# Patient Record
Sex: MALE | Race: WHITE | NOT HISPANIC OR LATINO | Employment: FULL TIME | URBAN - METROPOLITAN AREA
[De-identification: names, ages, dates, MRNs, and addresses within clinical notes are randomized per-mention and may not be internally consistent; named-entity substitution may affect disease eponyms.]

---

## 2017-06-16 ENCOUNTER — TRANSFERRED RECORDS (OUTPATIENT)
Dept: HEALTH INFORMATION MANAGEMENT | Facility: CLINIC | Age: 67
End: 2017-06-16

## 2017-06-21 ENCOUNTER — MEDICAL CORRESPONDENCE (OUTPATIENT)
Dept: HEALTH INFORMATION MANAGEMENT | Facility: CLINIC | Age: 67
End: 2017-06-21

## 2017-09-14 ENCOUNTER — TRANSFERRED RECORDS (OUTPATIENT)
Dept: HEALTH INFORMATION MANAGEMENT | Facility: CLINIC | Age: 67
End: 2017-09-14

## 2017-09-17 ASSESSMENT — ENCOUNTER SYMPTOMS
ARTHRALGIAS: 1
HYPERTENSION: 1
NUMBNESS: 1
TINGLING: 1
TREMORS: 1

## 2017-09-18 ENCOUNTER — PRE VISIT (OUTPATIENT)
Dept: NEUROLOGY | Facility: CLINIC | Age: 67
End: 2017-09-18

## 2017-09-18 NOTE — TELEPHONE ENCOUNTER
1.  Date/reason for appt:  9/28/17   DBS Consult - Tremors    2.  Referring provider:  Dr Ramirez, [facility]    3.  Call to patient (Yes / No - short description):  No referred  Received records from [facility], sent to clinic    4.  Previous care at / records requested from:  [facility]

## 2017-09-19 NOTE — TELEPHONE ENCOUNTER
Records received from [facility].   Included  Office notes: 2/27/14, 5/6/14, 9/9/14, 1/30/15, 6/12/15, 10/29/15, 2/12/16, 10/7/16, 2/3/17, 6/8/17, 9/14/17  Radiology reports: CT maxio facial 6/14/13  CT head 3/4/14  MR brain 6/16/17  Other: EMG 7/14/15

## 2017-09-25 ENCOUNTER — TRANSFERRED RECORDS (OUTPATIENT)
Dept: HEALTH INFORMATION MANAGEMENT | Facility: CLINIC | Age: 67
End: 2017-09-25

## 2017-09-28 ENCOUNTER — OFFICE VISIT (OUTPATIENT)
Dept: NEUROLOGY | Facility: CLINIC | Age: 67
End: 2017-09-28

## 2017-09-28 VITALS
WEIGHT: 293 LBS | HEART RATE: 71 BPM | HEIGHT: 74 IN | DIASTOLIC BLOOD PRESSURE: 73 MMHG | BODY MASS INDEX: 37.6 KG/M2 | SYSTOLIC BLOOD PRESSURE: 123 MMHG

## 2017-09-28 DIAGNOSIS — G25.0 ESSENTIAL TREMOR: ICD-10-CM

## 2017-09-28 DIAGNOSIS — R29.818 PARKINSONIAN FEATURES: Primary | ICD-10-CM

## 2017-09-28 RX ORDER — DOXEPIN HYDROCHLORIDE 10 MG/1
10 CAPSULE ORAL AT BEDTIME
COMMUNITY
Start: 2016-12-30 | End: 2022-08-19

## 2017-09-28 RX ORDER — FINASTERIDE 5 MG/1
TABLET, FILM COATED ORAL
COMMUNITY
Start: 2017-09-18 | End: 2019-01-11

## 2017-09-28 RX ORDER — FLUTICASONE PROPIONATE 50 MCG
SPRAY, SUSPENSION (ML) NASAL
COMMUNITY
Start: 2013-09-30 | End: 2018-05-02

## 2017-09-28 RX ORDER — CIPROFLOXACIN 500 MG/1
500 TABLET, FILM COATED ORAL
COMMUNITY
Start: 2017-08-07 | End: 2018-01-22

## 2017-09-28 RX ORDER — TOPIRAMATE 100 MG/1
TABLET, FILM COATED ORAL
COMMUNITY
Start: 2017-09-14 | End: 2019-06-21

## 2017-09-28 RX ORDER — ASPIRIN 81 MG/1
81 TABLET, CHEWABLE ORAL EVERY EVENING
Status: ON HOLD | COMMUNITY
Start: 2011-10-19 | End: 2018-05-30

## 2017-09-28 RX ORDER — ALLOPURINOL 300 MG/1
TABLET ORAL
COMMUNITY
Start: 2017-08-13 | End: 2022-08-19

## 2017-09-28 RX ORDER — GABAPENTIN 300 MG/1
300 CAPSULE ORAL
COMMUNITY
End: 2017-09-28

## 2017-09-28 RX ORDER — CARBIDOPA AND LEVODOPA 25; 100 MG/1; MG/1
TABLET ORAL
Qty: 270 TABLET | Refills: 3 | Status: SHIPPED | OUTPATIENT
Start: 2017-09-28 | End: 2017-11-13

## 2017-09-28 RX ORDER — CICLOPIROX 80 MG/ML
SOLUTION TOPICAL
COMMUNITY
Start: 2017-01-04 | End: 2018-05-15

## 2017-09-28 RX ORDER — LORAZEPAM 0.5 MG
2 TABLET ORAL 2 TIMES DAILY
Status: ON HOLD | COMMUNITY
Start: 2011-12-06 | End: 2018-05-30

## 2017-09-28 RX ORDER — CETIRIZINE HYDROCHLORIDE 5 MG/1
5 TABLET ORAL
COMMUNITY
Start: 2011-10-19 | End: 2018-01-22

## 2017-09-28 RX ORDER — MULTIVIT-MIN/IRON/FOLIC ACID/K 18-600-40
CAPSULE ORAL
COMMUNITY
Start: 2011-12-06 | End: 2018-01-22

## 2017-09-28 RX ORDER — HYDROCODONE BITARTRATE AND ACETAMINOPHEN 10; 325 MG/1; MG/1
1 TABLET ORAL EVERY 8 HOURS PRN
Status: ON HOLD | COMMUNITY
Start: 2016-12-30 | End: 2018-05-30

## 2017-09-28 RX ORDER — AMLODIPINE BESYLATE 5 MG/1
5 TABLET ORAL EVERY MORNING
COMMUNITY
Start: 2016-12-30

## 2017-09-28 RX ORDER — LIRAGLUTIDE 6 MG/ML
INJECTION SUBCUTANEOUS
COMMUNITY
Start: 2017-09-05 | End: 2023-07-14 | Stop reason: ALTCHOICE

## 2017-09-28 RX ORDER — POTASSIUM CHLORIDE 750 MG/1
CAPSULE, EXTENDED RELEASE ORAL
COMMUNITY
Start: 2017-09-09 | End: 2019-12-13

## 2017-09-28 RX ORDER — SIMVASTATIN 40 MG
40 TABLET ORAL AT BEDTIME
COMMUNITY
Start: 2016-12-30

## 2017-09-28 RX ORDER — GLIPIZIDE 10 MG/1
10 TABLET ORAL 2 TIMES DAILY
COMMUNITY
Start: 2017-08-29 | End: 2023-07-14 | Stop reason: ALTCHOICE

## 2017-09-28 RX ORDER — DIPHENOXYLATE HCL/ATROPINE 2.5-.025MG
2 TABLET ORAL 4 TIMES DAILY PRN
COMMUNITY
Start: 2017-08-07

## 2017-09-28 RX ORDER — CLOTRIMAZOLE AND BETAMETHASONE DIPROPIONATE 10; .64 MG/G; MG/G
CREAM TOPICAL
COMMUNITY
Start: 2016-06-24 | End: 2018-09-14

## 2017-09-28 ASSESSMENT — PAIN SCALES - GENERAL: PAINLEVEL: NO PAIN (0)

## 2017-09-28 NOTE — NURSING NOTE
Chief Complaint   Patient presents with     Consult     New Movement disorder- DBS assessment     Deana GEE

## 2017-09-28 NOTE — LETTER
2017       RE: Stefan Odonnell  [address]  [facility] [address]     Dear Colleague,    Thank you for referring your patient, Stefan Odonnell, to the [facility] NEUROLOGY at [facility]. Please see a copy of my visit note below.    REFERRING PROVIDER: Dr. Edmundo Ramirez    PATIENT: Stefan Odonnell    : 1950    YANELIS: 2017    REASON FOR VISIT: Evaluation of tremor.    HPI: Mr. Stefan Odonnell is a 67 year old right - male who came to the [facility] neurology clinic accompanied by his wife, Blanca, for evaluation of deep brain stimulation (DBS) surgery as referred by Dr. Edmundo Ramirez.  Pt & wife live in [place], [place] about 3 hours away from clinic.    He reports tremor started about 5 years ago in right hand.  Tremor has not responded to medication.  He has been tried on Propranolol, primidone, & gabapentin.  Currently, he is on Topiramate 100 mg TID.  He hasn't been on antiparkinsonian medications.  See Hx of symptoms below.    ROS for TREMOR: -   Tremor location:  Right hand.  He occasionally notices leg tremor when he is over-tired.   Able to hold utensils to eat: Hard to manage utensils. Learning to eat with his left hand.   Randal to cut food: His wife cut foods, as he is unable to cut due to tremor.   Exacerbating factors: Tremor gets worse with old weather & when trying to do something.   Relieving factors: Putting pressure on it.  Does alcohol relieve symptoms: Doesn't drink alcohol that much to notice a change.   Change in handwriting: Illegible.  His wife signs his name.   Family Hx of tremor: In his father & son. No official diagnosis.   Resting tremor: Yes.  Tremor is present all the time; at rest & with action.   Bradykinesia: Yes.  Rigidity: He has stiffness in legs.  Gait problem: Walks with caution.  Wife has noticed that strides are shorter.  He doesn't walk as fast as he used to.  Falls: About 3 months ago, he fell out of bed & split his right ear.  He  doesn't know what happened.  He denies having vivid dreams.  His wife reports that he doesn't talk or yell in sleep.    Dystonia: In the last 3 - 4 weeks, he has noticed intermittent cramping in his legs at night.  His toes curl up.  He has cut down salt to see if it helps.  It's unclear if it has helped.   Pain: Bilateral knees from arthritis & feet from neuropathy.  Numbness: Both feet & hands.   Tingling: Both feet & hands.    Pt has completed a [facility] Services Patient Health History, including a 14-system review. This will be scanned into Epic. He states no additional problems other than the ones mentioned above & under medical history.    DATE REVIEW: (MRI, Labs) Notes from Dr. Ramirez & Dr. Last      MEDICATIONS:  Medication Sig     allopurinol (ZYLOPRIM) 300 MG  TAKE 1 TABLET BY MOUTH DAILY     amLODIPine (NORVASC) 5 MG  Take 5 mg by mouth     aspirin 81 MG chewable  Take 81 mg by mouth     cetirizine (ZYRTEC) 5 MG  Take 5 mg by mouth     Cholecalciferol (VITAMIN D) 2000 UNITS       ciclopirox 8 % SOLN      ciprofloxacin (CIPRO) 500 MG  Take 500 mg by mouth     clotrimazole-betamethasone (LOTRISONE) cream as needed.     diphenoxylate-atropine (LOMOTIL) 2.5-0.025 MG per tablet      doxepin (SINEQUAN) 10 MG capsule Take 10 mg by mouth     insulin pen needle (EASY TOUCH PEN NEEDLES) 32G X 5 MM USE WITH INSULIN THREE TIMES DAILY     finasteride (PROSCAR) 5 MG  TAKE 1 TABLET BY MOUTH ONCE DAILY. DO NOT CRUSH.     fluticasone (FLONASE) 50 MCG/ACT spray USE TWO SPRAYS IN EACH NOSTRIL ONCE DAILY. SHAKE WELL BEFORE USE.     glipiZIDE (GLUCOTROL) 10 MG  TAKE 1 TABLET BY MOUTH TWICE DAILY BEFORE MEALS.     HYDROcodone-acetaminophen (NORCO)  MG per tablet      insulin glargine (LANTUS) 100 UNIT/ML injection Inject 27 units in morning and 24 units at night.     Misc Natural Products (TART CHERRY ADVANCED) CAPS      potassium chloride (MICRO-K) 10 MEQ CR capsule TAKE 1 CAPSULE BY MOUTH TWICE DAILY WITH  "MEALS. SWALLOW WHOLE, DO NOT CHEW. CAPSULES MAY BE OPENED AND SPRINKLED ON FOOD.     simvastatin (ZOCOR) 40 MG  Take 40 mg by mouth     topiramate (TOPAMAX) 100 MG  Do not crush.  1 PO TID; 3 Month Supply     liraglutide (VICTOZA PEN) 18 MG/3ML soln INJECT 1.8 MG UNDER THE SKIN ONE TIME A DAY       ALLERGIES: Atorvastatin; Clindamycin; and Gabapentin    PMH:  Anosmia; Basal cell carcinoma, face; Benign head tremor; Bilateral knee pain; Bleeding ulcer (1983); Carpal tunnel syndrome, bilateral; CKD (chronic kidney disease) stage 3, GFR 30-59 ml/min; Diabetes mellitus (H); Essential tremor (2002); Gout; Hyperlipidemia; Hypertension; Insomnia; Neuropathy (H); RENÉE (obstructive sleep apnea); Osteoarthritis of knees, bilateral; Periodic limb movements of sleep; and Restless legs syndrome (RLS).    PSH:  Open Stomach Ulcer Repair; Hemorrhoid surgery; colonoscopy; and tonsillectomy.    FH: Anxiety Disorder in his mother; Arthritis in his mother; Bone Cancer in his brother; DIABETES in his mother; Depression in his mother; HEART DISEASE in his brother, brother, and mother; Obesity in his sister; Prostate Cancer in his father; Tremor in his father and son.    SH: On second marriage.  Has a son from first marriage.   for 27 years.  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  Never smoked.  He rarely drinks alcohol about 2 - 3 x per year.  Doesn't use illicit drugs.       PHYSICAL EXAM:     Vital Signs:  Blood pressure 123/73, pulse 71, height 1.88 m (6' 2\"), weight 132.9 kg (293 lb). Body mass index is 37.62 kg/(m^2).  General: Mr. Odonnell is a pleasant  male who is well-groomed, well-developed and overweight sitting comfortably in the exam room without any distress. Affect is appropriate. Skin: Warm and dry. Head: Normocephalic. Tonsils are without swelling or erythemas. Buccal mucosa is pink & moist.   Lungs: CTA bilaterally without any adventitious sounds.  CV: S1, S2, with a regular heart rate & " rhythm, and no audible murmur. Abdomen: Obese, soft and non-tender.  BS are positive.  Extremities: Peripheral pulses are palpable.    Neurological Exam:   Mental Status: Raphael Cognitive Assessment (MOCA) was completed.     -- Visuospatial/executive:   3/5  -- Naming:   3/3   -- Attention:   3/6   -- Language:   3/3   -- Abstraction:   2/2   -- Delayed Recall: 1/5   -- Orientation:   3/6      Total MOCA score is 21/30 & will be scanned into Epic.    Cranial Nerve Exam: PERRLA. Visual fields are full to confrontation. EOMs are intact. No nystagmus. Facial sensation to light touch is intact. Face is symmetric. Has moderate hypomimia; lips parted some of the time. Gross hearing is intact.  Soft palate & uvula rise midline, the tongue protrudes midline. Voice shows slight loss of expression. Sternocleidomastoid and trapezius muscles are full bilaterally.    Strength: Has 5/5 strength in the upper and lower extremities and proximal & distal muscle groups.    Sensation:  Light touch & pin prick sensations in upper extremities are equal distal vs proximal as well as bilaterally. He had reduced light touch in Lt foot, intact in Rt.  Temperature, vibratory sensation, & proprioception are intact bilaterally.     Cerebellar exam: Finger-to-nose exam shows no dysmetria. Heel-to-shin exam was normal. Romberg test is negative. No pronator drift.    Reflexes: Has symmetrical 2+ reflexes in the biceps & patellar. Babinski is absent bilaterally.    Tremor/Movement Disorders Exam:     Face:  He has hypomimia with lips parted some of the time.  He has reduced eye blinking.   Rest Tremor: Mild in amplitude and persistent in Rt hand.   Action/Postural Tremor: Slight postural tremor in Lt; mild in Rt; mild action tremor bilaterally.   Rigidity: Mild to moderate in RUE; mild in LUE; absent in both LE.    Finger Taps: Mild slowing and reduction in amplitude in Rt; Normal in Lt.   Hand opening & closing: Mild slowing and reduction in  amplitude bilaterally.   Hand pronation & supination:  Mild slowing and reduction in amplitude bilaterally.   Leg Agility: Normal.   Arising from chair - arms folded across chest: Slow; or may need more than one attempt.  Posture: Slightly stooped posture.  Gait: Walks slowly, with short steps. No festination or propulsion. Reduced arm swing bilaterally; Rt > Lt. Tremor present in Rt.   Postural Stability: Normal.  Body Bradykinesia: Mild degree of slowness and poverty of movement.     ASSESSMENT/PLAN:    Mr. Odonnell is a 67 year old right - handed  male with a 5 - year history of right hand tremor. Today's exam shows bilateral postural & action tremor Rt side worse than Lt as well as rest tremor predominantly in Rt hand.  He has parkinsonian features including hypomimia, reduced eye blinking, bradykinesia, increased tone in both upper extremities Rt > Lt, slow gait & short steps & reduced arm swing Rt > Lt.       __  We discussed that possibility of having essential tremor as well as parkinson's diease.  Discussed the symptoms & treatment.  __ We discussed about DBS procedure, risks, & benefits. We informed pt the possibility of 1 - 2 % significant side effect per lead. We discussed the risks of infection, bleeding, stroke, & other potential complications. We discussed appropriate candidates for DBS. We discussed the work-up for the surgery, which includes ON/OFF motor testing, neuropsychological evaluation, and brain MRI. He has done MRI.  We have the report, but not the film, which was requested.  Questions were answered.   __ At this point, we think patient will benefit from DBS surgery. However, it would be reasonable to try Levodopa, which pt & wife agreed.  __  Rx for Sinemet 25/100 mg was given to take 1 tablet in the morning for 1 week; then 1 tablet twice daily for 1 week; then 1 tablet 3 x per day. Take pills 1 hour before meals.  If nauseated, may take it with toast, fruits, or crackers. Take  first dose shortly after you get up then 4 - 5 hours apart.  __  We recommended seeing Dr. Ramirez 2 months after he started Sinemet to evaluate effectiveness.  They were also informed to update us with benefits & side effects.  If he has no benefit & doesn't have side effect, it'd be reasonable to push the dose to 600 mg/day.  __  Will do DBS workup in 3 month, which would be the beginning of 2018 after seeing the effectiveness of Sinemet.    The total time spent with the patient was 60 minutes, and greater than 50% of this time was spent in counseling and coordination of care.  Patient was seen, examined, & discussed with Dr. Horn, who agrees with assessment and plan.    SAMARA Monreal, CNP  [facility]    Agree with assessment and diagnosis of PD, could be a component of ET however the predominant picture is that of PD. Tremor is predominately at rest and with action during movement tremor is most predominant, although still mild, with endpoint and no expression during the middle portions of the movement. Plan will be to go ahead with the DBS workup and in the interim give a trial of Sinemet to see if we can get control of his tremor. It is possible we can get improvement in PD tremor but an ET component will remain. The degree if tremor from PD versus ET is the question and while it appears most consistent with PD I cannot rule out a mild to even moderate ET component.    Again, thank you for allowing me to participate in the care of your patient.      Sincerely,    Pablo Horn MD

## 2017-09-28 NOTE — MR AVS SNAPSHOT
After Visit Summary   9/28/2017    Stefan Odonnell    MRN: [id number]           Patient Information     Date Of Birth          1950        Visit Information        Provider Department      9/28/2017 8:00 AM Pablo Horn MD [facility] Neurology        Today's Diagnoses     Parkinsonian features    -  1    Essential tremor          Care Instructions    September 28, 2017    Dear Mr. Stefan Odonnell,    Thank you for coming today.  During your visit, we have discussed the following:     __  Today's exam shows in addition to essential tremor, you have some parkinsonian features including rest tremor, stiffness mostly in right arm, but also some in left arm, reduced arm swing when walking, generalized slowness.   __  It would be reasonable to try parkinsonian medication called, Sinemet (Carbidopa/Levodopa) 25/100 mg  Take 1 tablet in the morning for 1 week; then 1 tablet twice daily for 1 week; then 1 tablet 3 x per day. Take pills 1 hour before meals.  If nauseated, may take it with toast, fruits, or crackers. Take first dose shortly after you get up then 4 - 5 hours apart.  __  Send [employer] message to update us on effectiveness.  __  We discussed about DBS procedure, risks, & benefits.  There is a possibility of 1 - 2 % significant side effect per lead including infection, bleeding, stroke, & other potential complications.  The work-up for the surgery are motor testing with videotaping, neuropsychological evaluation, and brain MRI.    __  We'll have Madeline Perez RN contact you to schedule the DBS work up in 3 months.   We can change this depending on your response to Sinemet as needed.   __  At this point, we think that you will benefit from DBS surgery.  However, it would be reasonable to try Sinemet first.  __  Return to see your neurologist, Dr. Ramirez, in 2 months after you started Sinemet to evaluate effectiveness of Sinemet.        For questions, you may send us a [employer] message or call  "[phone]    Fax number: [phone]    SAMARA Monreal, CNP  [facility]            Follow-ups after your visit        Who to contact     Please call your clinic at [phone] to:    Ask questions about your health    Make or cancel appointments    Discuss your medicines    Learn about your test results    Speak to your doctor   If you have compliments or concerns about an experience at your clinic, or if you wish to file a complaint, please contact [facility] Patient Relations at [phone] or email us at [email or web address]         Additional Information About Your Visit        [employer] Information     [email or web address] gives you secure access to your electronic health record. If you see a primary care provider, you can also send messages to your care team and make appointments. If you have questions, please call your primary care clinic.  If you do not have a primary care provider, please call [phone] and they will assist you.      [email or web address] is an electronic gateway that provides easy, online access to your medical records. With [email or web address], you can request a clinic appointment, read your test results, renew a prescription or communicate with your care team.     To access your existing account, please contact your [facility] or call [phone] for assistance.        Care EveryWhere ID     This is your Care EveryWhere ID. This could be used by other organizations to access your [facility] medical records  [id number]        Your Vitals Were     Pulse Height BMI (Body Mass Index)             71 1.88 m (6' 2\") 37.62 kg/m2          Blood Pressure from Last 3 Encounters:   09/28/17 123/73    Weight from Last 3 Encounters:   09/28/17 132.9 kg (293 lb)              Today, you had the following     No orders found for display         Today's Medication Changes          These changes are accurate as of: 9/28/17 10:16 AM.  If you have any " questions, ask your nurse or doctor.               Start taking these medicines.        Dose/Directions    carbidopa-levodopa  MG per tablet   Commonly known as:  SINEMET   Used for:  Parkinsonian features   Started by:  Pablo Horn MD        Take 1 tablet in the morning for 1 week; then 1 tablet twice daily for 1 week; then 1 tablet 3 x per day. Take pills 1 hour before meals.  If nauseated, may take it with toast, fruits, or crackers. Take first dose shortly after you get up then 4 - 5 hours apart.   Quantity:  270 tablet   Refills:  3            Where to get your medicines      Some of these will need a paper prescription and others can be bought over the counter.  Ask your nurse if you have questions.     Bring a paper prescription for each of these medications     carbidopa-levodopa  MG per tablet                Primary Care Provider Office Phone # Fax #    Dorian Last [phone]       [facility] [address]  [facility] [address]        Equal Access to Services     [facility] Hospital[facility] AH: Hadii aad ku hadasho Soomaali, waaxda luqadaha, qaybta kaalmada adeegyada, waxay malickin haybryanna dumont . So [facility] [phone].    ATENCIÓN: Si habla español, tiene a newman disposición servicios gratuitos de asistencia lingüística. Llame al [phone].    We comply with applicable federal civil rights laws and [place] laws. We do not discriminate on the basis of race, color, national origin, age, disability sex, sexual orientation or gender identity.            Thank you!     Thank you for choosing [facility] NEUROLOGY  for your care. Our goal is always to provide you with excellent care. Hearing back from our patients is one way we can continue to improve our services. Please take a few minutes to complete the written survey that you may receive in the mail after your visit with us. Thank you!             Your Updated Medication List - Protect others around you:  Learn how to safely use, store and throw away your medicines at www.disposemymeds.org.          This list is accurate as of: 9/28/17 10:16 AM.  Always use your most recent med list.                   Brand Name Dispense Instructions for use Diagnosis    allopurinol 300 MG tablet    ZYLOPRIM     TAKE 1 TABLET BY MOUTH DAILY        amLODIPine 5 MG tablet    NORVASC     Take 5 mg by mouth        aspirin 81 MG chewable tablet      Take 81 mg by mouth        VITA CONTOUR NEXT test strip   Generic drug:  blood glucose monitoring      USE TO TEST BLOOD SUGARS 3 TIMES DAILY        blood glucose monitoring lancets      Test Blood Sugar 3 Times Daily.  [id number]        blood glucose monitoring meter device kit           carbidopa-levodopa  MG per tablet    SINEMET    270 tablet    Take 1 tablet in the morning for 1 week; then 1 tablet twice daily for 1 week; then 1 tablet 3 x per day. Take pills 1 hour before meals.  If nauseated, may take it with toast, fruits, or crackers. Take first dose shortly after you get up then 4 - 5 hours apart.    Parkinsonian features       cetirizine 5 MG tablet    zyrTEC     Take 5 mg by mouth        ciclopirox 8 % Soln           ciprofloxacin 500 MG tablet    CIPRO     Take 500 mg by mouth        clotrimazole-betamethasone cream    LOTRISONE     as needed.        diphenoxylate-atropine 2.5-0.025 MG per tablet    LOMOTIL          doxepin 10 MG capsule    SINEquan     Take 10 mg by mouth        * insulin pen needle 32G X 4 MM      1 each        * EASY TOUCH PEN NEEDLES 32G X 5 MM   Generic drug:  insulin pen needle      USE WITH INSULIN THREE TIMES DAILY        finasteride 5 MG tablet    PROSCAR     TAKE 1 TABLET BY MOUTH ONCE DAILY. DO NOT CRUSH.        fluticasone 50 MCG/ACT spray    FLONASE     USE TWO SPRAYS IN EACH NOSTRIL ONCE DAILY. SHAKE WELL BEFORE USE.        glipiZIDE 10 MG tablet    GLUCOTROL     TAKE 1 TABLET BY MOUTH TWICE DAILY BEFORE MEALS.        HYDROcodone-acetaminophen   MG per tablet    NORCO          insulin glargine 100 UNIT/ML injection    LANTUS     Inject 27 units in morning and 24 units at night.        potassium chloride 10 MEQ CR capsule    MICRO-K     TAKE 1 CAPSULE BY MOUTH TWICE DAILY WITH MEALS. SWALLOW WHOLE, DO NOT CHEW. CAPSULES MAY BE OPENED AND SPRINKLED ON FOOD.        simvastatin 40 MG tablet    ZOCOR     Take 40 mg by mouth        TART CHERRY ADVANCED Caps           topiramate 100 MG tablet    TOPAMAX     Do not crush.  1 PO TID; 3 Month Supply        VICTOZA PEN 18 MG/3ML soln   Generic drug:  liraglutide      INJECT 1.8 MG UNDER THE SKIN ONE TIME A DAY        vitamin D 2000 UNITS Caps           * Notice:  This list has 2 medication(s) that are the same as other medications prescribed for you. Read the directions carefully, and ask your doctor or other care provider to review them with you.

## 2017-09-28 NOTE — PROGRESS NOTES
REFERRING PROVIDER: Dr. Edmundo Ramirez    PATIENT: Stefan Odonnell    : 1950    YANELIS: 2017    REASON FOR VISIT: Evaluation of tremor.    HPI: Mr. Stefan Odonnell is a 67 year old right - male who came to the [facility] neurology clinic accompanied by his wife, Blanca, for evaluation of deep brain stimulation (DBS) surgery as referred by Dr. Edmundo Ramirez.  Pt & wife live in [place], [place] about 3 hours away from clinic.    He reports tremor started about 5 years ago in right hand.  Tremor has not responded to medication.  He has been tried on Propranolol, primidone, & gabapentin.  Currently, he is on Topiramate 100 mg TID.  He hasn't been on antiparkinsonian medications.  See Hx of symptoms below.    ROS for TREMOR: -   Tremor location:  Right hand.  He occasionally notices leg tremor when he is over-tired.   Able to hold utensils to eat: Hard to manage utensils. Learning to eat with his left hand.   Randal to cut food: His wife cut foods, as he is unable to cut due to tremor.   Exacerbating factors: Tremor gets worse with old weather & when trying to do something.   Relieving factors: Putting pressure on it.  Does alcohol relieve symptoms: Doesn't drink alcohol that much to notice a change.   Change in handwriting: Illegible.  His wife signs his name.   Family Hx of tremor: In his father & son. No official diagnosis.   Resting tremor: Yes.  Tremor is present all the time; at rest & with action.   Bradykinesia: Yes.  Rigidity: He has stiffness in legs.  Gait problem: Walks with caution.  Wife has noticed that strides are shorter.  He doesn't walk as fast as he used to.  Falls: About 3 months ago, he fell out of bed & split his right ear.  He doesn't know what happened.  He denies having vivid dreams.  His wife reports that he doesn't talk or yell in sleep.    Dystonia: In the last 3 - 4 weeks, he has noticed intermittent cramping in his legs at night.  His toes curl up.  He has cut down salt to see if it helps.   It's unclear if it has helped.   Pain: Bilateral knees from arthritis & feet from neuropathy.  Numbness: Both feet & hands.   Tingling: Both feet & hands.    Pt has completed a Neuroscience Services Patient Health History, including a 14-system review. This will be scanned into Epic. He states no additional problems other than the ones mentioned above & under medical history.    DATE REVIEW: (MRI, Labs) Notes from Dr. Ramirez & Dr. Last      Answers for HPI/ROS submitted by the patient on 9/17/2017   General Symptoms: No  Skin Symptoms: No  HENT Symptoms: No  EYE SYMPTOMS: No  HEART SYMPTOMS: Yes  LUNG SYMPTOMS: No  INTESTINAL SYMPTOMS: No  URINARY SYMPTOMS: No  REPRODUCTIVE SYMPTOMS: No  SKELETAL SYMPTOMS: Yes  BLOOD SYMPTOMS: No  NERVOUS SYSTEM SYMPTOMS: Yes  MENTAL HEALTH SYMPTOMS: No  High blood pressure: Yes  Joint pain: Yes  Tingling: Yes  Tremor: Yes  Numbness: Yes      MEDICATIONS:  Medication Sig     allopurinol (ZYLOPRIM) 300 MG  TAKE 1 TABLET BY MOUTH DAILY     amLODIPine (NORVASC) 5 MG  Take 5 mg by mouth     aspirin 81 MG chewable  Take 81 mg by mouth     cetirizine (ZYRTEC) 5 MG  Take 5 mg by mouth     Cholecalciferol (VITAMIN D) 2000 UNITS       ciclopirox 8 % SOLN      ciprofloxacin (CIPRO) 500 MG  Take 500 mg by mouth     clotrimazole-betamethasone (LOTRISONE) cream as needed.     diphenoxylate-atropine (LOMOTIL) 2.5-0.025 MG per tablet      doxepin (SINEQUAN) 10 MG capsule Take 10 mg by mouth     insulin pen needle (EASY TOUCH PEN NEEDLES) 32G X 5 MM USE WITH INSULIN THREE TIMES DAILY     finasteride (PROSCAR) 5 MG  TAKE 1 TABLET BY MOUTH ONCE DAILY. DO NOT CRUSH.     fluticasone (FLONASE) 50 MCG/ACT spray USE TWO SPRAYS IN EACH NOSTRIL ONCE DAILY. SHAKE WELL BEFORE USE.     glipiZIDE (GLUCOTROL) 10 MG  TAKE 1 TABLET BY MOUTH TWICE DAILY BEFORE MEALS.     HYDROcodone-acetaminophen (NORCO)  MG per tablet      insulin glargine (LANTUS) 100 UNIT/ML injection Inject 27 units in morning and 24  "units at night.     [facility] (TART CHERRY ADVANCED) CAPS      potassium chloride (MICRO-K) 10 MEQ CR capsule TAKE 1 CAPSULE BY MOUTH TWICE DAILY WITH MEALS. SWALLOW WHOLE, DO NOT CHEW. CAPSULES MAY BE OPENED AND SPRINKLED ON FOOD.     simvastatin (ZOCOR) 40 MG  Take 40 mg by mouth     topiramate (TOPAMAX) 100 MG  Do not crush.  1 PO TID; 3 Month Supply     liraglutide (VICTOZA PEN) 18 MG/3ML soln INJECT 1.8 MG UNDER THE SKIN ONE TIME A DAY       ALLERGIES: Atorvastatin; Clindamycin; and Gabapentin    PMH:  Anosmia; Basal cell carcinoma, face; Benign head tremor; Bilateral knee pain; Bleeding ulcer (1983); Carpal tunnel syndrome, bilateral; CKD (chronic kidney disease) stage 3, GFR 30-59 ml/min; Diabetes mellitus (H); Essential tremor (2002); Gout; Hyperlipidemia; Hypertension; Insomnia; Neuropathy (H); RENÉE (obstructive sleep apnea); Osteoarthritis of knees, bilateral; Periodic limb movements of sleep; and Restless legs syndrome (RLS).    PSH:  Open Stomach Ulcer Repair; Hemorrhoid surgery; colonoscopy; and tonsillectomy.    FH: Anxiety Disorder in his mother; Arthritis in his mother; Bone Cancer in his brother; DIABETES in his mother; Depression in his mother; HEART DISEASE in his brother, brother, and mother; Obesity in his sister; Prostate Cancer in his father; Tremor in his father and son.    SH: On second marriage.  Has a son from first marriage.   for 27 years.  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  Never smoked.  He rarely drinks alcohol about 2 - 3 x per year.  Doesn't use illicit drugs.       PHYSICAL EXAM:     Vital Signs:  Blood pressure 123/73, pulse 71, height 1.88 m (6' 2\"), weight 132.9 kg (293 lb). Body mass index is 37.62 kg/(m^2).  General: Mr. Odonnell is a pleasant  male who is well-groomed, well-developed and overweight sitting comfortably in the exam room without any distress. Affect is appropriate. Skin: Warm and dry. Head: Normocephalic. Tonsils are " without swelling or erythemas. Buccal mucosa is pink & moist.   Lungs: CTA bilaterally without any adventitious sounds.  CV: S1, S2, with a regular heart rate & rhythm, and no audible murmur. Abdomen: Obese, soft and non-tender.  BS are positive.  Extremities: Peripheral pulses are palpable.    Neurological Exam:   Mental Status: Raphael Cognitive Assessment (MOCA) was completed.     -- Visuospatial/executive:   3/5  -- Naming:   3/3   -- Attention:   3/6   -- Language:   3/3   -- Abstraction:   2/2   -- Delayed Recall: 1/5   -- Orientation:   3/6      Total MOCA score is 21/30 & will be scanned into Epic.    Cranial Nerve Exam: PERRLA. Visual fields are full to confrontation. EOMs are intact. No nystagmus. Facial sensation to light touch is intact. Face is symmetric. Has moderate hypomimia; lips parted some of the time. Gross hearing is intact.  Soft palate & uvula rise midline, the tongue protrudes midline. Voice shows slight loss of expression. Sternocleidomastoid and trapezius muscles are full bilaterally.    Strength: Has 5/5 strength in the upper and lower extremities and proximal & distal muscle groups.    Sensation:  Light touch & pin prick sensations in upper extremities are equal distal vs proximal as well as bilaterally. He had reduced light touch in Lt foot, intact in Rt.  Temperature, vibratory sensation, & proprioception are intact bilaterally.     Cerebellar exam: Finger-to-nose exam shows no dysmetria. Heel-to-shin exam was normal. Romberg test is negative. No pronator drift.    Reflexes: Has symmetrical 2+ reflexes in the biceps & patellar. Babinski is absent bilaterally.    Tremor/Movement Disorders Exam:     Face:  He has hypomimia with lips parted some of the time.  He has reduced eye blinking.   Rest Tremor: Mild in amplitude and persistent in Rt hand.   Action/Postural Tremor: Slight postural tremor in Lt; mild in Rt; mild action tremor bilaterally.   Rigidity: Mild to moderate in RUE; mild in  LUE; absent in both LE.    Finger Taps: Mild slowing and reduction in amplitude in Rt; Normal in Lt.   Hand opening & closing: Mild slowing and reduction in amplitude bilaterally.   Hand pronation & supination:  Mild slowing and reduction in amplitude bilaterally.   Leg Agility: Normal.   Arising from chair - arms folded across chest: Slow; or may need more than one attempt.  Posture: Slightly stooped posture.  Gait: Walks slowly, with short steps. No festination or propulsion. Reduced arm swing bilaterally; Rt > Lt. Tremor present in Rt.   Postural Stability: Normal.  Body Bradykinesia: Mild degree of slowness and poverty of movement.     ASSESSMENT/PLAN:    Mr. Odonnell is a 67 year old right - handed  male with a 5 - year history of right hand tremor. Today's exam shows bilateral postural & action tremor Rt side worse than Lt as well as rest tremor predominantly in Rt hand.  He has parkinsonian features including hypomimia, reduced eye blinking, bradykinesia, increased tone in both upper extremities Rt > Lt, slow gait & short steps & reduced arm swing Rt > Lt.       __  We discussed that possibility of having essential tremor as well as parkinson's diease.  Discussed the symptoms & treatment.  __ We discussed about DBS procedure, risks, & benefits. We informed pt the possibility of 1 - 2 % significant side effect per lead. We discussed the risks of infection, bleeding, stroke, & other potential complications. We discussed appropriate candidates for DBS. We discussed the work-up for the surgery, which includes ON/OFF motor testing, neuropsychological evaluation, and brain MRI. He has done MRI.  We have the report, but not the film, which was requested.  Questions were answered.   __ At this point, we think patient will benefit from DBS surgery. However, it would be reasonable to try Levodopa, which pt & wife agreed.  __  Rx for Sinemet 25/100 mg was given to take 1 tablet in the morning for 1 week; then 1  tablet twice daily for 1 week; then 1 tablet 3 x per day. Take pills 1 hour before meals.  If nauseated, may take it with toast, fruits, or crackers. Take first dose shortly after you get up then 4 - 5 hours apart.  __  We recommended seeing Dr. Ramirez 2 months after he started Sinemet to evaluate effectiveness.  They were also informed to update us with benefits & side effects.  If he has no benefit & doesn't have side effect, it'd be reasonable to push the dose to 600 mg/day.  __  Will do DBS workup in 3 month, which would be the beginning of 2018 after seeing the effectiveness of Sinemet.    The total time spent with the patient was 60 minutes, and greater than 50% of this time was spent in counseling and coordination of care.  Patient was seen, examined, & discussed with Dr. Horn, who agrees with assessment and plan.    SAMARA Monreal, [facility]    Agree with assessment and diagnosis of PD, could be a component of ET however the predominant picture is that of PD. Tremor is predominately at rest and with action during movement tremor is most predominant, although still mild, with endpoint and no expression during the middle portions of the movement. Plan will be to go ahead with the DBS workup and in the interim give a trial of Sinemet to see if we can get control of his tremor. It is possible we can get improvement in PD tremor but an ET component will remain. The degree if tremor from PD versus ET is the question and while it appears most consistent with PD I cannot rule out a mild to even moderate ET component.    Pablo Horn MD, PhD

## 2017-09-28 NOTE — PATIENT INSTRUCTIONS
September 28, 2017    Dear  Stefan Ngoison,    Thank you for coming today.  During your visit, we have discussed the following:     __  Today's exam shows in addition to essential tremor, you have some parkinsonian features including rest tremor, stiffness mostly in right arm, but also some in left arm, reduced arm swing when walking, generalized slowness.   __  It would be reasonable to try parkinsonian medication called, Sinemet (Carbidopa/Levodopa) 25/100 mg  Take 1 tablet in the morning for 1 week; then 1 tablet twice daily for 1 week; then 1 tablet 3 x per day. Take pills 1 hour before meals.  If nauseated, may take it with toast, fruits, or crackers. Take first dose shortly after you get up then 4 - 5 hours apart.  __  Send [employer] message to update us on effectiveness.  __  We discussed about DBS procedure, risks, & benefits.  There is a possibility of 1 - 2 % significant side effect per lead including infection, bleeding, stroke, & other potential complications.  The work-up for the surgery are motor testing with videotaping, neuropsychological evaluation, and brain MRI.    __  We'll have Madeline Perez RN contact you to schedule the DBS work up in 3 months.   We can change this depending on your response to Sinemet as needed.   __  At this point, we think that you will benefit from DBS surgery.  However, it would be reasonable to try Sinemet first.  __  Return to see your neurologist, Dr. Ramirez, in 2 months after you started Sinemet to evaluate effectiveness of Sinemet.        For questions, you may send us a [employer] message or call [phone]    Fax number: [phone]    SAMARA Monreal, CNP  [facility]

## 2017-10-04 ENCOUNTER — TELEPHONE (OUTPATIENT)
Dept: NEUROSURGERY | Facility: CLINIC | Age: 67
End: 2017-10-04

## 2017-10-04 DIAGNOSIS — G20.A1 PARKINSON'S DISEASE (H): ICD-10-CM

## 2017-10-04 DIAGNOSIS — G25.0 ESSENTIAL TREMOR: Primary | ICD-10-CM

## 2017-10-04 NOTE — TELEPHONE ENCOUNTER
Spoke with pt's spouse Blanca about DBS workup appointments in January. I let her know that I can schedule all 4 appointments over 2 consecutive days on January 22 and 23rd. This will work well for them. I will send a letter with all pertinent scheduling information once everything is set up. No further questions at this time. I gave Blanca my direct phone number should there be any questions or concerns prior to the January appointments.

## 2017-11-13 DIAGNOSIS — R29.818 PARKINSONIAN FEATURES: ICD-10-CM

## 2017-11-13 RX ORDER — CARBIDOPA AND LEVODOPA 25; 100 MG/1; MG/1
TABLET ORAL
Qty: 540 TABLET | Refills: 3 | Status: SHIPPED | OUTPATIENT
Start: 2017-11-13 | End: 2018-01-24

## 2017-12-14 ENCOUNTER — TRANSFERRED RECORDS (OUTPATIENT)
Dept: HEALTH INFORMATION MANAGEMENT | Facility: CLINIC | Age: 67
End: 2017-12-14

## 2018-01-08 ASSESSMENT — MOVEMENT DISORDERS SOCIETY - UNIFIED PARKINSONS DISEASE RATING SCALE (MDS-UPDRS)
HYGIENE: SLIGHT:  I AM SLOW BUT I DO NOT NEED ANY HELP.
EATING_TASKS: MODERATE: I NEED HELP WITH MANY EATING TASKS BUT CAN MANAGE SOME ALONE.
GETTING_OUT_OF_BED_CAR_DEEP_CHAIR: NORMAL: NOT AT ALL (NO PROBLEMS).
CHEWING_AND_SWALLOWING: NORMAL: NO PROBLEMS.
SALIVA_AND_DROOLING: NORMAL: NOT AT ALL (NO PROBLEMS).
SPEECH: NORMAL: NOT AT ALL (NO PROBLEMS).
HANDWRITING: SEVERE: MOST OR ALL WORDS CANNOT BE READ.
TOTAL_SCORE: 17
TURNING_IN_BED: NORMAL: NOT AT ALL (NO PROBLEMS).
HOBBIES_AND_OTHER_ACTIVITIES: MODERATE: I HAVE MAJOR PROBLEMS DOING THESE ACTIVITIES, BUT STILL DO MOST.
TREMOR: SEVERE: SHAKING OR TEMORS CAUSES PROBLEMS WITH MOST OR ALL ACTIVITES.
WALKING_AND_BALANCE: NORMAL: NOT AT ALL (NO PROBLEMS).
DRESSING: MILD: I AM SLOW AND NEED HELP FOR A FEW DRESSING TASKS (BUTTONS, BRACELETS).
FREEZING: NORMAL: NOT AT ALL (NO PROBLEMS).

## 2018-01-17 ASSESSMENT — ENCOUNTER SYMPTOMS
ARTHRALGIAS: 1
JOINT SWELLING: 1
BACK PAIN: 0
MYALGIAS: 1
MUSCLE CRAMPS: 0
NECK PAIN: 0
STIFFNESS: 1
MUSCLE WEAKNESS: 0

## 2018-01-22 ENCOUNTER — OFFICE VISIT (OUTPATIENT)
Dept: NEUROLOGY | Facility: CLINIC | Age: 68
End: 2018-01-22
Payer: COMMERCIAL

## 2018-01-22 ENCOUNTER — OFFICE VISIT (OUTPATIENT)
Dept: NEUROSURGERY | Facility: CLINIC | Age: 68
End: 2018-01-22
Payer: COMMERCIAL

## 2018-01-22 VITALS
HEART RATE: 87 BPM | BODY MASS INDEX: 37.52 KG/M2 | SYSTOLIC BLOOD PRESSURE: 114 MMHG | DIASTOLIC BLOOD PRESSURE: 79 MMHG | WEIGHT: 292.33 LBS | RESPIRATION RATE: 18 BRPM | OXYGEN SATURATION: 92 % | HEIGHT: 74 IN

## 2018-01-22 VITALS
WEIGHT: 292.3 LBS | DIASTOLIC BLOOD PRESSURE: 79 MMHG | OXYGEN SATURATION: 92 % | HEART RATE: 87 BPM | HEIGHT: 74 IN | BODY MASS INDEX: 37.51 KG/M2 | SYSTOLIC BLOOD PRESSURE: 114 MMHG

## 2018-01-22 DIAGNOSIS — R29.818 PARKINSONIAN FEATURES: ICD-10-CM

## 2018-01-22 DIAGNOSIS — G25.0 ESSENTIAL TREMOR: Primary | ICD-10-CM

## 2018-01-22 DIAGNOSIS — G20.A1 PARKINSON'S DISEASE (H): ICD-10-CM

## 2018-01-22 ASSESSMENT — UNIFIED PARKINSONS DISEASE RATING SCALE (UPDRS)
ARISING_CHAIR: NORMAL: ABLE TO ARISE QUICKLY WITHOUT HESITATION.
SPONTANEITY_OF_MOVEMENT: 1: SLIGHT: SLIGHT GLOBAL SLOWNESS AND POVERTY OF SPONTANEOUS MOVEMENTS.
AXIAL_SCORE: 8
SPEECH: SLIGHT: LOSS OF MODULATION, DICTION OR VOLUME, BUT STILL ALL WORDS EASY TO UNDERSTAND.
RIGIDITY_RUE: SLIGHT: RIGIDITY ONLY DETECTED WITH ACTIVATION MANEUVER.
LEG_AGILITY_RIGHT: SLIGHT: ANY OF THE FOLLOWING: A) THE REGULAR RHYTHM IS BROKEN WITH ONE WITH ONE OR TWO INTERRUPTIONS OR HESITATIONS OF THE MOVEMENT B) SLIGHT SLOWING C) THE AMPLITUDE DECREMENTS NEAR THE END OF THE 10 MOVEMENTS.
TOTAL_SCORE: 19
TOETAPPING_RIGHT: NORMAL
LEG_AGILITY_RIGHT: NORMAL
PARKINSONS_MEDS: ON
PRONATION_SUPINATION_LEFT: NORMAL
ARISING_CHAIR: SLIGHT: ARISING IS SLOWER THAN NORMAL, OR MAY NEED MORE THAN ONE ATTEMPT, OR MAY NEED TO MOVE FORWARD IN THE CHAIR TO ARISE.  NO NEED TO USE THE ARMS OF THE CHAIR.
TOTAL_SCORE: 18
AMPLITUDE_LLE: NORMAL: NO TREMOR.
HANDMOVEMENTS_RIGHT: MILD: ANY OF THE FOLLOWING: A) 3 TO 5 INTERRUPTIONS DURING TAPPING B) MILD SLOWING C) THE AMPLITUDE DECREMENTS MIDWAY IN THE 10-MOVEMENT SEQUENCE
TOETAPPING_LEFT: NORMAL
PARKINSONS_MEDS: OFF
RIGIDITY_RUE: SLIGHT: RIGIDITY ONLY DETECTED WITH ACTIVATION MANEUVER.
RIGIDITY_LLE: NORMAL
AMPLITUDE_RUE: NORMAL: NO TREMOR.
FACIAL_EXPRESSION: MILD: IN ADDITION TO DECREASED EYE-BLINK FREQUENCY, MASKED FACIES PRESENT IN THE LOWER FACE AS WELL, NAMELY FEWER MOVEMENTS AROUND THE MOUTH, SUCH AS LESS SPONTANEOUS SMILING, BUT LIPS NOT PARTED.
FINGER_TAPPING_RIGHT: SLIGHT: ANY OF THE FOLLOWING: A) THE REGULAR RHYTHM IS BROKEN WITH ONE WITH ONE OR TWO INTERRUPTIONS OR HESITATIONS OF THE MOVEMENT B) SLIGHT SLOWING C) THE AMPLITUDE DECREMENTS NEAR THE END OF THE 10 MOVEMENTS.
SPONTANEITY_OF_MOVEMENT: 1: SLIGHT: SLIGHT GLOBAL SLOWNESS AND POVERTY OF SPONTANEOUS MOVEMENTS.
AMPLITUDE_LUE: NORMAL: NO TREMOR.
AMPLITUDE_LUE: NORMAL: NO TREMOR.
RIGIDITY_RLE: NORMAL
POSTURE: 1 SLIGHT.  NOT QUITE ERECT BUT COULD BE NORMAL FOR OLDER PERSONS.
AMPLITUDE_LLE: NORMAL: NO TREMOR.
AMPLITUDE_LIP_JAW: NORMAL: NO TREMOR.
FREEZING_GAIT: NORMAL
RIGIDITY_NECK: SLIGHT: RIGIDITY ONLY DETECTED WITH ACTIVATION MANEUVER.
FINGER_TAPPING_LEFT: NORMAL
TOETAPPING_RIGHT: NORMAL
RIGIDITY_LUE: NORMAL
TOETAPPING_LEFT: SLIGHT: ANY OF THE FOLLOWING: A) THE REGULAR RHYTHM IS BROKEN WITH ONE WITH ONE OR TWO INTERRUPTIONS OR HESITATIONS OF THE MOVEMENT B) SLIGHT SLOWING C) THE AMPLITUDE DECREMENTS NEAR THE END OF THE 10 MOVEMENTS.
AMPLITUDE_RUE: NORMAL: NO TREMOR.
POSTURE: 1 SLIGHT.  NOT QUITE ERECT BUT COULD BE NORMAL FOR OLDER PERSONS.
FINGER_TAPPING_LEFT: NORMAL
RIGIDITY_RLE: NORMAL
AMPLITUDE_RLE: NORMAL: NO TREMOR.
TOTAL_SCORE: 8
LEG_AGILITY_LEFT: NORMAL
FREEZING_GAIT: NORMAL
PRONATION_SUPINATION_LEFT: NORMAL
FINGER_TAPPING_RIGHT: SLIGHT: ANY OF THE FOLLOWING: A) THE REGULAR RHYTHM IS BROKEN WITH ONE WITH ONE OR TWO INTERRUPTIONS OR HESITATIONS OF THE MOVEMENT B) SLIGHT SLOWING C) THE AMPLITUDE DECREMENTS NEAR THE END OF THE 10 MOVEMENTS.
SPEECH: SLIGHT: LOSS OF MODULATION, DICTION OR VOLUME, BUT STILL ALL WORDS EASY TO UNDERSTAND.
TOTAL_SCORE_LEFT: 3
PRONATION_SUPINATION_RIGHT: NORMAL
GAIT: SLIGHT: INDEPENDENT WALKING WITH MINOR GAIT IMPAIRMENT.
AMPLITUDE_RLE: NORMAL: NO TREMOR.
HANDMOVEMENTS_LEFT: NORMAL
RIGIDITY_LUE: NORMAL
TOTAL_SCORE: 9
PRONATION_SUPINATION_RIGHT: NORMAL
POSTURAL_STABILITY: NORMAL:  RECOVERS WITH ONE OR TWO STEPS.
RIGIDITY_LLE: NORMAL
LEG_AGILITY_LEFT: NORMAL
HANDMOVEMENTS_RIGHT: MILD: ANY OF THE FOLLOWING: A) 3 TO 5 INTERRUPTIONS DURING TAPPING B) MILD SLOWING C) THE AMPLITUDE DECREMENTS MIDWAY IN THE 10-MOVEMENT SEQUENCE
AXIAL_SCORE: 7
FACIAL_EXPRESSION: MILD: IN ADDITION TO DECREASED EYE-BLINK FREQUENCY, MASKED FACIES PRESENT IN THE LOWER FACE AS WELL, NAMELY FEWER MOVEMENTS AROUND THE MOUTH, SUCH AS LESS SPONTANEOUS SMILING, BUT LIPS NOT PARTED.
HANDMOVEMENTS_LEFT: NORMAL
TOTAL_SCORE_LEFT: 2
RIGIDITY_NECK: SLIGHT: RIGIDITY ONLY DETECTED WITH ACTIVATION MANEUVER.
CONSTANCY_TREMOR_ATREST: NORMAL: NO TREMOR.
CONSTANCY_TREMOR_ATREST: NORMAL: NO TREMOR.
GAIT: SLIGHT: INDEPENDENT WALKING WITH MINOR GAIT IMPAIRMENT.
AMPLITUDE_LIP_JAW: NORMAL: NO TREMOR.
POSTURAL_STABILITY: NORMAL:  RECOVERS WITH ONE OR TWO STEPS.

## 2018-01-22 ASSESSMENT — PAIN SCALES - GENERAL
PAINLEVEL: NO PAIN (0)
PAINLEVEL: NO PAIN (0)

## 2018-01-22 NOTE — MR AVS SNAPSHOT
After Visit Summary   1/22/2018    Stefan Odonnell    MRN: [id number]           Patient Information     Date Of Birth          1950        Visit Information        Provider Department      1/22/2018 10:00 AM Wayne Marshall MD [facility] Neurosurgery        Today's Diagnoses     Essential tremor    -  1    Parkinson's disease (H)           Follow-ups after your visit        Your next 10 appointments already scheduled     Jan 23, 2018  8:00 AM CST   (Arrive by 7:45 AM)   Neuropsych Eval with Edwina Goldstein, PhD [facility] Neuropsychology ([facility])    [address]  3rd Floor  [place] [id number] [phone]            Jan 23, 2018  1:30 PM CST   MR BRAIN W/O & W CONTRAST with [id number]   [facility] ([facility])    [address] [address]   [phone]           Take your medicines as usual, unless your doctor tells you not to. Bring a list of your current medicines to your exam (including vitamins, minerals and over-the-counter drugs).  You will be given intravenous contrast for this exam. To prepare:   The day before your exam, drink extra fluids at least six 8-ounce glasses (unless your doctor tells you to restrict your fluids).   Have a blood test (creatinine test) within 30 days of your exam. Go to your clinic or Diagnostic Imaging Department for this test.  The MRI machine uses a strong magnet. Please wear clothes without metal (snaps, zippers). A sweatsuit works well, or we may give you a hospital gown.  Please remove any body piercings and hair extensions before you arrive. You will also remove watches, jewelry, hairpins, wallets, dentures, partial dental plates and hearing aids. You may wear contact lenses, and you may be able to wear your rings. We have a safe place to keep your personal items, but it is safer to leave them at home.   **IMPORTANT** THE INSTRUCTIONS BELOW ARE  ONLY FOR THOSE PATIENTS WHO HAVE BEEN TOLD THEY WILL RECEIVE SEDATION OR GENERAL ANESTHESIA DURING THEIR MRI PROCEDURE:  IF YOU WILL RECEIVE SEDATION (take medicine to help you relax during your exam):   You must get the medicine from your doctor before you arrive. Bring the medicine to the exam. Do not take it at home.   Arrive one hour early. Bring someone who can take you home after the test. Your medicine will make you sleepy. After the exam, you may not drive, take a bus or take a taxi by yourself.   No eating 8 hours before your exam. You may have clear liquids up until 4 hours before your exam. (Clear liquids include water, clear tea, black coffee and fruit juice without pulp.)  IF YOU WILL RECEIVE ANESTHESIA (be asleep for your exam):   Arrive 1 1/2 hours early. Bring someone who can take you home after the test. You may not drive, take a bus or take a taxi by yourself.   No eating 8 hours before your exam. You may have clear liquids up until 4 hours before your exam. (Clear liquids include water, clear tea, black coffee and fruit juice without pulp.)  Please call the Imaging Department at your exam site with any questions.              Who to contact     Please call your clinic at [phone] to:    Ask questions about your health    Make or cancel appointments    Discuss your medicines    Learn about your test results    Speak to your doctor   If you have compliments or concerns about an experience at your clinic, or if you wish to file a complaint, please contact [facility] Patient Relations at [phone] or email us at [email or web address]         Additional Information About Your Visit        [employer] Information     Online [employer] gives you secure access to your electronic health record. If you see a primary care provider, you can also send messages to your care team and make appointments. If you have questions, please call your primary care clinic.  If you do not have  "a primary care provider, please call [phone] and they will assist you.      [employer] is an electronic gateway that provides easy, online access to your medical records. With [employer], you can request a clinic appointment, read your test results, renew a prescription or communicate with your care team.     To access your existing account, please contact your [facility] or call [phone] for assistance.        Care EveryWhere ID     This is your Care EveryWhere ID. This could be used by other organizations to access your [facility] medical records  [id number]        Your Vitals Were     Pulse Respirations Height Pulse Oximetry BMI (Body Mass Index)       87 18 1.88 m (6' 2\") 92% 37.53 kg/m2        Blood Pressure from Last 3 Encounters:   01/22/18 114/79   01/22/18 114/79   09/28/17 123/73    Weight from Last 3 Encounters:   01/22/18 132.6 kg (292 lb 5.3 oz)   01/22/18 132.6 kg (292 lb 4.8 oz)   09/28/17 132.9 kg (293 lb)              Today, you had the following     No orders found for display       Primary Care Provider Office Phone # Fax #    Dorian Last [phone]       [facility] [address]  [facility] [address]        Equal Access to Services     DAWSON KHAN : Hadii aad ku hadasho Soomaali, waaxda luqadaha, qaybta kaalmada adeegyada, waxay elen haybryanna dumont . So [facility] [phone].    ATENCIÓN: Si habla español, tiene a newman disposición servicios gratuitos de asistencia lingüística. Llame al [phone].    We comply with applicable federal civil rights laws and [place] laws. We do not discriminate on the basis of race, color, national origin, age, disability, sex, sexual orientation, or gender identity.            Thank you!     Thank you for choosing [facility]  for your care. Our goal is always to provide you with excellent care. Hearing back from our patients is one way we can continue to improve our " services. Please take a few minutes to complete the written survey that you may receive in the mail after your visit with us. Thank you!             Your Updated Medication List - Protect others around you: Learn how to safely use, store and throw away your medicines at www.disposemymeds.org.          This list is accurate as of: 1/22/18 11:42 AM.  Always use your most recent med list.                   Brand Name Dispense Instructions for use Diagnosis    allopurinol 300 MG tablet    ZYLOPRIM     TAKE 1 TABLET BY MOUTH DAILY        amLODIPine 5 MG tablet    NORVASC     Take 5 mg by mouth        aspirin 81 MG chewable tablet      Take 81 mg by mouth        VITA CONTOUR NEXT test strip   Generic drug:  blood glucose monitoring      USE TO TEST BLOOD SUGARS 3 TIMES DAILY        blood glucose monitoring lancets      Test Blood Sugar 3 Times Daily.  Dx-E11.8        blood glucose monitoring meter device kit           carbidopa-levodopa  MG per tablet    SINEMET    540 tablet    Take 1.5 tablets 3 x per day for 2 weeks; if no benefit; increase dose to 1.5 tablets 4 x a day.    Parkinsonian features       ciclopirox 8 % Soln           clotrimazole-betamethasone cream    LOTRISONE     as needed.        diphenoxylate-atropine 2.5-0.025 MG per tablet    LOMOTIL          doxepin 10 MG capsule    SINEquan     Take 10 mg by mouth        * insulin pen needle 32G X 4 MM      1 each        * EASY TOUCH PEN NEEDLES 32G X 5 MM   Generic drug:  insulin pen needle      USE WITH INSULIN THREE TIMES DAILY        finasteride 5 MG tablet    PROSCAR     TAKE 1 TABLET BY MOUTH ONCE DAILY. DO NOT CRUSH.        fluticasone 50 MCG/ACT spray    FLONASE     USE TWO SPRAYS IN EACH NOSTRIL ONCE DAILY. SHAKE WELL BEFORE USE.        glipiZIDE 10 MG tablet    GLUCOTROL     TAKE 1 TABLET BY MOUTH TWICE DAILY BEFORE MEALS.        HYDROcodone-acetaminophen  MG per tablet    NORCO          insulin glargine 100 UNIT/ML injection    LANTUS      Inject 27 units in morning and 24 units at night.        potassium chloride 10 MEQ CR capsule    MICRO-K     TAKE 1 CAPSULE BY MOUTH TWICE DAILY WITH MEALS. SWALLOW WHOLE, DO NOT CHEW. CAPSULES MAY BE OPENED AND SPRINKLED ON FOOD.        simvastatin 40 MG tablet    ZOCOR     Take 40 mg by mouth        TART CHERRY ADVANCED Caps           topiramate 100 MG tablet    TOPAMAX     Do not crush.  1 PO TID; 3 Month Supply        VICTOZA PEN 18 MG/3ML soln   Generic drug:  liraglutide      INJECT 1.8 MG UNDER THE SKIN ONE TIME A DAY        ZYRTEC ALLERGY PO      Take 5 mg by mouth daily        * Notice:  This list has 2 medication(s) that are the same as other medications prescribed for you. Read the directions carefully, and ask your doctor or other care provider to review them with you.

## 2018-01-22 NOTE — LETTER
1/22/2018       RE: Stefan Odonnell  [address] [place] [id number]     Dear Colleague,    Thank you for referring your patient, Stefan Odonnell, to the [facility] NEUROSURGERY at [facility]. Please see a copy of my visit note below.    HISTORY AND PHYSICAL EXAM    Chief Complaint   Patient presents with     Consult     Deep Brain Stimulation, right hand tremors. Essential Tremor with Parkinsonian Features       HISTORY OF PRESENT ILLNESS  We saw Mr. Stefan Odonnell in Neurosurgery Clinic today as part of his work-up for Deep Brain Stimulation. He is a right-handed 67 year old man who began having tremors in his right hand 8-9 years ago. His left hand tremor in minimal and does not impact his daily living. The patient does not appreciate any other symptoms, though he was previously noted to have parkinsonian features including hypomimia, reduced eye blinking, bradykinesia, increased tone in upper extremities, slow gait, short steps and reduced arm swing on Dr. Horn's exam. His tremors have not responded well to medications including propranolol, primidone, gabapentin and he is currently on topiramate. He underwent a trial of Sinemet that did not improve his tremor. 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 His reports his diabetes is well-controlled. His A1C from 09/2017 was 6.8.       Past Medical History:   Diagnosis Date     Anosmia      Basal cell carcinoma, face      Benign head tremor      Bilateral knee pain      Bleeding ulcer 1983     Carpal tunnel syndrome, bilateral      CKD (chronic kidney disease) stage 3, GFR 30-59 ml/min      Diabetes mellitus (H)      Essential tremor 2002     Gout      Hyperlipidemia      Hypertension      Insomnia      Neuropathy      RENÉE (obstructive sleep apnea)      Osteoarthritis of knees, bilateral      Periodic limb movements of sleep      Restless legs syndrome (RLS)        Past Surgical History:    Procedure Laterality Date     COLONOSCOPY       HEMORRHOID SURGERY       Open Stomach Ulcer Repair       TONSILLECTOMY         Family History   Problem Relation Age of Onset     HEART DISEASE Mother      DIABETES Mother      Arthritis Mother      Depression Mother      Anxiety Disorder Mother      Prostate Cancer Father      Tremor Father      Obesity Sister      HEART DISEASE Brother      Bone Cancer Brother      HEART DISEASE Brother      Tremor Son        Social History     Social History     Marital status:      Spouse name: Blanca Odonnell     Number of children: 1     Years of education: N/A     Occupational History     [employer]     Social History Main Topics     Smoking status: Never Smoker     Smokeless tobacco: Never Used     Alcohol use No      Comment: 2 - 3 x a year     Drug use: No     Sexual activity: Not on file     Other Topics Concern     Not on file     Social History Narrative    On second marriage.  Has a son from first marriage.   for 27 years.  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  Never smoked.  He rarely drinks alcohol about 2 - 3 x per year.  Doesn't use illicit drugs.           Allergies   Allergen Reactions     Atorvastatin      Clindamycin GI Disturbance     Gabapentin        Current Outpatient Prescriptions   Medication     Cetirizine HCl (ZYRTEC ALLERGY PO)     carbidopa-levodopa (SINEMET)  MG per tablet     allopurinol (ZYLOPRIM) 300 MG tablet     amLODIPine (NORVASC) 5 MG tablet     aspirin 81 MG chewable tablet     blood glucose monitoring (VITA CONTOUR NEXT) test strip     blood glucose monitoring (VITA CONTOUR NEXT MONITOR) meter device kit     ciclopirox 8 % SOLN     clotrimazole-betamethasone (LOTRISONE) cream     diphenoxylate-atropine (LOMOTIL) 2.5-0.025 MG per tablet     doxepin (SINEQUAN) 10 MG capsule     insulin pen needle (EASY TOUCH PEN NEEDLES) 32G X 5 MM     finasteride (PROSCAR) 5 MG tablet      "fluticasone (FLONASE) 50 MCG/ACT spray     glipiZIDE (GLUCOTROL) 10 MG tablet     HYDROcodone-acetaminophen (NORCO)  MG per tablet     insulin glargine (LANTUS) 100 UNIT/ML injection     insulin pen needle 32G X 4 MM     blood glucose monitoring (VITA MICROLET) lancets     Misc Natural Products (TART CHERRY ADVANCED) CAPS     potassium chloride (MICRO-K) 10 MEQ CR capsule     simvastatin (ZOCOR) 40 MG tablet     topiramate (TOPAMAX) 100 MG tablet     liraglutide (VICTOZA PEN) 18 MG/3ML soln     No current facility-administered medications for this visit.          REVIEW OF SYSTEMS:  Answers for HPI/ROS submitted by the patient on 1/17/2018   General Symptoms: No  Skin Symptoms: No  HENT Symptoms: No  EYE SYMPTOMS: No  HEART SYMPTOMS: No  LUNG SYMPTOMS: No  INTESTINAL SYMPTOMS: No  URINARY SYMPTOMS: No  REPRODUCTIVE SYMPTOMS: No  SKELETAL SYMPTOMS: Yes  BLOOD SYMPTOMS: No  NERVOUS SYSTEM SYMPTOMS: No  MENTAL HEALTH SYMPTOMS: No  Back pain: No  Muscle aches: Yes  Neck pain: No  Swollen joints: Yes  Joint pain: Yes  Muscle cramps: No  Muscle weakness: No  Joint stiffness: Yes  Bone fracture: No      PHYSICAL EXAM  /79 (BP Location: Right arm, Patient Position: Sitting, Cuff Size: Adult Large)  Pulse 87  Resp 18  Ht 1.88 m (6' 2\")  Wt 132.6 kg (292 lb 5.3 oz)  SpO2 92%  BMI 37.53 kg/m2    General: Awake, alert, oriented. Well nourished, well developed, no acute distress.  HEENT: Head normocephalic, atraumatic. No carotid bruit. Neck supple. Good range of motion. No palpable thyroid mass.  Heart: Regular rhythm and rate. No murmurs.  Lungs: Clear to auscultation and percussion bilaterally. No ronchi, rales or wheeze.  Abdomen: Soft, non-tender, non-distended. No hepatosplenomegaly.  Extremity: Warm with no clubbing or cyanosis. No lower extremity edema.    Neurological:  Awake, alert and oriented to date, time, place and person. Speech fluent.   Pupils equal, round, reactive to light.  Extraocular " movement intact.  Visual fields are full on confrontation.  Hearing is grossly normal to finger rub.   Facial sensation intact.  Face symmetric.  Tongue midline.  Uvula elevates equally.    Motor: Right hand  4/5 (baseline secondary to carpel tunnel syndrome, per patient), otherwise full strength of the extremities.  Sensation: intact to light touch and pinpoint.  Deep tendon reflexes: Trace throughout. Negative for clonus. Negative for Keating's sign. No dysmetria.      IMAGING  MRI brain with gadolinium, 6/16/2017 from outside facility.  Minimal white matter disease/small vessel disease.  Mild atrophy.  He does have slightly large ventricular size.      ASSESSMENT   67 year old right handed male with a history of postural/action tremor of his bilateral hand, right worse than left.  Essential Tremor and Parkinsonian Features.    Patient presents for workup for DBS candidacy to treat his tremors.  He is scheduled for his neuropsychiatry evaluation tumor.  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    He did not have much questions regarding DBS surgery.  Given that his symptom is mostly on the right side, he will likely be a unilateral left side implant patient.  His target may be Vim but it should be discussed further.    During today's visit, we discussed both phase I and II of DBS surgery.  We discussed that during Phase I, we would place the DBS electrode on the left side under MAC and microelectrode recording.  He would then return one week later for the phase II with placement of the DBS generator/battery over the left chest wall under general anesthesia.  If he is a unilateral candidate or wait and see strategy candidate, then he will undergo another evaluation/discussion prior to proceeding  to the right side implantation.  If  he is a bilateral candidate in a staged fashion, he would return three weeks later for the phase I and II combined for the placement of the DBS electrode on the right side under MAC and microelectrode recording followed by connection to the previously implanted generator/battery.     Risks, benefits, alternative therapies were discussed with the patient, including but not limited to infection and bleeding (intracranial), injury to the brain, stroke, death, hardware failure and possible need for more surgeries.  Surgical procedure was discussed in detail.  I also discussed possible use of KAYLEE for neuronavigation.  All questions were answered, and he expressed understanding.  A full history and physical exam was performed during today's visit.  His case will be discussed at the Movement Disorder Consensus Group Meeting to determine whether he is a good candidate for DBS surgery.    Of note, they would like to avoid the week of March 19th for the surgery as the patient's wife will not be available to help with the patient and his surgeries.    Briefly, following topic was also discussed.    [employer]  MRI compatible.  Non-rechargeable and rechargeable generator/battery available.  4 contact electrode.  Communication method: have the  or patient controller on the skin directly over the generator/battery.    [employer]  Not yet MRI compatible.  Will become MRI compatible with retro-approval when FDA approves the device for MRI use.  Non-rechargeable generator/battery.  9 contact segmented/directional electrode.  Communication method: Wireless/bluetooth.    [employer]  Not MRI compatible.  Working on generator/battery that will be MRI compatible.  If patient gets an implant and needs an MRI in the future, when the device becomes available, patient can have the upgrade.  Rechargeable generator/battery.  8 contact electrode.  Independent current control at each  contacts.  Communication method: Wireless.    I did discuss that the implant technique for these devices are relatively the same which was discussed again.  They all have similar risks associated with the surgery.      PLAN:  1.  We will discuss his case at the Movement Disorder Consensus Group Meeting, and we will contact him regarding his DBS candidacy.     I, Lincoln David, am serving as a scribe to document services personally performed by Wayne Marshall MD, PhD, based upon my observations and the provider's statements to me. All documentation has been reviewed and edited by the aforementioned doctor prior to being entered into the official medical record.    I, Wayne Marshall, attest that above named individual is acting in scribe capacity, has observed my performance of the services and has documented them in accordance with my direction. The documentation recorded by the scribe accurately reflects the service I personally performed and the decisions made by me. The document was also partially recorded by me and the entire document was edited by me as well.      65 minutes were spent face to face with the patient of which more than 50% of the time was spent counseling and discussing the above issues regarding diagnosis, treatment options including the details of the deep brain stimulation surgery, device options and steps for further evaluation.    Again, thank you for allowing me to participate in the care of your patient.      Sincerely,    Wayne Marshall MD

## 2018-01-22 NOTE — PROGRESS NOTES
HISTORY AND PHYSICAL EXAM    Chief Complaint   Patient presents with     Consult     Deep Brain Stimulation, right hand tremors. Essential Tremor with Parkinsonian Features       HISTORY OF PRESENT ILLNESS  We saw Mr. Stefan Odonnell in Neurosurgery Clinic today as part of his work-up for Deep Brain Stimulation. He is a right-handed 67 year old man who began having tremors in his right hand 8-9 years ago. His left hand tremor in minimal and does not impact his daily living. The patient does not appreciate any other symptoms, though he was previously noted to have parkinsonian features including hypomimia, reduced eye blinking, bradykinesia, increased tone in upper extremities, slow gait, short steps and reduced arm swing on Dr. Horn's exam. His tremors have not responded well to medications including propranolol, primidone, gabapentin and he is currently on topiramate. He underwent a trial of Sinemet that did not improve his tremor. 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 His reports his diabetes is well-controlled. His A1C from 09/2017 was 6.8.       Past Medical History:   Diagnosis Date     Anosmia      Basal cell carcinoma, face      Benign head tremor      Bilateral knee pain      Bleeding ulcer 1983     Carpal tunnel syndrome, bilateral      CKD (chronic kidney disease) stage 3, GFR 30-59 ml/min      Diabetes mellitus (H)      Essential tremor 2002     Gout      Hyperlipidemia      Hypertension      Insomnia      Neuropathy      RENÉE (obstructive sleep apnea)      Osteoarthritis of knees, bilateral      Periodic limb movements of sleep      Restless legs syndrome (RLS)        Past Surgical History:   Procedure Laterality Date     COLONOSCOPY       HEMORRHOID SURGERY       Open Stomach Ulcer Repair       TONSILLECTOMY         Family History   Problem Relation Age of Onset     HEART DISEASE Mother      DIABETES Mother      Arthritis Mother      Depression Mother       Anxiety Disorder Mother      Prostate Cancer Father      Tremor Father      Obesity Sister      HEART DISEASE Brother      Bone Cancer Brother      HEART DISEASE Brother      Tremor Son        Social History     Social History     Marital status:      Spouse name: Blanca Odonnell     Number of children: 1     Years of education: N/A     Occupational History     [employer]     Social History Main Topics     Smoking status: Never Smoker     Smokeless tobacco: Never Used     Alcohol use No      Comment: 2 - 3 x a year     Drug use: No     Sexual activity: Not on file     Other Topics Concern     Not on file     Social History Narrative    On second marriage.  Has a son from first marriage.   for 27 years.  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  Never smoked.  He rarely drinks alcohol about 2 - 3 x per year.  Doesn't use illicit drugs.           Allergies   Allergen Reactions     Atorvastatin      Clindamycin GI Disturbance     Gabapentin        Current Outpatient Prescriptions   Medication     Cetirizine HCl (ZYRTEC ALLERGY PO)     carbidopa-levodopa (SINEMET)  MG per tablet     allopurinol (ZYLOPRIM) 300 MG tablet     amLODIPine (NORVASC) 5 MG tablet     aspirin 81 MG chewable tablet     blood glucose monitoring (VITA CONTOUR NEXT) test strip     blood glucose monitoring (VITA CONTOUR NEXT MONITOR) meter device kit     ciclopirox 8 % SOLN     clotrimazole-betamethasone (LOTRISONE) cream     diphenoxylate-atropine (LOMOTIL) 2.5-0.025 MG per tablet     doxepin (SINEQUAN) 10 MG capsule     insulin pen needle (EASY TOUCH PEN NEEDLES) 32G X 5 MM     finasteride (PROSCAR) 5 MG tablet     fluticasone (FLONASE) 50 MCG/ACT spray     glipiZIDE (GLUCOTROL) 10 MG tablet     HYDROcodone-acetaminophen (NORCO)  MG per tablet     insulin glargine (LANTUS) 100 UNIT/ML injection     insulin pen needle 32G X 4 MM     blood glucose monitoring (VITA MICROLET) lancets  "    Misc Natural Products (TART CHERRY ADVANCED) CAPS     potassium chloride (MICRO-K) 10 MEQ CR capsule     simvastatin (ZOCOR) 40 MG tablet     topiramate (TOPAMAX) 100 MG tablet     liraglutide (VICTOZA PEN) 18 MG/3ML soln     No current facility-administered medications for this visit.          REVIEW OF SYSTEMS:  Answers for HPI/ROS submitted by the patient on 1/17/2018   General Symptoms: No  Skin Symptoms: No  HENT Symptoms: No  EYE SYMPTOMS: No  HEART SYMPTOMS: No  LUNG SYMPTOMS: No  INTESTINAL SYMPTOMS: No  URINARY SYMPTOMS: No  REPRODUCTIVE SYMPTOMS: No  SKELETAL SYMPTOMS: Yes  BLOOD SYMPTOMS: No  NERVOUS SYSTEM SYMPTOMS: No  MENTAL HEALTH SYMPTOMS: No  Back pain: No  Muscle aches: Yes  Neck pain: No  Swollen joints: Yes  Joint pain: Yes  Muscle cramps: No  Muscle weakness: No  Joint stiffness: Yes  Bone fracture: No      PHYSICAL EXAM  /79 (BP Location: Right arm, Patient Position: Sitting, Cuff Size: Adult Large)  Pulse 87  Resp 18  Ht 1.88 m (6' 2\")  Wt 132.6 kg (292 lb 5.3 oz)  SpO2 92%  BMI 37.53 kg/m2    General: Awake, alert, oriented. Well nourished, well developed, no acute distress.  HEENT: Head normocephalic, atraumatic. No carotid bruit. Neck supple. Good range of motion. No palpable thyroid mass.  Heart: Regular rhythm and rate. No murmurs.  Lungs: Clear to auscultation and percussion bilaterally. No ronchi, rales or wheeze.  Abdomen: Soft, non-tender, non-distended. No hepatosplenomegaly.  Extremity: Warm with no clubbing or cyanosis. No lower extremity edema.    Neurological:  Awake, alert and oriented to date, time, place and person. Speech fluent.   Pupils equal, round, reactive to light.  Extraocular movement intact.  Visual fields are full on confrontation.  Hearing is grossly normal to finger rub.   Facial sensation intact.  Face symmetric.  Tongue midline.  Uvula elevates equally.    Motor: Right hand  4/5 (baseline secondary to carpel tunnel syndrome, per patient), " otherwise full strength of the extremities.  Sensation: intact to light touch and pinpoint.  Deep tendon reflexes: Trace throughout. Negative for clonus. Negative for Keating's sign. No dysmetria.      IMAGING  MRI brain with gadolinium, 6/16/2017 from outside facility.  Minimal white matter disease/small vessel disease.  Mild atrophy.  He does have slightly large ventricular size.      ASSESSMENT   67 year old right handed male with a history of postural/action tremor of his bilateral hand, right worse than left.  Essential Tremor and Parkinsonian Features.    Patient presents for workup for DBS candidacy to treat his tremors.  He is scheduled for his neuropsychiatry evaluation tumor.  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    He did not have much questions regarding DBS surgery.  Given that his symptom is mostly on the right side, he will likely be a unilateral left side implant patient.  His target may be Vim but it should be discussed further.    During today's visit, we discussed both phase I and II of DBS surgery.  We discussed that during Phase I, we would place the DBS electrode on the left side under MAC and microelectrode recording.  He would then return one week later for the phase II with placement of the DBS generator/battery over the left chest wall under general anesthesia.  If he is a unilateral candidate or wait and see strategy candidate, then he will undergo another evaluation/discussion prior to proceeding to the right side implantation.  If he is a bilateral candidate in a staged fashion, he would return three weeks later for the phase I and II combined for the placement of the DBS electrode on the right side under MAC and microelectrode recording followed by connection to the  previously implanted generator/battery.     Risks, benefits, alternative therapies were discussed with the patient, including but not limited to infection and bleeding (intracranial), injury to the brain, stroke, death, hardware failure and possible need for more surgeries.  Surgical procedure was discussed in detail.  I also discussed possible use of KAYLEE for neuronavigation.  All questions were answered, and he expressed understanding.  A full history and physical exam was performed during today's visit.  His case will be discussed at the Movement Disorder Consensus Group Meeting to determine whether he is a good candidate for DBS surgery.    Of note, they would like to avoid the week of March 19th for the surgery as the patient's wife will not be available to help with the patient and his surgeries.    Briefly, following topic was also discussed.    [employer]  MRI compatible.  Non-rechargeable and rechargeable generator/battery available.  4 contact electrode.  Communication method: have the  or patient controller on the skin directly over the generator/battery.    [employer]  Not yet MRI compatible.  Will become MRI compatible with retro-approval when FDA approves the device for MRI use.  Non-rechargeable generator/battery.  9 contact segmented/directional electrode.  Communication method: Wireless/bluetooth.    [employer]  Not MRI compatible.  Working on generator/battery that will be MRI compatible.  If patient gets an implant and needs an MRI in the future, when the device becomes available, patient can have the upgrade.  Rechargeable generator/battery.  8 contact electrode.  Independent current control at each contacts.  Communication method: Wireless.    I did discuss that the implant technique for these devices are relatively the same which was discussed again.  They all have similar risks associated with the surgery.      PLAN:  1.  We will discuss his case at the Movement Disorder Consensus  Group Meeting, and we will contact him regarding his DBS candidacy.     I, Lincoln Pamellairais, am serving as a scribe to document services personally performed by Wayne Marshall MD, PhD, based upon my observations and the provider's statements to me. All documentation has been reviewed and edited by the aforementioned doctor prior to being entered into the official medical record.    I, Wayne Marshall, attest that above named individual is acting in scribe capacity, has observed my performance of the services and has documented them in accordance with my direction. The documentation recorded by the scribe accurately reflects the service I personally performed and the decisions made by me. The document was also partially recorded by me and the entire document was edited by me as well.      65 minutes were spent face to face with the patient of which more than 50% of the time was spent counseling and discussing the above issues regarding diagnosis, treatment options including the details of the deep brain stimulation surgery, device options and steps for further evaluation.

## 2018-01-22 NOTE — MR AVS SNAPSHOT
After Visit Summary   1/22/2018    Stefan Odonnell    MRN: [id number]           Patient Information     Date Of Birth          1950        Visit Information        Provider Department      1/22/2018 7:20 AM Carlos Field APRN CNP [facility]        Today's Diagnoses     Essential tremor    -  1    Parkinsonian features          Care Instructions    __  You have completed the ON/OFF Motor Assessment.    __  Continue with your DBS workup appointments.  __  Once you're done with your workup, we'll discuss you at the DBS Consensus meeting & will let you know the decision.  __  You may contact us with any question.           Follow-ups after your visit        Who to contact     Please call your clinic at [phone] to:    Ask questions about your health    Make or cancel appointments    Discuss your medicines    Learn about your test results    Speak to your doctor   If you have compliments or concerns about an experience at your clinic, or if you wish to file a complaint, please contact [facility] Patient Relations at [phone] or email us at [email or web address]         Additional Information About Your Visit        MyChart Information     Nexttt gives you secure access to your electronic health record. If you see a primary care provider, you can also send messages to your care team and make appointments. If you have questions, please call your primary care clinic.  If you do not have a primary care provider, please call [phone] and they will assist you.      Fandium is an electronic gateway that provides easy, online access to your medical records. With Fandium, you can request a clinic appointment, read your test results, renew a prescription or communicate with your care team.     To access your existing account, please contact your [facility] or call [phone] for assistance.        Care EveryWhere ID   "   This is your Care EveryWhere ID. This could be used by other organizations to access your [place] medical records  [id number]        Your Vitals Were     Pulse Height Pulse Oximetry BMI (Body Mass Index)          87 1.88 m (6' 2\") 92% 37.53 kg/m2         Blood Pressure from Last 3 Encounters:   01/22/18 114/79   01/22/18 114/79   09/28/17 123/73    Weight from Last 3 Encounters:   01/22/18 132.6 kg (292 lb 5.3 oz)   01/22/18 132.6 kg (292 lb 4.8 oz)   09/28/17 132.9 kg (293 lb)              Today, you had the following     No orders found for display         Today's Medication Changes          These changes are accurate as of 1/22/18 11:59 PM.  If you have any questions, ask your nurse or doctor.               These medicines have changed or have updated prescriptions.        Dose/Directions    carbidopa-levodopa  MG per tablet   Commonly known as:  SINEMET   This may have changed:  additional instructions   Used for:  Parkinsonian features   Changed by:  Carlos Field, SAMARA CNP        Take 2 tablets 3 x per day.   Refills:  0                Primary Care Provider Office Phone # Fax #    Dorian Last [phone]       [facility] [address] [facility] [address]  [facility] [address]        Equal Access to Services     [facility] BILL : Hadii aad ku hadasho Soomaali, waaxda luqadaha, qaybta kaalmada adeegyada, robert plunkett. So [facility] [phone].    ATENCIÓN: Si habla español, tiene a newman disposición servicios gratuitos de asistencia lingüística. Frankie al [phone].    We comply with applicable federal civil rights laws and [place] laws. We do not discriminate on the basis of race, color, national origin, age, disability, sex, sexual orientation, or gender identity.            Thank you!     Thank you for choosing [facility]UROLOG[facility]  for your care. Our goal is always to provide you with excellent care. Hearing back from our patients is one way we can " continue to improve our services. Please take a few minutes to complete the written survey that you may receive in the mail after your visit with us. Thank you!             Your Updated Medication List - Protect others around you: Learn how to safely use, store and throw away your medicines at www.disposemymeds.org.          This list is accurate as of 1/22/18 11:59 PM.  Always use your most recent med list.                   Brand Name Dispense Instructions for use Diagnosis    allopurinol 300 MG tablet    ZYLOPRIM     TAKE 1 TABLET BY MOUTH DAILY        amLODIPine 5 MG tablet    NORVASC     Take 5 mg by mouth        aspirin 81 MG chewable tablet      Take 81 mg by mouth        VITA CONTOUR NEXT test strip   Generic drug:  blood glucose monitoring      USE TO TEST BLOOD SUGARS 3 TIMES DAILY        blood glucose monitoring lancets      Test Blood Sugar 3 Times Daily.  Dx-E11.8        blood glucose monitoring meter device kit           carbidopa-levodopa  MG per tablet    SINEMET     Take 2 tablets 3 x per day.    Parkinsonian features       ciclopirox 8 % Soln           clotrimazole-betamethasone cream    LOTRISONE     as needed.        diphenoxylate-atropine 2.5-0.025 MG per tablet    LOMOTIL          doxepin 10 MG capsule    SINEquan     Take 10 mg by mouth        * insulin pen needle 32G X 4 MM      1 each        * EASY TOUCH PEN NEEDLES 32G X 5 MM   Generic drug:  insulin pen needle      USE WITH INSULIN THREE TIMES DAILY        finasteride 5 MG tablet    PROSCAR     TAKE 1 TABLET BY MOUTH ONCE DAILY. DO NOT CRUSH.        fluticasone 50 MCG/ACT spray    FLONASE     USE TWO SPRAYS IN EACH NOSTRIL ONCE DAILY. SHAKE WELL BEFORE USE.        glipiZIDE 10 MG tablet    GLUCOTROL     TAKE 1 TABLET BY MOUTH TWICE DAILY BEFORE MEALS.        HYDROcodone-acetaminophen  MG per tablet    NORCO          insulin glargine 100 UNIT/ML injection    LANTUS     Inject 27 units in morning and 24 units at night.         potassium chloride 10 MEQ CR capsule    MICRO-K     TAKE 1 CAPSULE BY MOUTH TWICE DAILY WITH MEALS. SWALLOW WHOLE, DO NOT CHEW. CAPSULES MAY BE OPENED AND SPRINKLED ON FOOD.        simvastatin 40 MG tablet    ZOCOR     Take 40 mg by mouth        TART CHERRY ADVANCED Caps           topiramate 100 MG tablet    TOPAMAX     Do not crush.  1 PO TID; 3 Month Supply        VICTOZA PEN 18 MG/3ML soln   Generic drug:  liraglutide      INJECT 1.8 MG UNDER THE SKIN ONE TIME A DAY        ZYRTEC ALLERGY PO      Take 5 mg by mouth daily        * Notice:  This list has 2 medication(s) that are the same as other medications prescribed for you. Read the directions carefully, and ask your doctor or other care provider to review them with you.

## 2018-01-22 NOTE — NURSING NOTE
Chief Complaint   Patient presents with     Consult     P NEW - MOVEMENT DISORDER     Awa Holden MA

## 2018-01-22 NOTE — PATIENT INSTRUCTIONS
__  You have completed the ON/OFF Motor Assessment.    __  Continue with your DBS workup appointments.  __  Once you're done with your workup, we'll discuss you at the DBS Consensus meeting & will let you know the decision.  __  You may contact us with any question.

## 2018-01-23 ENCOUNTER — OFFICE VISIT (OUTPATIENT)
Dept: NEUROPSYCHOLOGY | Facility: CLINIC | Age: 68
End: 2018-01-23
Payer: COMMERCIAL

## 2018-01-23 ENCOUNTER — RADIANT APPOINTMENT (OUTPATIENT)
Dept: MRI IMAGING | Facility: CLINIC | Age: 68
End: 2018-01-23
Attending: PSYCHIATRY & NEUROLOGY
Payer: COMMERCIAL

## 2018-01-23 DIAGNOSIS — R41.3 MEMORY CHANGES: Primary | ICD-10-CM

## 2018-01-23 DIAGNOSIS — G25.0 ESSENTIAL TREMOR: ICD-10-CM

## 2018-01-23 DIAGNOSIS — G20.C PARKINSONISM, UNSPECIFIED PARKINSONISM TYPE (H): ICD-10-CM

## 2018-01-23 DIAGNOSIS — G20.A1 PARKINSON'S DISEASE (H): ICD-10-CM

## 2018-01-23 RX ORDER — GADOBUTROL 604.72 MG/ML
13 INJECTION INTRAVENOUS ONCE
Status: COMPLETED | OUTPATIENT
Start: 2018-01-23 | End: 2018-01-23

## 2018-01-23 RX ADMIN — GADOBUTROL 13 ML: 604.72 INJECTION INTRAVENOUS at 14:31

## 2018-01-23 NOTE — PROGRESS NOTES
The patient was seen for neuropsychological evaluation at the request of Pablo Horn MD for the purpose of diagnostic clarification and treatment planning.  Two hours of face to face testing were provided by this writer.  Please see Dr. Dewey Cazares's report for a full interpretation of the findings.    Irene Lei  Psychometrist

## 2018-01-23 NOTE — MR AVS SNAPSHOT
After Visit Summary   1/23/2018    Stefan Odonnell    MRN: [id number]           Patient Information     Date Of Birth          1950        Visit Information        Provider Department      1/23/2018 8:00 AM Dewey Caazres, PhD  Health Neuropsychology        Today's Diagnoses     Memory changes    -  1    Essential tremor        Parkinsonism, unspecified Parkinsonism type (H)           Follow-ups after your visit        Who to contact     Please call your clinic at [phone] to:    Ask questions about your health    Make or cancel appointments    Discuss your medicines    Learn about your test results    Speak to your doctor   If you have compliments or concerns about an experience at your clinic, or if you wish to file a complaint, please contact [facility] Patient Relations at [phone] or email us at [email or web address]         Additional Information About Your Visit        MyChart Information     Lavantet gives you secure access to your electronic health record. If you see a primary care provider, you can also send messages to your care team and make appointments. If you have questions, please call your primary care clinic.  If you do not have a primary care provider, please call [phone] and they will assist you.      iSnap is an electronic gateway that provides easy, online access to your medical records. With iSnap, you can request a clinic appointment, read your test results, renew a prescription or communicate with your care team.     To access your existing account, please contact your [facility] or call [phone] for assistance.        Care EveryWhere ID     This is your Care EveryWhere ID. This could be used by other organizations to access your [place] medical records  [id number]         Blood Pressure from Last 3 Encounters:   01/22/18 114/79   01/22/18 114/79   09/28/17 123/73    Weight from  Last 3 Encounters:   01/22/18 132.6 kg (292 lb 5.3 oz)   01/22/18 132.6 kg (292 lb 4.8 oz)   09/28/17 132.9 kg (293 lb)              We Performed the Following     [id number] TESTING, PER HR/PSYCHOLOGIST     NEUROPSYCH TESTING BY [facility]        Primary Care Provider Office Phone # Fax #    Dorian Last [phone]       [facility]CR[facility] [address]  [facility] [address]        Equal Access to Services     [facility] : Hadii aad ku hadasho Soomaali, waaxda luqadaha, qaybta kaalmada adeegyada, waxay idiin hayaan adeildefonso dumont . So [facility] [phone].    ATENCIÓN: Si habla español, tiene a newman disposición servicios gratuitos de asistencia lingüística. Frankie al [phone].    We comply with applicable federal civil rights laws and [place] laws. We do not discriminate on the basis of race, color, national origin, age, disability, sex, sexual orientation, or gender identity.            Thank you!     Thank you for choosing [facility] NEUROPSYCHOLOGY  for your care. Our goal is always to provide you with excellent care. Hearing back from our patients is one way we can continue to improve our services. Please take a few minutes to complete the written survey that you may receive in the mail after your visit with us. Thank you!             Your Updated Medication List - Protect others around you: Learn how to safely use, store and throw away your medicines at www.disposemymeds.org.          This list is accurate as of 1/23/18 11:59 PM.  Always use your most recent med list.                   Brand Name Dispense Instructions for use Diagnosis    allopurinol 300 MG tablet    ZYLOPRIM     TAKE 1 TABLET BY MOUTH DAILY        amLODIPine 5 MG tablet    NORVASC     Take 5 mg by mouth        aspirin 81 MG chewable tablet      Take 81 mg by mouth        VITA CONTOUR NEXT test strip   Generic drug:  blood glucose monitoring      USE TO TEST BLOOD SUGARS 3 TIMES DAILY        blood glucose  monitoring lancets      Test Blood Sugar 3 Times Daily.  Dx-E11.8        blood glucose monitoring meter device kit           carbidopa-levodopa  MG per tablet    SINEMET     Take 2 tablets 3 x per day.    Parkinsonian features       ciclopirox 8 % Soln           clotrimazole-betamethasone cream    LOTRISONE     as needed.        diphenoxylate-atropine 2.5-0.025 MG per tablet    LOMOTIL          doxepin 10 MG capsule    SINEquan     Take 10 mg by mouth        * insulin pen needle 32G X 4 MM      1 each        * EASY TOUCH PEN NEEDLES 32G X 5 MM   Generic drug:  insulin pen needle      USE WITH INSULIN THREE TIMES DAILY        finasteride 5 MG tablet    PROSCAR     TAKE 1 TABLET BY MOUTH ONCE DAILY. DO NOT CRUSH.        fluticasone 50 MCG/ACT spray    FLONASE     USE TWO SPRAYS IN EACH NOSTRIL ONCE DAILY. SHAKE WELL BEFORE USE.        glipiZIDE 10 MG tablet    GLUCOTROL     TAKE 1 TABLET BY MOUTH TWICE DAILY BEFORE MEALS.        HYDROcodone-acetaminophen  MG per tablet    NORCO          insulin glargine 100 UNIT/ML injection    LANTUS     Inject 27 units in morning and 24 units at night.        potassium chloride 10 MEQ CR capsule    MICRO-K     TAKE 1 CAPSULE BY MOUTH TWICE DAILY WITH MEALS. SWALLOW WHOLE, DO NOT CHEW. CAPSULES MAY BE OPENED AND SPRINKLED ON FOOD.        simvastatin 40 MG tablet    ZOCOR     Take 40 mg by mouth        TART CHERRY ADVANCED Caps           topiramate 100 MG tablet    TOPAMAX     Do not crush.  1 PO TID; 3 Month Supply        VICTOZA PEN 18 MG/3ML soln   Generic drug:  liraglutide      INJECT 1.8 MG UNDER THE SKIN ONE TIME A DAY        ZYRTEC ALLERGY PO      Take 5 mg by mouth daily        * Notice:  This list has 2 medication(s) that are the same as other medications prescribed for you. Read the directions carefully, and ask your doctor or other care provider to review them with you.

## 2018-01-24 RX ORDER — CARBIDOPA AND LEVODOPA 25; 100 MG/1; MG/1
2 TABLET ORAL 3 TIMES DAILY
COMMUNITY
Start: 2018-01-24 | End: 2019-06-21

## 2018-01-25 NOTE — PROGRESS NOTES
NAME: Stefan Odonnell  MRN: [id number]  : 1950  ABARCA: 2018    Neuropsychology Laboratory  [facility]  [address]  [place]  [address] [phone]    NEUROPSYCHOLOGICAL EVALUATION    RELEVANT HISTORY AND REASON FOR REFERRAL    This is a report of neuropsychological consultation regarding Stefan De La Fuente, a 67-year-old, right-handed allen with 12 years of formal education. He is a candidate for DBS implantation to treat tremor in his hands (R>L). He is referred for neuropsychological assessment of brain function by Dr. Pablo Horn, as part of the standard presurgical workup.     Mr. Odonnell has been followed by neurologists in the [facility] in [place], [place]. Forwarded records describe several years of a movement disorder that was first seen as essential tremor in his hands. He was treated with propranolol, primidone, gabapentin, and topirimate, with no or minimal benefit.     He has subsequently developed other Parkinsonian features. Seeing Dr. Horn in 2017, the physical exam showed bilateral postural and action tremor (R>L), increased upper extremity tone (R>L), hypomimia, reduced eye blink, bradykinesia, and a slow gait with short steps and reduced arm swing (R>L). Dr. Horn initiated a trial of carbidopa-levodopa. Mr. Odonnell reported no benefit and possible worsened tremor during that trial.     Additional medical concerns include type-2 diabetes, neuropathy, idiopathic anosmia, obstructive sleep apnea, periodic limb movements of sleep, stage-3 chronic kidney disease, obesity, and hypertension. His current medication list includes allopuriniol, amlodipine, 81 mg aspirin, cetirizine, vitamin D, doxepin, finasteride, fluticasone, glipizide, insulin, liraglutide, potassium, simvastatin, and topirimate.     At the 2017 visit with Dr. Horn, cognitive screening with the [facility] suggested mild cognitive impairment, (,  losing points for visuoconstructional, attentional, and recent memory items).     He had his On/Off testing the day before this appointment. There was minimal motor improvement (UPDRS Off=19, On=18).     Brain MRI from 06/16/2017 showed minimal white matter disease/small vessel disease and mild parenchymal atrophy.    With me, Mr. Odonnell reports onset of movement abnormalities about 10 to 15 years ago. He was initially told that it was an issue with muscles and nerves in his shoulder and upper arm, akin to repetitive stress syndrome. Symptoms included unintentionally letting go of a hammer while working. His tremor has progressively worsened in the last couple of years. It is apparent in his right hand. He knows that in-clinic physical exams show a tremor in his left hand, but he says he does not notice it himself.    He denies any concerns about cognitive abilities. He has had lifelong issues with needing information written down in order to remember it (e.g., to-do lists, work orders and instructions). He is a bit disorganized and prone to misplacing items, but this is also lifelong and not changing. He denies excessive forgetfulness, getting lost, or repeating himself. He denies any concerns about receptive or expressive language. He denies any problems with making decisions, either simple or complex. His wife concurs with his self-report.    Mr. Odonnell lives at home with his wife. His wife has to cut up food that requires a knife (he can cut softer foods with a fork in his left hand). She does not provide assistance in bathing, dressing, or other aspects of self-care. There have been no changes to the distribution of household responsibilities. He manages his medications independently, although his wife coordinates all of the refills from the pharmacy. He recently had a notable dosing error. He misread his physician s instructions for the dosing of topiramate. He was taking three pills three times per day  instead of one pill three times per day. The issue was caught after about a month, when he attempted to refill his prescription two months early. Notably, he says that he felt better during that month than he has in a long time, both in terms of his tremor and in terms of general functioning.    He reports appropriate control over his diabetes. He has been making efforts to reduce portion sizes and eat less junk food. He has made a concerted effort at losing weight. He weighed around 350 pounds a couple of years ago and is currently around 285.     He quit using CPAP for sleep apnea about 1  years ago, prompted by his dentist s concerns about repeated tooth decay issues. There have been discussions of alternative treatments for sleep apnea (e.g., mandibular advancement devices) but he has not followed up on them. He sleeps in a bed that elevates, and his wife reports little snoring or other observable sleep disruption currently. In interview, he denies any changes to his general levels of energy. He gets about eight hours of sleep, with his longstanding use of a sleeping medication (presumably doxepin). He feels rested in the morning. He takes a nap for about 60 to 90 minutes every day upon getting home from work.    Mr. Odonnell denies any concerns about mood, apathy, disinterest, diminished motivation, or anxiety. He has not had any psychiatric hospitalizations or concerns about suicidality. He says he has never been treated with psychiatric medications. About 30 years ago, he participated in an outpatient group class for anxiety management. 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 He denies any history of panic. He denies any feelings of claustrophobia with recent neuroimaging studies. The prospect of undergoing an awake neurosurgery does not provoke anxiety; in fact, he thinks it will be an interesting experience.    He denies any hallucinatory experiences. There are no  indications of REM sleep behaviors. He denies any concerns about obsessive-compulsive behaviors. He denies any concerns about addictive behaviors or impulse-control problems.    He reports minimal and rare alcohol consumption. He denies any history of alcohol problems. He denies any use of tobacco or illicit drugs.    Regarding historical cognitive and academic functioning, he denies any requirements for special education services. He was held back in either 4th or 5th grade, and he got some extra tutoring help that summer. He generally disliked school. 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 Throughout school, he worked evenings and weekends. He disliked academics and enjoyed athletics and shop classes. 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 His tremor interferes with work. He hopes that DBS will allow him to work for about another three years before retiring.    Psychosocial history includes a total of five marriages. The first four were short. He has been  to his current wife for the last 28 years. He has two children who live nearby are and are involved in his life.    Aside from tremor, neurologic history includes many years of anosmia. 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 He had a fall down a flight of stairs at age 3 or 4, with apparent loss of memory/altered awareness for about one day. He reports that he was back to his normal self the next day, and there were no apparent sequelae. He denies any history of stroke or seizure. There have been some balance and coordination concerns that he relates to orthopedic issues in his knee ( bone on bone ). He denies any unilateral weakness or  numbness. He denies any concerns with migraine or chronic headaches.    Reported family history is notable for tremor in his father and his son. Neither received a formal diagnosis, and their symptoms are less pronounced than the patient s. He says he is not particularly close with his family, so he knows of no other neurologic history.    BEHAVIORAL OBSERVATIONS    Mr. Odonnell was polite and cooperative with the exam. Mood was euthymic. Affective display was notable for hypomimia. Speech was notable for minimal to subtle hypophonia, but he showed a good range of voice volume over the course of the evaluation. Content of speech was fully normal. His posture was slightly stooped or pushed forward. He had a subtly slow gait. He was alert and not somnolent. Attentional control was appropriate. Comprehension of test instructions was normal. He was a bit doubtful of his abilities, and he seemed to presume a greater degree of difficulty than he was actually having. However he did not respond inappropriately to success and challenge in testing. His effort and persistence were good. Graphomotor tests were notably affected by his tremor. Otherwise, the test results are seen as valid estimations of his current cognitive status.    MEASURES ADMINISTERED    The following measures were administered by a trained psychometrist, under my direct supervision:    Jimmy Dementia Rating Scale-2; Wide Range Achievement Test 4: Word Reading; Wechsler Adult Intelligence Scale-IV: Similarities, Comprehension, Matrix Reasoning, Letter-Number Sequencing, Digit Span; Wechsler Memory Scale-Revised: Information and Orientation, Logical Memory; Debra-Pierce Executive Function System: Verbal Fluency; [place] Naming Test; Clock Drawing; Judgment of Line Orientation; Trail Making Test; Oral Trail Making Test; Stroop Test; Wisconsin Card Sorting Test; Gomez Verbal Learning Test, Revised; Brief Visuospatial Memory Test, Revised; Rodney Depression  Inventory-II; Questionnaire for Impulsive-Compulsive Disorders in Parkinson s Disease-Rating Scale; Parkinson's Disease Questionnaire-39; [place] Sleepiness Scale; Starkstein Apathy Scale; REM Sleep Behavior Disorder Screening Questionnaire.     RESULTS AND INTERPRETATION    Orientation: Mr. Odonnell was appropriately oriented to time, place, basic personal information, and basic cultural information.    Global Screening: Overall dementia screening performance was in the low average range for his age. Relative strengths came in the domains of attention/working memory, initiation/perseveration, and constructional abilities (with scoring lenience for his tremor), which were in the average range. Relative weaknesses came in conceptual thinking and anterograde memory formation, which were in the low average range.    Intellectual Abilities: General intellectual abilities were average to low average. Abstract analogical reasoning was average. Common sense/practical reasoning was average. Visual reasoning through pattern identification was low average.    Language & Related Skills: Basic reading and pronunciation skills were low average. Confrontation naming was superior. As noted above, verbal fluency performances ranged from low average to average.    Perceptual & Visuoconstructional Skills: Visuoperceptual discrimination and matching of variably oriented lines was high average. Clock drawing was abnormal, but mostly from fine motor interference. He was able to copy a pre-drawn clock without non-motoric issues.    Mental Speed & Executive Functioning: Speeded visual scanning and graphomotor sequencing (trail making) under simple conditions was borderline. However, trail making under more complex demands for controlling divided attention was fully average (only took four seconds longer). An oral analog to the Trail Making Test was also obtained. His performance on the simple condition (counting aloud) was mildly slowed  but accurate. Likewise, in the more complex condition (interspersing counting and reciting the alphabet) he had one error and performed with mild slowing. Speeded word reading was low average, speeded color naming was low average, and color naming under additional demands for inhibiting prepotent responses was lower average. Speeded verbal fluency performances were low average to middle average. Conceptualization and inductive reasoning under ambiguous conditions was lower average on a card-sorting test.    Attention & Working Memory: As noted above, attention and working memory performances from the dementia screening battery were in the average range. Attention and working memory were average for repeating and rearranging digit strings. He had a low average performance on a related task that involves rearranging sequences of letters and numbers.    Learning & Anterograde Memory: As noted above, recent memory performances from the dementia screening battery were in the low average range. Immediate verbal memory for short stories was average. Delayed story recall was average. Learning a word list over repeated trials was average. Delayed list recall was borderline, while delayed list recognition was low average. Learning a display of simple shapes and figures over repeated trials was low average. Delayed recall of the display was average, and delayed recognition was low average.    Emotional & Behavioral Functioning: Brief screening of depression symptoms indicated minimal concerns (BDI-II = 6). He did not endorse concerns about significant apathy (Starkstein = 2). There were clinically significant indications of excessive daytime sleepiness (Carrier = 15). He endorsed minimal indications of REM sleep behavior disorder (RBDSQ = 1/9). He endorsed minimal to slight concerns with obsessive/intrusive thoughts or impulsive/compulsive behaviors on the QUIP-RS. On the PDQ-39, he only endorsed issues with motor control in  his hands; he did not endorse emotional concerns or major functional limitations.  IMPRESSIONS AND RECOMMENDATIONS  The neuropsychological results are largely normal. Mr. Odonnell demonstrates overall average to low average cognitive abilities, generally commensurate with his estimated baseline. He may be somewhat slowed at times, and he has relative weaknesses in complex attentional/executive control, abstraction, and conceptualization. However, there are no indications that he is suffering from cognitive impairment that would rise to the level of dementia.     Some of his cognitive profile could be attributable to neurodevelopmental factors (e.g., possible ADHD-like syndrome) and normal aging. If there were organic involvement, it would most likely be due to relative weakening of frontal-subfrontal systems, which is expected in Parkinsonian disorders.     There are no indications of substantive mental health concerns requiring concerted clinical attention. I do not suspect depression or anxiety problems. He may want to revisit treatment options for his obstructive sleep apnea.    He provides an appropriate understanding of the DBS procedures and risks.    From a neuropsychological perspective, there are no direct contraindications to continued consideration of DBS to treat his tremor. He does not appear to be at increased risk for adverse cognitive impact from the surgery, compared to the general population.    A comprehensive baseline has been established. Neuropsychological reevaluation can be deferred to an as-needed basis.    Dewey Cazares, PhD,   Clinical Neuropsychologist      Time spent:  Four hours professional time, including interview, testing, records review, data integration, and report writing (CPT 29788); an additional two hours, including testing administered by a psychometrist and interpreted by a neuropsychologist (CPT 69312). ICD-10 diagnosis: R41.3, G25.0, G20

## 2018-01-29 ENCOUNTER — TELEPHONE (OUTPATIENT)
Dept: NEUROLOGY | Facility: CLINIC | Age: 68
End: 2018-01-29

## 2018-01-29 NOTE — PROGRESS NOTES
Date    1/23/18         Name       Stefan Odonnell         Age    67      Sex  Male        Birthdate                  [birth year]                 [facility] #         [id number]        Station           OP                    Preferred Hand  Right   Occupation            [employer]        Education     12                 Language                 English          WAIS-IV    Raw              Age Scaled   Similarities   21     8    Comprehension        25   11   Letter Num. Seq.   13     6   Digit Span   24     9   Matrix Reasoning     9     7     WIDE RANGE ACHIEVEMENT TEST - 4    Standard  %tile               Grade      Score  Rank               Equiv.  Reading    85     16            7.5      WECHSLER MEMORY SCALE - REVISED    Raw Score        MAS         Information & Orientation        13   Logical Memory  Immed.   27    11    Logical Memory  30 min.   20    10      ALONZO VERBAL LEARNING TEST - REVISED  Form 4                                                  Raw                  T                                        Trial 1               5   Trial 2               9   Trial 3  8   Total 1-3     22       46   Learning  4   Delay  4       33   Percent Retained     44       28   True Positives     10   Discrimination Index  9       42   False Positives  1     BRIEF VISUAL MEMORY TEST - REVISED  Form 5                                            Raw                T                                        Trial 1               4   Trial 2               5   Trial 3  7   Total 1-3     16       41   Learning  3     <20   Delay  7       46   Percent Retained   100     >16 (%ile)  True Positives  4   Discrimination Index  4     11-16  (%ile)  False Positives  0     BOSTON NAMING TEST  Score (Correct + Stim cue)     59   MAS    16       # correct Stimulus Cue:       0    # correct Phonemic Cue:     0   D-KEFS VERBAL FLUENCY  Standard/Alternate    Raw              Age Scaled   Letter Fluency   26     7    Category  Fluency        24     6   Switching Fluency    Total Correct   12     9    Switching Acc.   12   11     BDI Score        6             Minimal                 TRAIL MAKING TEST         Seconds         Errors           MAS                  A    73   1      4  .  B    77   1    10  .    ORAL TRAIL MAKING TEST         Seconds         Errors           M(SD)                  A    12   0     10.6(0.8)  .  B    59   1     40.2(5.6) .    STROOP                    Raw   +  Justus =     Total      MAS        Word  78  +  - =     78     7    Color  53  +  - =     53     7    C-W  29  +  - =     29     8      WISCONSIN CARD SORTING TEST - 64  # Categories  2             11-16    %ile  # persev err.        14         T       45      Conc. level resp.       26           T        39     FMS            0             MARTIN JUDGMENT OF LINE ORIENTATION  Form V  Raw     26  MAS   13    DEMENTIA RATING SCALE - 2  Standard    Raw MAS Raw     MAS   Attention  36   11   Concept  32    6   Init/Psv  35     8  Memory  22    7   Construct    6   10  Total     131/144   7     MAS = Sallis Older Adult Normative Study Age/Age & Ed. Adj. Scaled Score

## 2018-01-29 NOTE — TELEPHONE ENCOUNTER
"                                                      Deep Brain Stimulation Surgery Surgical Candidacy Form    Referring Provider: Dr. Edmundo Ramirez or   Dorian Last MD, [facility] ( primary care provider )   Patient Information:  Name: Stefan Odonnell  YOB: 1950  Age: 67 year old  Height: 6'2\"  Weight: 292 lbs. 5.3 oz  BMI:37.61  Blood Pressure: 114/79  Diagnosis:essential tremor and Parkinson's disease   Age of Onset of Symptoms: 59. Year of Onset of Symptoms: 2000.  Age of Diagnosis: ?. Year of Diagnosis: ?.  Handedness: Right.  Side of Onset: Right upper extremity.   Disease Features: Tremor, anosmia   Surgery Goals: Decrease tremor.  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  He also wants to continue working for at least 3 more years at [employer] doing special carpentry.     Medications: As of 2/1/18  Current Outpatient Prescriptions   Medication Sig Dispense Refill     carbidopa-levodopa (SINEMET)  MG per tablet Take 2 tablets 3 x per day.       Cetirizine HCl (ZYRTEC ALLERGY PO) Take 5 mg by mouth daily       allopurinol (ZYLOPRIM) 300 MG tablet TAKE 1 TABLET BY MOUTH DAILY       amLODIPine (NORVASC) 5 MG tablet Take 5 mg by mouth       aspirin 81 MG chewable tablet Take 81 mg by mouth       blood glucose monitoring (VITA CONTOUR NEXT) test strip USE TO TEST BLOOD SUGARS 3 TIMES DAILY       blood glucose monitoring (VITA CONTOUR NEXT MONITOR) meter device kit        ciclopirox 8 % SOLN        clotrimazole-betamethasone (LOTRISONE) cream as needed.       diphenoxylate-atropine (LOMOTIL) 2.5-0.025 MG per tablet        doxepin (SINEQUAN) 10 MG capsule Take 10 mg by mouth       insulin pen needle (EASY TOUCH PEN NEEDLES) 32G X 5 MM USE WITH INSULIN THREE TIMES DAILY       finasteride (PROSCAR) 5 MG tablet TAKE 1 TABLET BY MOUTH ONCE DAILY. DO NOT CRUSH.       fluticasone (FLONASE) 50 MCG/ACT spray USE TWO SPRAYS IN EACH NOSTRIL ONCE DAILY. SHAKE " WELL BEFORE USE.       glipiZIDE (GLUCOTROL) 10 MG tablet TAKE 1 TABLET BY MOUTH TWICE DAILY BEFORE MEALS.       HYDROcodone-acetaminophen (NORCO)  MG per tablet        insulin glargine (LANTUS) 100 UNIT/ML injection Inject 27 units in morning and 24 units at night.       insulin pen needle 32G X 4 MM 1 each       blood glucose monitoring (VITA MICROLET) lancets Test Blood Sugar 3 Times Daily.  Dx-E11.8       Misc Natural Products (TART CHERRY ADVANCED) CAPS        potassium chloride (MICRO-K) 10 MEQ CR capsule TAKE 1 CAPSULE BY MOUTH TWICE DAILY WITH MEALS. SWALLOW WHOLE, DO NOT CHEW. CAPSULES MAY BE OPENED AND SPRINKLED ON FOOD.       simvastatin (ZOCOR) 40 MG tablet Take 40 mg by mouth       topiramate (TOPAMAX) 100 MG tablet Do not crush.  1 PO TID; 3 Month Supply       liraglutide (VICTOZA PEN) 18 MG/3ML soln INJECT 1.8 MG UNDER THE SKIN ONE TIME A DAY        Motor Assessment: 1/22/18  ON: 18  OFF: 19  % Change: 5%    Tremor Scale 1/25/18 = 48   Neuropsychological Evaluation:1/23/18 Dr. Cazares    Cognitive Issues: Overall average to low average cognitive abilities, generally commensurate with his estimated baseline. Some slowing, variable. Relative weaknesses in complex attentional/executive control, abstraction, and conceptualization. No indications that he is suffering from cognitive impairment that would rise to the level of dementia.     Some of his cognitive profile could be attributable to neurodevelopmental factors (e.g., possible ADHD-like syndrome) and normal aging. If there were organic involvement, it would most likely be due to relative weakening of frontal-subfrontal systems, which is expected in Parkinsonian disorders.     He provides an appropriate understanding of the DBS procedures and risks.     From a neuropsychological perspective, there are no direct contraindications to continued consideration of DBS to treat his tremor. He does not appear to be at increased risk for adverse  cognitive impact from the surgery, compared to the general population.    Psychiatric Issues: Many years ago, had group therapy for anxiety. Some tendencies for ruminative thinking, seen as lifelong. No indications of substantive mental health concerns requiring concerted clinical attention. I do not suspect depression or anxiety problems. He may want to revisit treatment options for his obstructive sleep apnea.h    Psychosis: No hallucinations.     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 Findings are worse on the left side.    MRI Date: 1/23/18    Impression:   There is blurring of the substantia nigra on susceptibility weighted  images, which limits evaluation of the nigrosome-1.     No intracranial hemorrhage, mass effect, midline shift or abnormal  extra axial fluid collection. Mild leukoaraiosis. Mild-to-moderate  generalized parenchymal volume loss. Small right middle cranial fossa  arachnoid cyst. Ventricles are not enlarged out of proportion to the  cerebral sulci. No abnormal foci of restricted diffusion or  intracranial enhancement. Patent major intracranial vascular flow  voids. Paranasal sinuses and mastoid air cells are clear. Orbits are  unremarkable. Normal marrow signal.         IMPRESSION:   1. No significant change since 6/16/2017. No findings to explain  patient's symptoms.  2. Mild chronic small vessel ischemic disease and mild/moderate  generalized parenchymal volume loss.  3. Small right middle cranial fossa arachnoid cyst.   PMH: -  Past Medical History:   Diagnosis Date     Anosmia      Basal cell carcinoma, face      Benign head tremor      Bilateral knee pain      Bleeding ulcer 1983     Carpal tunnel syndrome, bilateral      CKD (chronic kidney disease) stage 3, GFR 30-59 ml/min      Diabetes mellitus (H)      Essential tremor 2002     Gout      Hyperlipidemia      Hypertension      Insomnia      Neuropathy      RENÉE (obstructive  "sleep apnea)      Osteoarthritis of knees, bilateral      Periodic limb movements of sleep      Restless legs syndrome (RLS)      Past Surgical History:   Procedure Laterality Date     COLONOSCOPY       HEMORRHOID SURGERY       Open Stomach Ulcer Repair       TONSILLECTOMY       He is holding off on his carpel tunnel surgery and possible knee surgery to get his DEEP BRAIN STIMULATION surgery first (Estella's note 1/22/18)    Soc Hx:  reports that he has never smoked. He has never used smokeless tobacco. He reports that he does not drink alcohol or use illicit drugs.    Family Hx of Movement Disorders: family history includes Anxiety Disorder in his mother; Arthritis in his mother; Bone Cancer in his brother; DIABETES in his mother; Depression in his mother; HEART DISEASE in his brother, brother, and mother; Obesity in his sister; Prostate Cancer in his father; Tremor in his father and son.    Consult Dr. Pablo Horn     Patient discussed at conference on: 2/1/18, 3/15/18     DBS Meeting Notes/Further Work-up Needed: -  2/5/18 Lala Day RN  1. Datscan to aide in clarifying diagnosis to help with choice of target  2. Lala to call  3. Quentin    Called patient to discuss the above information. Spoke to both he and his wife Blanca. Explained how a Datscan may help us to clarify the diagnosis which would impact the choice of target. He is willing to do whatever it takes to move forward. He stated,  \"I have to have 3 more years of work to get the rule of 85 (full skilled nursing) and I'm determined to achieve this.\"    He will have Dr. Last write the order for an A1C and have the result faxed to me.    His goal is to treat his right hand tremor as it is incapacitating.  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    2/5/18:  A1C as of 2/7/18 was 6.9  Datscan was abnormal - quentin after receiving these two pieces of info.  -----------------------------  From the " 3/15/18 consensus meetin. Treating right hand tremor - Tremor diagnosis - LSTN  2. Abbott - infinity 5  3. Wait and see  4. Madeline to call patient to check on timing of need for knee replacement, less than a year - should have knee done first  5. Discuss research  6. Notify referring doctor - Lala truong

## 2018-02-05 ENCOUNTER — TELEPHONE (OUTPATIENT)
Dept: NEUROLOGY | Facility: CLINIC | Age: 68
End: 2018-02-05

## 2018-02-05 DIAGNOSIS — R29.818 PARKINSONIAN FEATURES: Primary | ICD-10-CM

## 2018-02-05 DIAGNOSIS — G25.0 ESSENTIAL TREMOR: ICD-10-CM

## 2018-02-05 NOTE — TELEPHONE ENCOUNTER
"From the 18 DEEP BRAIN STIMULATION consensus meetin. Datscan to aide in clarifying diagnosis to help with choice of target  2. Lala to call  3. Quentin    Called patient to discuss the above information. Spoke to both he and his wife Blanca. Explained how a Datscan may help us to clarify the diagnosis which would impact the choice of target. He is willing to do whatever it takes to move forward. He stated,  \"I have to have 3 more years of work to get the rule of 85 (full FDC) and I'm determined to achieve this.\"    He will have Dr. Last write the order for an A1C and have the result faxed to me.    His goal is to treat his right hand tremor as it is incapacitating.  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  "

## 2018-02-07 ENCOUNTER — TELEPHONE (OUTPATIENT)
Dept: GENERAL RADIOLOGY | Facility: CLINIC | Age: 68
End: 2018-02-07

## 2018-02-09 NOTE — TELEPHONE ENCOUNTER
"A1C result sheet received from primary care provider. Last A1c listed was 8.0 in 2016. Called Dr. Last's office to let them know we need a current A1C. Spoke to \"Anais\". She will send me the result from 2/6/18.  "

## 2018-02-20 ENCOUNTER — TRANSFERRED RECORDS (OUTPATIENT)
Dept: HEALTH INFORMATION MANAGEMENT | Facility: CLINIC | Age: 68
End: 2018-02-20

## 2018-02-23 ENCOUNTER — RADIANT APPOINTMENT (OUTPATIENT)
Dept: NUCLEAR MEDICINE | Facility: CLINIC | Age: 68
End: 2018-02-23
Attending: NURSE PRACTITIONER
Payer: COMMERCIAL

## 2018-02-23 DIAGNOSIS — G25.0 ESSENTIAL TREMOR: ICD-10-CM

## 2018-02-23 DIAGNOSIS — R29.818 PARKINSONIAN FEATURES: ICD-10-CM

## 2018-02-23 PROCEDURE — 78607 NM BRAIN IMAGING TOMOGRAPHIC (SPECT) DATSCAN: CPT | Performed by: RADIOLOGY

## 2018-02-23 PROCEDURE — A9584 IODINE I-123 IOFLUPANE: HCPCS | Performed by: RADIOLOGY

## 2018-02-27 ENCOUNTER — TELEPHONE (OUTPATIENT)
Dept: NEUROLOGY | Facility: CLINIC | Age: 68
End: 2018-02-27

## 2018-02-27 NOTE — TELEPHONE ENCOUNTER
I called Mrs. Odonnell.  Mr. Odonnell was at work.  I informed her that her 's Dopamine scan was abnormal & most likely he has Parkinson's disease like we suspected.  I told her that we'll discuss him at the DBS consensus meeting & call them with the decision.  She appreciated the call.

## 2018-03-01 ENCOUNTER — TELEPHONE (OUTPATIENT)
Dept: NEUROLOGY | Facility: CLINIC | Age: 68
End: 2018-03-01

## 2018-03-01 NOTE — TELEPHONE ENCOUNTER
I received an Office Note (2 pages) from [address] about patient and I will place paperwork in non-labeled bin for scanning

## 2018-03-16 ENCOUNTER — TELEPHONE (OUTPATIENT)
Dept: NEUROLOGY | Facility: CLINIC | Age: 68
End: 2018-03-16

## 2018-03-16 NOTE — TELEPHONE ENCOUNTER
From the 3/15/18 consensus meetin. Treating right hand tremor - Tremor diagnosis - LSTN  2. Abbott - infinity 5  3. Wait and see  4. Madeline to call patient to check on timing of need for knee replacement, less than a year - should have knee done first  5. Discuss research  6. Notify referring doctor - Lala truong

## 2018-03-16 NOTE — TELEPHONE ENCOUNTER
Spoke with pt's spouse Blanca, as pt was at work. I let her know the followin. The consensus group met and pt is a candidate for Left side DBS lead placement for treatment of the right hand tremor. Target is the Left STN.  2. Wait and see approach, meaning we will see how he responds to the first side before considering doing the 2nd side.   3. Will use the Abbott - InfinAmeriprime system (Infinity 5 battery)    Blanca indicates that pt has been getting cortisone shots for his knee and it has been feeling much better lately. I explained that if his doctor thinks the knee needs to be replaced in less than a year, he should have that done first. If his doctor recommends waiting longer than a year, then we can move forward with DBS. I reminded Blanca that the Abbott system is not approved yet for full body MRI, though we do expect it soon. However, if his doctor thinks he will need an MRI of the knee, he should get it before DBS if Abbott is still not MRI approved by the time of the DBS surgery.    Blanca indicates the pt will call me after following up with his doctor about his knee. I will discuss research with him at that time. Blanca has my direct number and I will wait to hear back from pt.

## 2018-03-27 ENCOUNTER — TELEPHONE (OUTPATIENT)
Dept: NEUROSURGERY | Facility: CLINIC | Age: 68
End: 2018-03-27

## 2018-03-27 NOTE — TELEPHONE ENCOUNTER
Spoke with pt's spouse Blanca. Pt saw his knee doctor and there is no need for future MRI and no plan for knee replacement within the next year, as pt is doing well with occasional cortisone injections. I have messaged Dr. Marshall with this information and have requested that he enter the surgery request. Once the request has been entered, the surgery scheduler will f/u with pt/spouse re dates. Pt's spouse will fax a letter from pt's knee doctor reflecting the above information. No further questions/concerns at this time.

## 2018-04-02 DIAGNOSIS — G25.0 ESSENTIAL TREMOR: ICD-10-CM

## 2018-04-02 DIAGNOSIS — G20.A1 PARKINSON'S DISEASE (H): Primary | ICD-10-CM

## 2018-04-02 DIAGNOSIS — R25.1 TREMOR: Primary | ICD-10-CM

## 2018-04-27 ENCOUNTER — DOCUMENTATION ONLY (OUTPATIENT)
Dept: NEUROSURGERY | Facility: CLINIC | Age: 68
End: 2018-04-27

## 2018-04-30 ENCOUNTER — ALLIED HEALTH/NURSE VISIT (OUTPATIENT)
Dept: NEUROLOGY | Facility: CLINIC | Age: 68
End: 2018-04-30

## 2018-04-30 ENCOUNTER — APPOINTMENT (OUTPATIENT)
Dept: LAB | Facility: CLINIC | Age: 68
End: 2018-04-30

## 2018-04-30 VITALS
SYSTOLIC BLOOD PRESSURE: 134 MMHG | DIASTOLIC BLOOD PRESSURE: 79 MMHG | WEIGHT: 296.5 LBS | BODY MASS INDEX: 39.3 KG/M2 | HEIGHT: 73 IN | HEART RATE: 75 BPM | OXYGEN SATURATION: 95 % | TEMPERATURE: 97.9 F | RESPIRATION RATE: 24 BRPM

## 2018-04-30 DIAGNOSIS — G20.A1 PARALYSIS AGITANS (H): Primary | ICD-10-CM

## 2018-04-30 PROBLEM — G20.C PARKINSONISM, UNSPECIFIED PARKINSONISM TYPE (H): Status: ACTIVE | Noted: 2017-10-02

## 2018-04-30 PROBLEM — Z86.0100 HISTORY OF COLON POLYPS: Status: ACTIVE | Noted: 2018-03-12

## 2018-04-30 ASSESSMENT — PAIN SCALES - GENERAL: PAINLEVEL: NO PAIN (0)

## 2018-04-30 NOTE — MR AVS SNAPSHOT
After Visit Summary   4/30/2018    Stefan Odonnell    MRN: [id number]           Patient Information     Date Of Birth          1950        Visit Information        Provider Department      4/30/2018 10:00 AM Nurse, [facility] Neurology        Today's Diagnoses     Paralysis agitans (H)    -  1       Follow-ups after your visit        Your next 10 appointments already scheduled     May 15, 2018 11:00 AM CDT   (Arrive by 10:45 AM)   PAC EVALUATION with  Pac Dale 8   [facility] Preoperative Assessment [place] ([facility])    [address] [place] [address]   [phone]            May 15, 2018 12:00 PM CDT   (Arrive by 11:45 AM)   PAC RN ASSESSMENT with Hyun Pac Rn   [facility] Assessment [place] ([facility])    [address] [place] [address]   [phone]            May 15, 2018 12:30 PM CDT   (Arrive by 12:15 PM)   PAC Anesthesia Consult with Hyun Pac Anesthesiologist   [facility] Assessment [place] ([facility])    [address] [place] [address]   [phone]            May 29, 2018   Procedure with Wayne Marshall MD   [facility], [place], Same Day Surgery (--)    [address] [place] [address]   [phone]            May 29, 2018  8:40 AM CDT   CT HEAD W/O CONTRAST with UUCT1   [facility], [place], [place] ([facility] [place])    [address] [facility] [address] [phone]           Please bring any scans or X-rays taken at other hospitals, if similar tests were done. Also bring a list of your medicines, including vitamins, minerals and over-the-counter drugs. It is safest to leave personal items at home.  Be sure to tell your doctor:   If you have any allergies.   If there s any chance you are pregnant.   If you are breastfeeding.  You do not need to do  "anything special to prepare for this exam.  Please wear loose clothing, such as a sweat suit or jogging clothes. Avoid snaps, zippers and other metal. We may ask you to undress and put on a hospital gown.            Jun 08, 2018   Procedure with Wayne Marshall MD   [facility], [place], Same Day Surgery (--)    [address]  [place] [address] [phone]              Who to contact     Please call your clinic at [phone] to:    Ask questions about your health    Make or cancel appointments    Discuss your medicines    Learn about your test results    Speak to your doctor            Additional Information About Your Visit        [employer] Information     [employer] gives you secure access to your electronic health record. If you see a primary care provider, you can also send messages to your care team and make appointments. If you have questions, please call your primary care clinic.  If you do not have a primary care provider, please call [phone] and they will assist you.      [employer] is an electronic gateway that provides easy, online access to your medical records. With [employer], you can request a clinic appointment, read your test results, renew a prescription or communicate with your care team.     To access your existing account, please contact your [facility] or call [phone] for assistance.        Care EveryWhere ID     This is your Care EveryWhere ID. This could be used by other organizations to access your [place] medical records  [id number]        Your Vitals Were     Pulse Temperature Respirations Height Pulse Oximetry BMI (Body Mass Index)    75 97.9  F (36.6  C) (Oral) 24 1.861 m (6' 1.25\") 95% 38.85 kg/m2       Blood Pressure from Last 3 Encounters:   04/30/18 134/79   01/22/18 114/79   01/22/18 114/79    Weight from Last 3 Encounters:   04/30/18 134.5 kg (296 lb 8 oz)   01/22/18 132.6 kg (292 lb 5.3 oz)   01/22/18 132.6 kg (292 lb 4.8 oz)            "   Today, you had the following     No orders found for display       Primary Care Provider Office Phone # Fax #    Dorian Last [phone]       [facility] [address] [facility] [address]  [facility] [address]        Equal Access to Services     JAYYSHERRILL BILL : Hadii aad ku hadasho Soomaali, waaxda luqadaha, qaybta kaalmada adeegyada, waxay idiin hayaan adeildefonso parker laseemaearl . So [facility] [phone].    ATENCIÓN: Si habla español, tiene a newman disposición servicios gratuitos de asistencia lingüística. Llame al [phone].    We comply with applicable federal civil rights laws and [place] laws. We do not discriminate on the basis of race, color, national origin, age, disability, sex, sexual orientation, or gender identity.            Thank you!     Thank you for choosing [facility] NEUROLOGY  for your care. Our goal is always to provide you with excellent care. Hearing back from our patients is one way we can continue to improve our services. Please take a few minutes to complete the written survey that you may receive in the mail after your visit with us. Thank you!             Your Updated Medication List - Protect others around you: Learn how to safely use, store and throw away your medicines at www.disposemymeds.org.          This list is accurate as of 4/30/18 11:59 PM.  Always use your most recent med list.                   Brand Name Dispense Instructions for use Diagnosis    allopurinol 300 MG tablet    ZYLOPRIM     TAKE 1 TABLET BY MOUTH DAILY        amLODIPine 5 MG tablet    NORVASC     Take 5 mg by mouth        aspirin 81 MG chewable tablet      Take 81 mg by mouth        VITA CONTOUR NEXT test strip   Generic drug:  blood glucose monitoring      USE TO TEST BLOOD SUGARS 3 TIMES DAILY        blood glucose monitoring lancets      Test Blood Sugar 3 Times Daily.  Dx-E11.8        blood glucose monitoring meter device kit           carbidopa-levodopa  MG per tablet    SINEMET     Take 2  tablets 3 x per day.    Parkinsonian features       ciclopirox 8 % Soln           clotrimazole-betamethasone cream    LOTRISONE     as needed.        diphenoxylate-atropine 2.5-0.025 MG per tablet    LOMOTIL          doxepin 10 MG capsule    SINEquan     Take 10 mg by mouth        * insulin pen needle 32G X 4 MM      1 each        * EASY TOUCH PEN NEEDLES 32G X 5 MM   Generic drug:  insulin pen needle      USE WITH INSULIN THREE TIMES DAILY        finasteride 5 MG tablet    PROSCAR     TAKE 1 TABLET BY MOUTH ONCE DAILY. DO NOT CRUSH.        glipiZIDE 10 MG tablet    GLUCOTROL     TAKE 1 TABLET BY MOUTH TWICE DAILY BEFORE MEALS.        HYDROcodone-acetaminophen  MG per tablet    NORCO          insulin glargine 100 UNIT/ML injection    LANTUS     Inject 27 units in morning and 24 units at night.        potassium chloride 10 MEQ CR capsule    MICRO-K     TAKE 1 CAPSULE BY MOUTH TWICE DAILY WITH MEALS. SWALLOW WHOLE, DO NOT CHEW. CAPSULES MAY BE OPENED AND SPRINKLED ON FOOD.        simvastatin 40 MG tablet    ZOCOR     Take 40 mg by mouth        TART CHERRY ADVANCED Caps           topiramate 100 MG tablet    TOPAMAX     Do not crush.  1 PO TID; 3 Month Supply        VICTOZA PEN 18 MG/3ML soln   Generic drug:  liraglutide      INJECT 1.8 MG UNDER THE SKIN ONE TIME A DAY        * Notice:  This list has 2 medication(s) that are the same as other medications prescribed for you. Read the directions carefully, and ask your doctor or other care provider to review them with you.

## 2018-04-30 NOTE — NURSING NOTE
Chief Complaint   Patient presents with     RECHECK     [facility]- NURSE VISIT/ STUDY     Yoshi Beckford, D[place]

## 2018-04-30 NOTE — PROGRESS NOTES
Patient arrived to research nurse appointment following 7T scan at [facility]. Discussed  and Clinical Core studies, and consented for both studies. Copies of signed consents given to patient, study staff notified.     Completed baseline nurse appointment for Clinical Core following consent. PKG watch data collection was delayed due to band size and due to patient needing to wear a wrist brace for carpel tunnel syndrome.     Patient was asked about frequency of falls, and he stated that he has had no falls in the previous 12 months.     Following our visit, [facility].    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 I spoke with Madeline Perez RN and gave patient her card for them to contact her and discuss time off and needed documentation.     I let them know that I will be in touch with them regarding scheduling of monopolar review for 4-6 weeks following surgery.     Gela Reynolds RN, MPH  Research Nurse

## 2018-05-15 ENCOUNTER — APPOINTMENT (OUTPATIENT)
Dept: SURGERY | Facility: CLINIC | Age: 68
End: 2018-05-15
Payer: COMMERCIAL

## 2018-05-15 ENCOUNTER — OFFICE VISIT (OUTPATIENT)
Dept: SURGERY | Facility: CLINIC | Age: 68
End: 2018-05-15
Payer: COMMERCIAL

## 2018-05-15 ENCOUNTER — ALLIED HEALTH/NURSE VISIT (OUTPATIENT)
Dept: SURGERY | Facility: CLINIC | Age: 68
End: 2018-05-15
Payer: COMMERCIAL

## 2018-05-15 ENCOUNTER — ANESTHESIA EVENT (OUTPATIENT)
Dept: SURGERY | Facility: CLINIC | Age: 68
End: 2018-05-15
Payer: COMMERCIAL

## 2018-05-15 VITALS
WEIGHT: 297.5 LBS | OXYGEN SATURATION: 96 % | HEIGHT: 74 IN | DIASTOLIC BLOOD PRESSURE: 78 MMHG | HEART RATE: 75 BPM | BODY MASS INDEX: 38.18 KG/M2 | SYSTOLIC BLOOD PRESSURE: 133 MMHG | TEMPERATURE: 98 F | RESPIRATION RATE: 18 BRPM

## 2018-05-15 DIAGNOSIS — G20.A1 PARKINSON DISEASE (H): ICD-10-CM

## 2018-05-15 DIAGNOSIS — Z01.818 PREOP EXAMINATION: ICD-10-CM

## 2018-05-15 DIAGNOSIS — Z01.818 PREOP EXAMINATION: Primary | ICD-10-CM

## 2018-05-15 LAB
ANION GAP SERPL CALCULATED.3IONS-SCNC: 6 MMOL/L (ref 3–14)
BUN SERPL-MCNC: 13 MG/DL (ref 7–30)
CALCIUM SERPL-MCNC: 9 MG/DL (ref 8.5–10.1)
CHLORIDE SERPL-SCNC: 112 MMOL/L (ref 94–109)
CO2 SERPL-SCNC: 22 MMOL/L (ref 20–32)
CREAT SERPL-MCNC: 1.48 MG/DL (ref 0.66–1.25)
ERYTHROCYTE [DISTWIDTH] IN BLOOD BY AUTOMATED COUNT: 13.5 % (ref 10–15)
GFR SERPL CREATININE-BSD FRML MDRD: 47 ML/MIN/1.7M2
GLUCOSE SERPL-MCNC: 109 MG/DL (ref 70–99)
HBA1C MFR BLD: 7.3 % (ref 0–5.6)
HCT VFR BLD AUTO: 41 % (ref 40–53)
HGB BLD-MCNC: 13.8 G/DL (ref 13.3–17.7)
INR PPP: 1.04 (ref 0.86–1.14)
MCH RBC QN AUTO: 30.8 PG (ref 26.5–33)
MCHC RBC AUTO-ENTMCNC: 33.7 G/DL (ref 31.5–36.5)
MCV RBC AUTO: 92 FL (ref 78–100)
PLATELET # BLD AUTO: 191 10E9/L (ref 150–450)
POTASSIUM SERPL-SCNC: 4 MMOL/L (ref 3.4–5.3)
RBC # BLD AUTO: 4.48 10E12/L (ref 4.4–5.9)
SODIUM SERPL-SCNC: 141 MMOL/L (ref 133–144)
WBC # BLD AUTO: 7.2 10E9/L (ref 4–11)

## 2018-05-15 NOTE — MR AVS SNAPSHOT
After Visit Summary   5/15/2018    Stefan Odonnell    MRN: [id number]           Patient Information     Date Of Birth          1950        Visit Information        Provider Department      5/15/2018 10:30 AM Pharmacist, Hyun Saab [facility] Assessment [place]        Today's Diagnoses     Preop examination    -  1       Follow-ups after your visit        Your next 10 appointments already scheduled     May 15, 2018 11:00 AM CDT   (Arrive by 10:45 AM)   PAC EVALUATION with Hyun Pac Dale 8   [facility] Preoperative [facility] [place] ([facility])    [address] [place] [id number]   [phone]            May 15, 2018 12:00 PM CDT   (Arrive by 11:45 AM)   PAC RN ASSESSMENT with Hyun Pac Rn   [facility] ([facility])    [address] [place] [id number]   [phone]            May 15, 2018 12:30 PM CDT   (Arrive by 12:15 PM)   PAC Anesthesia Consult with Hyun Pac Anesthesiologist   [facility] ([facility])    [address] [place] [address]   [phone]            May 29, 2018   Procedure with Wayne Marshall MD   [facility], [place], Same Day Surgery (--)    [address] [place] [id number]   [phone]            May 29, 2018  8:40 AM CDT   CT HEAD W/O CONTRAST with UUCT1   [facility], [place], [place] ([facility] [place])    [address] [place] [id number] [phone]           Please bring any scans or X-rays taken at other hospitals, if similar tests were done. Also bring a list of your medicines, including vitamins, minerals and over-the-counter drugs. It is safest to leave personal items at home.  Be sure to tell your doctor:   If you have any allergies.   If there s any chance you are pregnant.   If you are breastfeeding.  You do not  need to do anything special to prepare for this exam.  Please wear loose clothing, such as a sweat suit or jogging clothes. Avoid snaps, zippers and other metal. We may ask you to undress and put on a hospital gown.            Jun 08, 2018   Procedure with Wayne Marshall MD   [facility], [place], Same Day Surgery (--)    [address]  [facility] [place] [address] [phone]              Who to contact     Please call your clinic at [phone] to:    Ask questions about your health    Make or cancel appointments    Discuss your medicines    Learn about your test results    Speak to your doctor            Additional Information About Your Visit        [employer] Information     [employer] gives you secure access to your electronic health record. If you see a primary care provider, you can also send messages to your care team and make appointments. If you have questions, please call your primary care clinic.  If you do not have a primary care provider, please call [phone] and they will assist you.      [employer] is an electronic gateway that provides easy, online access to your medical records. With [employer], you can request a clinic appointment, read your test results, renew a prescription or communicate with your care team.     To access your existing account, please contact your [facility] or call [phone] for assistance.        Care EveryWhere ID     This is your Care EveryWhere ID. This could be used by other organizations to access your [place] medical records  [id number]         Blood Pressure from Last 3 Encounters:   04/30/18 134/79   01/22/18 114/79   01/22/18 114/79    Weight from Last 3 Encounters:   04/30/18 134.5 kg (296 lb 8 oz)   01/22/18 132.6 kg (292 lb 5.3 oz)   01/22/18 132.6 kg (292 lb 4.8 oz)              Today, you had the following     No orders found for display       Primary Care Provider Office Phone # Fax #    Dorian Last [phone]        [facility] [address]  [facility] [address]        Equal Access to Services     JAYYSHERRILL BILL : Hadii aad ku hadketano Souriahali, waaxda luqadaha, qaybta kaalmada adecelestineda, robert malickin hayaaearl hubbardildefonso parker tim plunkett. So [facility] [phone].    ATENCIÓN: Si habla español, tiene a newman disposición servicios gratuitos de asistencia lingüística. Llame al [phone].    We comply with applicable federal civil rights laws and [place] laws. We do not discriminate on the basis of race, color, national origin, age, disability, sex, sexual orientation, or gender identity.            Thank you!     Thank you for choosing [facility] PREOPERATIVE ASS[facility]  for your care. Our goal is always to provide you with excellent care. Hearing back from our patients is one way we can continue to improve our services. Please take a few minutes to complete the written survey that you may receive in the mail after your visit with us. Thank you!             Your Updated Medication List - Protect others around you: Learn how to safely use, store and throw away your medicines at www.disposemymeds.org.          This list is accurate as of 5/15/18 10:51 AM.  Always use your most recent med list.                   Brand Name Dispense Instructions for use Diagnosis    allopurinol 300 MG tablet    ZYLOPRIM     TAKE 1 TABLET BY MOUTH DAILY IN THE EVENING        amLODIPine 5 MG tablet    NORVASC     Take 5 mg by mouth every morning        aspirin 81 MG chewable tablet      Take 81 mg by mouth every evening        VITA CONTOUR NEXT test strip   Generic drug:  blood glucose monitoring      USE TO TEST BLOOD SUGARS 3 TIMES DAILY        blood glucose monitoring lancets      Test Blood Sugar 3 Times Daily.  Dx-E11.8        blood glucose monitoring meter device kit           carbidopa-levodopa  MG per tablet    SINEMET     Take 2 tablets by mouth 3 times daily Takes at 4 AM, 11 AM, 5 PM    Parkinsonian features        clotrimazole-betamethasone cream    LOTRISONE     Apply topically as needed for rash on back of leg        diphenoxylate-atropine 2.5-0.025 MG per tablet    LOMOTIL     Take 2 tablets by mouth 4 times daily as needed        doxepin 10 MG capsule    SINEquan     Take 10 mg by mouth At Bedtime        * insulin pen needle 32G X 4 MM      1 each        * EASY TOUCH PEN NEEDLES 32G X 5 MM   Generic drug:  insulin pen needle      USE WITH INSULIN THREE TIMES DAILY        finasteride 5 MG tablet    PROSCAR     TAKE 1 TABLET BY MOUTH ONCE DAILY AT BEDTIME. DO NOT CRUSH.        glipiZIDE 10 MG tablet    GLUCOTROL     TAKE 5mg (1/2 tablet) BY MOUTH TWICE DAILY BEFORE MEALS.        HYDROcodone-acetaminophen  MG per tablet    NORCO     Take 1 tablet by mouth every 8 hours as needed        insulin glargine 100 UNIT/ML injection    LANTUS     Inject 27 units in morning and 24 units at night.        potassium chloride 10 MEQ CR capsule    MICRO-K     TAKE 1 CAPSULE BY MOUTH TWICE DAILY WITH MEALS. SWALLOW WHOLE, DO NOT CHEW. CAPSULES MAY BE OPENED AND SPRINKLED ON FOOD.        simvastatin 40 MG tablet    ZOCOR     Take 40 mg by mouth At Bedtime        TART CHERRY ADVANCED Caps      Take 2 capsules by mouth 2 times daily        topiramate 100 MG tablet    TOPAMAX     Take 1 tablet by mouth three times a day. Do not crush        VICTOZA PEN 18 MG/3ML soln   Generic drug:  liraglutide      INJECT 1.8 MG UNDER THE SKIN ONE TIME A DAY        * Notice:  This list has 2 medication(s) that are the same as other medications prescribed for you. Read the directions carefully, and ask your doctor or other care provider to review them with you.

## 2018-05-15 NOTE — H&P
Pre-Operative H & P     CC:  Preoperative exam to assess for increased cardiopulmonary risk while undergoing surgery and anesthesia.    Date of Encounter: 5/15/2018  Primary Care Physician:  Dorian Last  Associated diagnosis:  Parkinson's disease    HPI  Stefan Odonnell is a 67 year old male who presents for pre-operative H & P in preparation for a Stealth Assisted Left Deep Brain Stimulator Placement, Phase I, Placement Of Left Deep Brain Stimulator Electrode, Target Left Subthalamic Nucleus With Microelectrode Recording on 5/29/2018 and a Deep Brain Stimulator Placement, Phase II, Placement Of Deep Brain Stimulator Generator/Battery Over The Left Chest Wall on 6/8/2018 by Dr. Marshall at [facility].     Stefan Odonnell is a 67 year old male with hypertension, hyperlipidemia, sleep apnea, obesity, gout, peripheral neuropathy, restless leg syndrome, carpal tunnel, OA, CKD, insomnia and history of BCC that has tremors (R>L) related to parkinson's disease.  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  He has attempted multiple oral medications for management of the tremors, but hasn't had significant improvement with the medications.  He has consulted with Dr. Marshall and has elected to proceed with DBS as noted above.       History is obtained from the patient and his spouse.     Past Medical History  Past Medical History:   Diagnosis Date     Anosmia      Bleeding ulcer 1983     Carpal tunnel syndrome, bilateral      CKD (chronic kidney disease) stage 3, GFR 30-59 ml/min      Diabetes mellitus (H)      Gout      Hx of skin cancer, basal cell 2013    removed from face     Hyperlipidemia      Hypertension      Insomnia      Neuropathy      Obese      RENÉE (obstructive sleep apnea)      Osteoarthritis of knees, bilateral      Periodic limb movements of sleep      Restless legs syndrome (RLS)        Past Surgical History  Past  Surgical History:   Procedure Laterality Date     COLONOSCOPY       HEMORRHOID SURGERY       Open Stomach Ulcer Repair       TONSILLECTOMY         Hx of Blood transfusions/reactions: yes, no reactions    Hx of abnormal bleeding or anti-platelet use: aspirin      Steroid use in the last year: none    Personal or FH with difficulty with Anesthesia:  none    Prior to Admission Medications  Current Outpatient Prescriptions   Medication Sig Dispense Refill     allopurinol (ZYLOPRIM) 300 MG tablet TAKE 1 TABLET BY MOUTH DAILY IN THE EVENING       amLODIPine (NORVASC) 5 MG tablet Take 5 mg by mouth every morning        aspirin 81 MG chewable tablet Take 81 mg by mouth every evening        blood glucose monitoring ([employer] CONTOUR NEXT MONITOR) meter device kit        blood glucose monitoring ([employer] CONTOUR NEXT) test strip USE TO TEST BLOOD SUGARS 3 TIMES DAILY       blood glucose monitoring (VITA MICROLET) lancets Test Blood Sugar 3 Times Daily.  Dx-E11.8       carbidopa-levodopa (SINEMET)  MG per tablet Take 2 tablets by mouth 3 times daily Takes at 4 AM, 11 AM, 5 PM       clotrimazole-betamethasone (LOTRISONE) cream Apply topically as needed for rash on back of leg       diphenoxylate-atropine (LOMOTIL) 2.5-0.025 MG per tablet Take 2 tablets by mouth 4 times daily as needed        doxepin (SINEQUAN) 10 MG capsule Take 10 mg by mouth At Bedtime        finasteride (PROSCAR) 5 MG tablet TAKE 1 TABLET BY MOUTH ONCE DAILY AT BEDTIME. DO NOT CRUSH.       glipiZIDE (GLUCOTROL) 10 MG tablet TAKE 5mg (1/2 tablet) BY MOUTH TWICE DAILY BEFORE MEALS.       HYDROcodone-acetaminophen (NORCO)  MG per tablet Take 1 tablet by mouth every 8 hours as needed        insulin glargine (LANTUS) 100 UNIT/ML injection Inject 27 units in morning and 24 units at night.       insulin pen needle (EASY TOUCH PEN NEEDLES) 32G X 5 MM USE WITH INSULIN THREE TIMES DAILY       insulin pen needle 32G X 4 MM 1 each       liraglutide (VICTOZA PEN)  18 MG/3ML soln INJECT 1.8 MG UNDER THE SKIN ONE TIME A DAY       Misc Natural Products (TART CHERRY ADVANCED) CAPS Take 2 capsules by mouth 2 times daily        potassium chloride (MICRO-K) 10 MEQ CR capsule TAKE 1 CAPSULE BY MOUTH TWICE DAILY WITH MEALS. SWALLOW WHOLE, DO NOT CHEW. CAPSULES MAY BE OPENED AND SPRINKLED ON FOOD.       simvastatin (ZOCOR) 40 MG tablet Take 40 mg by mouth At Bedtime        topiramate (TOPAMAX) 100 MG tablet Take 1 tablet by mouth three times a day. Do not crush         Allergies  Allergies   Allergen Reactions     Atorvastatin Muscle Pain (Myalgia)     Clindamycin GI Disturbance     Gabapentin Swelling       Social History  Social History     Social History     Marital status:      Spouse name: Blanca Odonnell     Number of children: 1     Years of education: N/A     Occupational History     [employer]     Social History Main Topics     Smoking status: Never Smoker     Smokeless tobacco: Never Used     Alcohol use No      Comment: 2 - 3 x a year     Drug use: No     Sexual activity: Yes     Partners: Female     Other Topics Concern     Parent/Sibling W/ Cabg, Mi Or Angioplasty Before 65f 55m? No     Social History Narrative    On second marriage.  Has a son from first marriage.   for 27 years.  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  Never smoked.  He rarely drinks alcohol about 2 - 3 x per year.  Doesn't use illicit drugs.        Family History  Family History   Problem Relation Age of Onset     HEART DISEASE Mother      DIABETES Mother      Arthritis Mother      Depression Mother      Anxiety Disorder Mother      Dementia Mother      Hypertension Mother      Prostate Cancer Father      Tremor Father      Obesity Sister      HEART DISEASE Brother      Bone Cancer Brother      HEART DISEASE Brother      Tremor Son      HEART DISEASE Maternal Grandmother      HEART DISEASE Maternal Grandfather      HEART DISEASE Paternal Grandmother  "     HEART DISEASE Paternal Grandfather      Unknown/Adopted Sister              ROS/MED HX  The complete review of systems is negative other than noted in the HPI or here.     ENT/Pulmonary:     (+)sleep apnea, RENÉE risk factors hypertension, obese, doesn't use CPAP , . .    Neurologic:     (+)neuropathy - bilateral feet., Parkinson's disease features: walks slower than previous, tremors (R>L), mild head tremor, slower reaction time,     Cardiovascular:     (+) Dyslipidemia, hypertension----. : . . . :. . No previous cardiac testing       METS/Exercise Tolerance:  4 - Raking leaves, gardening   Hematologic:     (+) History of Transfusion no previous transfusion reaction -      Musculoskeletal:   (+) arthritis, , , other musculoskeletal- gout      GI/Hepatic:  - neg GI/hepatic ROS       Renal/Genitourinary:     (+) chronic renal disease, type: CRI, Pt does not require dialysis, Pt has no history of transplant,       Endo:     (+) type II DM Last HgA1c: 7.3 date: 5/15/2018 Using insulin - not using insulin pump Normal glucose range:  not previously admitted for DM/DKA Diabetic complications: nephropathy neuropathy, Obesity, .      Psychiatric:     (+) psychiatric history other (comment) (insomnia)      Infectious Disease:  - neg infectious disease ROS       Malignancy:   (+) Malignancy History of Skin  Skin CA Remission status post Surgery,         Other:    (+) H/O Chronic Pain,H/O chronic opiod use ,                    Temp: 98  F (36.7  C) Temp src: Oral BP: 133/78 Pulse: 75   Resp: 18 SpO2: 96 %         297 lbs 8 oz  6' 2\"   Body mass index is 38.2 kg/(m^2).       Physical Exam  Constitutional: Awake, alert, cooperative, no apparent distress, and appears stated age. obese  Eyes: Pupils equal, round and reactive to light, extra ocular muscles intact, sclera clear, conjunctiva normal.  HENT: Normocephalic, oral pharynx with moist mucus membranes, good dentition with 6 implants. No goiter appreciated. "   Respiratory: Clear to auscultation bilaterally, no crackles or wheezing.  Cardiovascular: Regular rate and rhythm, normal S1 and S2, and no murmur noted.  Carotids +2, no bruits. No edema. Palpable pulses to radial  DP and PT arteries.   GI: Normal bowel sounds, soft, non-distended, non-tender, no masses palpated, no hepatosplenomegaly. Mild ventral hernia visible.  Surgical scars: upper abdomen  Lymph/Hematologic: No cervical lymphadenopathy and no supraclavicular lymphadenopathy.  Genitourinary:  deferred  Skin: Warm and dry.  No rashes at anticipated surgical site.   Musculoskeletal: Full ROM of neck. There is no redness, warmth, or swelling of the exposed joints. Gross motor strength is normal.    Neurologic: Awake, alert, oriented to name, place and time. Cranial nerves II-XII are grossly intact. Gait is normal.   Neuropsychiatric: Calm, cooperative. Normal affect.     Labs: (personally reviewed)  Component      Latest Ref Rng & Units 5/15/2018   Sodium      133 - 144 mmol/L 141   Potassium      3.4 - 5.3 mmol/L 4.0   Chloride      94 - 109 mmol/L 112 (H)   Carbon Dioxide      20 - 32 mmol/L 22   Anion Gap      3 - 14 mmol/L 6   Glucose      70 - 99 mg/dL 109 (H)   Urea Nitrogen      7 - 30 mg/dL 13   Creatinine      0.66 - 1.25 mg/dL 1.48 (H)   GFR Estimate      >60 mL/min/1.7m2 47 (L)   GFR Estimate If Black      >60 mL/min/1.7m2 57 (L)   Calcium      8.5 - 10.1 mg/dL 9.0   WBC      4.0 - 11.0 10e9/L 7.2   RBC Count      4.4 - 5.9 10e12/L 4.48   Hemoglobin      13.3 - 17.7 g/dL 13.8   Hematocrit      40.0 - 53.0 % 41.0   MCV      78 - 100 fl 92   MCH      26.5 - 33.0 pg 30.8   MCHC      31.5 - 36.5 g/dL 33.7   RDW      10.0 - 15.0 % 13.5   Platelet Count      150 - 450 10e9/L 191   Hemoglobin A1C      0 - 5.6 % 7.3 (H)   INR      0.86 - 1.14 1.04         Outside records reviewed from: Care Everywhere    ASSESSMENT and PLAN  Stefan Odonnell is a 67 year old male scheduled for Stealth Assisted Left Deep Brain  Stimulator Placement, Phase I, Placement Of Left Deep Brain Stimulator Electrode, Target Left Subthalamic Nucleus With Microelectrode Recording on 5/29/2018 and a Deep Brain Stimulator Placement, Phase II, Placement Of Deep Brain Stimulator Generator/Battery Over The Left Chest Wall on 6/8/2018 by Dr. Marshall in treatment of Parkinson's disease symptoms.  PAC referral for risk assessment and optimization for anesthesia with comorbid conditions of: hypertension, hyperlipidemia, sleep apnea, obesity, gout, peripheral neuropathy, restless leg syndrome, carpal tunnel, OA, CKD, insomnia and history of BCC.      Pre-operative considerations:  1.  Cardiac:  Functional status- METS 4.  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  He reports that he can walk more than 3 blocks with no complication also.  He denies any exertional dyspnea with those activities. Hypertension is managed with norvasc.  Intermediate risk surgery with 0.4% risk of major adverse cardiac event.   2.  Pulm:  Airway feasible.  He was previously diagnosed with sleep apnea and used to use CPAP, but he lost about 70lbs and his wife reports he no longer snores at night like he used to.  He stopped using his CPAP after the weight loss, but hasn't had a repeat sleep study to confirm that the sleep apnea has resolved.  He is still obese with a BMI >30.   3.  GI:  Risk of PONV score = 1.  If > 2, anti-emetic intervention recommended.  4. Neurologic:  Tremors on right are worse than tremors on left per patient report.  He denies lower extremity rigidity or freezing and/or any history of falling.   He takes a rare tablet of norco for diabetic peripheral neuropathy pain.  Instructed to hold topamax and sinemet for phase 1 surgery, but to take both on phase 2 surgery day.    5. Endo:  Diabetes is managed with glipizide, lantus and victoza.  Hold glipizide and victoza DOS.  Take  only 80% of lantus DOS.    6. Renal:  +CKD - creatinine stable today at 1.48.  7. Heme:  Instructed to hold aspirin 7 days prior to surgery.      VTE risk: 1.8%    Patient is optimized and is acceptable candidate for the proposed procedure.  No further diagnostic evaluation is needed.     Patient discussed with Dr. Myers.              Sandra Karimi DNP, RN, APRN  Preoperative Assessment Center  [facility]  Clinic and Surgery Center  Phone: [phone]  Fax: [phone]

## 2018-05-15 NOTE — ANESTHESIA PREPROCEDURE EVALUATION
Anesthesia Evaluation     . Pt has had prior anesthetic. Type: General           ROS/MED HX    ENT/Pulmonary:     (+)sleep apnea, RENÉE risk factors hypertension, obese, doesn't use CPAP , . .    Neurologic:     (+)neuropathy - bilateral feet., Parkinson's disease features: walks slower than previous, tremors (R>L), mild head tremor, slower reaction time,     Cardiovascular:     (+) Dyslipidemia, hypertension----. : . . . :. . No previous cardiac testing       METS/Exercise Tolerance:  4 - Raking leaves, gardening   Hematologic:     (+) History of Transfusion no previous transfusion reaction -      Musculoskeletal:   (+) arthritis, , , other musculoskeletal- gout      GI/Hepatic:  - neg GI/hepatic ROS       Renal/Genitourinary:     (+) chronic renal disease, type: CRI, Pt does not require dialysis, Pt has no history of transplant,       Endo:     (+) type II DM Last HgA1c: 7.3 date: 5/15/2018 Using insulin - not using insulin pump Normal glucose range:  not previously admitted for DM/DKA Diabetic complications: nephropathy neuropathy, Obesity, .      Psychiatric:     (+) psychiatric history other (comment) (insomnia)      Infectious Disease:  - neg infectious disease ROS       Malignancy:   (+) Malignancy History of Skin  Skin CA Remission status post Surgery,         Other:    (+) H/O Chronic Pain,H/O chronic opiod use ,                    Physical Exam  Normal systems: cardiovascular and pulmonary    Airway   Mallampati: II  TM distance: >3 FB  Neck ROM: full    Dental   (+) implants  Comment: 6 implants    Cardiovascular   Rhythm and rate: regular and normal      Pulmonary    breath sounds clear to auscultation               PAC Discussion and Assessment    ASA Classification: 3  Case is suitable for: [place]  Anesthetic techniques and relevant risks discussed: MAC with GA as backup and GA  Invasive monitoring and risk discussed:   Types:   Possibility and Risk of blood transfusion discussed:   NPO  instructions given:   Additional anesthetic preparation and risks discussed:   Needs early admission to pre-op area:   Other:     PAC Resident/NP Anesthesia Assessment:  Stefan Odonnell is a 67 year old male scheduled for Stealth Assisted Left Deep Brain Stimulator Placement, Phase I, Placement Of Left Deep Brain Stimulator Electrode, Target Left Subthalamic Nucleus With Microelectrode Recording on 5/29/2018 and a Deep Brain Stimulator Placement, Phase II, Placement Of Deep Brain Stimulator Generator/Battery Over The Left Chest Wall on 6/8/2018 by Dr. Marshall in treatment of Parkinson's disease symptoms.  PAC referral for risk assessment and optimization for anesthesia with comorbid conditions of: hypertension, hyperlipidemia, sleep apnea, obesity, gout, peripheral neuropathy, restless leg syndrome, carpal tunnel, OA, CKD, insomnia and history of BCC.      Pre-operative considerations:  1.  Cardiac:  Functional status- METS 4.  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  He reports that he can walk more than 3 blocks with no complication also.  He denies any exertional dyspnea with those activities. Hypertension is managed with norvasc.  Intermediate risk surgery with 0.4% risk of major adverse cardiac event.   2.  Pulm:  Airway feasible.  He was previously diagnosed with sleep apnea and used to use CPAP, but he lost about 70lbs and his wife reports he no longer snores at night like he used to.  He stopped using his CPAP after the weight loss, but hasn't had a repeat sleep study to confirm that the sleep apnea has resolved.  He is still obese with a BMI >30.   3.  GI:  Risk of PONV score = 1.  If > 2, anti-emetic intervention recommended.  4. Neurologic:  Tremors on right are worse than tremors on left per patient report.  He denies lower extremity rigidity or freezing and/or any history of falling.   He takes a rare tablet of norco  for diabetic peripheral neuropathy pain.  Instructed to hold topamax and sinemet for phase 1 surgery, but to take both on phase 2 surgery day.    5. Endo:  Diabetes is managed with glipizide, lantus and victoza.  Hold glipizide and victoza DOS.  Take only 80% of lantus DOS.    6. Renal:  +CKD - creatinine stable today at 1.48.  7. Heme:  Instructed to hold aspirin 7 days prior to surgery.      VTE risk: 1.8%    Patient is optimized and is acceptable candidate for the proposed procedure.  No further diagnostic evaluation is needed.     Patient discussed with Dr. Myers.      **For further details of assessment, testing, and physical exam please see H and P completed on same date.          Sandra Karimi DNP, RN, APRN      Reviewed and Signed by PAC Mid-Level Provider/Resident  Mid-Level Provider/Resident: Sandra Karimi DNP, RN, APRN  Date: 5/15/2018  Time: 1200    Attending Anesthesiologist Anesthesia Assessment:  67 year old for DBS on the left in management of Parkinson's disease. Patient has no significant cardiac or pulmonary disease, had RENÉE in the past, but has lost 70 lbs, and no longer using CPAP.     Patient/case discussed with SHIREEN. No need to see patient. Patient is appropriate for the planned procedure without further work-up or medical management.      Reviewed and Signed by PAC Anesthesiologist  Anesthesiologist: rom  Date: 5/15/2018  Time:   Pass/Fail: Pass  Disposition:     PAC Pharmacist Assessment:        Pharmacist:   Date:   Time:      Anesthesia Plan      History & Physical Review  History and physical reviewed and following examination; no interval change.    ASA Status:  3 .        Plan for MAC (with GA backup) with Intravenous induction. Maintenance will be TIVA.  Reason for MAC:  Deep or markedly invasive procedure (G8) and Procedure to face, neck, head or breast  PONV prophylaxis:  Ondansetron       Postoperative Care  Postoperative pain management:  IV analgesics.       Consents  Anesthetic plan, risks, benefits and alternatives discussed with:  Patient (Including possibility of intraoperative awareness or recall.)..        History and physical reviewed; no interval change.     Patient seen, examined, reviewed. See above for details from PAC. Note updated.      Fransisco Ennis  Staff Anesthesiologist                          .

## 2018-05-15 NOTE — PATIENT INSTRUCTIONS
Preparing for Your Surgery      Name:  Stefan Odonnell   MRN:  [id number]   :  1950   Today's Date:  5/15/2018     Arriving for surgery:  Surgery date:  18  Arrival time:  5:30AM  Please come to:       [facility] [address]  [address] [place]  [address]    -   parking is available in front of the hospital from 5:15 am to 8:00 pm    -  Stop at the Information Desk in the lobby    -   Inform the information person that you are here for surgery. An escort to 3c will be provided. If you would not like an escort, please proceed to 3C on the 3rd floor. [phone]     What can I eat or drink?  -  You may have solid food or milk products until 8 hours prior to your surgery. Midnight  -  You may have water, apple juice or 7up/Sprite until 2 hours prior to your surgery. 5:30AM    Which medicines can I take?  -  Do NOT take these medications in the morning, the day of surgery:  Stop vitamins and supplements one week prior to surgery.  Please do not take aspirin for 7 days prior to surgery.  Please do not take carbidopa-levodopa, glipizide(glucotrol, Tart Cherry capsules, topamax, Victoza and potassium the morning of surgery.     -  Please take these medications the day of surgery:  Please take amlodipine(norvasc) and  finsteride the morning of surgery.  Please take Lantus insulin- 22 units the morning of surgery.     How do I prepare myself?  -  Take two showers: one the night before surgery; and one the morning of surgery.         Use Scrubcare or Hibiclens to wash from neck down.  You may use your own shampoo and conditioner. No other hair products.   -  Do NOT use lotion, powder, deodorant, or antiperspirant the day of your surgery.  -  Do NOT wear any jewelry.  - Do not bring your own medications to the hospital, except for inhalers and eye drops.  -  Bring your ID and insurance card.    Questions or Concerns:  If you have questions or concerns regarding the  day of surgery, please call the Preoperative Assessment Center (PAC), Monday-Friday 7AM-7PM:  [phone].  After surgery please call your surgeons office.           AFTER YOUR SURGERY  Breathing exercises   Breathing exercises help you recover faster. Take deep breaths and let the air out slowly. This will:     Help you wake up after surgery.    Help prevent complications like pneumonia.  Preventing complications will help you go home sooner.   We may give you a breathing device (incentive spirometer) to encourage you to breathe deeply.   Nausea and vomiting   You may feel sick to your stomach after surgery; if so, let your nurse know.    Pain control:  After surgery, you may have pain. Our goal is to help you manage your pain. Pain medicine will help you feel comfortable enough to do activities that will help you heal.  These activities may include breathing exercises, walking and physical therapy.   To help your health care team treat your pain we will ask: 1) If you have pain  2) where it is located 3) describe your pain in your words  Methods of pain control include medications given by mouth, vein or by nerve block for some surgeries.  We may give you a pain control pump that will:  1) Deliver the medicine through a tube placed in your vein  2) Control the amount of medicine you receive  3) Allow you to push a button to deliver a dose of pain medicine  Sequential Compression Device (SCD) or Pneumo Boots:  You may need to wear SCD S on your legs or feet. These are wraps connected to a machine that pumps in air and releases it. The repeated pumping helps prevent blood clots from forming.

## 2018-05-15 NOTE — PROGRESS NOTES
Preoperative Assessment Center medication history for May 15, 2018 is complete.    See Epic admission navigator for prior to admission medications.   Operating room staff will still need to confirm medications and last dose information on day of surgery.     Medication history interview sources:  patient, patient's home medication list from [facility]     Changes made to [facility] medication list (reason)  Added: none  Deleted: ciclopirox - not using  Changed: sig updated on every medication on the list except: lantus insulin, victoza, KCl    Additional medication history information (including reliability of information, actions taken by pharmacist):  -- No recent (within 30 days) course of antibiotics  -- No recent (within 30 days) course of steroids  -- No recent (within 30 days) chronic daily medications stopped   -- Patient declines being on any other prescription or over-the-counter medications    Prior to Admission medications    Medication Sig Last Dose Taking? Auth Provider   allopurinol (ZYLOPRIM) 300 MG tablet TAKE 1 TABLET BY MOUTH DAILY IN THE EVENING Taking Yes Reported, Patient   amLODIPine (NORVASC) 5 MG tablet Take 5 mg by mouth every morning  Taking Yes Reported, Patient   aspirin 81 MG chewable tablet Take 81 mg by mouth every evening  Taking Yes Reported, Patient   carbidopa-levodopa (SINEMET)  MG per tablet Take 2 tablets by mouth 3 times daily Takes at 4 AM, 11 AM, 5 PM Taking Yes Carlos Field APRN CNP   diphenoxylate-atropine (LOMOTIL) 2.5-0.025 MG per tablet Take 2 tablets by mouth 4 times daily as needed  Taking Yes Reported, Patient   doxepin (SINEQUAN) 10 MG capsule Take 10 mg by mouth At Bedtime  Taking Yes Reported, Patient   finasteride (PROSCAR) 5 MG tablet TAKE 1 TABLET BY MOUTH ONCE DAILY AT BEDTIME. DO NOT CRUSH. Taking Yes Reported, Patient   glipiZIDE (GLUCOTROL) 10 MG tablet TAKE 5mg (1/2 tablet) BY MOUTH TWICE DAILY BEFORE MEALS. Taking Yes Reported, Patient    HYDROcodone-acetaminophen (NORCO)  MG per tablet Take 1 tablet by mouth every 8 hours as needed  Taking Yes Reported, Patient   insulin glargine (LANTUS) 100 UNIT/ML injection Inject 27 units in morning and 24 units at night. Taking Yes Reported, Patient   liraglutide (VICTOZA PEN) 18 MG/3ML soln INJECT 1.8 MG UNDER THE SKIN ONE TIME A DAY Taking Yes Reported, Patient   Misc Natural Products (TART CHERRY ADVANCED) CAPS Take 2 capsules by mouth 2 times daily  Taking Yes Reported, Patient   potassium chloride (MICRO-K) 10 MEQ CR capsule TAKE 1 CAPSULE BY MOUTH TWICE DAILY WITH MEALS. SWALLOW WHOLE, DO NOT CHEW. CAPSULES MAY BE OPENED AND SPRINKLED ON FOOD. Taking Yes Reported, Patient   simvastatin (ZOCOR) 40 MG tablet Take 40 mg by mouth At Bedtime  Taking Yes Reported, Patient   topiramate (TOPAMAX) 100 MG tablet Take 1 tablet by mouth three times a day. Do not crush Taking Yes Reported, Patient   blood glucose monitoring (VITA CONTOUR NEXT MONITOR) meter device kit    Reported, Patient   blood glucose monitoring (VITA CONTOUR NEXT) test strip USE TO TEST BLOOD SUGARS 3 TIMES DAILY   Reported, Patient   blood glucose monitoring (VITA MICROLET) lancets Test Blood Sugar 3 Times Daily.  Dx-E11.8   Reported, Patient   clotrimazole-betamethasone (LOTRISONE) cream Apply topically as needed for rash on back of leg Not Taking  Reported, Patient   insulin pen needle (EASY TOUCH PEN NEEDLES) 32G X 5 MM USE WITH INSULIN THREE TIMES DAILY   Reported, Patient   insulin pen needle 32G X 4 MM 1 each   Reported, Patient          Medication history completed by: Desmond Hollins [facility]

## 2018-05-15 NOTE — MR AVS SNAPSHOT
After Visit Summary   5/15/2018    Stefan Odonnell    MRN: [id number]           Patient Information     Date Of Birth          1950        Visit Information        Provider Department      5/15/2018 12:00 PM Rn, [facility] Pre[facility]        Care Instructions    Preparing for Your Surgery      Name:  Stefan Odonnell   MRN:  [id number]   :  1950   Today's Date:  5/15/2018     Arriving for surgery:  Surgery date:  18  Arrival time:  5:30AM  Please come to:       [facility] [address]  [address] [place]  [address]    -   parking is available in front of the hospital from 5:15 am to 8:00 pm    -  Stop at the Information Desk in the lobby    -   Inform the information person that you are here for surgery. An escort to 3c will be provided. If you would not like an escort, please proceed to 3C on the 3rd floor. [phone]     What can I eat or drink?  -  You may have solid food or milk products until 8 hours prior to your surgery. Midnight  -  You may have water, apple juice or 7up/Sprite until 2 hours prior to your surgery. 5:30AM    Which medicines can I take?  -  Do NOT take these medications in the morning, the day of surgery:  Stop vitamins and supplements one week prior to surgery.  Please do not take aspirin for 7 days prior to surgery.  Please do not take carbidopa-levodopa, glipizide(glucotrol, Tart Cherry capsules, topamax, Victoza and potassium the morning of surgery.     -  Please take these medications the day of surgery:  Please take amlodipine(norvasc) and  finsteride the morning of surgery.  Please take Lantus insulin- 22 units the morning of surgery.     How do I prepare myself?  -  Take two showers: one the night before surgery; and one the morning of surgery.         Use Scrubcare or Hibiclens to wash from neck down.  You may use your own shampoo and conditioner. No other hair products.   -  Do NOT  use lotion, powder, deodorant, or antiperspirant the day of your surgery.  -  Do NOT wear any jewelry.  - Do not bring your own medications to the hospital, except for inhalers and eye drops.  -  Bring your ID and insurance card.    Questions or Concerns:  If you have questions or concerns regarding the day of surgery, please call the Preoperative Assessment Center (PAC), Monday-Friday 7AM-7PM:  [phone].  After surgery please call your surgeons office.           AFTER YOUR SURGERY  Breathing exercises   Breathing exercises help you recover faster. Take deep breaths and let the air out slowly. This will:     Help you wake up after surgery.    Help prevent complications like pneumonia.  Preventing complications will help you go home sooner.   We may give you a breathing device (incentive spirometer) to encourage you to breathe deeply.   Nausea and vomiting   You may feel sick to your stomach after surgery; if so, let your nurse know.    Pain control:  After surgery, you may have pain. Our goal is to help you manage your pain. Pain medicine will help you feel comfortable enough to do activities that will help you heal.  These activities may include breathing exercises, walking and physical therapy.   To help your health care team treat your pain we will ask: 1) If you have pain  2) where it is located 3) describe your pain in your words  Methods of pain control include medications given by mouth, vein or by nerve block for some surgeries.  We may give you a pain control pump that will:  1) Deliver the medicine through a tube placed in your vein  2) Control the amount of medicine you receive  3) Allow you to push a button to deliver a dose of pain medicine  Sequential Compression Device (SCD) or Pneumo Boots:  You may need to wear SCD S on your legs or feet. These are wraps connected to a machine that pumps in air and releases it. The repeated pumping helps prevent blood clots from forming.           Follow-ups  after your visit        Your next 10 appointments already scheduled     May 15, 2018 12:00 PM CDT   (Arrive by 11:45 AM)   PAC RN ASSESSMENT with Hyun Pac Rn   [facility] Preoperative Assessment [place] ([facility])    [address] [place] [id number]   [phone]            May 15, 2018 12:30 PM CDT   (Arrive by 12:15 PM)   PAC Anesthesia Consult with Hyun Pac Anesthesiologist   [facility] Preoperative Assessment [place] ([facility])    [address] [place] [id number]   [phone]            May 29, 2018   Procedure with Wayne Marshall MD   [facility], Same Day Surgery (--)    [address] [facility] [id number] [phone]            May 29, 2018  8:40 AM CDT   CT HEAD W/O CONTRAST with UUCT1   [facility], CT ([facility], [facility])    [address] [facility] [address] [phone]           Please bring any scans or X-rays taken at other hospitals, if similar tests were done. Also bring a list of your medicines, including vitamins, minerals and over-the-counter drugs. It is safest to leave personal items at home.  Be sure to tell your doctor:   If you have any allergies.   If there s any chance you are pregnant.   If you are breastfeeding.  You do not need to do anything special to prepare for this exam.  Please wear loose clothing, such as a sweat suit or jogging clothes. Avoid snaps, zippers and other metal. We may ask you to undress and put on a hospital gown.            Jun 08, 2018   Procedure with Wayne Marshall MD   [facility], Same Day Surgery (--)    [address] [address] [phone]              Future tests that were ordered for you today     Open Future Orders        Priority Expected Expires Ordered    CBC with platelets Routine 5/15/2018 6/14/2018 5/15/2018    Basic metabolic panel Routine 5/15/2018 6/14/2018  5/15/2018    Hemoglobin A1c Routine 5/15/2018 6/14/2018 5/15/2018    INR Routine 5/15/2018 6/14/2018 5/15/2018            Who to contact     Please call your clinic at [phone] to:    Ask questions about your health    Make or cancel appointments    Discuss your medicines    Learn about your test results    Speak to your doctor            Additional Information About Your Visit        [employer] Information     TabbedOut gives you secure access to your electronic health record. If you see a primary care provider, you can also send messages to your care team and make appointments. If you have questions, please call your primary care clinic.  If you do not have a primary care provider, please call [phone] and they will assist you.      TabbedOut is an electronic gateway that provides easy, online access to your medical records. With TabbedOut, you can request a clinic appointment, read your test results, renew a prescription or communicate with your care team.     To access your existing account, please contact your [facility] or call [phone] for assistance.        Care EveryWhere ID     This is your Care EveryWhere ID. This could be used by other organizations to access your [place] medical records  [id number]         Blood Pressure from Last 3 Encounters:   05/15/18 133/78   04/30/18 134/79   01/22/18 114/79    Weight from Last 3 Encounters:   05/15/18 134.9 kg (297 lb 8 oz)   04/30/18 134.5 kg (296 lb 8 oz)   01/22/18 132.6 kg (292 lb 5.3 oz)              Today, you had the following     No orders found for display       Primary Care Provider Office Phone # Fax #    Dorian BUTTS Berhane [phone]       [facility] [address]  [facility] [address]        Equal Access to Services     DAWSON KHAN : Sunny Elias, waaquilino aguayo, qalucilleta kaalmalili schmidt, robert plunkett. So [facility] [phone].    ATENCIÓN:  Si habla celestino, tiene a newman disposición servicios gratuitos de asistencia lingüística. Frankie parks [phone].    We comply with applicable federal civil rights laws and [place] laws. We do not discriminate on the basis of race, color, national origin, age, disability, sex, sexual orientation, or gender identity.            Thank you!     Thank you for choosing [facility]OPERAT[facility]  for your care. Our goal is always to provide you with excellent care. Hearing back from our patients is one way we can continue to improve our services. Please take a few minutes to complete the written survey that you may receive in the mail after your visit with us. Thank you!             Your Updated Medication List - Protect others around you: Learn how to safely use, store and throw away your medicines at www.disposemymeds.org.          This list is accurate as of 5/15/18 11:30 AM.  Always use your most recent med list.                   Brand Name Dispense Instructions for use Diagnosis    allopurinol 300 MG tablet    ZYLOPRIM     TAKE 1 TABLET BY MOUTH DAILY IN THE EVENING        amLODIPine 5 MG tablet    NORVASC     Take 5 mg by mouth every morning        aspirin 81 MG chewable tablet      Take 81 mg by mouth every evening        VITA CONTOUR NEXT test strip   Generic drug:  blood glucose monitoring      USE TO TEST BLOOD SUGARS 3 TIMES DAILY        blood glucose monitoring lancets      Test Blood Sugar 3 Times Daily.  Dx-E11.8        blood glucose monitoring meter device kit           carbidopa-levodopa  MG per tablet    SINEMET     Take 2 tablets by mouth 3 times daily Takes at 4 AM, 11 AM, 5 PM    Parkinsonian features       clotrimazole-betamethasone cream    LOTRISONE     Apply topically as needed for rash on back of leg        diphenoxylate-atropine 2.5-0.025 MG per tablet    LOMOTIL     Take 2 tablets by mouth 4 times daily as needed        doxepin 10 MG capsule    SINEquan     Take 10 mg by  mouth At Bedtime        * insulin pen needle 32G X 4 MM      1 each        * EASY TOUCH PEN NEEDLES 32G X 5 MM   Generic drug:  insulin pen needle      USE WITH INSULIN THREE TIMES DAILY        finasteride 5 MG tablet    PROSCAR     TAKE 1 TABLET BY MOUTH ONCE DAILY AT BEDTIME. DO NOT CRUSH.        glipiZIDE 10 MG tablet    GLUCOTROL     TAKE 5mg (1/2 tablet) BY MOUTH TWICE DAILY BEFORE MEALS.        HYDROcodone-acetaminophen  MG per tablet    NORCO     Take 1 tablet by mouth every 8 hours as needed        insulin glargine 100 UNIT/ML injection    LANTUS     Inject 27 units in morning and 24 units at night.        potassium chloride 10 MEQ CR capsule    MICRO-K     TAKE 1 CAPSULE BY MOUTH TWICE DAILY WITH MEALS. SWALLOW WHOLE, DO NOT CHEW. CAPSULES MAY BE OPENED AND SPRINKLED ON FOOD.        simvastatin 40 MG tablet    ZOCOR     Take 40 mg by mouth At Bedtime        TART CHERRY ADVANCED Caps      Take 2 capsules by mouth 2 times daily        topiramate 100 MG tablet    TOPAMAX     Take 1 tablet by mouth three times a day. Do not crush        VICTOZA PEN 18 MG/3ML soln   Generic drug:  liraglutide      INJECT 1.8 MG UNDER THE SKIN ONE TIME A DAY        * Notice:  This list has 2 medication(s) that are the same as other medications prescribed for you. Read the directions carefully, and ask your doctor or other care provider to review them with you.

## 2018-05-17 NOTE — PROGRESS NOTES
Liban Aviles,    Your test results are attached.  All of your labs are good for surgery.  Your hemoglobin A1c for your diabetes is 7.3 which indicates fairly good control.  Your creatinine (kidney function number) is stable at 1.48.        Sandra Karimi DNP, RN, ANP-C

## 2018-05-18 ENCOUNTER — TELEPHONE (OUTPATIENT)
Dept: NEUROLOGY | Facility: CLINIC | Age: 68
End: 2018-05-18

## 2018-05-18 DIAGNOSIS — G20.C PARKINSONISM (H): ICD-10-CM

## 2018-05-18 DIAGNOSIS — G25.0 ESSENTIAL TREMOR: Primary | ICD-10-CM

## 2018-05-18 NOTE — TELEPHONE ENCOUNTER
Left voice mail letting patient know that his initial programming for Deep Brain Stimulation is on July 20th with Dr. Sloan at 10:20 am, his CT is scheduled that same day for 1:40. He can go down to imaging early and they may be able to get him in earlier than 1:40. Also sent MyChart message.      Asked patient to call or [employer]Firehart message to confirm appointment.

## 2018-05-22 ENCOUNTER — TRANSFERRED RECORDS (OUTPATIENT)
Dept: HEALTH INFORMATION MANAGEMENT | Facility: CLINIC | Age: 68
End: 2018-05-22

## 2018-05-25 ENCOUNTER — CARE COORDINATION (OUTPATIENT)
Dept: NEUROSURGERY | Facility: CLINIC | Age: 68
End: 2018-05-25

## 2018-05-25 NOTE — PROGRESS NOTES
Left  for pt. Called to see if pt has any questions or concerns for his DBS surgery on 5/29/18, as we will be out of the office on 5/28/18. Reminded pt to follow the written preop instructions he was given at his PAC appointment with regard to nothing to eat after midnight; medications he should/should not take the morning of surgery; antibacterial scrub. Left my direct number and pt is welcome to call with any questions/concerns.

## 2018-05-29 ENCOUNTER — APPOINTMENT (OUTPATIENT)
Dept: GENERAL RADIOLOGY | Facility: CLINIC | Age: 68
End: 2018-05-29
Attending: NEUROLOGICAL SURGERY
Payer: COMMERCIAL

## 2018-05-29 ENCOUNTER — ANESTHESIA (OUTPATIENT)
Dept: SURGERY | Facility: CLINIC | Age: 68
End: 2018-05-29
Payer: COMMERCIAL

## 2018-05-29 ENCOUNTER — HOSPITAL ENCOUNTER (INPATIENT)
Facility: CLINIC | Age: 68
LOS: 1 days | Discharge: HOME | End: 2018-05-30
Attending: NEUROLOGICAL SURGERY | Admitting: NEUROLOGICAL SURGERY
Payer: COMMERCIAL

## 2018-05-29 ENCOUNTER — APPOINTMENT (OUTPATIENT)
Dept: CT IMAGING | Facility: CLINIC | Age: 68
End: 2018-05-29
Attending: STUDENT IN AN ORGANIZED HEALTH CARE EDUCATION/TRAINING PROGRAM
Payer: COMMERCIAL

## 2018-05-29 ENCOUNTER — HOSPITAL ENCOUNTER (INPATIENT)
Dept: CT IMAGING | Facility: CLINIC | Age: 68
End: 2018-05-29
Attending: NEUROLOGICAL SURGERY | Admitting: NEUROLOGICAL SURGERY
Payer: COMMERCIAL

## 2018-05-29 ENCOUNTER — APPOINTMENT (OUTPATIENT)
Dept: GENERAL RADIOLOGY | Facility: CLINIC | Age: 68
End: 2018-05-29
Attending: STUDENT IN AN ORGANIZED HEALTH CARE EDUCATION/TRAINING PROGRAM
Payer: COMMERCIAL

## 2018-05-29 DIAGNOSIS — R25.1 TREMOR: ICD-10-CM

## 2018-05-29 DIAGNOSIS — G25.0 ESSENTIAL TREMOR: Primary | ICD-10-CM

## 2018-05-29 PROBLEM — Z96.89 S/P DEEP BRAIN STIMULATOR PLACEMENT: Status: ACTIVE | Noted: 2018-05-29

## 2018-05-29 LAB
GLUCOSE BLDC GLUCOMTR-MCNC: 108 MG/DL (ref 70–99)
GLUCOSE BLDC GLUCOMTR-MCNC: 126 MG/DL (ref 70–99)
GLUCOSE BLDC GLUCOMTR-MCNC: 138 MG/DL (ref 70–99)
GLUCOSE BLDC GLUCOMTR-MCNC: 260 MG/DL (ref 70–99)
GLUCOSE SERPL-MCNC: 148 MG/DL (ref 70–99)
INTERPRETATION ECG - MUSE: NORMAL
POTASSIUM SERPL-SCNC: 3.8 MMOL/L (ref 3.4–5.3)

## 2018-05-29 PROCEDURE — 70450 CT HEAD/BRAIN W/O DYE: CPT | Mod: 77

## 2018-05-29 PROCEDURE — C1778 LEAD, NEUROSTIMULATOR: HCPCS | Performed by: NEUROLOGICAL SURGERY

## 2018-05-29 PROCEDURE — 25000128 H RX IP 250 OP 636: Performed by: NURSE ANESTHETIST, CERTIFIED REGISTERED

## 2018-05-29 PROCEDURE — 36000074 ZZH SURGERY LEVEL 6 1ST 30 MIN - UMMC: Performed by: NEUROLOGICAL SURGERY

## 2018-05-29 PROCEDURE — 25000125 ZZHC RX 250: Performed by: NURSE ANESTHETIST, CERTIFIED REGISTERED

## 2018-05-29 PROCEDURE — 37000008 ZZH ANESTHESIA TECHNICAL FEE, 1ST 30 MIN: Performed by: NEUROLOGICAL SURGERY

## 2018-05-29 PROCEDURE — 00H03MZ INSERTION OF NEUROSTIMULATOR LEAD INTO BRAIN, PERCUTANEOUS APPROACH: ICD-10-PCS | Performed by: NEUROLOGICAL SURGERY

## 2018-05-29 PROCEDURE — 25000128 H RX IP 250 OP 636: Performed by: STUDENT IN AN ORGANIZED HEALTH CARE EDUCATION/TRAINING PROGRAM

## 2018-05-29 PROCEDURE — 87081 CULTURE SCREEN ONLY: CPT | Performed by: NEUROLOGICAL SURGERY

## 2018-05-29 PROCEDURE — 40000170 ZZH STATISTIC PRE-PROCEDURE ASSESSMENT II: Performed by: NEUROLOGICAL SURGERY

## 2018-05-29 PROCEDURE — 25000132 ZZH RX MED GY IP 250 OP 250 PS 637: Performed by: STUDENT IN AN ORGANIZED HEALTH CARE EDUCATION/TRAINING PROGRAM

## 2018-05-29 PROCEDURE — 36000076 ZZH SURGERY LEVEL 6 EA 15 ADDTL MIN - UMMC: Performed by: NEUROLOGICAL SURGERY

## 2018-05-29 PROCEDURE — 40000277 XR SURGERY CARM FLUORO LESS THAN 5 MIN W STILLS: Mod: TC

## 2018-05-29 PROCEDURE — 25000128 H RX IP 250 OP 636: Performed by: NEUROLOGICAL SURGERY

## 2018-05-29 PROCEDURE — 27210794 ZZH OR GENERAL SUPPLY STERILE: Performed by: NEUROLOGICAL SURGERY

## 2018-05-29 PROCEDURE — 82947 ASSAY GLUCOSE BLOOD QUANT: CPT | Performed by: ANESTHESIOLOGY

## 2018-05-29 PROCEDURE — 27210995 ZZH RX 272: Performed by: NEUROLOGICAL SURGERY

## 2018-05-29 PROCEDURE — 25000131 ZZH RX MED GY IP 250 OP 636 PS 637: Mod: GY | Performed by: STUDENT IN AN ORGANIZED HEALTH CARE EDUCATION/TRAINING PROGRAM

## 2018-05-29 PROCEDURE — 25000128 H RX IP 250 OP 636: Performed by: ANESTHESIOLOGY

## 2018-05-29 PROCEDURE — 71000014 ZZH RECOVERY PHASE 1 LEVEL 2 FIRST HR: Performed by: NEUROLOGICAL SURGERY

## 2018-05-29 PROCEDURE — 70250 X-RAY EXAM OF SKULL: CPT

## 2018-05-29 PROCEDURE — 8E09XBG COMPUTER ASSISTED PROCEDURE OF HEAD AND NECK REGION, WITH COMPUTERIZED TOMOGRAPHY: ICD-10-PCS | Performed by: NEUROLOGICAL SURGERY

## 2018-05-29 PROCEDURE — 37000009 ZZH ANESTHESIA TECHNICAL FEE, EACH ADDTL 15 MIN: Performed by: NEUROLOGICAL SURGERY

## 2018-05-29 PROCEDURE — 00000146 ZZHCL STATISTIC GLUCOSE BY METER IP

## 2018-05-29 PROCEDURE — A9270 NON-COVERED ITEM OR SERVICE: HCPCS | Mod: GY | Performed by: NEUROLOGICAL SURGERY

## 2018-05-29 PROCEDURE — 20000004 ZZH R&B ICU UMMC

## 2018-05-29 PROCEDURE — 93010 ELECTROCARDIOGRAM REPORT: CPT | Performed by: INTERNAL MEDICINE

## 2018-05-29 PROCEDURE — 36415 COLL VENOUS BLD VENIPUNCTURE: CPT | Performed by: ANESTHESIOLOGY

## 2018-05-29 PROCEDURE — A9270 NON-COVERED ITEM OR SERVICE: HCPCS | Mod: GY | Performed by: STUDENT IN AN ORGANIZED HEALTH CARE EDUCATION/TRAINING PROGRAM

## 2018-05-29 PROCEDURE — 40000065 ZZH STATISTIC EKG NON-CHARGEABLE

## 2018-05-29 PROCEDURE — 27810169 ZZH OR IMPLANT GENERAL: Performed by: NEUROLOGICAL SURGERY

## 2018-05-29 PROCEDURE — 25000132 ZZH RX MED GY IP 250 OP 250 PS 637: Performed by: NEUROLOGICAL SURGERY

## 2018-05-29 PROCEDURE — 8E09XBH COMPUTER ASSISTED PROCEDURE OF HEAD AND NECK REGION, WITH MAGNETIC RESONANCE IMAGING: ICD-10-PCS | Performed by: NEUROLOGICAL SURGERY

## 2018-05-29 PROCEDURE — 71000015 ZZH RECOVERY PHASE 1 LEVEL 2 EA ADDTL HR: Performed by: NEUROLOGICAL SURGERY

## 2018-05-29 PROCEDURE — 70450 CT HEAD/BRAIN W/O DYE: CPT

## 2018-05-29 PROCEDURE — 84132 ASSAY OF SERUM POTASSIUM: CPT | Performed by: ANESTHESIOLOGY

## 2018-05-29 PROCEDURE — 25000125 ZZHC RX 250: Performed by: NEUROLOGICAL SURGERY

## 2018-05-29 DEVICE — KIT DBS LEAD DBS 8CH 40CM 1-3,3-1 SPACE 0.5 BLACK 6172ANS: Type: IMPLANTABLE DEVICE | Site: BRAIN | Status: FUNCTIONAL

## 2018-05-29 DEVICE — IMP BUR HOLE COVER GUARDIAN 6010ANS: Type: IMPLANTABLE DEVICE | Site: BRAIN | Status: FUNCTIONAL

## 2018-05-29 RX ORDER — DOXEPIN HYDROCHLORIDE 10 MG/1
10 CAPSULE ORAL AT BEDTIME
Status: DISCONTINUED | OUTPATIENT
Start: 2018-05-29 | End: 2018-05-30 | Stop reason: HOSPADM

## 2018-05-29 RX ORDER — SODIUM CHLORIDE, SODIUM LACTATE, POTASSIUM CHLORIDE, CALCIUM CHLORIDE 600; 310; 30; 20 MG/100ML; MG/100ML; MG/100ML; MG/100ML
INJECTION, SOLUTION INTRAVENOUS CONTINUOUS
Status: DISCONTINUED | OUTPATIENT
Start: 2018-05-29 | End: 2018-05-29 | Stop reason: HOSPADM

## 2018-05-29 RX ORDER — LIRAGLUTIDE 6 MG/ML
1.8 INJECTION SUBCUTANEOUS DAILY
Status: DISCONTINUED | OUTPATIENT
Start: 2018-05-30 | End: 2018-05-30 | Stop reason: HOSPADM

## 2018-05-29 RX ORDER — HYDRALAZINE HYDROCHLORIDE 20 MG/ML
10-20 INJECTION INTRAMUSCULAR; INTRAVENOUS EVERY 30 MIN PRN
Status: DISCONTINUED | OUTPATIENT
Start: 2018-05-29 | End: 2018-05-30 | Stop reason: HOSPADM

## 2018-05-29 RX ORDER — ALLOPURINOL 300 MG/1
300 TABLET ORAL EVERY EVENING
Status: DISCONTINUED | OUTPATIENT
Start: 2018-05-29 | End: 2018-05-30 | Stop reason: HOSPADM

## 2018-05-29 RX ORDER — PROPOFOL 10 MG/ML
INJECTION, EMULSION INTRAVENOUS PRN
Status: DISCONTINUED | OUTPATIENT
Start: 2018-05-29 | End: 2018-05-29

## 2018-05-29 RX ORDER — OXYCODONE HYDROCHLORIDE 5 MG/1
5-10 TABLET ORAL EVERY 4 HOURS PRN
Status: DISCONTINUED | OUTPATIENT
Start: 2018-05-29 | End: 2018-05-30 | Stop reason: HOSPADM

## 2018-05-29 RX ORDER — SODIUM CHLORIDE, SODIUM LACTATE, POTASSIUM CHLORIDE, CALCIUM CHLORIDE 600; 310; 30; 20 MG/100ML; MG/100ML; MG/100ML; MG/100ML
INJECTION, SOLUTION INTRAVENOUS CONTINUOUS PRN
Status: DISCONTINUED | OUTPATIENT
Start: 2018-05-29 | End: 2018-05-29

## 2018-05-29 RX ORDER — LIDOCAINE HYDROCHLORIDE AND EPINEPHRINE 10; 10 MG/ML; UG/ML
INJECTION, SOLUTION INFILTRATION; PERINEURAL PRN
Status: DISCONTINUED | OUTPATIENT
Start: 2018-05-29 | End: 2018-05-29 | Stop reason: HOSPADM

## 2018-05-29 RX ORDER — CEFAZOLIN SODIUM 2 G/100ML
2 INJECTION, SOLUTION INTRAVENOUS EVERY 8 HOURS
Status: DISCONTINUED | OUTPATIENT
Start: 2018-05-29 | End: 2018-05-30 | Stop reason: HOSPADM

## 2018-05-29 RX ORDER — AMOXICILLIN 250 MG
1 CAPSULE ORAL 2 TIMES DAILY
Status: DISCONTINUED | OUTPATIENT
Start: 2018-05-29 | End: 2018-05-30 | Stop reason: HOSPADM

## 2018-05-29 RX ORDER — AMOXICILLIN 250 MG
2 CAPSULE ORAL 2 TIMES DAILY
Status: DISCONTINUED | OUTPATIENT
Start: 2018-05-29 | End: 2018-05-30 | Stop reason: HOSPADM

## 2018-05-29 RX ORDER — NALOXONE HYDROCHLORIDE 0.4 MG/ML
.1-.4 INJECTION, SOLUTION INTRAMUSCULAR; INTRAVENOUS; SUBCUTANEOUS
Status: DISCONTINUED | OUTPATIENT
Start: 2018-05-29 | End: 2018-05-30 | Stop reason: HOSPADM

## 2018-05-29 RX ORDER — HYDROMORPHONE HYDROCHLORIDE 1 MG/ML
.3-.5 INJECTION, SOLUTION INTRAMUSCULAR; INTRAVENOUS; SUBCUTANEOUS EVERY 5 MIN PRN
Status: DISCONTINUED | OUTPATIENT
Start: 2018-05-29 | End: 2018-05-29 | Stop reason: HOSPADM

## 2018-05-29 RX ORDER — AMLODIPINE BESYLATE 5 MG/1
5 TABLET ORAL EVERY MORNING
Status: DISCONTINUED | OUTPATIENT
Start: 2018-05-30 | End: 2018-05-30 | Stop reason: HOSPADM

## 2018-05-29 RX ORDER — BACITRACIN 500 [USP'U]/G
OINTMENT OPHTHALMIC PRN
Status: DISCONTINUED | OUTPATIENT
Start: 2018-05-29 | End: 2018-05-29 | Stop reason: HOSPADM

## 2018-05-29 RX ORDER — PROPOFOL 10 MG/ML
INJECTION, EMULSION INTRAVENOUS CONTINUOUS PRN
Status: DISCONTINUED | OUTPATIENT
Start: 2018-05-29 | End: 2018-05-29

## 2018-05-29 RX ORDER — LABETALOL HYDROCHLORIDE 5 MG/ML
10-40 INJECTION, SOLUTION INTRAVENOUS EVERY 10 MIN PRN
Status: DISCONTINUED | OUTPATIENT
Start: 2018-05-29 | End: 2018-05-30 | Stop reason: HOSPADM

## 2018-05-29 RX ORDER — CARBIDOPA AND LEVODOPA 25; 100 MG/1; MG/1
2 TABLET ORAL ONCE
Status: COMPLETED | OUTPATIENT
Start: 2018-05-29 | End: 2018-05-29

## 2018-05-29 RX ORDER — FINASTERIDE 5 MG/1
5 TABLET, FILM COATED ORAL EVERY EVENING
Status: DISCONTINUED | OUTPATIENT
Start: 2018-05-30 | End: 2018-05-30 | Stop reason: HOSPADM

## 2018-05-29 RX ORDER — ONDANSETRON 4 MG/1
4 TABLET, ORALLY DISINTEGRATING ORAL EVERY 6 HOURS PRN
Status: DISCONTINUED | OUTPATIENT
Start: 2018-05-29 | End: 2018-05-30 | Stop reason: HOSPADM

## 2018-05-29 RX ORDER — GLIPIZIDE 5 MG/1
5 TABLET ORAL
Status: DISCONTINUED | OUTPATIENT
Start: 2018-05-30 | End: 2018-05-30 | Stop reason: HOSPADM

## 2018-05-29 RX ORDER — DEXTROSE MONOHYDRATE 25 G/50ML
25-50 INJECTION, SOLUTION INTRAVENOUS
Status: DISCONTINUED | OUTPATIENT
Start: 2018-05-29 | End: 2018-05-30 | Stop reason: HOSPADM

## 2018-05-29 RX ORDER — ACETAMINOPHEN 325 MG/1
975 TABLET ORAL EVERY 8 HOURS
Status: DISCONTINUED | OUTPATIENT
Start: 2018-05-29 | End: 2018-05-30 | Stop reason: HOSPADM

## 2018-05-29 RX ORDER — NICOTINE POLACRILEX 4 MG
15-30 LOZENGE BUCCAL
Status: DISCONTINUED | OUTPATIENT
Start: 2018-05-29 | End: 2018-05-30 | Stop reason: HOSPADM

## 2018-05-29 RX ORDER — CARBIDOPA AND LEVODOPA 25; 100 MG/1; MG/1
2 TABLET ORAL 3 TIMES DAILY
Status: DISCONTINUED | OUTPATIENT
Start: 2018-05-29 | End: 2018-05-30 | Stop reason: HOSPADM

## 2018-05-29 RX ORDER — TOPIRAMATE 100 MG/1
100 TABLET, FILM COATED ORAL 3 TIMES DAILY
Status: DISCONTINUED | OUTPATIENT
Start: 2018-05-29 | End: 2018-05-30 | Stop reason: HOSPADM

## 2018-05-29 RX ORDER — CEFAZOLIN SODIUM 1 G/50ML
3 SOLUTION INTRAVENOUS
Status: DISCONTINUED | OUTPATIENT
Start: 2018-05-29 | End: 2018-05-29

## 2018-05-29 RX ORDER — LIDOCAINE 40 MG/G
CREAM TOPICAL
Status: DISCONTINUED | OUTPATIENT
Start: 2018-05-29 | End: 2018-05-30 | Stop reason: HOSPADM

## 2018-05-29 RX ORDER — ONDANSETRON 2 MG/ML
INJECTION INTRAMUSCULAR; INTRAVENOUS PRN
Status: DISCONTINUED | OUTPATIENT
Start: 2018-05-29 | End: 2018-05-29

## 2018-05-29 RX ORDER — FENTANYL CITRATE 50 UG/ML
25-50 INJECTION, SOLUTION INTRAMUSCULAR; INTRAVENOUS
Status: DISCONTINUED | OUTPATIENT
Start: 2018-05-29 | End: 2018-05-29 | Stop reason: HOSPADM

## 2018-05-29 RX ORDER — CEFAZOLIN SODIUM 1 G/50ML
3 SOLUTION INTRAVENOUS
Status: COMPLETED | OUTPATIENT
Start: 2018-05-29 | End: 2018-05-29

## 2018-05-29 RX ORDER — ONDANSETRON 4 MG/1
4 TABLET, ORALLY DISINTEGRATING ORAL EVERY 30 MIN PRN
Status: DISCONTINUED | OUTPATIENT
Start: 2018-05-29 | End: 2018-05-29 | Stop reason: HOSPADM

## 2018-05-29 RX ORDER — ONDANSETRON 2 MG/ML
4 INJECTION INTRAMUSCULAR; INTRAVENOUS EVERY 30 MIN PRN
Status: DISCONTINUED | OUTPATIENT
Start: 2018-05-29 | End: 2018-05-29 | Stop reason: HOSPADM

## 2018-05-29 RX ORDER — POLYETHYLENE GLYCOL 3350 17 G/17G
17 POWDER, FOR SOLUTION ORAL DAILY
Status: DISCONTINUED | OUTPATIENT
Start: 2018-05-29 | End: 2018-05-30 | Stop reason: HOSPADM

## 2018-05-29 RX ORDER — LIDOCAINE 40 MG/G
CREAM TOPICAL
Status: DISCONTINUED | OUTPATIENT
Start: 2018-05-29 | End: 2018-05-29 | Stop reason: HOSPADM

## 2018-05-29 RX ORDER — ONDANSETRON 2 MG/ML
4 INJECTION INTRAMUSCULAR; INTRAVENOUS EVERY 6 HOURS PRN
Status: DISCONTINUED | OUTPATIENT
Start: 2018-05-29 | End: 2018-05-30 | Stop reason: HOSPADM

## 2018-05-29 RX ORDER — SIMVASTATIN 20 MG
40 TABLET ORAL AT BEDTIME
Status: DISCONTINUED | OUTPATIENT
Start: 2018-05-29 | End: 2018-05-30 | Stop reason: HOSPADM

## 2018-05-29 RX ORDER — CEFAZOLIN SODIUM 1 G/3ML
1 INJECTION, POWDER, FOR SOLUTION INTRAMUSCULAR; INTRAVENOUS SEE ADMIN INSTRUCTIONS
Status: DISCONTINUED | OUTPATIENT
Start: 2018-05-29 | End: 2018-05-29

## 2018-05-29 RX ORDER — CEFAZOLIN SODIUM 1 G/3ML
1 INJECTION, POWDER, FOR SOLUTION INTRAMUSCULAR; INTRAVENOUS SEE ADMIN INSTRUCTIONS
Status: DISCONTINUED | OUTPATIENT
Start: 2018-05-29 | End: 2018-05-29 | Stop reason: HOSPADM

## 2018-05-29 RX ORDER — LIDOCAINE HYDROCHLORIDE 20 MG/ML
INJECTION, SOLUTION INFILTRATION; PERINEURAL PRN
Status: DISCONTINUED | OUTPATIENT
Start: 2018-05-29 | End: 2018-05-29

## 2018-05-29 RX ORDER — ACETAMINOPHEN 325 MG/1
650 TABLET ORAL EVERY 4 HOURS PRN
Status: DISCONTINUED | OUTPATIENT
Start: 2018-06-01 | End: 2018-05-30 | Stop reason: HOSPADM

## 2018-05-29 RX ADMIN — FENTANYL CITRATE 50 MCG: 50 INJECTION, SOLUTION INTRAMUSCULAR; INTRAVENOUS at 15:43

## 2018-05-29 RX ADMIN — PROPOFOL 100 MCG/KG/MIN: 10 INJECTION, EMULSION INTRAVENOUS at 13:25

## 2018-05-29 RX ADMIN — PROPOFOL 30 MG: 10 INJECTION, EMULSION INTRAVENOUS at 08:09

## 2018-05-29 RX ADMIN — FENTANYL CITRATE 25 MCG: 50 INJECTION, SOLUTION INTRAMUSCULAR; INTRAVENOUS at 15:22

## 2018-05-29 RX ADMIN — SODIUM CHLORIDE, POTASSIUM CHLORIDE, SODIUM LACTATE AND CALCIUM CHLORIDE: 600; 310; 30; 20 INJECTION, SOLUTION INTRAVENOUS at 07:40

## 2018-05-29 RX ADMIN — FENTANYL CITRATE 25 MCG: 50 INJECTION, SOLUTION INTRAMUSCULAR; INTRAVENOUS at 15:11

## 2018-05-29 RX ADMIN — PROPOFOL 30 MG: 10 INJECTION, EMULSION INTRAVENOUS at 08:11

## 2018-05-29 RX ADMIN — HYDROMORPHONE HYDROCHLORIDE 0.3 MG: 1 INJECTION, SOLUTION INTRAMUSCULAR; INTRAVENOUS; SUBCUTANEOUS at 16:40

## 2018-05-29 RX ADMIN — DOXEPIN HYDROCHLORIDE 10 MG: 10 CAPSULE ORAL at 21:23

## 2018-05-29 RX ADMIN — INSULIN GLARGINE 24 UNITS: 100 INJECTION, SOLUTION SUBCUTANEOUS at 21:21

## 2018-05-29 RX ADMIN — SIMVASTATIN 40 MG: 20 TABLET, FILM COATED ORAL at 21:21

## 2018-05-29 RX ADMIN — LIDOCAINE HYDROCHLORIDE 60 MG: 20 INJECTION, SOLUTION INFILTRATION; PERINEURAL at 08:00

## 2018-05-29 RX ADMIN — CARBIDOPA AND LEVODOPA 2 TABLET: 25; 100 TABLET ORAL at 13:25

## 2018-05-29 RX ADMIN — TOPIRAMATE 100 MG: 100 TABLET, FILM COATED ORAL at 20:16

## 2018-05-29 RX ADMIN — PROPOFOL 40 MG: 10 INJECTION, EMULSION INTRAVENOUS at 08:06

## 2018-05-29 RX ADMIN — CARBIDOPA AND LEVODOPA 2 TABLET: 25; 100 TABLET ORAL at 20:15

## 2018-05-29 RX ADMIN — LIDOCAINE HYDROCHLORIDE 40 MG: 20 INJECTION, SOLUTION INFILTRATION; PERINEURAL at 13:27

## 2018-05-29 RX ADMIN — CEFAZOLIN 1 G: 1 INJECTION, POWDER, FOR SOLUTION INTRAMUSCULAR; INTRAVENOUS at 11:20

## 2018-05-29 RX ADMIN — CEFAZOLIN 1 G: 1 INJECTION, POWDER, FOR SOLUTION INTRAMUSCULAR; INTRAVENOUS at 14:13

## 2018-05-29 RX ADMIN — HYDROMORPHONE HYDROCHLORIDE 0.3 MG: 1 INJECTION, SOLUTION INTRAMUSCULAR; INTRAVENOUS; SUBCUTANEOUS at 15:57

## 2018-05-29 RX ADMIN — CEFAZOLIN SODIUM 2 G: 2 INJECTION, SOLUTION INTRAVENOUS at 21:22

## 2018-05-29 RX ADMIN — Medication 10 MG: at 17:58

## 2018-05-29 RX ADMIN — Medication 3 G: at 09:22

## 2018-05-29 RX ADMIN — ALLOPURINOL 300 MG: 300 TABLET ORAL at 20:16

## 2018-05-29 RX ADMIN — HYDRALAZINE HYDROCHLORIDE 10 MG: 20 INJECTION INTRAMUSCULAR; INTRAVENOUS at 16:02

## 2018-05-29 RX ADMIN — DEXMEDETOMIDINE HYDROCHLORIDE 0.7 MCG/KG/HR: 100 INJECTION, SOLUTION INTRAVENOUS at 08:45

## 2018-05-29 RX ADMIN — ACETAMINOPHEN 975 MG: 325 TABLET, FILM COATED ORAL at 17:59

## 2018-05-29 RX ADMIN — SENNOSIDES AND DOCUSATE SODIUM 1 TABLET: 8.6; 5 TABLET ORAL at 20:16

## 2018-05-29 RX ADMIN — ONDANSETRON 4 MG: 2 INJECTION INTRAMUSCULAR; INTRAVENOUS at 08:00

## 2018-05-29 ASSESSMENT — VISUAL ACUITY
OU: GLASSES;NORMAL ACUITY

## 2018-05-29 ASSESSMENT — PAIN DESCRIPTION - DESCRIPTORS
DESCRIPTORS: ACHING;SORE
DESCRIPTORS: ACHING;SORE

## 2018-05-29 NOTE — ANESTHESIA POSTPROCEDURE EVALUATION
Patient: Stefan Odonnell    Procedure(s):  Stealth Assisted Left Deep Brain Stimulator Placement, Phase I, Placement Of Left Deep Brain Stimulator Electrode, Target Left Subthalamic Nucleus With Microelectrode Recording - Wound Class: I-Clean    Diagnosis:Parkinson's Disease  Diagnosis Additional Information: No value filed.    Anesthesia Type:  MAC    Note:  Anesthesia Post Evaluation    Patient location during evaluation: PACU  Patient participation: Able to fully participate in evaluation  Level of consciousness: awake and alert and awake  Pain management: adequate  Airway patency: patent  Cardiovascular status: acceptable  Respiratory status: acceptable  Hydration status: acceptable  PONV: none     Anesthetic complications: None    Comments: Doing well.        Last vitals:  Vitals:    05/29/18 0540   BP: 138/87   Resp: 17   Temp: 37.1  C (98.8  F)   SpO2: 98%         Electronically Signed By: Fransisco Ennis MD  May 29, 2018  3:00 PM

## 2018-05-29 NOTE — ANESTHESIA CARE TRANSFER NOTE
Patient: Stefan Odonnell    Procedure(s):  Stealth Assisted Left Deep Brain Stimulator Placement, Phase I, Placement Of Left Deep Brain Stimulator Electrode, Target Left Subthalamic Nucleus With Microelectrode Recording - Wound Class: I-Clean    Diagnosis: Parkinson's Disease  Diagnosis Additional Information: No value filed.    Anesthesia Type:   No value filed.     Note:  Airway :Room Air  Patient transferred to:PACU  Comments: Alert, VSS, patent airway, report to RN.Handoff Report: Identifed the Patient, Identified the Reponsible Provider, Reviewed the pertinent medical history, Discussed the surgical course, Reviewed Intra-OP anesthesia mangement and issues during anesthesia, Set expectations for post-procedure period and Allowed opportunity for questions and acknowledgement of understanding      Vitals: (Last set prior to Anesthesia Care Transfer)    CRNA VITALS  5/29/2018 1356 - 5/29/2018 1442      5/29/2018             Pulse: 71    SpO2: 93 %                Electronically Signed By: SAMARA Alexander CRNA  May 29, 2018  2:42 PM

## 2018-05-29 NOTE — OR NURSING
Pt arrived to PACU.  PACU Rn assumed care of pt @ 1440.   Pt arrived able to state name and deny nausea at present time.  Lungs equal and clear bi lat; tolerating nasal cannula.  Head dressing x 2 with dressing c/d/i - incision to left anterior head approximated with sutures - minimal drainage noted.  Neuro checks as per flowsheet - correlates to preop baseline with same exceptions - slight left eye droop & right corner of mouth droop - left eye lag/ poor accommodation when looking to right.    Pt arrived alert and oriented to PACU -had moderate sedation in OR for case.  Additional assessment as per flowsheet.

## 2018-05-29 NOTE — IP AVS SNAPSHOT
MRN:[id number]                      After Visit Summary   2018    Stefan Odonnell    MRN: [id number]           Thank you!     Thank you for choosing [facility] for your care. Our goal is always to provide you with excellent care. Hearing back from our patients is one way we can continue to improve our services. Please take a few minutes to complete the written survey that you may receive in the mail after you visit with us. Thank you!        Patient Information     Date Of Birth          1950        Designated Caregiver       Most Recent Value    Caregiver    Will someone help with your care after discharge? yes    Name of designated caregiver spouse lisa    Phone number of caregiver [phone]    Caregiver address per facesheet      About your hospital stay     You were admitted on:  May 29, 2018 You last received care in the:  [address] [facility]    You were discharged on:  May 30, 2018        Reason for your hospital stay       You were hospitalized for treatment of a Right-Handed Tremor. You underwent placement of a Left Deep Brain Stimulator by Dr. Wayne Marshall on 2018.                  Who to Call     For medical emergencies, please call [phone].  For non-urgent questions about your medical care, please call your primary care provider or clinic, [phone]  For questions related to your surgery, please call your surgery clinic        Attending Provider     Provider Wayne Liang MD Neurosurgery       Primary Care Provider Office Phone # Fax #    Dorian Last [phone]       When to contact your care team       Call if you have any of the followin. Temperature greater than 101.5 F.   2. Any redness, swelling or discharge from the wound.   3. Any new weakness, numbness or altered mental status.  4. Worsening pain that is not improving with the pain medications you were prescribed.     Call [phone] or after 5:00 pm or on  weekends call [phone] and ask for the neurosurgery resident on call. Thank You.                  After Care Instructions     Activity       Your activity upon discharge:   - You may gradually increase your activity as tolerated beginning with short and frequent walks.   - Please avoid any strenuous activity including lifting > 10 lbs for the next 2 weeks.   - Please DO NOT drive while using Opioid Analgesics (Oxycodone)  - Please DO NOT submerge your incision beneath water in a bath tub, hot tub, or pool for 4-6 weeks.            Diet       Follow this diet upon discharge: Orders Placed This Encounter      Regular Diet Adult            Discharge Instructions       Please remain OFF your Aspirin and vitamins/supplements until AFTER Phase II of your DBS Placement.            Wound care and dressings       Instructions to care for your wound at home:  - You may remove your surgical dressings on post-operative day #1 (5/30/2018)  - Monitor your incision for redness, swelling, drainage and warmth.  - You may shower on post-operative day #1 (5/30/2018). It is ok to get your incision wet. Pat dry with a clean towel.  - Maintain your incision open to air. Do not apply any creams, lotions, gels, oils, or ointments to your incision.                  Follow-up Appointments     Follow Up and recommended labs and tests       You are scheduled for Phase II of your Deep Brain Stimulator procedure on Friday June 8, 2018.                  Your next 10 appointments already scheduled     Jun 08, 2018   Procedure with Wayne Marshall MD   [facility], [place], Same Day Surgery (--)    [address] [address]   [phone]            Jul 20, 2018 10:20 AM CDT   (Arrive by 10:05 AM)   Return Movement Disorder with Stanford Sloan MD   [facility] ([facility])    [address]  [address]rd [facility] [address] [phone]            Jul 20, 2018  1:40 PM CDT   CT HEAD W/O  "CONTRAST with UCCT2   [facility] ([facility])    [address]  1st Floor  [facility] [address] [phone]           Please bring any scans or X-rays taken at other hospitals, if similar tests were done. Also bring a list of your medicines, including vitamins, minerals and over-the-counter drugs. It is safest to leave personal items at home.  Be sure to tell your doctor:   If you have any allergies.   If there s any chance you are pregnant.   If you are breastfeeding.  You do not need to do anything special to prepare for this exam.  Please wear loose clothing, such as a sweat suit or jogging clothes. Avoid snaps, zippers and other metal. We may ask you to undress and put on a hospital gown.              Pending Results     Date and Time Order Name Status Description    5/29/2018 1749 Methicillin resistant staph aureus cult In process             Statement of Approval     Ordered          05/30/18 0724  I have reviewed and agree with all the recommendations and orders detailed in this document.  EFFECTIVE NOW     Approved and electronically signed by:  Patricia Garza APRN CNP             Admission Information     Date & Time Provider Department Dept. Phone    5/29/2018 Wayne Marshall MD [address] [facility] [place] [phone]      Your Vitals Were     Blood Pressure Temperature Respirations Height Weight Pulse Oximetry    115/81 98.5  F (36.9  C) (Oral) 14 1.88 m (6' 2\") 135.2 kg (298 lb 1 oz) 94%    BMI (Body Mass Index)                   38.27 kg/m2           MyChart Information     [facility] gives you secure access to your electronic health record. If you see a primary care provider, you can also send messages to your care team and make appointments. If you have questions, please call your primary care clinic.  If you do not have a primary care provider, please call [phone] and they will assist you.        Care EveryWhere ID     This is " your Care EveryWhere ID. This could be used by other organizations to access your [facility] medical records  [id number]        Equal Access to Services     DAWSON KHAN : Hadii mike Elias, wajaleesalili damiancarlos aha, maria teresamarkus kanicki steviechristiano, waxnaif elen jeromeearl hubbardildefonso parker tim dimitrios. So [facility] [phone].    ATENCIÓN: Si habla español, tiene a newman disposición servicios gratuitos de asistencia lingüística. Llame al [phone].    We comply with applicable federal civil rights laws and [place] laws. We do not discriminate on the basis of race, color, national origin, age, disability, sex, sexual orientation, or gender identity.               Review of your medicines      START taking        Dose / Directions    oxyCODONE IR 5 MG tablet   Commonly known as:  ROXICODONE   Used for:  Essential tremor        Dose:  5-10 mg   Take 1-2 tablets (5-10 mg) by mouth every 6 hours as needed for other (pain control or improvement in physical function. Hold dose for analgesic side effects.)   Quantity:  30 tablet   Refills:  0       senna-docusate 8.6-50 MG per tablet   Commonly known as:  SENOKOT-S;PERICOLACE   Used for:  Essential tremor        Dose:  2 tablet   Take 2 tablets by mouth 2 times daily   Quantity:  100 tablet   Refills:  0         CONTINUE these medicines which have NOT CHANGED        Dose / Directions    allopurinol 300 MG tablet   Commonly known as:  ZYLOPRIM        TAKE 1 TABLET BY MOUTH DAILY IN THE EVENING   Refills:  0       amLODIPine 5 MG tablet   Commonly known as:  NORVASC        Dose:  5 mg   Take 5 mg by mouth every morning   Refills:  0       VITA CONTOUR NEXT test strip   Generic drug:  blood glucose monitoring        USE TO TEST BLOOD SUGARS 3 TIMES DAILY   Refills:  0       blood glucose monitoring lancets        Test Blood Sugar 3 Times Daily.  Dx-E11.8   Refills:  0       blood glucose monitoring meter device kit        Refills:  0       carbidopa-levodopa  MG per tablet   Commonly  known as:  SINEMET   Used for:  Parkinsonian features        Dose:  2 tablet   Take 2 tablets by mouth 3 times daily Takes at 4 AM, 11 AM, 5 PM   Refills:  0       clotrimazole-betamethasone cream   Commonly known as:  LOTRISONE        Apply topically as needed for rash on back of leg   Refills:  0       diphenoxylate-atropine 2.5-0.025 MG per tablet   Commonly known as:  LOMOTIL        Dose:  2 tablet   Take 2 tablets by mouth 4 times daily as needed   Refills:  0       doxepin 10 MG capsule   Commonly known as:  SINEquan        Dose:  10 mg   Take 10 mg by mouth At Bedtime   Refills:  0       * insulin pen needle 32G X 4 MM        Dose:  1 each   1 each   Refills:  0       * EASY TOUCH PEN NEEDLES 32G X 5 MM   Generic drug:  insulin pen needle        USE WITH INSULIN THREE TIMES DAILY   Refills:  0       finasteride 5 MG tablet   Commonly known as:  PROSCAR        TAKE 1 TABLET BY MOUTH ONCE DAILY AT BEDTIME. DO NOT CRUSH.   Refills:  0       glipiZIDE 10 MG tablet   Commonly known as:  GLUCOTROL        TAKE 5mg (1/2 tablet) BY MOUTH TWICE DAILY BEFORE MEALS.   Refills:  0       insulin glargine 100 UNIT/ML injection   Commonly known as:  LANTUS        Inject 27 units in morning and 24 units at night.   Refills:  0       potassium chloride 10 MEQ CR capsule   Commonly known as:  MICRO-K        TAKE 1 CAPSULE BY MOUTH TWICE DAILY WITH MEALS. SWALLOW WHOLE, DO NOT CHEW. CAPSULES MAY BE OPENED AND SPRINKLED ON FOOD.   Refills:  0       simvastatin 40 MG tablet   Commonly known as:  ZOCOR        Dose:  40 mg   Take 40 mg by mouth At Bedtime   Refills:  0       topiramate 100 MG tablet   Commonly known as:  TOPAMAX        Take 1 tablet by mouth three times a day. Do not crush   Refills:  0       VICTOZA PEN 18 MG/3ML soln   Generic drug:  liraglutide        INJECT 1.8 MG UNDER THE SKIN ONE TIME A DAY   Refills:  0       * Notice:  This list has 2 medication(s) that are the same as other medications prescribed for you.  Read the directions carefully, and ask your doctor or other care provider to review them with you.      STOP taking     aspirin 81 MG chewable tablet           HYDROcodone-acetaminophen  MG per tablet   Commonly known as:  NORCO           TARSOFIA LOAIZA ADVANCED Caps                Where to get your medicines      Some of these will need a paper prescription and others can be bought over the counter. Ask your nurse if you have questions.     Bring a paper prescription for each of these medications     oxyCODONE IR 5 MG tablet    senna-docusate 8.6-50 MG per tablet                Protect others around you: Learn how to safely use, store and throw away your medicines at www.disposemymeds.org.        Information about OPIOIDS     PRESCRIPTION OPIOIDS: WHAT YOU NEED TO KNOW   You have a prescription for an opioid (narcotic) pain medicine. Opioids can cause addiction. If you have a history of chemical dependency of any type, you are at a higher risk of becoming addicted to opioids. Only take this medicine after all other options have been tried. Take it for as short a time and as few doses as possible.     Do not:    Drive. If you drive while taking these medicines, you could be arrested for driving under the influence (DUI).    Operate heavy machinery    Do any other dangerous activities while taking these medicines.     Drink any alcohol while taking these medicines.      Take with any other medicines that contain acetaminophen. Read all labels carefully. Look for the word  acetaminophen  or  Tylenol.  Ask your pharmacist if you have questions or are unsure.    Store your pills in a secure place, locked if possible. We will not replace any lost or stolen medicine. If you don t finish your medicine, please throw away (dispose) as directed by your pharmacist. The [place] Pollution Control Agency has more information about safe disposal: https://www.pca.state.[place]/living-green/managing-unwanted-medications    All  opioids tend to cause constipation. Drink plenty of water and eat foods that have a lot of fiber, such as fruits, vegetables, prune juice, apple juice and high-fiber cereal. Take a laxative (Miralax, milk of magnesia, Colace, Senna) if you don t move your bowels at least every other day.              Medication List: This is a list of all your medications and when to take them. Check marks below indicate your daily home schedule. Keep this list as a reference.      Medications           Morning Afternoon Evening Bedtime As Needed    allopurinol 300 MG tablet   Commonly known as:  ZYLOPRIM   TAKE 1 TABLET BY MOUTH DAILY IN THE EVENING   Last time this was given:  300 mg on 5/29/2018  8:16 PM                                amLODIPine 5 MG tablet   Commonly known as:  NORVASC   Take 5 mg by mouth every morning   Last time this was given:  5 mg on 5/30/2018  8:10 AM   Last time this was given:  0848 am 5/30                                VITA CONTOUR NEXT test strip   USE TO TEST BLOOD SUGARS 3 TIMES DAILY   Generic drug:  blood glucose monitoring                                blood glucose monitoring lancets   Test Blood Sugar 3 Times Daily.  Dx-E11.8                                blood glucose monitoring meter device kit                                carbidopa-levodopa  MG per tablet   Commonly known as:  SINEMET   Take 2 tablets by mouth 3 times daily Takes at 4 AM, 11 AM, 5 PM   Last time this was given:  2 tablets on 5/30/2018  5:03 AM                                clotrimazole-betamethasone cream   Commonly known as:  LOTRISONE   Apply topically as needed for rash on back of leg                                diphenoxylate-atropine 2.5-0.025 MG per tablet   Commonly known as:  LOMOTIL   Take 2 tablets by mouth 4 times daily as needed                                doxepin 10 MG capsule   Commonly known as:  SINEquan   Take 10 mg by mouth At Bedtime   Last time this was given:  10 mg on 5/29/2018  9:23  PM                                * insulin pen needle 32G X 4 MM   1 each                                * EASY TOUCH PEN NEEDLES 32G X 5 MM   USE WITH INSULIN THREE TIMES DAILY   Generic drug:  insulin pen needle                                finasteride 5 MG tablet   Commonly known as:  PROSCAR   TAKE 1 TABLET BY MOUTH ONCE DAILY AT BEDTIME. DO NOT CRUSH.                                glipiZIDE 10 MG tablet   Commonly known as:  GLUCOTROL   TAKE 5mg (1/2 tablet) BY MOUTH TWICE DAILY BEFORE MEALS.   Last time this was given:  5 mg on 5/30/2018  8:11 AM   Last time this was given:  0800 5/30                                insulin glargine 100 UNIT/ML injection   Commonly known as:  LANTUS   Inject 27 units in morning and 24 units at night.   Last time this was given:  27 Units on 5/30/2018  8:12 AM   Last time this was given:  5/30 800am                                oxyCODONE IR 5 MG tablet   Commonly known as:  ROXICODONE   Take 1-2 tablets (5-10 mg) by mouth every 6 hours as needed for other (pain control or improvement in physical function. Hold dose for analgesic side effects.)                                potassium chloride 10 MEQ CR capsule   Commonly known as:  MICRO-K   TAKE 1 CAPSULE BY MOUTH TWICE DAILY WITH MEALS. SWALLOW WHOLE, DO NOT CHEW. CAPSULES MAY BE OPENED AND SPRINKLED ON FOOD.                                senna-docusate 8.6-50 MG per tablet   Commonly known as:  SENOKOT-S;PERICOLACE   Take 2 tablets by mouth 2 times daily   Last time this was given:  1 tablet on 5/30/2018  8:10 AM   Last time this was given:  5/30 0800am                                simvastatin 40 MG tablet   Commonly known as:  ZOCOR   Take 40 mg by mouth At Bedtime   Last time this was given:  40 mg on 5/29/2018  9:21 PM                                topiramate 100 MG tablet   Commonly known as:  TOPAMAX   Take 1 tablet by mouth three times a day. Do not crush   Last time this was given:  100 mg on 5/30/2018  8:13  AM   Last time this was given:  5/30 800am                                VICTOZA PEN 18 MG/3ML soln   INJECT 1.8 MG UNDER THE SKIN ONE TIME A DAY   Last time this was given:  1.8 mg on 5/30/2018  8:12 AM   Generic drug:  liraglutide                                * Notice:  This list has 2 medication(s) that are the same as other medications prescribed for you. Read the directions carefully, and ask your doctor or other care provider to review them with you.

## 2018-05-29 NOTE — OR NURSING
Left arm PIV pt reports in painful to flush  - slight swelling noted at site - this PIV removed and right arm PIV placed.

## 2018-05-29 NOTE — BRIEF OP NOTE
[facility], [place]    Brief Operative Note    Pre-operative diagnosis: Parkinson's Disease  Post-operative diagnosis Parkinson's Disease  Procedure: Procedure(s):  Stealth Assisted Left Deep Brain Stimulator Placement, Phase I, Placement Of Left Deep Brain Stimulator Electrode, Target Left Subthalamic Nucleus With Microelectrode Recording - Wound Class: I-Clean  Surgeon: Surgeon(s) and Role:     * Wayne Marshall MD - Primary     * Camilo Blue MD - Resident - Assisting  Anesthesia: Combined MAC with Local   Estimated blood loss: 5cc  Drains: None  Specimens: * No specimens in log *  Findings:   Final electrode location is posterior Felix Gun position at 1.0 mm below target.  Complications: None.  Implants: Abbott electrode in left STN, Ref 6172, SN [id number]      NEUROSURGERY ATTENDING ATTESTATION: Wayne OTOOLE M.D., Ph.D., Neurosurgery Attending, was present and scrubbed for the entire case and performed the key and critical portions of the case.

## 2018-05-29 NOTE — IP AVS SNAPSHOT
[address] [address]    Phone:  [phone]                                       After Visit Summary   5/29/2018    Stefan Odonnell    MRN: [id number]           After Visit Summary Signature Page     I have received my discharge instructions, and my questions have been answered. I have discussed any challenges I see with this plan with the nurse or doctor.    ..........................................................................................................................................  Patient/Patient Representative Signature      ..........................................................................................................................................  Patient Representative Print Name and Relationship to Patient    ..................................................               ................................................  Date                                            Time    ..........................................................................................................................................  Reviewed by Signature/Title    ...................................................              ..............................................  Date                                                            Time

## 2018-05-29 NOTE — OR NURSING
Patient taken to CT and then transferred to 4A where a set of neuro checks was done with writer and the 4A nurse together

## 2018-05-29 NOTE — OR NURSING
Dr Brooke Gaviria notified that there were not any fluid orders and that patient sleepy and has not started drinking.  She stated they would evaluate once he arrives in the ICU.  Attempted to call 4a with report-No answer at this time

## 2018-05-30 VITALS
WEIGHT: 298.06 LBS | OXYGEN SATURATION: 96 % | BODY MASS INDEX: 38.25 KG/M2 | DIASTOLIC BLOOD PRESSURE: 73 MMHG | TEMPERATURE: 98 F | RESPIRATION RATE: 16 BRPM | HEIGHT: 74 IN | SYSTOLIC BLOOD PRESSURE: 127 MMHG

## 2018-05-30 LAB
ANION GAP SERPL CALCULATED.3IONS-SCNC: 10 MMOL/L (ref 3–14)
BUN SERPL-MCNC: 16 MG/DL (ref 7–30)
CALCIUM SERPL-MCNC: 8.3 MG/DL (ref 8.5–10.1)
CHLORIDE SERPL-SCNC: 110 MMOL/L (ref 94–109)
CO2 SERPL-SCNC: 22 MMOL/L (ref 20–32)
CREAT SERPL-MCNC: 1.38 MG/DL (ref 0.66–1.25)
ERYTHROCYTE [DISTWIDTH] IN BLOOD BY AUTOMATED COUNT: 14 % (ref 10–15)
GFR SERPL CREATININE-BSD FRML MDRD: 51 ML/MIN/1.7M2
GLUCOSE BLDC GLUCOMTR-MCNC: 178 MG/DL (ref 70–99)
GLUCOSE SERPL-MCNC: 186 MG/DL (ref 70–99)
HCT VFR BLD AUTO: 37.6 % (ref 40–53)
HGB BLD-MCNC: 12.7 G/DL (ref 13.3–17.7)
MCH RBC QN AUTO: 30.7 PG (ref 26.5–33)
MCHC RBC AUTO-ENTMCNC: 33.8 G/DL (ref 31.5–36.5)
MCV RBC AUTO: 91 FL (ref 78–100)
PLATELET # BLD AUTO: 164 10E9/L (ref 150–450)
POTASSIUM SERPL-SCNC: 3.5 MMOL/L (ref 3.4–5.3)
RBC # BLD AUTO: 4.14 10E12/L (ref 4.4–5.9)
SODIUM SERPL-SCNC: 141 MMOL/L (ref 133–144)
WBC # BLD AUTO: 8 10E9/L (ref 4–11)

## 2018-05-30 PROCEDURE — 25000128 H RX IP 250 OP 636: Performed by: STUDENT IN AN ORGANIZED HEALTH CARE EDUCATION/TRAINING PROGRAM

## 2018-05-30 PROCEDURE — 85027 COMPLETE CBC AUTOMATED: CPT | Performed by: STUDENT IN AN ORGANIZED HEALTH CARE EDUCATION/TRAINING PROGRAM

## 2018-05-30 PROCEDURE — 25000132 ZZH RX MED GY IP 250 OP 250 PS 637: Mod: GY | Performed by: STUDENT IN AN ORGANIZED HEALTH CARE EDUCATION/TRAINING PROGRAM

## 2018-05-30 PROCEDURE — 00000146 ZZHCL STATISTIC GLUCOSE BY METER IP

## 2018-05-30 PROCEDURE — A9270 NON-COVERED ITEM OR SERVICE: HCPCS | Mod: GY | Performed by: STUDENT IN AN ORGANIZED HEALTH CARE EDUCATION/TRAINING PROGRAM

## 2018-05-30 PROCEDURE — 25000125 ZZHC RX 250: Performed by: STUDENT IN AN ORGANIZED HEALTH CARE EDUCATION/TRAINING PROGRAM

## 2018-05-30 PROCEDURE — 36415 COLL VENOUS BLD VENIPUNCTURE: CPT | Performed by: STUDENT IN AN ORGANIZED HEALTH CARE EDUCATION/TRAINING PROGRAM

## 2018-05-30 PROCEDURE — 80048 BASIC METABOLIC PNL TOTAL CA: CPT | Performed by: STUDENT IN AN ORGANIZED HEALTH CARE EDUCATION/TRAINING PROGRAM

## 2018-05-30 RX ORDER — AMOXICILLIN 250 MG
2 CAPSULE ORAL 2 TIMES DAILY
Qty: 100 TABLET | Refills: 0 | Status: SHIPPED | OUTPATIENT
Start: 2018-05-30 | End: 2018-09-14

## 2018-05-30 RX ORDER — OXYCODONE HYDROCHLORIDE 5 MG/1
5-10 TABLET ORAL EVERY 6 HOURS PRN
Qty: 30 TABLET | Refills: 0 | Status: SHIPPED | OUTPATIENT
Start: 2018-05-30 | End: 2018-09-14

## 2018-05-30 RX ADMIN — SENNOSIDES AND DOCUSATE SODIUM 1 TABLET: 8.6; 5 TABLET ORAL at 08:10

## 2018-05-30 RX ADMIN — CARBIDOPA AND LEVODOPA 2 TABLET: 25; 100 TABLET ORAL at 05:03

## 2018-05-30 RX ADMIN — AMLODIPINE BESYLATE 5 MG: 5 TABLET ORAL at 08:10

## 2018-05-30 RX ADMIN — ACETAMINOPHEN 975 MG: 325 TABLET, FILM COATED ORAL at 01:28

## 2018-05-30 RX ADMIN — TOPIRAMATE 100 MG: 100 TABLET, FILM COATED ORAL at 08:13

## 2018-05-30 RX ADMIN — CEFAZOLIN SODIUM 2 G: 2 INJECTION, SOLUTION INTRAVENOUS at 05:03

## 2018-05-30 RX ADMIN — GLIPIZIDE 5 MG: 5 TABLET ORAL at 08:11

## 2018-05-30 RX ADMIN — LIRAGLUTIDE 1.8 MG: 6 INJECTION SUBCUTANEOUS at 08:12

## 2018-05-30 ASSESSMENT — VISUAL ACUITY
OU: GLASSES;NORMAL ACUITY

## 2018-05-30 NOTE — PROGRESS NOTES
S: neck pain from frame, improved overnight. Ambulating around the unit.      O:  Exam:  General: Awake;  Alert, In No Acute Distress  Pulm: Breathing Comfortably on RA  Mental status: Oriented x 3  Cranial Nerves: Cranial Nerves II-XII Grossly Intact Bilaterally  Strength:      Del Tr Bi WE WF Gr  R 5 5 5 5 5 4*  L 5 5 5 5 5 5     HF KE KF DF PF EHL  R 5 5 5 5 5 5  L 5 5 5 5 5 5    *R handgrip 4/5 baseline secondary to carpal tunnel syndrome     Pronator Drift: Absent  Sensory: Intact to Light Touch  Reflexes: No Hyperreflexia, Keating s or Clonus Present; Toes Down-Going Bilaterally    INCISION: c/d/i, absorbable sutures in place     A: S/p left DBS, phase I, electrode placement. Doing well post-operatively.     P:  Sutures out: absorbable, wound check in 2 weeks  Imaging: post-operative CT head STEALTH and XR skull AP/lateral completed; no concerns  Pain: pain controlled  Anti-epileptics: none  Blood pressure goals: SBP < 140  Diet: Regular   Hematological goals: Platelets > 100k, INR < 1.5, Hemoglobin > 8   Blood glucose control: restarted PTA medications   Antibiotics: ancef x 3 doses post-operatively   DVT prophylaxis: Sequential compression devices  Ulcer prophylaxis: none indicated  DISPO:  Anticipate D/C Home 5/30   Barriers: none      Contact the neurosurgery resident on call with questions.    Dial * * * [phone]: Enter job code [email or web address] when prompted.    Conner Carney MD  Neurosurgery PGY2

## 2018-05-30 NOTE — DISCHARGE SUMMARY
[facility] Discharge Summary and Instructions    Stefan Odonnell MRN# [id number]   Age: 68 year old YOB: 1950     Date of Admission:  5/29/2018  Date of Discharge::  5/30/2018  Admitting Physician:  Wayne Marshall MD  Discharge Physician:  Wayne Marshall MD          Admission Diagnoses:   S/P deep brain stimulator placement [Z96.89]          Discharge Diagnosis:   S/P deep brain stimulator placement [Z96.89]          Procedures:   5/29/2018: Stealth-Assisted Left Deep Brain Stimulator Placement; Phase I           Brief History of Illness:   Mr. Odonnell is a very pleasant 68 year old male patient whose past medical history is significant for Obstructive Sleep Apnea, Hyperlipidemia, Basal Cell Carcinoma, Diabetes Mellitus, Gout, Chronic Kidney Disease (Stage 3), Restless Leg Syndrome and Essential Tremor affecting the Right Upper Extremity.  His essential tremor began about 8-9 years ago and has progressively worsened despite medical management with Propranolol, Primodone, Gabapentin, Topamax, and Sinemet. He was evaluated by Dr. Wayne Marshall in the Neurosurgery Clinic and deemed an appropriate candidate for placement of Deep Brain Stimulator. 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            Hospital Course:   The patient was admitted to the hospital on 5/29/2018 and underwent the above named operation. There were no susan-operative complications. Post-operatively, the patient was transferred to Unit 4A, Neuro-Surgical ICU for routine post-operative cares. He was evaluated with a CT Head and Skull X-Rays for routine surveillance that demonstrated excellent placement of the leads and no intracranial hemorrhage. The patient completed his susan-operative antibiotics for prophylaxis. On post-operative day #1, the patient had met all discharge goals and was deemed medically and neurologically stable for  "discharge home. He will return for Phase II of his DBS placement on 6/8/2018. He has been instructed to remain off Aspirin and his nutritional supplements until after Phase II of his procedure.     Examination:   /81  Temp 98.5  F (36.9  C) (Oral)  Resp 14  Ht 1.88 m (6' 2\")  Wt 135.2 kg (298 lb 1 oz)  SpO2 94%  BMI 38.27 kg/m2     General Appearance: Up Ambulating in Room; In No Apparent Distress  Orientation: Oriented to Person, Place, Time   Cranial Nerves: CN II-XII Grossly Intact  Motor: 5/5 in BUE and BLE w/ exception to 4+/5 with Right Hand Grasp  Pronator Drift: Absent  Incision: Clean, Dry and Intact with Glue and Absorbable Sutures         Discharge Medications:     Current Discharge Medication List      START taking these medications    Details   oxyCODONE IR (ROXICODONE) 5 MG tablet Take 1-2 tablets (5-10 mg) by mouth every 6 hours as needed for other (pain control or improvement in physical function. Hold dose for analgesic side effects.)  Qty: 30 tablet, Refills: 0    Comments: Indication: Moderate to Severe Post-Operative Pain  Associated Diagnoses: Essential tremor      senna-docusate (SENOKOT-S;PERICOLACE) 8.6-50 MG per tablet Take 2 tablets by mouth 2 times daily  Qty: 100 tablet, Refills: 0    Comments: Indication: Prevention of Constipation with Concurrent use of Opioid Analgesics  Associated Diagnoses: Essential tremor         CONTINUE these medications which have NOT CHANGED    Details   allopurinol (ZYLOPRIM) 300 MG tablet TAKE 1 TABLET BY MOUTH DAILY IN THE EVENING      amLODIPine (NORVASC) 5 MG tablet Take 5 mg by mouth every morning       blood glucose monitoring (VITA CONTOUR NEXT MONITOR) meter device kit       blood glucose monitoring (VITA MICROLET) lancets Test Blood Sugar 3 Times Daily.  Dx-E11.8      carbidopa-levodopa (SINEMET)  MG per tablet Take 2 tablets by mouth 3 times daily Takes at 4 AM, 11 AM, 5 PM    Associated Diagnoses: Parkinsonian features    "   clotrimazole-betamethasone (LOTRISONE) cream Apply topically as needed for rash on back of leg      doxepin (SINEQUAN) 10 MG capsule Take 10 mg by mouth At Bedtime       finasteride (PROSCAR) 5 MG tablet TAKE 1 TABLET BY MOUTH ONCE DAILY AT BEDTIME. DO NOT CRUSH.      glipiZIDE (GLUCOTROL) 10 MG tablet TAKE 5mg (1/2 tablet) BY MOUTH TWICE DAILY BEFORE MEALS.      insulin glargine (LANTUS) 100 UNIT/ML injection Inject 27 units in morning and 24 units at night.      !! insulin pen needle (EASY TOUCH PEN NEEDLES) 32G X 5 MM USE WITH INSULIN THREE TIMES DAILY      liraglutide (VICTOZA PEN) 18 MG/3ML soln INJECT 1.8 MG UNDER THE SKIN ONE TIME A DAY      potassium chloride (MICRO-K) 10 MEQ CR capsule TAKE 1 CAPSULE BY MOUTH TWICE DAILY WITH MEALS. SWALLOW WHOLE, DO NOT CHEW. CAPSULES MAY BE OPENED AND SPRINKLED ON FOOD.      simvastatin (ZOCOR) 40 MG tablet Take 40 mg by mouth At Bedtime       topiramate (TOPAMAX) 100 MG tablet Take 1 tablet by mouth three times a day. Do not crush      blood glucose monitoring (VITA CONTOUR NEXT) test strip USE TO TEST BLOOD SUGARS 3 TIMES DAILY      diphenoxylate-atropine (LOMOTIL) 2.5-0.025 MG per tablet Take 2 tablets by mouth 4 times daily as needed       !! insulin pen needle 32G X 4 MM 1 each       !! - Potential duplicate medications found. Please discuss with provider.      STOP taking these medications       aspirin 81 MG chewable tablet Comments:   Reason for Stopping:         HYDROcodone-acetaminophen (NORCO)  MG per tablet Comments:   Reason for Stopping:         Misc Natural Products (TART CHERRY ADVANCED) CAPS Comments:   Reason for Stopping:                       Discharge Instructions and Follow-Up:   Discharge diet: Regular     Discharge activity: You may advance activity as tolerated. No strenuous exercise or heay lifting greater than 10 lbs for 4 weeks or until seen and cleared in clinic.     Discharge follow-up: Please return for Phase II of your Deep Brain  Stimulator Placement on Friday June 8, 2018.      Wound care: Ok to shower,however no scrubbing of the wound and no soaking of the wound, meaning no bathtubs or swimming pools. Pat dry only. Leave wound open to air.       Please call if you have:  1. increased pain, redness, drainage, swelling at your incision  2. fevers > 101.5 F degrees  3. with any questions or concerns.  You may reach the Neurosurgery clinic at [phone] during regular work hours. ER at [phone].    and ask for the Neurosurgery Resident on call at [phone], during off hours or weekends.         Discharge Disposition:   Discharged to home        Patricia Garza, ACNP-BC, CCRN, CNRN  Department of Neurosurgery  Pager: [phone]

## 2018-05-30 NOTE — PLAN OF CARE
Problem: Patient Care Overview  Goal: Plan of Care/Patient Progress Review  Outcome: Improving  Pt remains at baseline neurologically. SR 60's-70's. Rare PVC's. Normotensive. Afebrile. Satting mid 90's on RA. BBS clear. Regular diet tolerating well. Adequate UOP per urinal. Skin intact other than surgical wounds. See flowsheets. Continue plan of care. DC home today.    Problem: Diabetes Comorbidity  Goal: Diabetes  Patient comorbidity will be monitored for signs and symptoms of hyperglycemia or hypoglycemia. Problems will be absent, minimized or managed by discharge/transition of care.   Outcome: No Change  Pt given home dose of lantus HS.

## 2018-05-30 NOTE — PLAN OF CARE
"Problem: Patient Care Overview  Goal: Plan of Care/Patient Progress Review  Outcome: No Change          Nursing Progress Note    Pt arrived to the SICU at 1730 with PACU RN. Bedside neuro check completed.    D/I/A:  Neuro: Pt A&Ox4. PERRL. Bilateral nystagmus, fatigable. L eyelid droop. R mouth droop with smiling. Denies N/T/decreased sensation to face or trunk. N/T to Bilateral hands and feet, baseline neuropathy. Tremors throughout, R-side>L-side. Glasses on. No acute neurological changes.  CV: Sinus rhythm, rare PVCs. SBP<140mmHg maintained, Labetalol given x1. Afebrile  Pulm: LS clear, diminished at bases. On RA.  GI/: BS hypoactive, -Flatus. Denies nausea. Clear liquid diet tolerating well, advanced to Regular diet. BG stable. Barahona removed at 1850, got up to BR to void.    Ambulated to BR with SBA, tolerated well. Spouse and family at bedside.    P: Continue to monitor and assess. Update POC as needed.    /81  Temp 98  F (36.7  C) (Oral)  Resp 20  Ht 1.88 m (6' 2\")  Wt 134.6 kg (296 lb 11.8 oz)  SpO2 96%  BMI 38.1 kg/m2  Anaid Constantino  May 29, 2018  7:44 PM        "

## 2018-05-31 ENCOUNTER — TELEPHONE (OUTPATIENT)
Dept: CARE COORDINATION | Facility: CLINIC | Age: 68
End: 2018-05-31

## 2018-05-31 ENCOUNTER — CARE COORDINATION (OUTPATIENT)
Dept: NEUROSURGERY | Facility: CLINIC | Age: 68
End: 2018-05-31

## 2018-05-31 ENCOUNTER — CARE COORDINATION (OUTPATIENT)
Dept: NEUROLOGY | Facility: CLINIC | Age: 68
End: 2018-05-31

## 2018-05-31 LAB
BACTERIA SPEC CULT: NORMAL
GLUCOSE BLDC GLUCOMTR-MCNC: 106 MG/DL (ref 70–99)
SPECIMEN SOURCE: NORMAL

## 2018-05-31 NOTE — OP NOTE
[facility] Neurophysiology Summary    Name: Stefan Odonnell  : 1950   MRN: [id number]   Surgery Date: 2018  Surgery Performed:  Left STN DBS lead placement  Neurologist: Pablo Horn MD, PhD  Ordering Physician: Wayne Marshall MD, PhD    Diagnosis: Essential Tremor/Parkinson's disease    Target:  Subthalamic Nucleus (STN)    Side: Left    Procedure:   The surgical field was prepared as per the neurosurgeon's note. Microelectrodes were attached to the stereotactic frame and lowered through the brain with a calibrated microdrive. Neuronal activity was recorded along each track. Single units were isolated and somatosensory/motor responses (SSR) were determined.     Functional Physiologic Mapping:  3.0 Hours     Mapping Equipment: [employer]    Microelectrode Mapping    Electrophysiological Mapping: The target was physiologically mapped using 2 passes (5 total tracks)    Pass 1:    Mapping: Three simultaneous tracks; anterior, center and lateral Felix Gun, starting 15 mm above target depth.  Anterior track: Approximately 2.6 mm interpreted as STN w/ SSR during shoulder, elbow and wrist rotation. Center track: Approximately 5.0 mm interpreted as STN. Lateral track: Approximately 1.59 mm interpreted as STN.    Microstimulation:  No microstimulation performed.    Ring electrode stimulation: No ring electrode stimulation performed.      Pass 2:    Mapping: Two simultaneous tracks; posterior and medial Felix Gun, starting 15 mm above target depth. Posterior track: approximately 5.6 mm interpreted as STN with SSR during shoulder and elbow rotation. Medial track: approximately 5.8 mm interpreted as STN.    Microstimulation: No microstimulation performed.    Ring electrode stimulation: No ring electrode stimulation performed.      Lead placement:  Abbott Infinity 0.5 lead was placed in the posterior Felix Gun track at 1.0 mm below target depth. Intention was for the top of  (segmented) contact three to be placed at the dorsal border of STN.    Macrostimulation (DBS lead):    Lead: Abbott Infinity 0.5    1. Stimulation Parameters: 1(-) 3abc(+) 130 Hz, 60  s  Outcome: Reduced rigidity and improved bradykinesia noted at 0.5 mA.  Cramping  reported by the patient at 2.0 mA in his right foot but persisted even when stimulation was turned off. Muscle contractions/translation of the jaw noted at 2.0 mA, consistent with stimulation of the posterior limb of the internal capsule. No paresthesias reported by the patient up to 2.25 mA.    2. Stimulation Parameters: 3abc(-) 1(+) 130 Hz, 60  s  Outcome: Reduced rigidity and improved bradykinesia noted at 0.75 mA. Muscle contractions/translation of the jaw noted at 2.5 mA, consistent with stimulation of the posterior limb of the internal capsule. No paresthesias reported by the patient up to 2.5 mA.     3. Stimulation Parameters: 3ab(-) 2ab(+) 130 Hz, 60  s  Outcome: No muscle contractions noted up to 4.0 mA. No paresthesias reported by the patient up to 4.0 mA.      Comments:   Overall, good map with dense, robust STN cell activity of adequate length in the posterior and medial Felix Gun tracks with palpable SSR's in the upper limb. Pt. reports tremor but no tremor appreciated during the mapping procedure. Perceptible improvement in bradykinesia and rigidity with less than 1.0 mA using contact configuration 1- 3+ and 3- 1+, showing capsule effect with moderate amplitude (2.0 - 2.5 mA). However, when delivering stimulation via segmented contacts 3ab- 2ab+, there was no perceptible capsule effect up to 4.0 mA. As such, it is expected that using segmented contacts, potentially 3ab- 2ab+, to deliver stimulation will provide a sufficient therapeutic window for symptom suppression.    Pablo Horn MD, PhD

## 2018-05-31 NOTE — PROGRESS NOTES
I received a office note in the mail from [facility] dated 5/22/18 and placed a label on it and will place in bin to be scanned into chart

## 2018-05-31 NOTE — PROGRESS NOTES
Neurosurgery Discharge Coordination Note     Attending physician: Dr. Marshall  Surgery performed: DBS lead placement  Date of Discharge: 5/30/2018  Discharge to: Home     Current status: Patient states he  feels pretty good. He has  mild occasional headache. Incision is also mildly tender making it difficult to sleep on the left side, but discomfort is tolerable.   Denies redness, swelling, increased tenderness, drainage, incision opening or elevated temp. Reports Incision CDI without signs of infection.  Denies current bowel or bladder issues.    Discharge instructions and medications reviewed with patient. Also reviewed preop instructions for 6/8/18 procedure. Pt should take his Sinemet as scheduled on the day of surgery. Otherwise, the preop instructions are the same as those he followed for the 5/29 procedure. Pt will purchase a bottle of antibacterial scrub at local pharmacy, as he was only given 1 bottle at his PAC appointment.     Follow up appointments/imaging/tests needed: DBS battery placement on 6/8/18 at 9:00, arrive on  at 7:00.     RN triage/on call number given: [phone]/ [phone]

## 2018-06-07 ENCOUNTER — ANESTHESIA EVENT (OUTPATIENT)
Dept: SURGERY | Facility: CLINIC | Age: 68
End: 2018-06-07
Payer: COMMERCIAL

## 2018-06-08 ENCOUNTER — ANESTHESIA (OUTPATIENT)
Dept: SURGERY | Facility: CLINIC | Age: 68
End: 2018-06-08
Payer: COMMERCIAL

## 2018-06-08 ENCOUNTER — HOSPITAL ENCOUNTER (OUTPATIENT)
Facility: CLINIC | Age: 68
Discharge: HOME | End: 2018-06-08
Attending: NEUROLOGICAL SURGERY | Admitting: NEUROLOGICAL SURGERY
Payer: COMMERCIAL

## 2018-06-08 VITALS
OXYGEN SATURATION: 95 % | SYSTOLIC BLOOD PRESSURE: 134 MMHG | HEART RATE: 73 BPM | RESPIRATION RATE: 18 BRPM | TEMPERATURE: 98 F | DIASTOLIC BLOOD PRESSURE: 89 MMHG | BODY MASS INDEX: 36.92 KG/M2 | WEIGHT: 287.7 LBS | HEIGHT: 74 IN

## 2018-06-08 DIAGNOSIS — Z96.89 S/P DEEP BRAIN STIMULATOR PLACEMENT: Primary | ICD-10-CM

## 2018-06-08 LAB
GLUCOSE BLDC GLUCOMTR-MCNC: 118 MG/DL (ref 70–99)
GLUCOSE BLDC GLUCOMTR-MCNC: 127 MG/DL (ref 70–99)

## 2018-06-08 PROCEDURE — 27211024 ZZHC OR SUPPLY OTHER OPNP: Performed by: NEUROLOGICAL SURGERY

## 2018-06-08 PROCEDURE — 36000059 ZZH SURGERY LEVEL 3 EA 15 ADDTL MIN UMMC: Performed by: NEUROLOGICAL SURGERY

## 2018-06-08 PROCEDURE — 27210794 ZZH OR GENERAL SUPPLY STERILE: Performed by: NEUROLOGICAL SURGERY

## 2018-06-08 PROCEDURE — 25000128 H RX IP 250 OP 636: Performed by: NURSE ANESTHETIST, CERTIFIED REGISTERED

## 2018-06-08 PROCEDURE — 71000014 ZZH RECOVERY PHASE 1 LEVEL 2 FIRST HR: Performed by: NEUROLOGICAL SURGERY

## 2018-06-08 PROCEDURE — 71000015 ZZH RECOVERY PHASE 1 LEVEL 2 EA ADDTL HR: Performed by: NEUROLOGICAL SURGERY

## 2018-06-08 PROCEDURE — 37000009 ZZH ANESTHESIA TECHNICAL FEE, EACH ADDTL 15 MIN: Performed by: NEUROLOGICAL SURGERY

## 2018-06-08 PROCEDURE — 25000566 ZZH SEVOFLURANE, EA 15 MIN: Performed by: NEUROLOGICAL SURGERY

## 2018-06-08 PROCEDURE — 25000128 H RX IP 250 OP 636: Performed by: ANESTHESIOLOGY

## 2018-06-08 PROCEDURE — 82962 GLUCOSE BLOOD TEST: CPT | Mod: 91

## 2018-06-08 PROCEDURE — 25000128 H RX IP 250 OP 636: Performed by: NEUROLOGICAL SURGERY

## 2018-06-08 PROCEDURE — 36000057 ZZH SURGERY LEVEL 3 1ST 30 MIN - UMMC: Performed by: NEUROLOGICAL SURGERY

## 2018-06-08 PROCEDURE — 25000125 ZZHC RX 250: Performed by: NEUROLOGICAL SURGERY

## 2018-06-08 PROCEDURE — 25000125 ZZHC RX 250: Performed by: NURSE ANESTHETIST, CERTIFIED REGISTERED

## 2018-06-08 PROCEDURE — C9399 UNCLASSIFIED DRUGS OR BIOLOG: HCPCS | Performed by: NURSE ANESTHETIST, CERTIFIED REGISTERED

## 2018-06-08 PROCEDURE — C1883 ADAPT/EXT, PACING/NEURO LEAD: HCPCS | Performed by: NEUROLOGICAL SURGERY

## 2018-06-08 PROCEDURE — 37000008 ZZH ANESTHESIA TECHNICAL FEE, 1ST 30 MIN: Performed by: NEUROLOGICAL SURGERY

## 2018-06-08 PROCEDURE — 40000170 ZZH STATISTIC PRE-PROCEDURE ASSESSMENT II: Performed by: NEUROLOGICAL SURGERY

## 2018-06-08 PROCEDURE — 25000132 ZZH RX MED GY IP 250 OP 250 PS 637: Performed by: ANESTHESIOLOGY

## 2018-06-08 PROCEDURE — C1767 GENERATOR, NEURO NON-RECHARG: HCPCS | Performed by: NEUROLOGICAL SURGERY

## 2018-06-08 PROCEDURE — 27210995 ZZH RX 272: Performed by: NEUROLOGICAL SURGERY

## 2018-06-08 PROCEDURE — 71000027 ZZH RECOVERY PHASE 2 EACH 15 MINS: Performed by: NEUROLOGICAL SURGERY

## 2018-06-08 DEVICE — LEAD EXTENSION W/EXTEND TECHNOLOGY 50CM 6371ANS: Type: IMPLANTABLE DEVICE | Status: FUNCTIONAL

## 2018-06-08 DEVICE — GENERATOR DBS SYSTEM INFINITY 5 IPG 6660ANS
Type: IMPLANTABLE DEVICE | Site: CHEST  WALL | Status: NON-FUNCTIONAL
Removed: 2023-05-11

## 2018-06-08 RX ORDER — LIDOCAINE 40 MG/G
CREAM TOPICAL
Status: DISCONTINUED | OUTPATIENT
Start: 2018-06-08 | End: 2018-06-08 | Stop reason: HOSPADM

## 2018-06-08 RX ORDER — HYDROMORPHONE HYDROCHLORIDE 1 MG/ML
.3-.5 INJECTION, SOLUTION INTRAMUSCULAR; INTRAVENOUS; SUBCUTANEOUS EVERY 10 MIN PRN
Status: DISCONTINUED | OUTPATIENT
Start: 2018-06-08 | End: 2018-06-08 | Stop reason: HOSPADM

## 2018-06-08 RX ORDER — FENTANYL CITRATE 50 UG/ML
INJECTION, SOLUTION INTRAMUSCULAR; INTRAVENOUS PRN
Status: DISCONTINUED | OUTPATIENT
Start: 2018-06-08 | End: 2018-06-08

## 2018-06-08 RX ORDER — FENTANYL CITRATE 50 UG/ML
25-50 INJECTION, SOLUTION INTRAMUSCULAR; INTRAVENOUS
Status: DISCONTINUED | OUTPATIENT
Start: 2018-06-08 | End: 2018-06-08 | Stop reason: HOSPADM

## 2018-06-08 RX ORDER — CEFAZOLIN SODIUM 1 G/50ML
3 SOLUTION INTRAVENOUS
Status: COMPLETED | OUTPATIENT
Start: 2018-06-08 | End: 2018-06-08

## 2018-06-08 RX ORDER — SODIUM CHLORIDE, SODIUM LACTATE, POTASSIUM CHLORIDE, CALCIUM CHLORIDE 600; 310; 30; 20 MG/100ML; MG/100ML; MG/100ML; MG/100ML
INJECTION, SOLUTION INTRAVENOUS CONTINUOUS
Status: DISCONTINUED | OUTPATIENT
Start: 2018-06-08 | End: 2018-06-08 | Stop reason: HOSPADM

## 2018-06-08 RX ORDER — LABETALOL HYDROCHLORIDE 5 MG/ML
10 INJECTION, SOLUTION INTRAVENOUS
Status: DISCONTINUED | OUTPATIENT
Start: 2018-06-08 | End: 2018-06-08 | Stop reason: HOSPADM

## 2018-06-08 RX ORDER — HYDRALAZINE HYDROCHLORIDE 20 MG/ML
2.5-5 INJECTION INTRAMUSCULAR; INTRAVENOUS EVERY 10 MIN PRN
Status: DISCONTINUED | OUTPATIENT
Start: 2018-06-08 | End: 2018-06-08 | Stop reason: HOSPADM

## 2018-06-08 RX ORDER — ONDANSETRON 2 MG/ML
4 INJECTION INTRAMUSCULAR; INTRAVENOUS EVERY 30 MIN PRN
Status: DISCONTINUED | OUTPATIENT
Start: 2018-06-08 | End: 2018-06-08 | Stop reason: HOSPADM

## 2018-06-08 RX ORDER — LIDOCAINE HYDROCHLORIDE 20 MG/ML
INJECTION, SOLUTION INFILTRATION; PERINEURAL PRN
Status: DISCONTINUED | OUTPATIENT
Start: 2018-06-08 | End: 2018-06-08

## 2018-06-08 RX ORDER — ACETAMINOPHEN 325 MG/1
975 TABLET ORAL ONCE
Status: COMPLETED | OUTPATIENT
Start: 2018-06-08 | End: 2018-06-08

## 2018-06-08 RX ORDER — ONDANSETRON 2 MG/ML
INJECTION INTRAMUSCULAR; INTRAVENOUS PRN
Status: DISCONTINUED | OUTPATIENT
Start: 2018-06-08 | End: 2018-06-08

## 2018-06-08 RX ORDER — CEPHALEXIN 500 MG/1
500 CAPSULE ORAL 2 TIMES DAILY
Qty: 14 CAPSULE | Refills: 0 | Status: SHIPPED | OUTPATIENT
Start: 2018-06-08 | End: 2018-06-15

## 2018-06-08 RX ORDER — CEFAZOLIN SODIUM 1 G/3ML
1 INJECTION, POWDER, FOR SOLUTION INTRAMUSCULAR; INTRAVENOUS SEE ADMIN INSTRUCTIONS
Status: DISCONTINUED | OUTPATIENT
Start: 2018-06-08 | End: 2018-06-08 | Stop reason: HOSPADM

## 2018-06-08 RX ORDER — NALOXONE HYDROCHLORIDE 0.4 MG/ML
.1-.4 INJECTION, SOLUTION INTRAMUSCULAR; INTRAVENOUS; SUBCUTANEOUS
Status: DISCONTINUED | OUTPATIENT
Start: 2018-06-08 | End: 2018-06-08 | Stop reason: HOSPADM

## 2018-06-08 RX ORDER — ONDANSETRON 4 MG/1
4 TABLET, ORALLY DISINTEGRATING ORAL EVERY 30 MIN PRN
Status: DISCONTINUED | OUTPATIENT
Start: 2018-06-08 | End: 2018-06-08 | Stop reason: HOSPADM

## 2018-06-08 RX ORDER — PROPOFOL 10 MG/ML
INJECTION, EMULSION INTRAVENOUS PRN
Status: DISCONTINUED | OUTPATIENT
Start: 2018-06-08 | End: 2018-06-08

## 2018-06-08 RX ORDER — MEPERIDINE HYDROCHLORIDE 50 MG/ML
12.5 INJECTION INTRAMUSCULAR; INTRAVENOUS; SUBCUTANEOUS
Status: DISCONTINUED | OUTPATIENT
Start: 2018-06-08 | End: 2018-06-08 | Stop reason: HOSPADM

## 2018-06-08 RX ADMIN — PHENYLEPHRINE HYDROCHLORIDE 200 MCG: 10 INJECTION, SOLUTION INTRAMUSCULAR; INTRAVENOUS; SUBCUTANEOUS at 09:37

## 2018-06-08 RX ADMIN — ROCURONIUM BROMIDE 30 MG: 10 INJECTION INTRAVENOUS at 09:31

## 2018-06-08 RX ADMIN — SODIUM CHLORIDE, POTASSIUM CHLORIDE, SODIUM LACTATE AND CALCIUM CHLORIDE: 600; 310; 30; 20 INJECTION, SOLUTION INTRAVENOUS at 09:48

## 2018-06-08 RX ADMIN — FENTANYL CITRATE 50 MCG: 50 INJECTION, SOLUTION INTRAMUSCULAR; INTRAVENOUS at 08:49

## 2018-06-08 RX ADMIN — FENTANYL CITRATE 50 MCG: 50 INJECTION, SOLUTION INTRAMUSCULAR; INTRAVENOUS at 09:15

## 2018-06-08 RX ADMIN — PHENYLEPHRINE HYDROCHLORIDE 200 MCG: 10 INJECTION, SOLUTION INTRAMUSCULAR; INTRAVENOUS; SUBCUTANEOUS at 10:04

## 2018-06-08 RX ADMIN — ROCURONIUM BROMIDE 50 MG: 10 INJECTION INTRAVENOUS at 08:58

## 2018-06-08 RX ADMIN — Medication 3 G: at 09:15

## 2018-06-08 RX ADMIN — PHENYLEPHRINE HYDROCHLORIDE 100 MCG: 10 INJECTION, SOLUTION INTRAMUSCULAR; INTRAVENOUS; SUBCUTANEOUS at 10:16

## 2018-06-08 RX ADMIN — PROPOFOL 150 MG: 10 INJECTION, EMULSION INTRAVENOUS at 08:58

## 2018-06-08 RX ADMIN — PHENYLEPHRINE HYDROCHLORIDE 100 MCG: 10 INJECTION, SOLUTION INTRAMUSCULAR; INTRAVENOUS; SUBCUTANEOUS at 09:24

## 2018-06-08 RX ADMIN — PHENYLEPHRINE HYDROCHLORIDE 100 MCG: 10 INJECTION, SOLUTION INTRAMUSCULAR; INTRAVENOUS; SUBCUTANEOUS at 09:16

## 2018-06-08 RX ADMIN — Medication 0.5 MG: at 11:37

## 2018-06-08 RX ADMIN — SODIUM CHLORIDE, POTASSIUM CHLORIDE, SODIUM LACTATE AND CALCIUM CHLORIDE: 600; 310; 30; 20 INJECTION, SOLUTION INTRAVENOUS at 08:49

## 2018-06-08 RX ADMIN — LIDOCAINE HYDROCHLORIDE 100 MG: 20 INJECTION, SOLUTION INFILTRATION; PERINEURAL at 08:58

## 2018-06-08 RX ADMIN — MIDAZOLAM 2 MG: 1 INJECTION INTRAMUSCULAR; INTRAVENOUS at 08:49

## 2018-06-08 RX ADMIN — ACETAMINOPHEN 975 MG: 325 TABLET, FILM COATED ORAL at 11:50

## 2018-06-08 RX ADMIN — SUGAMMADEX 200 MG: 100 INJECTION, SOLUTION INTRAVENOUS at 10:39

## 2018-06-08 RX ADMIN — FENTANYL CITRATE 25 MCG: 50 INJECTION, SOLUTION INTRAMUSCULAR; INTRAVENOUS at 10:31

## 2018-06-08 RX ADMIN — ONDANSETRON 4 MG: 2 INJECTION INTRAMUSCULAR; INTRAVENOUS at 10:05

## 2018-06-08 ASSESSMENT — VISUAL ACUITY
OU: BASELINE
OU: GLASSES;NORMAL ACUITY
OU: BASELINE

## 2018-06-08 NOTE — H&P
NEUROSURGERY    HISTORY AND PHYSICAL EXAM UPDATE           Chief Complaint   Patient presents with     Consult       Ongoing Deep Brain Stimulation surgery, right hand tremors. Essential Tremor with Parkinsonian Features.  S/p left side deep brain stimulator placement, phase I, placement of left side deep brain stimulator electrode, target left subthalamic nucleus on 5/29/2018.       HISTORY OF PRESENT ILLNESS  Mr. Stefan Odonnell returns for his ongoing deep brain stimulator surgery.  He is s/p left side deep brain stimulator placement, phase I, placement of left side deep brain stimulator electrode, target left subthalamic nucleus on 5/29/2018.  He tolerated the procedure well and there were no complications.  His postoperative CT head was without any complicating features and he was discharged to home on POD#1.  He does complain of being tired and he does have some tenderness in his head from time to time but he denies any fevers, chills, nausea, vomiting, dizziness, weakness, numbness or seizure like activity.  He is now here for his phase II surgery.  Briefly, he is a right-handed 68 year old man who began having tremors in his right hand 8-9 years ago.  His left hand tremor in minimal and does not impact his daily living.  The patient does not appreciate any other symptoms, though he was previously noted to have parkinsonian features including hypomimia, reduced eye blinking, bradykinesia, increased tone in upper extremities, slow gait, short steps and reduced arm swing on Dr. Horn's exam.  His tremors have not responded well to medications including propranolol, primidone, gabapentin and he is currently on topiramate.  He underwent a trial of Sinemet that did not improve his tremor.  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  His reports his diabetes is well-controlled. His A1C from 09/2017 was 6.8.  His case was discussed at the [employer]  meeting and he was considered to be a DBS candidate, left side STN, and wait and see strategy.    Past Medical History:   Diagnosis Date     Anosmia      Bleeding ulcer 1983     Carpal tunnel syndrome, bilateral      CKD (chronic kidney disease) stage 3, GFR 30-59 ml/min      Diabetes mellitus (H)      Gout      Hx of skin cancer, basal cell 2013    removed from face     Hyperlipidemia      Hypertension      Insomnia      Neuropathy      Obese      RENÉE (obstructive sleep apnea)      Osteoarthritis of knees, bilateral      Periodic limb movements of sleep      Restless legs syndrome (RLS)      Past Surgical History:   Procedure Laterality Date     COLONOSCOPY       HEMORRHOID SURGERY       Open Stomach Ulcer Repair       OPTICAL TRACKING SYSTEM INSERTION DEEP BRAIN STIMULATION Left 5/29/2018    Procedure: OPTICAL TRACKING SYSTEM INSERTION DEEP BRAIN STIMULATION;  Stealth Assisted Left Deep Brain Stimulator Placement, Phase I, Placement Of Left Deep Brain Stimulator Electrode, Target Left Subthalamic Nucleus With Microelectrode Recording;  Surgeon: Wayne Marshall MD;  Location: [facility] OR     TONSILLECTOMY       Social History     Social History     Marital status:      Spouse name: Blanca Odonnell     Number of children: 1     Years of education: N/A     Occupational History     [employer]     Social History Main Topics     Smoking status: Never Smoker     Smokeless tobacco: Never Used     Alcohol use No      Comment: 2 - 3 x a year     Drug use: No     Sexual activity: Yes     Partners: Female     Other Topics Concern     Parent/Sibling W/ Cabg, Mi Or Angioplasty Before 65f 55m? No     Social History Narrative    On second marriage.  Has a son from first marriage.   for 27 years.  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  Never smoked.  He rarely drinks alcohol about 2 - 3 x per year.  Doesn't use illicit drugs.      Family History   Problem Relation Age of  Onset     HEART DISEASE Mother      DIABETES Mother      Arthritis Mother      Depression Mother      Anxiety Disorder Mother      Dementia Mother      Hypertension Mother      Prostate Cancer Father      Tremor Father      Obesity Sister      HEART DISEASE Brother      Bone Cancer Brother      HEART DISEASE Brother      Tremor Son      HEART DISEASE Maternal Grandmother      HEART DISEASE Maternal Grandfather      HEART DISEASE Paternal Grandmother      HEART DISEASE Paternal Grandfather      Unknown/Adopted Sister         Allergies   Allergen Reactions     Atorvastatin Muscle Pain (Myalgia)     Clindamycin GI Disturbance     Gabapentin Swelling     Current Facility-Administered Medications   Medication     ceFAZolin (ANCEF) 1 g vial to attach to  ml bag for ADULT or 50 ml bag for PEDS     ceFAZolin (ANCEF) intermittent infusion 3 g (pre-mix)     lactated ringers infusion     lidocaine (LMX4) kit     lidocaine 1 % 1 mL     sodium chloride (PF) 0.9% PF flush 3 mL     sodium chloride (PF) 0.9% PF flush 3 mL       Current Outpatient Prescriptions   Medication     Cetirizine HCl (ZYRTEC ALLERGY PO)     carbidopa-levodopa (SINEMET)  MG per tablet     allopurinol (ZYLOPRIM) 300 MG tablet     amLODIPine (NORVASC) 5 MG tablet     aspirin 81 MG chewable tablet - ON HOLD FOR SURGERY     blood glucose monitoring ([employer] CONTOUR NEXT) test strip     blood glucose monitoring (Agorafy CONTOUR NEXT MONITOR) meter device kit     ciclopirox 8 % SOLN     clotrimazole-betamethasone (LOTRISONE) cream     diphenoxylate-atropine (LOMOTIL) 2.5-0.025 MG per tablet     doxepin (SINEQUAN) 10 MG capsule     insulin pen needle (EASY TOUCH PEN NEEDLES) 32G X 5 MM     finasteride (PROSCAR) 5 MG tablet     fluticasone (FLONASE) 50 MCG/ACT spray     glipiZIDE (GLUCOTROL) 10 MG tablet     HYDROcodone-acetaminophen (NORCO)  MG per tablet     insulin glargine (LANTUS) 100 UNIT/ML injection     insulin pen needle 32G X 4 MM     blood  glucose monitoring (VITA MICROLET) lancets     Misc Natural Products (TART CHERRY ADVANCED) CAPS     potassium chloride (MICRO-K) 10 MEQ CR capsule     simvastatin (ZOCOR) 40 MG tablet     topiramate (TOPAMAX) 100 MG tablet     liraglutide (VICTOZA PEN) 18 MG/3ML soln       REVIEW OF SYSTEMS:  Answers for HPI/ROS submitted by the patient on 1/17/2018 - updated 6/8/2018.  Also per HPI.  General Symptoms: No  Skin Symptoms: No  HENT Symptoms: No  EYE SYMPTOMS: No  HEART SYMPTOMS: No  LUNG SYMPTOMS: No  INTESTINAL SYMPTOMS: No  URINARY SYMPTOMS: No  REPRODUCTIVE SYMPTOMS: No  SKELETAL SYMPTOMS: Yes  BLOOD SYMPTOMS: No  NERVOUS SYSTEM SYMPTOMS: No  MENTAL HEALTH SYMPTOMS: No  Back pain: No  Muscle aches: Yes  Neck pain: No  Swollen joints: Yes  Joint pain: Yes  Muscle cramps: No  Muscle weakness: No  Joint stiffness: Yes  Bone fracture: No      PHYSICAL EXAM  Temp: 97.9  F (36.6  C) Temp src: Oral BP: 118/82 Pulse: 73   Resp: 18 SpO2: 95 % O2 Device: None (Room air)       General: Awake, alert, oriented. Well nourished, well developed, no acute distress.  HEENT: Head normocephalic, atraumatic. No carotid bruit. Neck supple. Good range of motion. No palpable thyroid mass.  Heart: Regular rhythm and rate. No murmurs.  Lungs: Clear to auscultation and percussion bilaterally. No ronchi, rales or wheeze.  Abdomen: Soft, non-tender, non-distended. No hepatosplenomegaly.  Extremity: Warm with no clubbing or cyanosis. No lower extremity edema.  Left frontal incision clean/dry and intact.  No redness.  No drainage.  No fluid collection.    Neurological:  Awake, alert and oriented to date, time, place and person. Speech fluent.   Pupils equal, round, reactive to light.  Extraocular movement intact.  Visual fields are full on confrontation.  Hearing is grossly normal to finger rub.   Facial sensation intact.  Face symmetric.  Tongue midline.  Uvula elevates equally.    Motor: Right hand  4/5 (baseline secondary to carpel  tunnel syndrome, per patient), otherwise full strength of the extremities.  Sensation: intact to light touch and pinpoint.  Deep tendon reflexes: Trace throughout. Negative for clonus. Negative for Keating's sign. No dysmetria.      ASSESSMENT   67 year old right handed male with a history of postural/action tremor of his bilateral hand, right worse than left.  Essential Tremor and Parkinsonian Features.  S/p left side deep brain stimulator placement, phase I, placement of left side deep brain stimulator electrode, target left subthalamic nucleus, with microelectrode recording on 5/29/2018.    He has done well with his phase I surgery and he is here for his phase II surgery.  We discussed phase II of DBS surgery, placement of the DBS generator/battery over the left chest wall under general anesthesia.     Risks, benefits, alternative therapies were discussed with the patient, including but not limited to infection and bleeding (intracranial), injury to the brain, stroke, death, hardware failure and possible need for more surgeries.  All questions were answered, and he expressed understanding.        PLAN:  1.  Deep brain stimulator placement, phase II, placement of deep brain stimulator generator/battery over the left chest wall.

## 2018-06-08 NOTE — DISCHARGE INSTRUCTIONS
[facility]  Same-Day Surgery   Adult Discharge Orders & Instructions     For 24 hours after surgery    1. Get plenty of rest.  A responsible adult must stay with you for at least 24 hours after you leave the hospital.   2. Do not drive or use heavy equipment.  If you have weakness or tingling, don't drive or use heavy equipment until this feeling goes away.  3. Do not drink alcohol.  4. Avoid strenuous or risky activities.  Ask for help when climbing stairs.   5. You may feel lightheaded.  IF so, sit for a few minutes before standing.  Have someone help you get up.   6. If you have nausea (feel sick to your stomach): Drink only clear liquids such as apple juice, ginger ale, broth or 7-Up.  Rest may also help.  Be sure to drink enough fluids.  Move to a regular diet as you feel able.  7. You may have a slight fever. Call the doctor if your fever is over 100 F (37.7 C) (taken under the tongue) or lasts longer than 24 hours.  8. You may have a dry mouth, a sore throat, muscle aches or trouble sleeping.  These should go away after 24 hours.  9. Do not make important or legal decisions.   Call your doctor for any of the followin.  Signs of infection (fever, growing tenderness at the surgery site, a large amount of drainage or bleeding, severe pain, foul-smelling drainage, redness, swelling).    2. It has been over 8 to 10 hours since surgery and you are still not able to urinate (pass water).    3.  Headache for over 24 hours.    To contact a doctor, call Dr Marshall's office at [phone]-- Neurosurgery      [phone] and ask for the resident on call for Neurosurgery (answered 24 hours a day)      Emergency Department:    [facility]: [phone]       (TTY for hearing impaired: [phone])    If you have obstructive sleep apnea     You were given anesthesia medicine during surgery to keep you comfortable and free of pain. After surgery, you may have more apnea  spells because of this medicine and other medicines you were given. The spells may last longer than usual.     At home:        Keep using the continuous positive airway pressure (CPAP) device when you sleep. Unless your health care provider tells you not to, use it when you sleep, day or night. CPAP is a common device used to treat obstructive sleep apnea.

## 2018-06-08 NOTE — ANESTHESIA POSTPROCEDURE EVALUATION
Patient: Stefan Odonnell    Procedure(s):  Deep Brain Stimulator Placement, Phase II, Placement Of Deep Brain Stimulator Generator/Battery Over The Left Chest Wall - Wound Class: I-Clean    Diagnosis:Parkinson's Disease   Diagnosis Additional Information: No value filed.    Anesthesia Type:  General, ETT    Note:  Anesthesia Post Evaluation    Patient location during evaluation: PACU  Patient participation: Able to fully participate in evaluation  Level of consciousness: awake and alert  Pain management: adequate  Airway patency: patent  Cardiovascular status: acceptable  Respiratory status: acceptable  Hydration status: acceptable  PONV: none     Anesthetic complications: None          Last vitals:  Vitals:    06/08/18 1130 06/08/18 1145 06/08/18 1155   BP: 118/71 125/80 120/81   Pulse:      Resp: 18 18 18   Temp: 36.8  C (98.3  F) 37  C (98.6  F) 36.9  C (98.4  F)   SpO2: 93% 92% 96%         Electronically Signed By: Wayne Gray MD  June 8, 2018  12:02 PM

## 2018-06-08 NOTE — ANESTHESIA PREPROCEDURE EVALUATION
Anesthesia Plan      History & Physical Review  History and physical reviewed and following examination; no interval change.    ASA Status:  3 .    NPO Status:  > 6 hours    Plan for General and ETT with Intravenous induction. Maintenance will be Other.    PONV prophylaxis:  Ondansetron (or other 5HT-3)  See preop H&P prior to his DBS placement last week, document dated 5/15/18.  No interval change      Postoperative Care  Postoperative pain management:  IV analgesics.      Consents  Anesthetic plan, risks, benefits and alternatives discussed with:  Patient and Spouse..                          .

## 2018-06-08 NOTE — ANESTHESIA CARE TRANSFER NOTE
Patient: Stefan Odonnell    Procedure(s):  Deep Brain Stimulator Placement, Phase II, Placement Of Deep Brain Stimulator Generator/Battery Over The Left Chest Wall - Wound Class: I-Clean    Diagnosis: Parkinson's Disease   Diagnosis Additional Information: No value filed.    Anesthesia Type:   No value filed.     Note:  Airway :Face Mask  Patient transferred to:PACU  Comments: Anesthesia Care Transfer Note    Patient: Stefan Odonnell    Transferred to: PACU    Patient vital signs: stable    Airway: none    Monitors on, VSS, pt. Stable, Report given to PACU RN.     Jerry Carrasco CRNA  6/8/2018 10:51 AM      Handoff Report: Identifed the Patient, Identified the Reponsible Provider, Reviewed the pertinent medical history, Discussed the surgical course, Reviewed Intra-OP anesthesia mangement and issues during anesthesia, Set expectations for post-procedure period and Allowed opportunity for questions and acknowledgement of understanding      Vitals: (Last set prior to Anesthesia Care Transfer)    CRNA VITALS  6/8/2018 1015 - 6/8/2018 1051      6/8/2018             Resp Rate (observed): (!)  1    EKG: Sinus rhythm                Electronically Signed By: SAMARA Mcnulty CRNA  June 8, 2018  10:51 AM

## 2018-06-08 NOTE — OR NURSING
Pt requesting tylenol for incisional pain. Dr. Gray notified. Order for tylenol received. MDA to complete sign out. Will continue to monitor.

## 2018-06-08 NOTE — IP AVS SNAPSHOT
MRN:[id number]                      After Visit Summary   6/8/2018    Stefan Odonnell    MRN: [id number]           Thank you!     Thank you for choosing [facility] for your care. Our goal is always to provide you with excellent care. Hearing back from our patients is one way we can continue to improve our services. Please take a few minutes to complete the written survey that you may receive in the mail after you visit with us. Thank you!        Patient Information     Date Of Birth          1950        About your hospital stay     You were admitted on:  June 8, 2018 You last received care in the:  Same Day Surgery [facility]    You were discharged on:  June 8, 2018       Who to Call     For medical emergencies, please call [phone].  For non-urgent questions about your medical care, please call your primary care provider or clinic, [phone]  For questions related to your surgery, please call your surgery clinic        Attending Provider     Provider Wayne Liang MD Neurosurgery       Primary Care Provider Office Phone # Fax #    Dorian Last [phone]      After Care Instructions     Discharge Instructions       Ok to restart Aspirin on Tuesday 6/12/18            Wound care       You may shower. After your incision gets wet, pat your incision dry. Do not vigorously rub your incision. - Do not apply any creams, gels, lotions, or ointments to your incision. - Do not submerge your incision in water for 2-3 weeks post-operatively. - Monitor your incision for signs of infection including redness, swelling, drainage or warmth at the surgical site.                  Follow-up Appointments     Follow Up ([facility])       Follow-up in 2 weeks with Dr. Marshall for wound check    Appointments on [place] and/or [facility] (with [facility] or [facility] provider or service). Call [phone] if you haven't heard regarding these appointments within 7 days of discharge.                   Your next 10 appointments already scheduled     Jul 20, 2018 10:20 AM CDT   (Arrive by 10:05 AM)   Return Movement Disorder with Stanford Sloan MD   [facility] Neurology ([facility])    [address]  [address] [place] [id number] [phone]            Jul 20, 2018  1:40 PM CDT   CT HEAD W/O CONTRAST with UCCT2   [facility] Imaging [place] [facility] ([facility])    [address]  [address] [place] [id number] [phone]           Please bring any scans or X-rays taken at other hospitals, if similar tests were done. Also bring a list of your medicines, including vitamins, minerals and over-the-counter drugs. It is safest to leave personal items at home.  Be sure to tell your doctor:   If you have any allergies.   If there s any chance you are pregnant.   If you are breastfeeding.  You do not need to do anything special to prepare for this exam.  Please wear loose clothing, such as a sweat suit or jogging clothes. Avoid snaps, zippers and other metal. We may ask you to undress and put on a hospital gown.              Further instructions from your care team       [facility] [place]  Same-Day Surgery   Adult Discharge Orders & Instructions     For 24 hours after surgery    1. Get plenty of rest.  A responsible adult must stay with you for at least 24 hours after you leave the hospital.   2. Do not drive or use heavy equipment.  If you have weakness or tingling, don't drive or use heavy equipment until this feeling goes away.  3. Do not drink alcohol.  4. Avoid strenuous or risky activities.  Ask for help when climbing stairs.   5. You may feel lightheaded.  IF so, sit for a few minutes before standing.  Have someone help you get up.   6. If you have nausea (feel sick to your stomach): Drink only clear liquids such as apple juice, ginger ale, broth or 7-Up.  Rest may also help.  Be sure to drink  "enough fluids.  Move to a regular diet as you feel able.  7. You may have a slight fever. Call the doctor if your fever is over 100 F (37.7 C) (taken under the tongue) or lasts longer than 24 hours.  8. You may have a dry mouth, a sore throat, muscle aches or trouble sleeping.  These should go away after 24 hours.  9. Do not make important or legal decisions.   Call your doctor for any of the followin.  Signs of infection (fever, growing tenderness at the surgery site, a large amount of drainage or bleeding, severe pain, foul-smelling drainage, redness, swelling).    2. It has been over 8 to 10 hours since surgery and you are still not able to urinate (pass water).    3.  Headache for over 24 hours.    To contact a doctor, call Dr Marshall's office at [phone]-- Neurosurgery      [phone] and ask for the resident on call for Neurosurgery (answered 24 hours a day)      Emergency Department:    [facility]: [phone]       (TTY for hearing impaired: [phone])    If you have obstructive sleep apnea     You were given anesthesia medicine during surgery to keep you comfortable and free of pain. After surgery, you may have more apnea spells because of this medicine and other medicines you were given. The spells may last longer than usual.     At home:        Keep using the continuous positive airway pressure (CPAP) device when you sleep. Unless your health care provider tells you not to, use it when you sleep, day or night. CPAP is a common device used to treat obstructive sleep apnea.            Pending Results     No orders found from 2018 to 2018.            Admission Information     Date & Time Provider Department Dept. Phone    2018 Wayne Marshall MD Same Day Surgery [facility] [place] [phone]      Your Vitals Were     Blood Pressure Pulse Temperature Respirations Height Weight    122/75 73 97.9  F (36.6  C) (Oral) 18 1.88 m (6' 2\") 130.5 kg (287 lb 11.2 oz)    Pulse " Oximetry BMI (Body Mass Index)                99% 36.94 kg/m2          MyChart Information     [facility] gives you secure access to your electronic health record. If you see a primary care provider, you can also send messages to your care team and make appointments. If you have questions, please call your primary care clinic.  If you do not have a primary care provider, please call [phone] and they will assist you.        Care EveryWhere ID     This is your Care EveryWhere ID. This could be used by other organizations to access your [facility] medical records  [id number]        Equal Access to Services     [facility]: Hadtsering Elias, waaquilino aguayo, venu lipscombalsmith schmidt, robert dumont . So [facility] [phone].    ATENCIÓN: Si habla español, tiene a newman disposición servicios gratuitos de asistencia lingüística. Frankie al [phone].    We comply with applicable federal civil rights laws and [place] laws. We do not discriminate on the basis of race, color, national origin, age, disability, sex, sexual orientation, or gender identity.               Review of your medicines      START taking        Dose / Directions    cephALEXin 500 MG capsule   Commonly known as:  KEFLEX   Used for:  S/P deep brain stimulator placement        Dose:  500 mg   Take 1 capsule (500 mg) by mouth 2 times daily for 7 days   Quantity:  14 capsule   Refills:  0         CONTINUE these medicines which have NOT CHANGED        Dose / Directions    allopurinol 300 MG tablet   Commonly known as:  ZYLOPRIM        TAKE 1 TABLET BY MOUTH DAILY IN THE EVENING   Refills:  0       amLODIPine 5 MG tablet   Commonly known as:  NORVASC        Dose:  5 mg   Take 5 mg by mouth every morning   Refills:  0       VITA CONTOUR NEXT test strip   Generic drug:  blood glucose monitoring        USE TO TEST BLOOD SUGARS 3 TIMES DAILY   Refills:  0       blood glucose monitoring lancets        Test Blood Sugar 3  Times Daily.  Dx-E11.8   Refills:  0       blood glucose monitoring meter device kit        Refills:  0       carbidopa-levodopa  MG per tablet   Commonly known as:  SINEMET   Used for:  Parkinsonian features        Dose:  2 tablet   Take 2 tablets by mouth 3 times daily Takes at 4 AM, 11 AM, 5 PM   Refills:  0       clotrimazole-betamethasone cream   Commonly known as:  LOTRISONE        Apply topically as needed for rash on back of leg   Refills:  0       diphenoxylate-atropine 2.5-0.025 MG per tablet   Commonly known as:  LOMOTIL        Dose:  2 tablet   Take 2 tablets by mouth 4 times daily as needed   Refills:  0       doxepin 10 MG capsule   Commonly known as:  SINEquan        Dose:  10 mg   Take 10 mg by mouth At Bedtime   Refills:  0       * insulin pen needle 32G X 4 MM        Dose:  1 each   1 each   Refills:  0       * EASY TOUCH PEN NEEDLES 32G X 5 MM   Generic drug:  insulin pen needle        USE WITH INSULIN THREE TIMES DAILY   Refills:  0       finasteride 5 MG tablet   Commonly known as:  PROSCAR        TAKE 1 TABLET BY MOUTH ONCE DAILY AT BEDTIME. DO NOT CRUSH.   Refills:  0       glipiZIDE 10 MG tablet   Commonly known as:  GLUCOTROL        TAKE 5mg (1/2 tablet) BY MOUTH TWICE DAILY BEFORE MEALS.   Refills:  0       insulin glargine 100 UNIT/ML injection   Commonly known as:  LANTUS        Inject 27 units in morning and 24 units at night.   Refills:  0       oxyCODONE IR 5 MG tablet   Commonly known as:  ROXICODONE   Used for:  Essential tremor        Dose:  5-10 mg   Take 1-2 tablets (5-10 mg) by mouth every 6 hours as needed for other (pain control or improvement in physical function. Hold dose for analgesic side effects.)   Quantity:  30 tablet   Refills:  0       potassium chloride 10 MEQ CR capsule   Commonly known as:  MICRO-K        TAKE 1 CAPSULE BY MOUTH TWICE DAILY WITH MEALS. SWALLOW WHOLE, DO NOT CHEW. CAPSULES MAY BE OPENED AND SPRINKLED ON FOOD.   Refills:  0       senna-docusate  8.6-50 MG per tablet   Commonly known as:  SENOKOT-S;PERICOLACE   Used for:  Essential tremor        Dose:  2 tablet   Take 2 tablets by mouth 2 times daily   Quantity:  100 tablet   Refills:  0       simvastatin 40 MG tablet   Commonly known as:  ZOCOR        Dose:  40 mg   Take 40 mg by mouth At Bedtime   Refills:  0       topiramate 100 MG tablet   Commonly known as:  TOPAMAX        Take 1 tablet by mouth three times a day. Do not crush   Refills:  0       VICTOZA PEN 18 MG/3ML soln   Generic drug:  liraglutide        INJECT 1.8 MG UNDER THE SKIN ONE TIME A DAY   Refills:  0       * Notice:  This list has 2 medication(s) that are the same as other medications prescribed for you. Read the directions carefully, and ask your doctor or other care provider to review them with you.         Where to get your medicines      These medications were sent to [facility] [place], [place] - [address] [address]     Phone:  [phone]     cephALEXin 500 MG capsule                Protect others around you: Learn how to safely use, store and throw away your medicines at www.disposemymeds.org.        ANTIBIOTIC INSTRUCTION     You've Been Prescribed an Antibiotic - Now What?  Your healthcare team thinks that you or your loved one might have an infection. Some infections can be treated with antibiotics, which are powerful, life-saving drugs. Like all medications, antibiotics have side effects and should only be used when necessary. There are some important things you should know about your antibiotic treatment.      Your healthcare team may run tests before you start taking an antibiotic.    Your team may take samples (e.g., from your blood, urine or other areas) to run tests to look for bacteria. These test can be important to determine if you need an antibiotic at all and, if you do, which antibiotic will work best.      Within a few days, your healthcare team might  change or even stop your antibiotic.    Your team may start you on an antibiotic while they are working to find out what is making you sick.    Your team might change your antibiotic because test results show that a different antibiotic would be better to treat your infection.    In some cases, once your team has more information, they learn that you do not need an antibiotic at all. They may find out that you don't have an infection, or that the antibiotic you're taking won't work against your infection. For example, an infection caused by a virus can't be treated with antibiotics. Staying on an antibiotic when you don't need it is more likely to be harmful than helpful.      You may experience side effects from your antibiotic.    Like all medications, antibiotics have side effects. Some of these can be serious.    Let you healthcare team know if you have any known allergies when you are admitted to the hospital.    One significant side effect of nearly all antibiotics is the risk of severe and sometimes deadly diarrhea caused by Clostridium difficile (C. Difficile). This occurs when a person takes antibiotics because some good germs are destroyed. Antibiotic use allows C. diificile to take over, putting patients at high risk for this serious infection.    As a patient or caregiver, it is important to understand your or your loved one's antibiotic treatment. It is especially important for caregivers to speak up when patients can't speak for themselves. Here are some important questions to ask your healthcare team.    What infection is this antibiotic treating and how do you know I have that infection?    What side effects might occur from this antibiotic?    How long will I need to take this antibiotic?    Is it safe to take this antibiotic with other medications or supplements (e.g., vitamins) that I am taking?     Are there any special directions I need to know about taking this antibiotic? For example, should I  take it with food?    How will I be monitored to know whether my infection is responding to the antibiotic?    What tests may help to make sure the right antibiotic is prescribed for me?      Information provided by:  www.cdc.gov/getsmart  U.S. Department of Health and Human Services  Centers for disease Control and Prevention  National Center for Emerging and Zoonotic Infectious Diseases  Division of Healthcare Quality Promotion             Medication List: This is a list of all your medications and when to take them. Check marks below indicate your daily home schedule. Keep this list as a reference.      Medications           Morning Afternoon Evening Bedtime As Needed    allopurinol 300 MG tablet   Commonly known as:  ZYLOPRIM   TAKE 1 TABLET BY MOUTH DAILY IN THE EVENING                                amLODIPine 5 MG tablet   Commonly known as:  NORVASC   Take 5 mg by mouth every morning                                VITA CONTOUR NEXT test strip   USE TO TEST BLOOD SUGARS 3 TIMES DAILY   Generic drug:  blood glucose monitoring                                blood glucose monitoring lancets   Test Blood Sugar 3 Times Daily.  Dx-E11.8                                blood glucose monitoring meter device kit                                carbidopa-levodopa  MG per tablet   Commonly known as:  SINEMET   Take 2 tablets by mouth 3 times daily Takes at 4 AM, 11 AM, 5 PM                                cephALEXin 500 MG capsule   Commonly known as:  KEFLEX   Take 1 capsule (500 mg) by mouth 2 times daily for 7 days                                clotrimazole-betamethasone cream   Commonly known as:  LOTRISONE   Apply topically as needed for rash on back of leg                                diphenoxylate-atropine 2.5-0.025 MG per tablet   Commonly known as:  LOMOTIL   Take 2 tablets by mouth 4 times daily as needed                                doxepin 10 MG capsule   Commonly known as:  SINEquan   Take 10 mg by  mouth At Bedtime                                * insulin pen needle 32G X 4 MM   1 each                                * EASY TOUCH PEN NEEDLES 32G X 5 MM   USE WITH INSULIN THREE TIMES DAILY   Generic drug:  insulin pen needle                                finasteride 5 MG tablet   Commonly known as:  PROSCAR   TAKE 1 TABLET BY MOUTH ONCE DAILY AT BEDTIME. DO NOT CRUSH.                                glipiZIDE 10 MG tablet   Commonly known as:  GLUCOTROL   TAKE 5mg (1/2 tablet) BY MOUTH TWICE DAILY BEFORE MEALS.                                insulin glargine 100 UNIT/ML injection   Commonly known as:  LANTUS   Inject 27 units in morning and 24 units at night.                                oxyCODONE IR 5 MG tablet   Commonly known as:  ROXICODONE   Take 1-2 tablets (5-10 mg) by mouth every 6 hours as needed for other (pain control or improvement in physical function. Hold dose for analgesic side effects.)                                potassium chloride 10 MEQ CR capsule   Commonly known as:  MICRO-K   TAKE 1 CAPSULE BY MOUTH TWICE DAILY WITH MEALS. SWALLOW WHOLE, DO NOT CHEW. CAPSULES MAY BE OPENED AND SPRINKLED ON FOOD.                                senna-docusate 8.6-50 MG per tablet   Commonly known as:  SENOKOT-S;PERICOLACE   Take 2 tablets by mouth 2 times daily                                simvastatin 40 MG tablet   Commonly known as:  ZOCOR   Take 40 mg by mouth At Bedtime                                topiramate 100 MG tablet   Commonly known as:  TOPAMAX   Take 1 tablet by mouth three times a day. Do not crush                                VICTOZA PEN 18 MG/3ML soln   INJECT 1.8 MG UNDER THE SKIN ONE TIME A DAY   Generic drug:  liraglutide                                * Notice:  This list has 2 medication(s) that are the same as other medications prescribed for you. Read the directions carefully, and ask your doctor or other care provider to review them with you.

## 2018-06-08 NOTE — IP AVS SNAPSHOT
Same Day Surgery [address] [address]    Phone:  [phone]                                       After Visit Summary   6/8/2018    Stefan Odonnell    MRN: [id number]           After Visit Summary Signature Page     I have received my discharge instructions, and my questions have been answered. I have discussed any challenges I see with this plan with the nurse or doctor.    ..........................................................................................................................................  Patient/Patient Representative Signature      ..........................................................................................................................................  Patient Representative Print Name and Relationship to Patient    ..................................................               ................................................  Date                                            Time    ..........................................................................................................................................  Reviewed by Signature/Title    ...................................................              ..............................................  Date                                                            Time

## 2018-06-08 NOTE — OP NOTE
PATIENT NAME: STEFAN CABRERA  YOB: 1950  MRN:   [id number]  ACCOUNT:  [id number]      DATE OF SURGERY:  06/08/2018    PREOPERATIVE DIAGNOSIS:   1.  Right hand essential tremor with Parkinsonian features.  2.  Essential tremor.  3.  Parkinsonism.  4.  S/p left side deep brain stimulator placement, phase I, placement of left side deep brain stimulator electrode, target left subthalamic nucleus, with microelectrode recording, on 5/29/2018.    POSTOPERATIVE DIAGNOSIS:  1.  Right hand essential tremor with Parkinsonian features.  2.  Essential tremor.  3.  Parkinsonism.  4.  S/p left side deep brain stimulator placement, phase I, placement of left side deep brain stimulator electrode, target left subthalamic nucleus, with microelectrode recording, on 5/29/2018.    PROCEDURES PERFORMED:  1.  Deep brain stimulator placement, phase II, placement of new deep brain stimulator generator/battery, model Infinity 5, over the left chest wall.  2.  Placement of one extension wire and connection of the left side deep brain stimulator electrode, one electrode array, to the new generator/battery.  3.  Electrical interrogation and analysis of the deep brain stimulator system.    ATTENDING SURGEON:  Wayne Marshall MD, PhD     RESIDENT SURGEON:  Camilo Blue MD     ANESTHESIA:  General endotracheal.     ESTIMATED BLOOD LOSS:  1 mL.    COMPLICATIONS:  No complications were encountered.     IMPLANTS:  Abbott Infinity 5 DBS generator/battery, Ref# [id number], S/N [id number].  Abbott DBS extension lead, Ref# [id number], S/N [id number].     FINDINGS:  Normal impedance testing of the new device.  No problems were found.    INDICATIONS FOR PROCEDURE:  Mr. Stefan Cabrera returns for his ongoing deep brain stimulator surgery.  He is s/p left side deep brain stimulator placement, phase I, placement of left side deep brain stimulator electrode, target left subthalamic nucleus on 5/29/2018.  He tolerated the procedure well and there were no  complications.  His postoperative CT head was without any complicating features and he was discharged to home on POD#1.  He does complain of being tired and he does have some tenderness in his head from time to time but he denies any fevers, chills, nausea, vomiting, dizziness, weakness, numbness or seizure like activity.  He is now here for his phase II surgery.  Briefly, he is a right-handed 68 year old man who began having tremors in his right hand 8-9 years ago.  His left hand tremor in minimal and does not impact his daily living.  The patient does not appreciate any other symptoms, though he was previously noted to have parkinsonian features including hypomimia, reduced eye blinking, bradykinesia, increased tone in upper extremities, slow gait, short steps and reduced arm swing on Dr. Horn's exam.  His tremors have not responded well to medications including propranolol, primidone, gabapentin and he is currently on topiramate.  He underwent a trial of Sinemet that did not improve his tremor.  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  His reports his diabetes is well-controlled.  His A1C from 09/2017 was 6.8.  His case was discussed at the Movement Disorder [employer] meeting and he was considered to be a DBS candidate, left side STN, and wait and see strategy.  We discussed phase II of DBS surgery, placement of the DBS generator/battery over the left chest wall under general anesthesia.  Risks, benefits, alternative therapies were discussed with the patient, including but not limited to infection and bleeding (intracranial), injury to the brain, stroke, death, hardware failure and possible need for more surgeries.  All questions were answered, and he expressed understanding.      DESCRIPTION OF PROCEDURE:  The patient was taken to the operating theater and positioned supine on the operating room table.  All pressure points were appropriately padded.  With  placement of the endotracheal tube, general endotracheal anesthesia was induced.  His head was turned to the right side, thus exposing the left parietal area of the head.  The previously implanted connector portion of the stimulating electrode from the left side could be palpated on the left side of the head.  A small area of hair was removed over this palpable connector using a surgical hair clipper.  Then the left side of the head, neck, and chest area was prepped and draped in normal sterile fashion.  Local anesthetic was injected in the areas of intended incision at the left scalp and left chest wall as well as along the path of the intended tunneling.  A total of 18 mL of 1% lidocaine with epinephrine mixed with 0.25% Marcaine plain in a 50:50 combination was used.  A timeout was performed confirming the patient's identity, procedure to be performed, site and side of surgery and administration of preoperative prophylactic antibiotics.      Attention was turned to the head.  A #10 blade scalpel was used to make a 2 cm skin incision at the distal end of the connector that was palpated in his scalp.  Hemostasis was achieved with bipolar cautery.  The incision was carried down to the pericranium.  The mosquito was used to hold the protective cover, and we gently pulled out the connector.  This was found to be fully intact.  At that point, a pocket was made using a Gela clamp down toward behind the ear into the nuchal line.  This was in preparation for tunneling of the extension wire.      At that point, attention was turned to the left chest wall area.  A #10 blade scalpel was used to make a 6 cm incision on the left chest wall.  A #10 blade scalpel was used to finish dissection down to the proper plane less than 1 cm below the skin.  We found a nice dissecting plane superficial to the pectoralis muscle.  A combination of blunt dissection as well as the monopolar cautery with a mosquito was used to establish a  subcutaneous pocket about 3 fingerbreadths wide and deep enough to encompass the full length of the fingers.  Hemostasis was achieved with monopolar cautery.  The pocket was copiously irrigated with antibiotic-impregnated saline and sponge was placed in the pocket.      At this point, a tunneler was brought into the field.  We tunnel a passage from the head incision to the chest wall incision.  Care was taken to stay superficial and the path of the tunneler was confirmed to be over the clavicle.  The tunneler was removed, leaving behind a plastic sheath.  One extension wire was passed from the head incision to the chest incision.  Subsequently, the plastic sheath was pulled out from the chest incision, leaving behind the tunneled extension wire.     We then proceeded to make the connection between the left intracranial electrode and the extension wire.  The protective covering was removed from the connector portion of the stimulating electrode.  The contacts were inspected to be clean and without damage.  Then, the stimulating electrode was inserted into the extension wire connection.  The connection was secured with screwdriver.  Then, the extension wire was gently pulled from the chest incision and the excess wire length towards the head was also buried superiorly until the connector was within the incision.  Then, the connector was buried slightly superiorly in the incision so it was not directly under the incision.    At this time, a new Infinity 5 generator/battery was brought onto the field.  The extension wire was then cleaned at its contacts and inserted into the generator/battery portal.  The extension wire for the left sided implant was placed into the front connector portal.  A plug was placed in the back connector portal.  These were secured with a screwdriver.  The previously placed sponge within the pocket was removed and hemostasis was confirmed.  Then, the new generator/battery with the excess wire  being placed posterior to the generator/battery was placed within the left chest wall pocket that was created previously.  The entire apparatus fit into the pocket comfortably.  The generator/battery was anchored to the pocket using two 2-0 Ethibond sutures.    The system was tested electrically.  All the impedances of the left stimulating electrode was within normal.  No problems were found with the system.    With the satisfactory placement of the new system, we began closing the wound.  The left chest incision was closed in the following manner.  The deep pocket was reapproximated using 3-0 Vicryl sutures in an inverted interrupted fashion.  The subcutaneous fat layer was reapproximated using 3-0 Vicryl sutures in an inverted interrupted fashion.  The dermal layer was reapproximated using 3-0 Vicryl sutures in an inverted interrupted fashion.  The skin was reapproximated using 4-0 Monocryl suture in a subcuticular fashion.  The left parietal head incision was irrigated with antibiotic solution and dried.  Meticulous hemostasis was obtained.  It was then closed in the following manner.  The galea was reapproximated over the implanted hardware using 3-0 Vicryl sutures in an inverted interrupted fashion.  This provided a protective layer.  The skin was then reapproximated using 4-0 Monocryl suture in a running fashion.  Both wounds were cleaned and dried.  ChoraPrep was used to clean the incisions again.  Then, Dermabond was applied.    At the end of the case, all counts including needle, sponge and instrument counts were correct x 2.  There were no complications.  Patient was extubated and taken to the recovery room in a stable condition.    Dr. Marshall was present and scrubbed throughout the entirety of the case.       WAYNE MARSHALL MD     As dictated by MORGAN HOFF MD, MS       NEUROSURGERY ATTENDING ATTESTATION: IWayne M.D., Ph.D., Neurosurgery Attending, was present and scrubbed for the entire  case and performed the key and critical portions of the case.

## 2018-06-08 NOTE — BRIEF OP NOTE
[facility], [place]    Brief Operative Note    Pre-operative diagnosis: Parkinson's Disease   Post-operative diagnosis Parkinson's Disease  Procedure: Procedure(s):  Deep Brain Stimulator Placement, Phase II, Placement Of Deep Brain Stimulator Generator/Battery Over The Left Chest Wall - Wound Class: I-Clean  Surgeon: Surgeon(s) and Role:     * Wayne Marshall MD - Primary     * Camilo Blue MD - Resident - Assisting  Anesthesia: General   Estimated blood loss: 1 cc  Drains: None  Specimens: * No specimens in log *  Findings:   DBS battery placed, good impedence.  Complications: None.  Implants: Abbott DBS generator/battery, Infinity 5, Ref# [id number], S/N [id number].  Abbott extension wire, Ref# [id number], S/N [id number].

## 2018-06-13 ENCOUNTER — CARE COORDINATION (OUTPATIENT)
Dept: NEUROSURGERY | Facility: CLINIC | Age: 68
End: 2018-06-13

## 2018-06-13 NOTE — PROGRESS NOTES
"Neurosurgery Discharge Coordination Note     Attending physician: Dr. Marshall  Surgery performed: DBS battery placement  Date of Discharge: 6/8/2018  Discharge to: Home     Current status: Patient states he  feels \"great.\" he denies pain and endorses mild tenderness at the chest incision site and the small site behind the ear, but is tolerating it well without pain medication. Denies redness, swelling, increased tenderness, drainage, incision opening or elevated temp. Reports Incision CDI without signs of infection.  Denies current bowel or bladder issues.    Discharge instructions and medications reviewed with patient.  Follow up appointments/imaging/tests needed: 2 week post op with PA-C or NP. Messaged  to f/u with pt about scheduling an appointment.     RN triage/on call number given: [phone]/ [phone]      "

## 2018-06-25 ENCOUNTER — OFFICE VISIT (OUTPATIENT)
Dept: NEUROSURGERY | Facility: CLINIC | Age: 68
End: 2018-06-25
Payer: COMMERCIAL

## 2018-06-25 VITALS
DIASTOLIC BLOOD PRESSURE: 79 MMHG | OXYGEN SATURATION: 96 % | SYSTOLIC BLOOD PRESSURE: 124 MMHG | HEIGHT: 74 IN | TEMPERATURE: 98.2 F | RESPIRATION RATE: 28 BRPM | WEIGHT: 294.2 LBS | BODY MASS INDEX: 37.76 KG/M2 | HEART RATE: 79 BPM

## 2018-06-25 DIAGNOSIS — G25.0 ESSENTIAL TREMOR: ICD-10-CM

## 2018-06-25 DIAGNOSIS — G20.A1 PARKINSON'S DISEASE (H): Primary | ICD-10-CM

## 2018-06-25 DIAGNOSIS — R25.1 TREMOR: ICD-10-CM

## 2018-06-25 DIAGNOSIS — Z96.89 STATUS POST DEEP BRAIN STIMULATOR PLACEMENT: ICD-10-CM

## 2018-06-25 RX ORDER — ASPIRIN 81 MG/1
81 TABLET, CHEWABLE ORAL DAILY
Status: ON HOLD | COMMUNITY
Start: 2011-10-19 | End: 2021-11-24

## 2018-06-25 ASSESSMENT — PAIN SCALES - GENERAL: PAINLEVEL: NO PAIN (0)

## 2018-06-25 NOTE — MR AVS SNAPSHOT
After Visit Summary   6/25/2018    Stefan Odonnell    MRN: [id number]           Patient Information     Date Of Birth          1950        Visit Information        Provider Department      6/25/2018 2:30 PM Wayne Marshall MD [facility] Neurosurgery        Today's Diagnoses     Parkinson's disease (H)    -  1    Tremor        Essential tremor        Status post deep brain stimulator placement           Follow-ups after your visit        Your next 10 appointments already scheduled     Jul 20, 2018 10:20 AM CDT   (Arrive by 10:05 AM)   Return Movement Disorder with Stanford Sloan MD   [facility] Neurology ([facility] [place])    [address] [place] [address] [id number] [phone]            Jul 20, 2018  1:40 PM CDT   CT HEAD W/O CONTRAST with UCCT2   [facility] CT ([facility])    [address] [place] [address] [id number] [phone]           Please bring any scans or X-rays taken at other hospitals, if similar tests were done. Also bring a list of your medicines, including vitamins, minerals and over-the-counter drugs. It is safest to leave personal items at home.  Be sure to tell your doctor:   If you have any allergies.   If there s any chance you are pregnant.   If you are breastfeeding.  You do not need to do anything special to prepare for this exam.  Please wear loose clothing, such as a sweat suit or jogging clothes. Avoid snaps, zippers and other metal. We may ask you to undress and put on a hospital gown.              Who to contact     Please call your clinic at [phone] to:    Ask questions about your health    Make or cancel appointments    Discuss your medicines    Learn about your test results    Speak to your doctor            Additional Information About Your Visit        MyChart Information     [employer] gives you secure access to your electronic health record. If you see a  "primary care provider, you can also send messages to your care team and make appointments. If you have questions, please call your primary care clinic.  If you do not have a primary care provider, please call [phone] and they will assist you.      [employer] is an electronic gateway that provides easy, online access to your medical records. With [employer], you can request a clinic appointment, read your test results, renew a prescription or communicate with your care team.     To access your existing account, please contact your [facility] or call [phone] for assistance.        Care EveryWhere ID     This is your Care EveryWhere ID. This could be used by other organizations to access your [facility] medical records  [id number]        Your Vitals Were     Pulse Temperature Respirations Height Pulse Oximetry BMI (Body Mass Index)    79 98.2  F (36.8  C) (Oral) 28 1.88 m (6' 2\") 96% 37.77 kg/m2       Blood Pressure from Last 3 Encounters:   06/25/18 124/79   06/08/18 134/89   05/30/18 127/73    Weight from Last 3 Encounters:   06/25/18 133.4 kg (294 lb 3.2 oz)   06/08/18 130.5 kg (287 lb 11.2 oz)   05/30/18 135.2 kg (298 lb 1 oz)              Today, you had the following     No orders found for display       Primary Care Provider Office Phone # Fax #    Dorian Last [phone]       [facility] [address]  [facility] [address]        Equal Access to Services     DAWSON KHAN : Hadii aad ku hadasho Soomaali, waaxda luqadaha, qaybta kaalmada adeegyada, robert dumont . So [facility] [phone].    ATENCIÓN: Si habla español, tiene a newman disposición servicios gratuitos de asistencia lingüística. Llame al [phone].    We comply with applicable federal civil rights laws and [place] laws. We do not discriminate on the basis of race, color, national origin, age, disability, sex, sexual orientation, or gender identity.          "   Thank you!     Thank you for choosing [facility] NEUROSURGERY  for your care. Our goal is always to provide you with excellent care. Hearing back from our patients is one way we can continue to improve our services. Please take a few minutes to complete the written survey that you may receive in the mail after your visit with us. Thank you!             Your Updated Medication List - Protect others around you: Learn how to safely use, store and throw away your medicines at www.disposemymeds.org.          This list is accurate as of 6/25/18 11:59 PM.  Always use your most recent med list.                   Brand Name Dispense Instructions for use Diagnosis    allopurinol 300 MG tablet    ZYLOPRIM     TAKE 1 TABLET BY MOUTH DAILY IN THE EVENING        amLODIPine 5 MG tablet    NORVASC     Take 5 mg by mouth every morning        aspirin 81 MG chewable tablet      Take 81 mg by mouth daily        VITA CONTOUR NEXT test strip   Generic drug:  blood glucose monitoring      USE TO TEST BLOOD SUGARS 3 TIMES DAILY        blood glucose monitoring lancets      Test Blood Sugar 3 Times Daily.  Dx-E11.8        blood glucose monitoring meter device kit           carbidopa-levodopa  MG per tablet    SINEMET     Take 2 tablets by mouth 3 times daily Takes at 4 AM, 11 AM, 5 PM    Parkinsonian features       clotrimazole-betamethasone cream    LOTRISONE     Apply topically as needed for rash on back of leg        diphenoxylate-atropine 2.5-0.025 MG per tablet    LOMOTIL     Take 2 tablets by mouth 4 times daily as needed        doxepin 10 MG capsule    SINEquan     Take 10 mg by mouth At Bedtime        * insulin pen needle 32G X 4 MM      1 each        * EASY TOUCH PEN NEEDLES 32G X 5 MM   Generic drug:  insulin pen needle      USE WITH INSULIN THREE TIMES DAILY        finasteride 5 MG tablet    PROSCAR     TAKE 1 TABLET BY MOUTH ONCE DAILY AT BEDTIME. DO NOT CRUSH.        glipiZIDE 10 MG tablet    GLUCOTROL     TAKE 5mg (1/2  tablet) BY MOUTH TWICE DAILY BEFORE MEALS.        insulin glargine 100 UNIT/ML injection    LANTUS     Inject 27 units in morning and 24 units at night.        oxyCODONE IR 5 MG tablet    ROXICODONE    30 tablet    Take 1-2 tablets (5-10 mg) by mouth every 6 hours as needed for other (pain control or improvement in physical function. Hold dose for analgesic side effects.)    Essential tremor       potassium chloride 10 MEQ CR capsule    MICRO-K     TAKE 1 CAPSULE BY MOUTH TWICE DAILY WITH MEALS. SWALLOW WHOLE, DO NOT CHEW. CAPSULES MAY BE OPENED AND SPRINKLED ON FOOD.        senna-docusate 8.6-50 MG per tablet    SENOKOT-S;PERICOLACE    100 tablet    Take 2 tablets by mouth 2 times daily    Essential tremor       simvastatin 40 MG tablet    ZOCOR     Take 40 mg by mouth At Bedtime        topiramate 100 MG tablet    TOPAMAX     Take 1 tablet by mouth three times a day. Do not crush        VICTOZA PEN 18 MG/3ML soln   Generic drug:  liraglutide      INJECT 1.8 MG UNDER THE SKIN ONE TIME A DAY        * Notice:  This list has 2 medication(s) that are the same as other medications prescribed for you. Read the directions carefully, and ask your doctor or other care provider to review them with you.

## 2018-06-25 NOTE — PROGRESS NOTES
HISTORY AND PHYSICAL EXAM    Chief Complaint   Patient presents with     RECHECK     S/p left STN DBS 05/2018       HISTORY OF PRESENT ILLNESS  We saw Mr. Stefan Odonnell today in [facility]surgery [facility] for his first post-operative visit.  He is a 68 year old man with a right hand tremor that was treated with left STN DBS lead implantation on 05/29/2018 followed by implantation and connection to an Infinity 5 generator/battery on 06/08/2018. He tolerated both operations and he is recovering well.  His initial programming is scheduled for July 20th.  He reports no issues with the incisions and he denies any fevers, chills, nausea, vomiting, dizziness, weakness, numbness or seizure like activity.      Past Medical History:   Diagnosis Date     Anosmia      Bleeding ulcer 1983     Carpal tunnel syndrome, bilateral      CKD (chronic kidney disease) stage 3, GFR 30-59 ml/min      Diabetes mellitus (H)      Gout      Hx of skin cancer, basal cell 2013    removed from face     Hyperlipidemia      Hypertension      Insomnia      Neuropathy      Obese      RENÉE (obstructive sleep apnea)      Osteoarthritis of knees, bilateral      Periodic limb movements of sleep      Restless legs syndrome (RLS)        Past Surgical History:   Procedure Laterality Date     ABDOMEN SURGERY  6/1983    ulcers     COLONOSCOPY       HEMORRHOID SURGERY       IMPLANT DEEP BRAIN STIMULATION GENERATOR / BATTERY Left 6/8/2018    Procedure: IMPLANT DEEP BRAIN STIMULATION GENERATOR / BATTERY;  Deep Brain Stimulator Placement, Phase II, Placement Of Deep Brain Stimulator Generator/Battery Over The Left Chest Wall;  Surgeon: Wayne Marshall MD;  Location: [facility] OR     Open Stomach Ulcer Repair       OPTICAL TRACKING SYSTEM INSERTION DEEP BRAIN STIMULATION Left 5/29/2018    Procedure: OPTICAL TRACKING SYSTEM INSERTION DEEP BRAIN STIMULATION;  Stealth Assisted Left Deep Brain Stimulator Placement, Phase I, Placement Of Left Deep Brain Stimulator Electrode,  Target Left Subthalamic Nucleus With Microelectrode Recording;  Surgeon: Wayne Marshall MD;  Location: [facility] OR     TONSILLECTOMY         Family History   Problem Relation Age of Onset     HEART DISEASE Mother      Diabetes Mother      Arthritis Mother      Depression Mother      Anxiety Disorder Mother      Dementia Mother      Hypertension Mother      LUNG DISEASE Mother      Prostate Cancer Father      Tremor Father      Cancer Father      Prostate     Obesity Sister      HEART DISEASE Brother      Bone Cancer Brother      HEART DISEASE Brother      Tremor Son      HEART DISEASE Maternal Grandmother      HEART DISEASE Maternal Grandfather      HEART DISEASE Paternal Grandmother      HEART DISEASE Paternal Grandfather      Unknown/Adopted Sister        Social History     Social History     Marital status:      Spouse name: Blanca Odonnell     Number of children: 1     Years of education: N/A     Occupational History     [employer]     Social History Main Topics     Smoking status: Never Smoker     Smokeless tobacco: Never Used     Alcohol use No      Comment: 2 - 3 x a year     Drug use: No     Sexual activity: Yes     Partners: Female     Other Topics Concern     Parent/Sibling W/ Cabg, Mi Or Angioplasty Before 65f 55m? No     Social History Narrative    On second marriage.  Has a son from first marriage.   for 27 years.  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  Never smoked.  He rarely drinks alcohol about 2 - 3 x per year.  Doesn't use illicit drugs.           Allergies   Allergen Reactions     Atorvastatin Muscle Pain (Myalgia)     Clindamycin GI Disturbance     Gabapentin Swelling       Current Outpatient Prescriptions   Medication     allopurinol (ZYLOPRIM) 300 MG tablet     amLODIPine (NORVASC) 5 MG tablet     aspirin 81 MG chewable tablet     blood glucose monitoring (VITA CONTOUR NEXT MONITOR) meter device kit     blood glucose monitoring (VITA  "CONTOUR NEXT) test strip     blood glucose monitoring (VITA MICROLET) lancets     carbidopa-levodopa (SINEMET)  MG per tablet     clotrimazole-betamethasone (LOTRISONE) cream     diphenoxylate-atropine (LOMOTIL) 2.5-0.025 MG per tablet     doxepin (SINEQUAN) 10 MG capsule     finasteride (PROSCAR) 5 MG tablet     glipiZIDE (GLUCOTROL) 10 MG tablet     insulin glargine (LANTUS) 100 UNIT/ML injection     insulin pen needle (EASY TOUCH PEN NEEDLES) 32G X 5 MM     insulin pen needle 32G X 4 MM     liraglutide (VICTOZA PEN) 18 MG/3ML soln     oxyCODONE IR (ROXICODONE) 5 MG tablet     potassium chloride (MICRO-K) 10 MEQ CR capsule     senna-docusate (SENOKOT-S;PERICOLACE) 8.6-50 MG per tablet     simvastatin (ZOCOR) 40 MG tablet     topiramate (TOPAMAX) 100 MG tablet     No current facility-administered medications for this visit.        REVIEW OF SYSTEMS:  Answers for HPI/ROS submitted by the patient on 1/17/2018 - updated 6/25/2018.  Also per HPI.  General Symptoms: No  Skin Symptoms: No  HENT Symptoms: No  EYE SYMPTOMS: No  HEART SYMPTOMS: No  LUNG SYMPTOMS: No  INTESTINAL SYMPTOMS: No  URINARY SYMPTOMS: No  REPRODUCTIVE SYMPTOMS: No  SKELETAL SYMPTOMS: Yes  BLOOD SYMPTOMS: No  NERVOUS SYSTEM SYMPTOMS: No  MENTAL HEALTH SYMPTOMS: No  Back pain: No  Muscle aches: Yes  Neck pain: No  Swollen joints: Yes  Joint pain: Yes  Muscle cramps: No  Muscle weakness: No  Joint stiffness: Yes  Bone fracture: No      PHYSICAL EXAM  /79  Pulse 79  Temp 98.2  F (36.8  C) (Oral)  Resp 28  Ht 1.88 m (6' 2\")  Wt 133.4 kg (294 lb 3.2 oz)  SpO2 96%  BMI 37.77 kg/m2    General: Awake, alert, oriented. Well nourished, well developed, he is not in any acute distress.  Incisions: Left frontal incision, left parietal incision and left chest wall incision is well-healed. No exposed hardware. Dermabond and residual sutures were removed without difficulty.     Neurological:  Awake, alert and oriented to date, time, place and " person. Speech fluent.   Pupils equal, round, reactive to light.  Extraocular movement intact.  Facial sensation intact.  Face symmetric.    Motor: moves all extremities well.  Sensation: grossly intact.      ASSESSMENT  68 year old right handed male with a history of postural/action tremor of his bilateral hand, right worse than left.  Essential Tremor and Parkinsonian Features.  S/p left STN DBS with connection to Infinity 5 generator/battery.    Patient has done well with phase I and II surgeries.  He has recovered well and he has no complaints.  At this point, we have no activity or lifting restrictions.  However, his device will not be turned on until 07/20.  Thus, it will be best that he does not return to work until he obtains good tremor control from his system.  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      PLAN  1.  No future appointments needed at this time - we will see him back as needed.     I, Lincoln David, am serving as a scribe to document services personally performed by Wayne Marshall MD, PhD, based upon my observations and the provider's statements to me. All documentation has been reviewed and edited by the aforementioned doctor prior to being entered into the official medical record.    I, Wayne Marshall, attest that above named individual is acting in scribe capacity, has observed my performance of the services and has documented them in accordance with my direction. The documentation recorded by the scribe accurately reflects the service I personally performed and the decisions made by me. The document was also partially recorded by me and the entire document was edited by me as well.

## 2018-06-25 NOTE — NURSING NOTE
Chief Complaint   Patient presents with     RECHECK     [facility]- 2 WEEKS POST-OP F/U     Yoshi Beckford, CMA

## 2018-06-25 NOTE — LETTER
6/25/2018       RE: Stefan Odonnell  [address] [place] [id number]     Dear Colleague,    Thank you for referring your patient, Stefan Odonnell, to the [facility] NEUROSURGERY at [facility]. Please see a copy of my visit note below.    HISTORY AND PHYSICAL EXAM    Chief Complaint   Patient presents with     RECHECK     S/p left STN DBS 05/2018       HISTORY OF PRESENT ILLNESS  We saw Mr. Stefan Odonnell today in Neurosurgery Clinic for his first post-operative visit.  He is a 68 year old man with a right hand tremor that was treated with left STN DBS lead implantation on 05/29/2018 followed by implantation and connection to an Infinity 5 generator/battery on 06/08/2018. He tolerated both operations and he is recovering well.  His initial programming is scheduled for July 20th.  He reports no issues with the incisions and he denies any fevers, chills, nausea, vomiting, dizziness, weakness, numbness or seizure like activity.      Past Medical History:   Diagnosis Date     Anosmia      Bleeding ulcer 1983     Carpal tunnel syndrome, bilateral      CKD (chronic kidney disease) stage 3, GFR 30-59 ml/min      Diabetes mellitus (H)      Gout      Hx of skin cancer, basal cell 2013    removed from face     Hyperlipidemia      Hypertension      Insomnia      Neuropathy      Obese      RENÉE (obstructive sleep apnea)      Osteoarthritis of knees, bilateral      Periodic limb movements of sleep      Restless legs syndrome (RLS)        Past Surgical History:   Procedure Laterality Date     ABDOMEN SURGERY  6/1983    ulcers     COLONOSCOPY       HEMORRHOID SURGERY       IMPLANT DEEP BRAIN STIMULATION GENERATOR / BATTERY Left 6/8/2018    Procedure: IMPLANT DEEP BRAIN STIMULATION GENERATOR / BATTERY;  Deep Brain Stimulator Placement, Phase II, Placement Of Deep Brain Stimulator Generator/Battery Over The Left Chest Wall;  Surgeon: Wayne Marsahll MD;  Location: [facility] OR     Open Stomach Ulcer  Repair       OPTICAL TRACKING SYSTEM INSERTION DEEP BRAIN STIMULATION Left 5/29/2018    Procedure: OPTICAL TRACKING SYSTEM INSERTION DEEP BRAIN STIMULATION;  Stealth Assisted Left Deep Brain Stimulator Placement, Phase I, Placement Of Left Deep Brain Stimulator Electrode, Target Left Subthalamic Nucleus With Microelectrode Recording;  Surgeon: Wayne Marshall MD;  Location: [facility] OR     TONSILLECTOMY         Family History   Problem Relation Age of Onset     HEART DISEASE Mother      Diabetes Mother      Arthritis Mother      Depression Mother      Anxiety Disorder Mother      Dementia Mother      Hypertension Mother      LUNG DISEASE Mother      Prostate Cancer Father      Tremor Father      Cancer Father      Prostate     Obesity Sister      HEART DISEASE Brother      Bone Cancer Brother      HEART DISEASE Brother      Tremor Son      HEART DISEASE Maternal Grandmother      HEART DISEASE Maternal Grandfather      HEART DISEASE Paternal Grandmother      HEART DISEASE Paternal Grandfather      Unknown/Adopted Sister        Social History     Social History     Marital status:      Spouse name: Blanca Odonnell     Number of children: 1     Years of education: N/A     Occupational History     [employer]     Social History Main Topics     Smoking status: Never Smoker     Smokeless tobacco: Never Used     Alcohol use No      Comment: 2 - 3 x a year     Drug use: No     Sexual activity: Yes     Partners: Female     Other Topics Concern     Parent/Sibling W/ Cabg, Mi Or Angioplasty Before 65f 55m? No     Social History Narrative    On second marriage.  Has a son from first marriage.   for 27 years.  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  Never smoked.  He rarely drinks alcohol about 2 - 3 x per year.  Doesn't use illicit drugs.           Allergies   Allergen Reactions     Atorvastatin Muscle Pain (Myalgia)     Clindamycin GI Disturbance     Gabapentin Swelling  "      Current Outpatient Prescriptions   Medication     allopurinol (ZYLOPRIM) 300 MG tablet     amLODIPine (NORVASC) 5 MG tablet     aspirin 81 MG chewable tablet     blood glucose monitoring (VITA CONTOUR NEXT MONITOR) meter device kit     blood glucose monitoring (VITA CONTOUR NEXT) test strip     blood glucose monitoring (VITA MICROLET) lancets     carbidopa-levodopa (SINEMET)  MG per tablet     clotrimazole-betamethasone (LOTRISONE) cream     diphenoxylate-atropine (LOMOTIL) 2.5-0.025 MG per tablet     doxepin (SINEQUAN) 10 MG capsule     finasteride (PROSCAR) 5 MG tablet     glipiZIDE (GLUCOTROL) 10 MG tablet     insulin glargine (LANTUS) 100 UNIT/ML injection     insulin pen needle (EASY TOUCH PEN NEEDLES) 32G X 5 MM     insulin pen needle 32G X 4 MM     liraglutide (VICTOZA PEN) 18 MG/3ML soln     oxyCODONE IR (ROXICODONE) 5 MG tablet     potassium chloride (MICRO-K) 10 MEQ CR capsule     senna-docusate (SENOKOT-S;PERICOLACE) 8.6-50 MG per tablet     simvastatin (ZOCOR) 40 MG tablet     topiramate (TOPAMAX) 100 MG tablet     No current facility-administered medications for this visit.      REVIEW OF SYSTEMS:  Answers for HPI/ROS submitted by the patient on 1/17/2018 - updated 6/25/2018.  Also per HPI.    PHYSICAL EXAM  /79  Pulse 79  Temp 98.2  F (36.8  C) (Oral)  Resp 28  Ht 1.88 m (6' 2\")  Wt 133.4 kg (294 lb 3.2 oz)  SpO2 96%  BMI 37.77 kg/m2    General: Awake, alert, oriented. Well nourished, well developed, he is not in any acute distress.  Incisions: Left frontal incision, left parietal incision and left chest wall incision is well-healed. No exposed hardware. Dermabond and residual sutures were removed without difficulty.     Neurological:  Awake, alert and oriented to date, time, place and person. Speech fluent.   Pupils equal, round, reactive to light.  Extraocular movement intact.  Facial sensation intact.  Face symmetric.    Motor: moves all extremities well.  Sensation: " grossly intact.      ASSESSMENT  68 year old right handed male with a history of postural/action tremor of his bilateral hand, right worse than left.  Essential Tremor and Parkinsonian Features.  S/p left STN DBS with connection to Infinity 5 generator/battery.    Patient has done well with phase I and II surgeries.  He has recovered well and he has no complaints.  At this point, we have no activity or lifting restrictions.  However, his device will not be turned on until 07/20.  Thus, it will be best that he does not return to work until he obtains good tremor control from his system.  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      PLAN  1.  No future appointments needed at this time - we will see him back as needed.     I, Lincoln David, am serving as a scribe to document services personally performed by Wayne Marshall MD, PhD, based upon my observations and the provider's statements to me. All documentation has been reviewed and edited by the aforementioned doctor prior to being entered into the official medical record.    I, Wayne Marshall, attest that above named individual is acting in scribe capacity, has observed my performance of the services and has documented them in accordance with my direction. The documentation recorded by the scribe accurately reflects the service I personally performed and the decisions made by me. The document was also partially recorded by me and the entire document was edited by me as well.     Again, thank you for allowing me to participate in the care of your patient.      Sincerely,    Wayne Marshall MD

## 2018-07-06 ENCOUNTER — MYC MEDICAL ADVICE (OUTPATIENT)
Dept: NEUROLOGY | Facility: CLINIC | Age: 68
End: 2018-07-06

## 2018-07-06 ASSESSMENT — MOVEMENT DISORDERS SOCIETY - UNIFIED PARKINSONS DISEASE RATING SCALE (MDS-UPDRS)
SALIVA_AND_DROOLING: NORMAL: NOT AT ALL (NO PROBLEMS).
WALKING_AND_BALANCE: NORMAL: NOT AT ALL (NO PROBLEMS).
EATING_TASKS: NORMAL: NOT AT ALL (NO PROBLEMS).
HYGIENE: NORMAL: NOT AT ALL (NO PROBLEMS).
TREMOR: MILD: SHAKING OR TREMOR CAUSES PROBLEMS WITH ONLY A FEW ACTIVITES.
HANDWRITING: MODERATE: MANY WORDS ARE UNCLEAR AND DIFFICULT TO READ.
GETTING_OUT_OF_BED_CAR_DEEP_CHAIR: NORMAL: NOT AT ALL (NO PROBLEMS).
FREEZING: NORMAL: NOT AT ALL (NO PROBLEMS).
HOBBIES_AND_OTHER_ACTIVITIES: NORMAL:  NOT AT ALL (NO PROBLEMS).
CHEWING_AND_SWALLOWING: NORMAL: NO PROBLEMS.
TOTAL_SCORE: 5
SPEECH: NORMAL: NOT AT ALL (NO PROBLEMS).
TURNING_IN_BED: NORMAL: NOT AT ALL (NO PROBLEMS).
DRESSING: NORMAL: NOT AT ALL (NO PROBLEMS).

## 2018-07-20 ENCOUNTER — OFFICE VISIT (OUTPATIENT)
Dept: NEUROLOGY | Facility: CLINIC | Age: 68
End: 2018-07-20
Payer: COMMERCIAL

## 2018-07-20 ENCOUNTER — RADIANT APPOINTMENT (OUTPATIENT)
Dept: CT IMAGING | Facility: CLINIC | Age: 68
End: 2018-07-20
Payer: COMMERCIAL

## 2018-07-20 VITALS
RESPIRATION RATE: 20 BRPM | SYSTOLIC BLOOD PRESSURE: 125 MMHG | BODY MASS INDEX: 37.82 KG/M2 | DIASTOLIC BLOOD PRESSURE: 81 MMHG | WEIGHT: 294.7 LBS | OXYGEN SATURATION: 95 % | HEIGHT: 74 IN | HEART RATE: 80 BPM

## 2018-07-20 DIAGNOSIS — G25.0 ESSENTIAL TREMOR: ICD-10-CM

## 2018-07-20 DIAGNOSIS — G20.C PARKINSONISM (H): ICD-10-CM

## 2018-07-20 DIAGNOSIS — G20.A1 PARKINSON DISEASE (H): Primary | ICD-10-CM

## 2018-07-20 ASSESSMENT — UNIFIED PARKINSONS DISEASE RATING SCALE (UPDRS)
TOETAPPING_LEFT: NORMAL
TOETAPPING_LEFT: SLIGHT: ANY OF THE FOLLOWING: A) THE REGULAR RHYTHM IS BROKEN WITH ONE WITH ONE OR TWO INTERRUPTIONS OR HESITATIONS OF THE MOVEMENT B) SLIGHT SLOWING C) THE AMPLITUDE DECREMENTS NEAR THE END OF THE 10 MOVEMENTS.
SPEECH: SLIGHT: LOSS OF MODULATION, DICTION OR VOLUME, BUT STILL ALL WORDS EASY TO UNDERSTAND.
POSTURAL_STABILITY: SLIGHT: 3-5 STEPS, BUT RECOVERS UNAIDED.
AXIAL_SCORE: 10
SPEECH: SLIGHT: LOSS OF MODULATION, DICTION OR VOLUME, BUT STILL ALL WORDS EASY TO UNDERSTAND.
RIGIDITY_LUE: SLIGHT: RIGIDITY ONLY DETECTED WITH ACTIVATION MANEUVER.
SPONTANEITY_OF_MOVEMENT: 1: SLIGHT: SLIGHT GLOBAL SLOWNESS AND POVERTY OF SPONTANEOUS MOVEMENTS.
FACIAL_EXPRESSION: MODERATE: MASKED FACIES WITH LIPS PARTED SOME OF THE TIME WHEN THE MOUTH IS AT REST.
RIGIDITY_LLE: NORMAL
TOTAL_SCORE: 11
FREEZING_GAIT: NORMAL
FACIAL_EXPRESSION: MILD: IN ADDITION TO DECREASED EYE-BLINK FREQUENCY, MASKED FACIES PRESENT IN THE LOWER FACE AS WELL, NAMELY FEWER MOVEMENTS AROUND THE MOUTH, SUCH AS LESS SPONTANEOUS SMILING, BUT LIPS NOT PARTED.
TOTAL_SCORE: 5
TOTAL_SCORE: 25
FINGER_TAPPING_LEFT: NORMAL
RIGIDITY_LLE: NORMAL
AMPLITUDE_LUE: NORMAL: NO TREMOR.
POSTURE: 1 SLIGHT.  NOT QUITE ERECT BUT COULD BE NORMAL FOR OLDER PERSONS.
PARKINSONS_MEDS: ON
RIGIDITY_LLE: NORMAL
POSTURE: 1 SLIGHT.  NOT QUITE ERECT BUT COULD BE NORMAL FOR OLDER PERSONS.
GAIT: NORMAL
RIGIDITY_NECK: SLIGHT: RIGIDITY ONLY DETECTED WITH ACTIVATION MANEUVER.
RIGIDITY_NECK: SLIGHT: RIGIDITY ONLY DETECTED WITH ACTIVATION MANEUVER.
AXIAL_SCORE: 8
CONSTANCY_TREMOR_ATREST: NORMAL: NO TREMOR.
AMPLITUDE_LIP_JAW: NORMAL: NO TREMOR.
AMPLITUDE_RLE: NORMAL: NO TREMOR.
TOTAL_SCORE: 17
AMPLITUDE_LUE: NORMAL: NO TREMOR.
POSTURAL_STABILITY: SLIGHT: 3-5 STEPS, BUT RECOVERS UNAIDED.
HANDMOVEMENTS_LEFT: NORMAL
LEG_AGILITY_LEFT: NORMAL
HANDMOVEMENTS_RIGHT: SLIGHT: ANY OF THE FOLLOWING: A) THE REGULAR RHYTHM IS BROKEN WITH ONE WITH ONE OR TWO INTERRUPTIONS OR HESITATIONS OF THE MOVEMENT B) SLIGHT SLOWING C) THE AMPLITUDE DECREMENTS NEAR THE END OF THE 10 MOVEMENTS.
RIGIDITY_RLE: SLIGHT: RIGIDITY ONLY DETECTED WITH ACTIVATION MANEUVER.
PRONATION_SUPINATION_LEFT: NORMAL
AMPLITUDE_LIP_JAW: NORMAL: NO TREMOR.
LEG_AGILITY_RIGHT: NORMAL
CONSTANCY_TREMOR_ATREST: NORMAL: NO TREMOR.
AMPLITUDE_RUE: NORMAL: NO TREMOR.
FREEZING_GAIT: NORMAL
FINGER_TAPPING_RIGHT: SLIGHT: ANY OF THE FOLLOWING: A) THE REGULAR RHYTHM IS BROKEN WITH ONE WITH ONE OR TWO INTERRUPTIONS OR HESITATIONS OF THE MOVEMENT B) SLIGHT SLOWING C) THE AMPLITUDE DECREMENTS NEAR THE END OF THE 10 MOVEMENTS.
RIGIDITY_RLE: SLIGHT: RIGIDITY ONLY DETECTED WITH ACTIVATION MANEUVER.
AMPLITUDE_RLE: NORMAL: NO TREMOR.
RIGIDITY_RUE: SLIGHT: RIGIDITY ONLY DETECTED WITH ACTIVATION MANEUVER.
PRONATION_SUPINATION_LEFT: NORMAL
TOTAL_SCORE: 5
TOTAL_SCORE_LEFT: 4
LEG_AGILITY_LEFT: NORMAL
RIGIDITY_LUE: SLIGHT: RIGIDITY ONLY DETECTED WITH ACTIVATION MANEUVER.
RIGIDITY_RUE: SLIGHT: RIGIDITY ONLY DETECTED WITH ACTIVATION MANEUVER.
RIGIDITY_RUE: NORMAL
AMPLITUDE_RLE: NORMAL: NO TREMOR.
PARKINSONS_MEDS: OFF
TOTAL_SCORE: 17
RIGIDITY_LUE: SLIGHT: RIGIDITY ONLY DETECTED WITH ACTIVATION MANEUVER.
AMPLITUDE_LLE: NORMAL: NO TREMOR.
RIGIDITY_NECK: SLIGHT: RIGIDITY ONLY DETECTED WITH ACTIVATION MANEUVER.
TOTAL_SCORE_LEFT: 4
FINGER_TAPPING_RIGHT: SLIGHT: ANY OF THE FOLLOWING: A) THE REGULAR RHYTHM IS BROKEN WITH ONE WITH ONE OR TWO INTERRUPTIONS OR HESITATIONS OF THE MOVEMENT B) SLIGHT SLOWING C) THE AMPLITUDE DECREMENTS NEAR THE END OF THE 10 MOVEMENTS.
FINGER_TAPPING_LEFT: NORMAL
AMPLITUDE_LUE: NORMAL: NO TREMOR.
AXIAL_SCORE: 8
AMPLITUDE_LLE: NORMAL: NO TREMOR.
PRONATION_SUPINATION_RIGHT: NORMAL
AMPLITUDE_LLE: NORMAL: NO TREMOR.
TOTAL_SCORE_LEFT: 4
TOETAPPING_LEFT: NORMAL
LEG_AGILITY_LEFT: NORMAL
FACIAL_EXPRESSION: MILD: IN ADDITION TO DECREASED EYE-BLINK FREQUENCY, MASKED FACIES PRESENT IN THE LOWER FACE AS WELL, NAMELY FEWER MOVEMENTS AROUND THE MOUTH, SUCH AS LESS SPONTANEOUS SMILING, BUT LIPS NOT PARTED.
AMPLITUDE_RUE: NORMAL: NO TREMOR.
FINGER_TAPPING_LEFT: NORMAL
TOETAPPING_RIGHT: SLIGHT: ANY OF THE FOLLOWING: A) THE REGULAR RHYTHM IS BROKEN WITH ONE WITH ONE OR TWO INTERRUPTIONS OR HESITATIONS OF THE MOVEMENT B) SLIGHT SLOWING C) THE AMPLITUDE DECREMENTS NEAR THE END OF THE 10 MOVEMENTS.
HANDMOVEMENTS_LEFT: SLIGHT: ANY OF THE FOLLOWING: A) THE REGULAR RHYTHM IS BROKEN WITH ONE WITH ONE OR TWO INTERRUPTIONS OR HESITATIONS OF THE MOVEMENT B) SLIGHT SLOWING C) THE AMPLITUDE DECREMENTS NEAR THE END OF THE 10 MOVEMENTS.
AMPLITUDE_RUE: NORMAL: NO TREMOR.
PRONATION_SUPINATION_LEFT: NORMAL
LEG_AGILITY_RIGHT: SLIGHT: ANY OF THE FOLLOWING: A) THE REGULAR RHYTHM IS BROKEN WITH ONE WITH ONE OR TWO INTERRUPTIONS OR HESITATIONS OF THE MOVEMENT B) SLIGHT SLOWING C) THE AMPLITUDE DECREMENTS NEAR THE END OF THE 10 MOVEMENTS.
TOETAPPING_RIGHT: NORMAL
PARKINSONS_MEDS: OFF
HANDMOVEMENTS_RIGHT: SLIGHT: ANY OF THE FOLLOWING: A) THE REGULAR RHYTHM IS BROKEN WITH ONE WITH ONE OR TWO INTERRUPTIONS OR HESITATIONS OF THE MOVEMENT B) SLIGHT SLOWING C) THE AMPLITUDE DECREMENTS NEAR THE END OF THE 10 MOVEMENTS.
ARISING_CHAIR: SLIGHT: ARISING IS SLOWER THAN NORMAL, OR MAY NEED MORE THAN ONE ATTEMPT, OR MAY NEED TO MOVE FORWARD IN THE CHAIR TO ARISE.  NO NEED TO USE THE ARMS OF THE CHAIR.
POSTURAL_STABILITY: SLIGHT: 3-5 STEPS, BUT RECOVERS UNAIDED.
RIGIDITY_RLE: NORMAL
HANDMOVEMENTS_RIGHT: MILD: ANY OF THE FOLLOWING: A) 3 TO 5 INTERRUPTIONS DURING TAPPING B) MILD SLOWING C) THE AMPLITUDE DECREMENTS MIDWAY IN THE 10-MOVEMENT SEQUENCE
LEG_AGILITY_RIGHT: NORMAL
FINGER_TAPPING_RIGHT: SLIGHT: ANY OF THE FOLLOWING: A) THE REGULAR RHYTHM IS BROKEN WITH ONE WITH ONE OR TWO INTERRUPTIONS OR HESITATIONS OF THE MOVEMENT B) SLIGHT SLOWING C) THE AMPLITUDE DECREMENTS NEAR THE END OF THE 10 MOVEMENTS.
CONSTANCY_TREMOR_ATREST: NORMAL: NO TREMOR.
SPEECH: SLIGHT: LOSS OF MODULATION, DICTION OR VOLUME, BUT STILL ALL WORDS EASY TO UNDERSTAND.
SPONTANEITY_OF_MOVEMENT: 1: SLIGHT: SLIGHT GLOBAL SLOWNESS AND POVERTY OF SPONTANEOUS MOVEMENTS.
ARISING_CHAIR: SLIGHT: ARISING IS SLOWER THAN NORMAL, OR MAY NEED MORE THAN ONE ATTEMPT, OR MAY NEED TO MOVE FORWARD IN THE CHAIR TO ARISE.  NO NEED TO USE THE ARMS OF THE CHAIR.
GAIT: SLIGHT: INDEPENDENT WALKING WITH MINOR GAIT IMPAIRMENT.
POSTURE: 1 SLIGHT.  NOT QUITE ERECT BUT COULD BE NORMAL FOR OLDER PERSONS.
PRONATION_SUPINATION_RIGHT: NORMAL
GAIT: NORMAL
AMPLITUDE_LIP_JAW: NORMAL: NO TREMOR.
SPONTANEITY_OF_MOVEMENT: 1: SLIGHT: SLIGHT GLOBAL SLOWNESS AND POVERTY OF SPONTANEOUS MOVEMENTS.
PRONATION_SUPINATION_RIGHT: NORMAL
HANDMOVEMENTS_LEFT: SLIGHT: ANY OF THE FOLLOWING: A) THE REGULAR RHYTHM IS BROKEN WITH ONE WITH ONE OR TWO INTERRUPTIONS OR HESITATIONS OF THE MOVEMENT B) SLIGHT SLOWING C) THE AMPLITUDE DECREMENTS NEAR THE END OF THE 10 MOVEMENTS.
FREEZING_GAIT: NORMAL
TOETAPPING_RIGHT: NORMAL
ARISING_CHAIR: SLIGHT: ARISING IS SLOWER THAN NORMAL, OR MAY NEED MORE THAN ONE ATTEMPT, OR MAY NEED TO MOVE FORWARD IN THE CHAIR TO ARISE.  NO NEED TO USE THE ARMS OF THE CHAIR.

## 2018-07-20 ASSESSMENT — PAIN SCALES - GENERAL: PAINLEVEL: NO PAIN (0)

## 2018-07-20 NOTE — PATIENT INSTRUCTIONS
Today you saw Dr. Sloan and Dr. Mcneill for DBS programming.    We completed the initial programming for your Abbott Infinity device.     We found good tremor control at the current settings.   Currently you are set at 2.0 milliamps. You have a range 0.0 to 3.5 milliamps. Please let us know how you are doing at home. If your tremor is still not controlled, we could discuss increasing the stimulation as long as you are not having side effects.     Continue your medications at current doses.

## 2018-07-20 NOTE — PROGRESS NOTES
"[facility] Neurology  Movement Disorders Clinic    Stefan Odonnell  YOB: 1950  MRN: [id number]    Reason for visit: Initial DBS programming     History of Present Illness:    Stefan Odonnell is a 68 year old man with essential tremor and Parkinsonism s/p left STN DBS (5/29/2018). After surgery he noted improvement in tremor and rigidity for about 3 days. He would like to start back at work as a [employer]en. He is feeling well. No pain at surgical site. Incisions have healed well.     No medication changes. He is taking Sinemet for Parkinsonism. He does not notice a clear \"on\" effect. Tremor does not respond to levodopa. Topiramate is modestly effective. He denied side effects from topiramate.      MEDICATIONS:  All medications reviewed in Epic. Has stopped oxycodone and senna.     MDS Medications  4am  11am  5pm    Carbidopa/levodopa 25/100  2  2  2    Topiramate 100 mg  1  1  1        Review of Systems:  12 point review of systems completed. Pertinent positives and negatives above and in HPI    Physical Exam:    Vitals: /81 (BP Location: Left arm, Patient Position: Sitting, Cuff Size: Adult Large)  Pulse 80  Resp 20  Ht 1.88 m (6' 2\")  Wt 133.7 kg (294 lb 11.2 oz)  SpO2 95%  BMI 37.84 kg/m2  General: Cooperative, no acute distress  HEENT: Normocephalic and nontraumatic. Head incisions well healed. Sclera white, moist mucous membranes.  Cardiac: Regular rate and rhythm.  Respiratory: Nonlabored breathing  Chest: Left chest incision well healed. No erythema or drainage.   Extremities: Distal pulses intact. No UE or LE edema      NEUROLOGIC:  MENTAL STATUS: Fully alert, attentive and oriented.  SPEECH: Slight hypophonia  FACIAL EXPRESSION: Mild hypomimia    CRANIAL NERVES: EOM full. Face symmetric    MOTOR:   INVOLUNTARY MOVEMENTS - No resting tremor. Reemergent postural tremor about 2cm with outstretched hands, 3-5 cm in batwing position on the right, 1 on the left. Kinetic tremor about 1cm " bilaterally with intention component.   AGILITY - Mild bradykinesia on the right and slight on the left hemibody. Improved with stimulation.  TONE - Slight axial and appendicular rigidity - see UPDRS    COORDINATION: No dysmetria with FNF bilaterally  GAIT: Able to rise with hands crossed over chest, slight hesitation. Posture slightly stooped. Stride shortened on the right off stimulation, but normal on stimulation. Arm swing also improved on stimulation. Pull test slightly abnormal (3 steps) - unchanged with stimulation.         UDPRS Part I and II    Part I    -- Over the last week -- 0: Normal -- 1: Slight -- 2: Mild -- 3: Moderate -- 4: Severe  1.1 Cognitive Impairment: 0   1.2 Hallucinations and psychosis: 0   1.3 Depressed mood: 0   1.4 Anxious mood: 0   1.5 Apathy:  0   1.6 Features of DDS:  0   1.7 Sleep problems:  0   1.8 Daytime sleepiness:  0   1.9 Pain and other sensations:  0   1.10 Urinary problems:  0   1.11 Constipation problems:   0   1.12 Lightheadedness on standin   1.13 Fatigue:  1     Total:    1       Part II    -- Over the last week -- 0: Normal -- 1: Slight -- 2: Mild -- 3: Moderate -- 4: Severe   2.1 Speech: 1   2.2 Saliva and droolin   2.3 Chewing and swallowin   2.4 Eating tasks: 2   2.5 Dressin   2.6 Hygiene:  1   2.7 Handwritin   2.8 Doing hobbies and other activities:  2   2.9 Turning in bed:  0   2.10 Tremor:       2.11 Getting out of bed/car/deep chair:   2   2.12 Walking and balance: 0   2.13 Freezin     Total:    14     Total Part I and II: 15    MDS-UPDRS Part III   0: Normal -- 1: Slight -- 2: Mild -- 3: Moderate -- 4: Severe      UPDRS Values 2018   Time: 11:13 AM 12:53 PM 1:52 PM   Medication Off Off On   R Brain DBS: None None None   L Brain DBS: Off On On   Speech 1 1 1   Facial Expression 3 2 2   Rigidity Neck 1 1 1   Rigidity RUE 1 1 0   Rigidity LUE 1 1 1   Rigidity RLE 1 0 1   Rigidity LLE 0 0 0   Finger Taps R 1 1 1    Finger Taps L 0 0 0   Hand Mvt R 2 1 1   Hand Mvt L 0 1 1   Pron-/Supinate R 0 0 0   Pron-/Supinate L 0 0 0   Toe Tap R 1 0 0   Toe Tap L 1 0 0   Leg Agility R 1 0 0   Leg Agility L 0 0 0   Arise From Chair 1 1 1   Gait 1 0 0   Gait Freezing 0 0 0   Postural Stability 1 1 1   Posture 1 1 1   Global Spont Mvt 1 1 1   Postural Tremor RUE 3 1 1   Postural Tremor LUE 1 1 1   Kinetic Tremor RUE 1 1 1   Kinetic Tremor LUE 1 1 1   Rest Tremor RUE 0 0 0   Rest Tremor LUE 0 0 0   Rest Tremor RLE 0 0 0   Rest Tremor LLE 0 0 0   Rest Tremor Lip/Jaw 0 0 0   Rest Tremor Constancy 0 0 0   Total Right 11 5 5   Total Left 4 4 4   Axial Total 10 8 8   Total 25 17 17       PROCEDURE - DBS PROGRAMMING RECORD    Battery status: >3.0v  ON/OFF:OFF  Implanted generator: Abbott Infinity, implanted 6/8/2018  Lead site(s): Left STN, implanted 5/29/2018  Impedance Check: No problems found     Impedances: (ohms)  1 875  2A 1975  3A 1862  2B 1562  4 800  3B 1650  2C 450  3C 437       Program 1 Left Brain             Initial Final   Amplitude (mA)  OFF  2.0 [range 0-3.5]   Pulse width (usec)    60   Freq (Hz)    130   Contacts: C    +   Contacts: 4       Contacts: 3    abc-   Contacts: 2       Contacts: 1              Contacts Amplitude PW Freq Ramp? Effect Side effect    Cpos, 1neg  0.5  60  130  y  Wingbeating tremor 3, reemergent  NN    Cpos, 1neg  1.0  60  130  y  Wingbeating tremor 2, highest amplitude at 15 seconds  NN    Cpos, 1neg  1.5  60  130  y  Wingbeating tremor 1, highest amplitude at 1 minute  NN    Cpos, 1neg  2.0  60  130  y  Wingbeating tremor 1; no reemergent component after >1.5 minute observation. Intention tremor 1.    FT 1   Feels looser.  Tingling left hand, mild, transient.     Cpos, 1neg   2.5  60  130  y  FT 1  NN    Cpos, 1neg   3.0  60  130  y  FT 1. Trace wingbeating tremor.   NN    Cpos, 1neg   3.5  60  130  y  Intention tremor 1. FT 1  NN    Cpos, 1neg   4.0  60  130  y  FT 1, Rigidity 0  NN    Cpos, 1neg   4.5   "60  130  y  FT 1  Feels \"weird,\" foggy    Cpos, 2abcneg  0.5  60  130  y  Wingbeating tremor 1 immediately, reemergent tremor 3 at 25 seconds.  NN    Cpos, 2abcneg  1.0  60  130  y  Wingbeating immediate tremor 1, reemergent 2 at 40 seconds. FT 1 with decrement  NN    Cpos, 2abcneg  1.5  60  130   y  FT 1 with slowing, no decrement. Improved compared to previous. Wingbeating tremor trace immediately. Reemergent at 60 seconds, still 1. Intention tremor 1.   NN    Cpos, 2abcneg  2.0  60  130  y   Intention tremor slightly better. FT improved, slightly slow 1.   NN    Cpos, 2abcneg  2.5  60  130  y  Intention tremor improved, 1 but trace. FT 1.   NN    Cpos, 2abcneg  3.0  60  130  y  Intention tremor trace. FT 1.   NN    Cpos, 2abcneg  3.5  60  130  y  Trace intention tremor, FT slightly slow  NN    Cpos, 2abcneg  4.0  60  130  y  Very trace intention tremor, FT slightly slow  NN    Cpos, 2abcneg  4.5  60  130  y  Very trace intention tremor, FT slightly slower compared to previous current  Foggy feeling    Cpos, 3abcneg  0.5  60  130  y  Intention tremor 1.   NN    Cpos, 3abcneg  1.0  60  130  y  Intention tremor 1, improved. FT 1, improved.   NN    Cpos, 3abcneg  1.5  60  130  y  FT faster just trace slowness. Intention tremor 1.   NN    Cpos, 3abcneg  2.0  60  130  y  FT trace slowness, Intention tremor 1 but better than baseline.   NN    Cpos, 3abcneg  2.5  60  130  y  Intention tremor trace  NN    Cpos, 3abcneg  3.0  60  130  y  FT trace slowness, Intention tremor trace at the chin  NN    Cpos, 3abcneg  3.5  60  130  y  Very trace intention tremor only at the chin. FT trace slowness, improved compared to previous setting. Best tremor control.  NN    Cpos, 3abcneg  4.0  60  130  y  No change in tremor or finger tapping.   Feeling of tenseness on right hemibody. \"Everything wants to tense up the the right side.\"    Cpos, 4neg  0.5  60  130  y  NN  NN    Cpos, 4neg  1.0  60  130  y  Intention tremor 1, improved from " baseline. FT slow with decrement  NN    Cpos, 4neg  1.5  60   130  y  Intention tremor 1 NC  NN    Cpos, 4neg  2.0  60   130  y  FT 1 slow with decrement.    Intention tremor 1 but improved  NN    Cpos, 4neg  2.5  60  130  y  Intention tremor 1  NN    Cpos, 4neg  3.0  60  130  y  Intention tremor 1, but improved - remains at chin. FT 1.   NN    Cpos, 4neg  3.5  60  130  y  No change in intention tremor. FT slightly improved - no decrement but still slow.   NN    Cpos, 4neg  4.0  60  130  y  Slight slowness with FT. Intention tremor trace at chin  NN    Cpos, 4neg  4.5  60  130  y  Slight slowness with FT. Trace intention tremor.  Feels nauseated.            IMPRESSION:  1. Essential tremor - we did observe low frequency tremor immediately on posture, but larger amplitude tremor is reemergent and likely related to PD  2. Parkinson disease, tremor predominant    Stefan Odonnell is a 68 year old man with essential tremor and tremor predominant Parkinson disease s/p left STN DBS with the Abbott Infinity device who presented for initial programming. We established good thresholds and tremor suppression at all contacts - but best at contacts 2 and 3.     PLAN:  - He left today on a monopolar configuration C+3- at 2.0 mA, 60 microseconds, 130 Hz. His range is set 0 to 3.5 mA  - No medication changes today  - Follow up in 3 months    Filomena Mcneill MD  Movement Disorders Fellow    Patient seen and discussed with Dr. Sloan    Patient seen and examined with Dr. Mcneill and I agree with the assessment and plan as outlined.  CT may reflect developing susan-elctrode gliosis;  no evidence of acuity or clinical correllate.  The neurostimulator interrogation and programming was done under my close supervision and I was present and actively participated throughout.  Analysis of neurostimulator complex, DBS, with programming:   95978 x1, [id number] x2    Stanford Sloan PhD, MD

## 2018-07-20 NOTE — MR AVS SNAPSHOT
After Visit Summary   7/20/2018    Stefan Odonnell    MRN: [id number]           Patient Information     Date Of Birth          1950        Visit Information        Provider Department      7/20/2018 10:20 AM Stanford Sloan MD [facility] Neurology        Today's Diagnoses     Parkinson disease (H)    -  1      Care Instructions    Today you saw Dr. Sloan and Dr. Mcneill for DBS programming.    We completed the initial programming for your Abbott Infinity device.     We found good tremor control at the current settings.   Currently you are set at 2.0 milliamps. You have a range 0.0 to 3.5 milliamps. Please let us know how you are doing at home. If your tremor is still not controlled, we could discuss increasing the stimulation as long as you are not having side effects.     Continue your medications at current doses.           Follow-ups after your visit        Your next 10 appointments already scheduled     Sep 14, 2018 10:20 AM CDT   (Arrive by 10:05 AM)   Return Movement Disorder with Stanford Sloan MD   [facility] Neurology ([facility])    [address] [place] [id number] [phone]              Who to contact     Please call your clinic at [phone] to:    Ask questions about your health    Make or cancel appointments    Discuss your medicines    Learn about your test results    Speak to your doctor            Additional Information About Your Visit        [employer] Information     [employer] gives you secure access to your electronic health record. If you see a primary care provider, you can also send messages to your care team and make appointments. If you have questions, please call your primary care clinic.  If you do not have a primary care provider, please call [phone] and they will assist you.      [employer] is an electronic gateway that provides easy, online access to your medical records. With [employer], you can request a clinic  "appointment, read your test results, renew a prescription or communicate with your care team.     To access your existing account, please contact your [facility] or call [phone] for assistance.        Care EveryWhere ID     This is your Care EveryWhere ID. This could be used by other organizations to access your [place] medical records  [id number]        Your Vitals Were     Pulse Respirations Height Pulse Oximetry BMI (Body Mass Index)       80 20 1.88 m (6' 2\") 95% 37.84 kg/m2        Blood Pressure from Last 3 Encounters:   07/20/18 125/81   06/25/18 124/79   06/08/18 134/89    Weight from Last 3 Encounters:   07/20/18 133.7 kg (294 lb 11.2 oz)   06/25/18 133.4 kg (294 lb 3.2 oz)   06/08/18 130.5 kg (287 lb 11.2 oz)              We Performed the Following     ANALYSIS NEUROSTIM, COMPLEX DBS W PROGRAM, EA ADDTL 30 MIN     ANALYSIS NEUROSTIM, COMPLEX DBS W PROGRAM, EA ADDTL 30 MIN     Analysis of Neurostimulator, Complex DBS w/Programming, 1st Hour (09422)     EP REPORT - HIM SCAN        Primary Care Provider Office Phone # Fax #    Dorian Last [phone]        [facility] [address]  [facility] [address]        Equal Access to Services     DAWSON KHAN AH: Hadii aad ku hadasho Soomaali, waaxda luqadaha, qaybta kaalmada adeegyada, waxay elen haybryanna dumont . So [facility] [phone].    ATENCIÓN: Si habla español, tiene a newman disposición servicios gratuitos de asistencia lingüística. Llame al [phone].    We comply with applicable federal civil rights laws and [place] laws. We do not discriminate on the basis of race, color, national origin, age, disability, sex, sexual orientation, or gender identity.            Thank you!     Thank you for choosing [facility]  for your care. Our goal is always to provide you with excellent care. Hearing back from our patients is one way we can continue to improve our services. " Please take a few minutes to complete the written survey that you may receive in the mail after your visit with us. Thank you!             Your Updated Medication List - Protect others around you: Learn how to safely use, store and throw away your medicines at www.disposemymeds.org.          This list is accurate as of 7/20/18 11:59 PM.  Always use your most recent med list.                   Brand Name Dispense Instructions for use Diagnosis    allopurinol 300 MG tablet    ZYLOPRIM     TAKE 1 TABLET BY MOUTH DAILY IN THE EVENING        amLODIPine 5 MG tablet    NORVASC     Take 5 mg by mouth every morning        aspirin 81 MG chewable tablet      Take 81 mg by mouth daily        VITA CONTOUR NEXT test strip   Generic drug:  blood glucose monitoring      USE TO TEST BLOOD SUGARS 3 TIMES DAILY        blood glucose monitoring lancets      Test Blood Sugar 3 Times Daily.  Dx-E11.8        blood glucose monitoring meter device kit           carbidopa-levodopa  MG per tablet    SINEMET     Take 2 tablets by mouth 3 times daily Takes at 4 AM, 11 AM, 5 PM    Parkinsonian features       clotrimazole-betamethasone cream    LOTRISONE     Apply topically as needed for rash on back of leg        diphenoxylate-atropine 2.5-0.025 MG per tablet    LOMOTIL     Take 2 tablets by mouth 4 times daily as needed        doxepin 10 MG capsule    SINEquan     Take 10 mg by mouth At Bedtime        * insulin pen needle 32G X 4 MM      1 each        * EASY TOUCH PEN NEEDLES 32G X 5 MM   Generic drug:  insulin pen needle      USE WITH INSULIN THREE TIMES DAILY        finasteride 5 MG tablet    PROSCAR     TAKE 1 TABLET BY MOUTH ONCE DAILY AT BEDTIME. DO NOT CRUSH.        glipiZIDE 10 MG tablet    GLUCOTROL     TAKE 5mg (1/2 tablet) BY MOUTH TWICE DAILY BEFORE MEALS.        insulin glargine 100 UNIT/ML injection    LANTUS     Inject 27 units in morning and 24 units at night.        oxyCODONE IR 5 MG tablet    ROXICODONE    30 tablet     Take 1-2 tablets (5-10 mg) by mouth every 6 hours as needed for other (pain control or improvement in physical function. Hold dose for analgesic side effects.)    Essential tremor       potassium chloride 10 MEQ CR capsule    MICRO-K     TAKE 1 CAPSULE BY MOUTH TWICE DAILY WITH MEALS. SWALLOW WHOLE, DO NOT CHEW. CAPSULES MAY BE OPENED AND SPRINKLED ON FOOD.        senna-docusate 8.6-50 MG per tablet    SENOKOT-S;PERICOLACE    100 tablet    Take 2 tablets by mouth 2 times daily    Essential tremor       simvastatin 40 MG tablet    ZOCOR     Take 40 mg by mouth At Bedtime        topiramate 100 MG tablet    TOPAMAX     Take 1 tablet by mouth three times a day. Do not crush        VICTOZA PEN 18 MG/3ML soln   Generic drug:  liraglutide      INJECT 1.8 MG UNDER THE SKIN ONE TIME A DAY        * Notice:  This list has 2 medication(s) that are the same as other medications prescribed for you. Read the directions carefully, and ask your doctor or other care provider to review them with you.

## 2018-07-20 NOTE — NURSING NOTE
Chief Complaint   Patient presents with     RECHECK     P RETURN - MOVEMENT DISORDER     Awa Holden MA     S/P knee surgery  left 2017

## 2018-07-20 NOTE — LETTER
Re:  Stefan Odonnell  [address] [place] [id number]      July 20, 2018        To whom it may concern.    Mr. Stefan Odonnell was seen in the neurology clinic today for deep brain stimulation programming for tremor.  With respect to his tremor, he can return to work without activity restrictions.  With respect to his implanted neurostimulator, he should follow 's recommendations on avoiding powerful electromagnetic sources such as MRI scanners, arc welding, and diathermy.      Stanford Sloan PhD, MD

## 2018-07-20 NOTE — LETTER
Date: 7/20/2018      Name: Stefan Odonnell                       YOB: 1950    To whom it may concern,    Mr. Stefan Odonnell was seen in the neurology clinic for deep brain stimulation programming for tremor. From that perspective he may return to work without any activity restrictions.           _________________________  Stanford Sloan MD

## 2018-08-31 ASSESSMENT — MOVEMENT DISORDERS SOCIETY - UNIFIED PARKINSONS DISEASE RATING SCALE (MDS-UPDRS)
TREMOR: NORMAL: NOT AT ALL (NO PROBLEMS).
WALKING_AND_BALANCE: NORMAL: NOT AT ALL (NO PROBLEMS).
HOBBIES_AND_OTHER_ACTIVITIES: NORMAL:  NOT AT ALL (NO PROBLEMS).
HYGIENE: NORMAL: NOT AT ALL (NO PROBLEMS).
CHEWING_AND_SWALLOWING: NORMAL: NO PROBLEMS.
EATING_TASKS: NORMAL: NOT AT ALL (NO PROBLEMS).
SALIVA_AND_DROOLING: NORMAL: NOT AT ALL (NO PROBLEMS).
DRESSING: NORMAL: NOT AT ALL (NO PROBLEMS).
SPEECH: NORMAL: NOT AT ALL (NO PROBLEMS).
TURNING_IN_BED: NORMAL: NOT AT ALL (NO PROBLEMS).
GETTING_OUT_OF_BED_CAR_DEEP_CHAIR: NORMAL: NOT AT ALL (NO PROBLEMS).
TOTAL_SCORE: 0
HANDWRITING: NORMAL: NOT AT ALL (NO PROBLEMS).
FREEZING: NORMAL: NOT AT ALL (NO PROBLEMS).

## 2018-09-14 ENCOUNTER — OFFICE VISIT (OUTPATIENT)
Dept: NEUROLOGY | Facility: CLINIC | Age: 68
End: 2018-09-14
Payer: COMMERCIAL

## 2018-09-14 ENCOUNTER — RADIANT APPOINTMENT (OUTPATIENT)
Dept: CT IMAGING | Facility: CLINIC | Age: 68
End: 2018-09-14
Payer: COMMERCIAL

## 2018-09-14 VITALS
HEART RATE: 84 BPM | SYSTOLIC BLOOD PRESSURE: 142 MMHG | WEIGHT: 289.3 LBS | HEIGHT: 74 IN | BODY MASS INDEX: 37.13 KG/M2 | DIASTOLIC BLOOD PRESSURE: 84 MMHG

## 2018-09-14 DIAGNOSIS — G25.0 ESSENTIAL TREMOR: Primary | ICD-10-CM

## 2018-09-14 DIAGNOSIS — G25.0 ESSENTIAL TREMOR: ICD-10-CM

## 2018-09-14 ASSESSMENT — UNIFIED PARKINSONS DISEASE RATING SCALE (UPDRS)
PRONATION_SUPINATION_RIGHT: NORMAL
FINGER_TAPPING_LEFT: SLIGHT: ANY OF THE FOLLOWING: A) THE REGULAR RHYTHM IS BROKEN WITH ONE WITH ONE OR TWO INTERRUPTIONS OR HESITATIONS OF THE MOVEMENT B) SLIGHT SLOWING C) THE AMPLITUDE DECREMENTS NEAR THE END OF THE 10 MOVEMENTS.
AMPLITUDE_RLE: NORMAL: NO TREMOR.
GAIT: NORMAL
AMPLITUDE_LLE: NORMAL: NO TREMOR.
TOTAL_SCORE: 2
SPEECH: SLIGHT: LOSS OF MODULATION, DICTION OR VOLUME, BUT STILL ALL WORDS EASY TO UNDERSTAND.
AXIAL_SCORE: 8
LEG_AGILITY_RIGHT: NORMAL
SPEECH: SLIGHT: LOSS OF MODULATION, DICTION OR VOLUME, BUT STILL ALL WORDS EASY TO UNDERSTAND.
POSTURE: 1 SLIGHT.  NOT QUITE ERECT BUT COULD BE NORMAL FOR OLDER PERSONS.
CONSTANCY_TREMOR_ATREST: SLIGHT: TREMOR AT REST IS PRESENT  25% OF THE ENTIRE EXAMINATION PERIOD.
CONSTANCY_TREMOR_ATREST: SLIGHT: TREMOR AT REST IS PRESENT  25% OF THE ENTIRE EXAMINATION PERIOD.
FACIAL_EXPRESSION: MILD: IN ADDITION TO DECREASED EYE-BLINK FREQUENCY, MASKED FACIES PRESENT IN THE LOWER FACE AS WELL, NAMELY FEWER MOVEMENTS AROUND THE MOUTH, SUCH AS LESS SPONTANEOUS SMILING, BUT LIPS NOT PARTED.
FINGER_TAPPING_LEFT: SLIGHT: ANY OF THE FOLLOWING: A) THE REGULAR RHYTHM IS BROKEN WITH ONE WITH ONE OR TWO INTERRUPTIONS OR HESITATIONS OF THE MOVEMENT B) SLIGHT SLOWING C) THE AMPLITUDE DECREMENTS NEAR THE END OF THE 10 MOVEMENTS.
RIGIDITY_RLE: NORMAL
RIGIDITY_LLE: NORMAL
AMPLITUDE_LLE: NORMAL: NO TREMOR.
TOTAL_SCORE: 15
AXIAL_SCORE: 8
LEG_AGILITY_RIGHT: NORMAL
TOTAL_SCORE_LEFT: 6
FACIAL_EXPRESSION: MILD: IN ADDITION TO DECREASED EYE-BLINK FREQUENCY, MASKED FACIES PRESENT IN THE LOWER FACE AS WELL, NAMELY FEWER MOVEMENTS AROUND THE MOUTH, SUCH AS LESS SPONTANEOUS SMILING, BUT LIPS NOT PARTED.
PARKINSONS_MEDS: OFF
RIGIDITY_LUE: MILD: RIGIDITY DETECTED WITHOUT THE ACTIVATION MANEUVER.  FULL RANGE OF MOTION IS EASILY ACHIEVED.
TOETAPPING_RIGHT: NORMAL
FINGER_TAPPING_RIGHT: SLIGHT: ANY OF THE FOLLOWING: A) THE REGULAR RHYTHM IS BROKEN WITH ONE WITH ONE OR TWO INTERRUPTIONS OR HESITATIONS OF THE MOVEMENT B) SLIGHT SLOWING C) THE AMPLITUDE DECREMENTS NEAR THE END OF THE 10 MOVEMENTS.
HANDMOVEMENTS_LEFT: SLIGHT: ANY OF THE FOLLOWING: A) THE REGULAR RHYTHM IS BROKEN WITH ONE WITH ONE OR TWO INTERRUPTIONS OR HESITATIONS OF THE MOVEMENT B) SLIGHT SLOWING C) THE AMPLITUDE DECREMENTS NEAR THE END OF THE 10 MOVEMENTS.
FREEZING_GAIT: NORMAL
AMPLITUDE_RUE: NORMAL: NO TREMOR.
RIGIDITY_RLE: NORMAL
HANDMOVEMENTS_RIGHT: SLIGHT: ANY OF THE FOLLOWING: A) THE REGULAR RHYTHM IS BROKEN WITH ONE WITH ONE OR TWO INTERRUPTIONS OR HESITATIONS OF THE MOVEMENT B) SLIGHT SLOWING C) THE AMPLITUDE DECREMENTS NEAR THE END OF THE 10 MOVEMENTS.
TOTAL_SCORE_LEFT: 4
TOETAPPING_LEFT: NORMAL
RIGIDITY_RUE: NORMAL
PRONATION_SUPINATION_LEFT: NORMAL
SPONTANEITY_OF_MOVEMENT: 1: SLIGHT: SLIGHT GLOBAL SLOWNESS AND POVERTY OF SPONTANEOUS MOVEMENTS.
POSTURAL_STABILITY: NORMAL:  RECOVERS WITH ONE OR TWO STEPS.
TOTAL_SCORE_LEFT: 5
RIGIDITY_LLE: NORMAL
AXIAL_SCORE: 7
PRONATION_SUPINATION_RIGHT: NORMAL
RIGIDITY_LLE: NORMAL
TOTAL_SCORE: 20
PARKINSONS_MEDS: ON
ARISING_CHAIR: SLIGHT: ARISING IS SLOWER THAN NORMAL, OR MAY NEED MORE THAN ONE ATTEMPT, OR MAY NEED TO MOVE FORWARD IN THE CHAIR TO ARISE.  NO NEED TO USE THE ARMS OF THE CHAIR.
POSTURAL_STABILITY: NORMAL:  RECOVERS WITH ONE OR TWO STEPS.
RIGIDITY_NECK: MILD: RIGIDITY DETECTED WITHOUT THE ACTIVATION MANEUVER.  FULL RANGE OF MOTION IS EASILY ACHIEVED.
PRONATION_SUPINATION_LEFT: NORMAL
LEG_AGILITY_RIGHT: NORMAL
SPONTANEITY_OF_MOVEMENT: 1: SLIGHT: SLIGHT GLOBAL SLOWNESS AND POVERTY OF SPONTANEOUS MOVEMENTS.
RIGIDITY_LUE: SLIGHT: RIGIDITY ONLY DETECTED WITH ACTIVATION MANEUVER.
FINGER_TAPPING_RIGHT: MILD: ANY OF THE FOLLOWING: A) 3 TO 5 INTERRUPTIONS DURING TAPPING B) MILD SLOWING C) THE AMPLITUDE DECREMENTS MIDWAY IN THE 10-MOVEMENT SEQUENCE
RIGIDITY_NECK: MILD: RIGIDITY DETECTED WITHOUT THE ACTIVATION MANEUVER.  FULL RANGE OF MOTION IS EASILY ACHIEVED.
HANDMOVEMENTS_RIGHT: SLIGHT: ANY OF THE FOLLOWING: A) THE REGULAR RHYTHM IS BROKEN WITH ONE WITH ONE OR TWO INTERRUPTIONS OR HESITATIONS OF THE MOVEMENT B) SLIGHT SLOWING C) THE AMPLITUDE DECREMENTS NEAR THE END OF THE 10 MOVEMENTS.
GAIT: NORMAL
RIGIDITY_NECK: SLIGHT: RIGIDITY ONLY DETECTED WITH ACTIVATION MANEUVER.
AMPLITUDE_LIP_JAW: NORMAL: NO TREMOR.
TOETAPPING_LEFT: NORMAL
FACIAL_EXPRESSION: MILD: IN ADDITION TO DECREASED EYE-BLINK FREQUENCY, MASKED FACIES PRESENT IN THE LOWER FACE AS WELL, NAMELY FEWER MOVEMENTS AROUND THE MOUTH, SUCH AS LESS SPONTANEOUS SMILING, BUT LIPS NOT PARTED.
AMPLITUDE_LIP_JAW: NORMAL: NO TREMOR.
HANDMOVEMENTS_LEFT: SLIGHT: ANY OF THE FOLLOWING: A) THE REGULAR RHYTHM IS BROKEN WITH ONE WITH ONE OR TWO INTERRUPTIONS OR HESITATIONS OF THE MOVEMENT B) SLIGHT SLOWING C) THE AMPLITUDE DECREMENTS NEAR THE END OF THE 10 MOVEMENTS.
FREEZING_GAIT: NORMAL
SPEECH: SLIGHT: LOSS OF MODULATION, DICTION OR VOLUME, BUT STILL ALL WORDS EASY TO UNDERSTAND.
TOTAL_SCORE: 14
AMPLITUDE_RUE: NORMAL: NO TREMOR.
LEG_AGILITY_LEFT: NORMAL
RIGIDITY_LUE: SLIGHT: RIGIDITY ONLY DETECTED WITH ACTIVATION MANEUVER.
CONSTANCY_TREMOR_ATREST: NORMAL: NO TREMOR.
TOETAPPING_RIGHT: NORMAL
AMPLITUDE_RLE: NORMAL: NO TREMOR.
AMPLITUDE_LUE: SLIGHT: < 1 CM IN MAXIMAL AMPLITUDE.
POSTURAL_STABILITY: NORMAL:  RECOVERS WITH ONE OR TWO STEPS.
PARKINSONS_MEDS: OFF
SPONTANEITY_OF_MOVEMENT: 1: SLIGHT: SLIGHT GLOBAL SLOWNESS AND POVERTY OF SPONTANEOUS MOVEMENTS.
PRONATION_SUPINATION_RIGHT: NORMAL
RIGIDITY_RLE: SLIGHT: RIGIDITY ONLY DETECTED WITH ACTIVATION MANEUVER.
FINGER_TAPPING_RIGHT: SLIGHT: ANY OF THE FOLLOWING: A) THE REGULAR RHYTHM IS BROKEN WITH ONE WITH ONE OR TWO INTERRUPTIONS OR HESITATIONS OF THE MOVEMENT B) SLIGHT SLOWING C) THE AMPLITUDE DECREMENTS NEAR THE END OF THE 10 MOVEMENTS.
AMPLITUDE_LIP_JAW: NORMAL: NO TREMOR.
GAIT: NORMAL
RIGIDITY_RUE: NORMAL
LEG_AGILITY_LEFT: NORMAL
AMPLITUDE_LLE: NORMAL: NO TREMOR.
FINGER_TAPPING_LEFT: SLIGHT: ANY OF THE FOLLOWING: A) THE REGULAR RHYTHM IS BROKEN WITH ONE WITH ONE OR TWO INTERRUPTIONS OR HESITATIONS OF THE MOVEMENT B) SLIGHT SLOWING C) THE AMPLITUDE DECREMENTS NEAR THE END OF THE 10 MOVEMENTS.
AMPLITUDE_LUE: NORMAL: NO TREMOR.
TOTAL_SCORE: 2
POSTURE: 1 SLIGHT.  NOT QUITE ERECT BUT COULD BE NORMAL FOR OLDER PERSONS.
RIGIDITY_RUE: NORMAL
FREEZING_GAIT: NORMAL
TOETAPPING_RIGHT: NORMAL
AMPLITUDE_LUE: SLIGHT: < 1 CM IN MAXIMAL AMPLITUDE.
LEG_AGILITY_LEFT: NORMAL
HANDMOVEMENTS_RIGHT: SLIGHT: ANY OF THE FOLLOWING: A) THE REGULAR RHYTHM IS BROKEN WITH ONE WITH ONE OR TWO INTERRUPTIONS OR HESITATIONS OF THE MOVEMENT B) SLIGHT SLOWING C) THE AMPLITUDE DECREMENTS NEAR THE END OF THE 10 MOVEMENTS.
ARISING_CHAIR: SLIGHT: ARISING IS SLOWER THAN NORMAL, OR MAY NEED MORE THAN ONE ATTEMPT, OR MAY NEED TO MOVE FORWARD IN THE CHAIR TO ARISE.  NO NEED TO USE THE ARMS OF THE CHAIR.
AMPLITUDE_RUE: NORMAL: NO TREMOR.
PRONATION_SUPINATION_LEFT: NORMAL
TOETAPPING_LEFT: NORMAL
TOTAL_SCORE: 5
AMPLITUDE_RLE: NORMAL: NO TREMOR.
HANDMOVEMENTS_LEFT: SLIGHT: ANY OF THE FOLLOWING: A) THE REGULAR RHYTHM IS BROKEN WITH ONE WITH ONE OR TWO INTERRUPTIONS OR HESITATIONS OF THE MOVEMENT B) SLIGHT SLOWING C) THE AMPLITUDE DECREMENTS NEAR THE END OF THE 10 MOVEMENTS.
POSTURE: 1 SLIGHT.  NOT QUITE ERECT BUT COULD BE NORMAL FOR OLDER PERSONS.
ARISING_CHAIR: SLIGHT: ARISING IS SLOWER THAN NORMAL, OR MAY NEED MORE THAN ONE ATTEMPT, OR MAY NEED TO MOVE FORWARD IN THE CHAIR TO ARISE.  NO NEED TO USE THE ARMS OF THE CHAIR.

## 2018-09-14 ASSESSMENT — PAIN SCALES - GENERAL: PAINLEVEL: NO PAIN (0)

## 2018-09-14 NOTE — PATIENT INSTRUCTIONS
Your repeat head CT looks improved, which is good.    We did not change your DBS settings today. Let us know if you are having any side effects by MyChart or by a phone call.    We will send a message to Dr. Gandhi, we agree that we can try tapering off the topiramate.    We can also likely decrease the Sinemet in the future, but would wait to start that until you are seen again in follow-up

## 2018-09-14 NOTE — PROGRESS NOTES
[facility] Follow-up    CC: DBS follow-up    HPI: Stefan Odonnell is a 68 year-old male with PD (and possible concomitant ET) s/p left STN DBS who presents in follow-up. He wast last seen for monopolar review and initial programming on 7/20/18.    He reports good effect for his right hand tremor. He eventually turned up the current from 2 to 3.0mA. He reports he is completely satisfied with his tremor now.    He does report that he feels like he is going to fall over when he first stands up, but has not done so. No orthostasis. He did have one fall when a giant spool for wires fell out of the back of his truck and he was walking backward to get away from it.     He also reports some stomach upset since the DBS has been turned off. Decreased appetite. Was recommended to take omeprazole by PCP but he has not done it.    He has had diarrhea since his stomach surgery for hernia in past but it is somewhat worse now.    No dyskinesias. He does not feel like the topiramate or levodopa have any effect.      PD-related Medications                   4 11 5 7   Carbidopa/levodopa 25/100                                 2 2 2     topiramate 100mg                        1 1  1     Last dose of topiramate at 7pm yesterday  Last dose of carbidopa/levodopa at 5pm    Took 2 tablets of Sinemet at 11:06am    Medications:  Current Outpatient Prescriptions   Medication     allopurinol (ZYLOPRIM) 300 MG tablet     amLODIPine (NORVASC) 5 MG tablet     aspirin 81 MG chewable tablet     blood glucose monitoring (VITA CONTOUR NEXT MONITOR) meter device kit     blood glucose monitoring (VITA CONTOUR NEXT) test strip     blood glucose monitoring (VITA MICROLET) lancets     carbidopa-levodopa (SINEMET)  MG per tablet     clotrimazole-betamethasone (LOTRISONE) cream     diphenoxylate-atropine (LOMOTIL) 2.5-0.025 MG per tablet     doxepin (SINEQUAN) 10 MG capsule     finasteride (PROSCAR) 5 MG tablet  "    glipiZIDE (GLUCOTROL) 10 MG tablet     insulin glargine (LANTUS) 100 UNIT/ML injection     insulin pen needle (EASY TOUCH PEN NEEDLES) 32G X 5 MM     insulin pen needle 32G X 4 MM     liraglutide (VICTOZA PEN) 18 MG/3ML soln     oxyCODONE IR (ROXICODONE) 5 MG tablet     potassium chloride (MICRO-K) 10 MEQ CR capsule     senna-docusate (SENOKOT-S;PERICOLACE) 8.6-50 MG per tablet     simvastatin (ZOCOR) 40 MG tablet     topiramate (TOPAMAX) 100 MG tablet     No current facility-administered medications for this visit.      UDPRS Part II  Part II   2.1 Speech: 0: Normal: Not at all (no problems).   2.2 Saliva and droolin: Normal: Not at all (no problems).   2.3 Chewing and swallowin: Normal: No problems.   2.4 Eating tasks: 0: Normal: Not at all (No problems).   2.5 Dressin: Normal: Not at all (no problems).   2.6 Hygiene: 0: Normal: Not at all (no problems).   2.7 Handwritin: Normal: Not at all (no problems).   2.8 Doing hobbies and other activities: 0: Normal: Not at all (no problems).   2.9 Turning in bed:0: Normal: Not at all (no problems).   2.10 Tremor: 0: Normal: Not at all (no problems).   2.11 Getting out of bed: 0: Normal: Not at all (no problems).   2.12 Walking and balance: 1: Slight: I am slightly slow or may drag a leg. I never use a walking aid.   2.13 Freezin: Normal: Not at all (no problems).   Total:   Total Part I: 1    Part IV: 0    Exam:  /84 (BP Location: Right arm, Patient Position: Chair, Cuff Size: Adult Large)  Pulse 84  Ht 1.88 m (6' 2\")  Wt 131.2 kg (289 lb 4.8 oz)  BMI 37.14 kg/m2    Neurological Examination (R/L):  UPDRS Values 2018   Time: 10:30 AM 10:34 AM 11:46 AM   Medication Off Off On   R Brain DBS: None None None   L Brain DBS: On Off On   Speech 1 1 1   Facial Expression 2 2 2   Rigidity Neck 1 2 2   Rigidity RUE 0 0 0   Rigidity LUE 1 2 1   Rigidity RLE 0 1 0   Rigidity LLE 0 0 0   Finger Taps R 1 2 1   Finger Taps L 1 " 1 1   Hand Mvt R 1 1 1   Hand Mvt L 1 1 1   Pron-/Supinate R 0 0 0   Pron-/Supinate L 0 0 0   Toe Tap R 0 0 0   Toe Tap L 0 0 0   Leg Agility R 0 0 0   Leg Agility L 0 0 0   Arise From Chair 1 1 1   Gait 0 0 0   Gait Freezing 0 0 0   Postural Stability 0 0 0   Posture 1 1 1   Global Spont Mvt 1 1 1   Postural Tremor RUE 0 0 0   Postural Tremor LUE 0 0 0   Kinetic Tremor RUE 0 1 0   Kinetic Tremor LUE 1 1 1   Rest Tremor RUE 0 0 0   Rest Tremor LUE 1 1 0   Rest Tremor RLE 0 0 0   Rest Tremor LLE 0 0 0   Rest Tremor Lip/Jaw 0 0 0   Rest Tremor Constancy 1 1 0   Total Right 2 5 2   Total Left 5 6 4   Axial Total 7 8 8   Total 15 20 14       Battery status 2.96V  ON/OFF: ON  Implanted generator: Abbott 6660 (5)  Lead site(s): L STN  Impedance Check: OK  Therapeutic: 775 ohms     Program 1 Left Brain         Right Brain       Initial Final Initial Final   Amplitude (mA)  3.0  same  N/A     Pulse width (usec)  60         Freq (Hz)  130         Contacts: C  +         Contacts: 4           Contacts: 3  abc-         Contacts: 2           Contacts: 1           Range 0 to 3.50mA       Therapy impedances:  Program 1: 775 ohms    Assessment: 68 year-old male with PD (and possible concomitant ET) s/p left STN DBS. Has good tremor control with some subjective imbalance as only side effect noted. Will continue to monitor. A repeat head CT was performed today due to some edema surrounding his DBS lead. This appeared to be improved on our read.     Will not make any DBS changes.    Read Dr. Ramirez's notes. Agree can try tapering off topiramate. Sinemet can likely also be decreased in future, but would hold on doing so until he follows up here again in January. Feel he will likely not be able to fully discontinue the Sinemet.    Plan:   -no DBS changes  -can taper topiramate    Follow-up in January    Kalin Karimi MD  Movement Disorders Fellow    Patient seen and examined with Dr. Karimi and I agree with the assessment and plan as  outlined.  I participated in anc closely supervised the neurostimulator interrogation:  first hour, Analysis of neurostimulator simple/complex without programming 46487 1h.    He increased stimulation amplitude from 2 to 3 mA (upper limit is 3.5) for breakthrough tremor, and with that, has satisfactory symptom control.  We discussed whether to tweak his stimulator settings (he is using contact 3 in omnidirectional mode, so we are not using individual segments, at this point) but there seems, at this point, no significant room for improvement.  Possibly, at some time in the future we'll do that.  Prilosec's been prescribed, for queasiness -- he was skeptical, doesn't think he has GERD, but I suggested he at least try it:  he can always discontinue if it's ineffective.  On exam, ONstim/OFFmed 1+ LUE rigidity, about 1+ bradykinesia in right finger- and toe-tapping and no other Izzy, mild LUE postural & kinetic tremor, decreased R armswing.  Handwriting OFF- vs. ON-stim by Dr. Karimi shows a very dramatic improvement ONstim.   Repeat head CT today shows reduction in susan-electrode lucency (presumptive edema) compared to prior.    Stanford Sloan PhD, MD

## 2018-09-14 NOTE — MR AVS SNAPSHOT
After Visit Summary   9/14/2018    Stefan Odonnell    MRN: [id number]           Patient Information     Date Of Birth          1950        Visit Information        Provider Department      9/14/2018 10:20 AM Stanford Sloan MD [facility]        Today's Diagnoses     Essential tremor    -  1      Care Instructions    Your repeat head CT looks improved, which is good.    We did not change your DBS settings today. Let us know if you are having any side effects by MyChart or by a phone call.    We will send a message to Dr. Gandhi, we agree that we can try tapering off the topiramate.    We can also likely decrease the Sinemet in the future, but would wait to start that until you are seen again in follow-up          Follow-ups after your visit        Your next 10 appointments already scheduled     Jan 11, 2019  8:20 AM CST   (Arrive by 8:05 AM)   Return Movement Disorder with Stanford Sloan MD   [facility] ([facility])    [address] [place] [id number] [phone]              Who to contact     Please call your clinic at [phone] to:    Ask questions about your health    Make or cancel appointments    Discuss your medicines    Learn about your test results    Speak to your doctor            Additional Information About Your Visit        [employer] Information     [employer] gives you secure access to your electronic health record. If you see a primary care provider, you can also send messages to your care team and make appointments. If you have questions, please call your primary care clinic.  If you do not have a primary care provider, please call [phone] and they will assist you.      [employer] is an electronic gateway that provides easy, online access to your medical records. With [employer], you can request a clinic appointment, read your test results, renew a prescription or communicate with your care team.     To access  "your existing account, please contact your [facility] or call [phone] for assistance.        Care EveryWhere ID     This is your Care EveryWhere ID. This could be used by other organizations to access your [place] medical records  [id number]        Your Vitals Were     Pulse Height BMI (Body Mass Index)             84 1.88 m (6' 2\") 37.14 kg/m2          Blood Pressure from Last 3 Encounters:   09/14/18 142/84   07/20/18 125/81   06/25/18 124/79    Weight from Last 3 Encounters:   09/14/18 131.2 kg (289 lb 4.8 oz)   07/20/18 133.7 kg (294 lb 11.2 oz)   06/25/18 133.4 kg (294 lb 3.2 oz)                 Today's Medication Changes          These changes are accurate as of 9/14/18 11:54 AM.  If you have any questions, ask your nurse or doctor.               Stop taking these medicines if you haven't already. Please contact your care team if you have questions.     clotrimazole-betamethasone cream   Commonly known as:  LOTRISONE   Stopped by:  Stanford Sloan MD           oxyCODONE IR 5 MG tablet   Commonly known as:  ROXICODONE   Stopped by:  Stanford Sloan MD           senna-docusate 8.6-50 MG per tablet   Commonly known as:  SENOKOT-S;PERICOLACE   Stopped by:  Stanford Sloan MD                    Primary Care Provider Office Phone # Fax #    Dorianjeromy Last [phone]       [facility] [address] [facility] [address]  [facility] [address]        Equal Access to Services     [facility] AH: Hadii mike monaco hadasho Soomaali, waaxda luqadaha, qaybta kaalmada robert schmidt. So [facility] [phone].    ATENCIÓN: Si habla español, tiene a newman disposición servicios gratuitos de asistencia lingüística. Llame al [phone].    We comply with applicable federal civil rights laws and [place] laws. We do not discriminate on the basis of race, color, national origin, age, disability, sex, sexual orientation, or gender " identity.            Thank you!     Thank you for choosing [facility]  for your care. Our goal is always to provide you with excellent care. Hearing back from our patients is one way we can continue to improve our services. Please take a few minutes to complete the written survey that you may receive in the mail after your visit with us. Thank you!             Your Updated Medication List - Protect others around you: Learn how to safely use, store and throw away your medicines at www.disposemymeds.org.          This list is accurate as of 9/14/18 11:54 AM.  Always use your most recent med list.                   Brand Name Dispense Instructions for use Diagnosis    allopurinol 300 MG tablet    ZYLOPRIM     TAKE 1 TABLET BY MOUTH DAILY IN THE EVENING        amLODIPine 5 MG tablet    NORVASC     Take 5 mg by mouth every morning        aspirin 81 MG chewable tablet      Take 81 mg by mouth daily        VITA CONTOUR NEXT test strip   Generic drug:  blood glucose monitoring      USE TO TEST BLOOD SUGARS 3 TIMES DAILY        blood glucose monitoring lancets      Test Blood Sugar 3 Times Daily.  Dx-E11.8        blood glucose monitoring meter device kit           carbidopa-levodopa  MG per tablet    SINEMET     Take 2 tablets by mouth 3 times daily Takes at 4 AM, 11 AM, 5 PM    Parkinsonian features       diphenoxylate-atropine 2.5-0.025 MG per tablet    LOMOTIL     Take 2 tablets by mouth 4 times daily as needed        doxepin 10 MG capsule    SINEquan     Take 10 mg by mouth At Bedtime        * insulin pen needle 32G X 4 MM      1 each        * EASY TOUCH PEN NEEDLES 32G X 5 MM   Generic drug:  insulin pen needle      USE WITH INSULIN THREE TIMES DAILY        finasteride 5 MG tablet    PROSCAR     TAKE 1 TABLET BY MOUTH ONCE DAILY AT BEDTIME. DO NOT CRUSH.        glipiZIDE 10 MG tablet    GLUCOTROL     TAKE 5mg (1/2 tablet) BY MOUTH TWICE DAILY BEFORE MEALS.        insulin glargine 100 UNIT/ML injection     LANTUS     Inject 27 units in morning and 24 units at night.        potassium chloride 10 MEQ CR capsule    MICRO-K     TAKE 1 CAPSULE BY MOUTH TWICE DAILY WITH MEALS. SWALLOW WHOLE, DO NOT CHEW. CAPSULES MAY BE OPENED AND SPRINKLED ON FOOD.        simvastatin 40 MG tablet    ZOCOR     Take 40 mg by mouth At Bedtime        topiramate 100 MG tablet    TOPAMAX     Take 1 tablet by mouth three times a day. Do not crush        VICTOZA PEN 18 MG/3ML soln   Generic drug:  liraglutide      INJECT 1.8 MG UNDER THE SKIN ONE TIME A DAY        * Notice:  This list has 2 medication(s) that are the same as other medications prescribed for you. Read the directions carefully, and ask your doctor or other care provider to review them with you.

## 2018-12-07 ENCOUNTER — OFFICE VISIT (OUTPATIENT)
Dept: NEUROPSYCHOLOGY | Facility: CLINIC | Age: 68
End: 2018-12-07

## 2018-12-07 DIAGNOSIS — Z00.6 EXAMINATION OF PARTICIPANT IN CLINICAL TRIAL: Primary | ICD-10-CM

## 2018-12-07 NOTE — PROGRESS NOTES
The patient was seen for neuropsychological evaluation at the request of Stanford Sloan for the purposes of diagnostic clarification and treatment planning.  One hour of face-to-face testing was provided by this writer.  Please see Dr. Edwina Goldstein's report for a full interpretation of the findings.

## 2018-12-10 NOTE — PROGRESS NOTES
The patient completed the 6 month neuropsychological evaluation for the [place] [facility]. There was 1 hour and 10 minutes of psychometrist time. OnCore ID: [id number]     Measures administered:    WAIS-IV: Letter Number Sequencing, Digit Span  WMS-R Information & Orientation  D-KEFS Verbal Fluency  Clock Drawing  Trail Making Test  Stroop  Gomez Verbal Learning Test - Revised  MoCA v 7.3    BDI-2  QUIP  ESS  RBDSQ  PDQ-39  Apathy Scale

## 2019-01-06 ASSESSMENT — MOVEMENT DISORDERS SOCIETY - UNIFIED PARKINSONS DISEASE RATING SCALE (MDS-UPDRS)
HOBBIES_AND_OTHER_ACTIVITIES: NORMAL:  NOT AT ALL (NO PROBLEMS).
CHEWING_AND_SWALLOWING: NORMAL: NO PROBLEMS.
WALKING_AND_BALANCE: NORMAL: NOT AT ALL (NO PROBLEMS).
GETTING_OUT_OF_BED_CAR_DEEP_CHAIR: NORMAL: NOT AT ALL (NO PROBLEMS).
HYGIENE: NORMAL: NOT AT ALL (NO PROBLEMS).
TURNING_IN_BED: NORMAL: NOT AT ALL (NO PROBLEMS).
TOTAL_SCORE: 1
DRESSING: NORMAL: NOT AT ALL (NO PROBLEMS).
SPEECH: NORMAL: NOT AT ALL (NO PROBLEMS).
HANDWRITING: NORMAL: NOT AT ALL (NO PROBLEMS).
TREMOR: SLIGHT: SHAKING OR TREMOR OCCURS BUT DOES NOT CAUSE PROBLEMS WITH ANY ACTIVITIES.
FREEZING: NORMAL: NOT AT ALL (NO PROBLEMS).
EATING_TASKS: NORMAL: NOT AT ALL (NO PROBLEMS).
SALIVA_AND_DROOLING: NORMAL: NOT AT ALL (NO PROBLEMS).

## 2019-01-11 ENCOUNTER — OFFICE VISIT (OUTPATIENT)
Dept: NEUROLOGY | Facility: CLINIC | Age: 69
End: 2019-01-11

## 2019-01-11 VITALS
TEMPERATURE: 98.1 F | SYSTOLIC BLOOD PRESSURE: 138 MMHG | HEART RATE: 66 BPM | HEIGHT: 74 IN | OXYGEN SATURATION: 96 % | DIASTOLIC BLOOD PRESSURE: 84 MMHG | BODY MASS INDEX: 37.23 KG/M2 | RESPIRATION RATE: 18 BRPM | WEIGHT: 290.1 LBS

## 2019-01-11 DIAGNOSIS — G20.A1 PARKINSON'S DISEASE (H): Primary | ICD-10-CM

## 2019-01-11 RX ORDER — FINASTERIDE 5 MG/1
5 TABLET, FILM COATED ORAL EVERY MORNING
COMMUNITY

## 2019-01-11 ASSESSMENT — UNIFIED PARKINSONS DISEASE RATING SCALE (UPDRS)
LEG_AGILITY_RIGHT: NORMAL
RIGIDITY_RLE: NORMAL
POSTURAL_STABILITY: NORMAL:  RECOVERS WITH ONE OR TWO STEPS.
POSTURE: 1 SLIGHT.  NOT QUITE ERECT BUT COULD BE NORMAL FOR OLDER PERSONS.
CONSTANCY_TREMOR_ATREST: NORMAL: NO TREMOR.
HANDMOVEMENTS_RIGHT: SLIGHT: ANY OF THE FOLLOWING: A) THE REGULAR RHYTHM IS BROKEN WITH ONE WITH ONE OR TWO INTERRUPTIONS OR HESITATIONS OF THE MOVEMENT B) SLIGHT SLOWING C) THE AMPLITUDE DECREMENTS NEAR THE END OF THE 10 MOVEMENTS.
RIGIDITY_LLE: NORMAL
POSTURE: 1 SLIGHT.  NOT QUITE ERECT BUT COULD BE NORMAL FOR OLDER PERSONS.
FINGER_TAPPING_LEFT: NORMAL
PRONATION_SUPINATION_LEFT: NORMAL
CONSTANCY_TREMOR_ATREST: NORMAL: NO TREMOR.
LEG_AGILITY_LEFT: NORMAL
HANDMOVEMENTS_LEFT: NORMAL
AMPLITUDE_RUE: NORMAL: NO TREMOR.
TOTAL_SCORE: 14
LEG_AGILITY_RIGHT: NORMAL
FACIAL_EXPRESSION: MODERATE: MASKED FACIES WITH LIPS PARTED SOME OF THE TIME WHEN THE MOUTH IS AT REST.
ARISING_CHAIR: SLIGHT: ARISING IS SLOWER THAN NORMAL, OR MAY NEED MORE THAN ONE ATTEMPT, OR MAY NEED TO MOVE FORWARD IN THE CHAIR TO ARISE.  NO NEED TO USE THE ARMS OF THE CHAIR.
RIGIDITY_RUE: NORMAL
AMPLITUDE_LLE: NORMAL: NO TREMOR.
PRONATION_SUPINATION_LEFT: NORMAL
AMPLITUDE_RUE: NORMAL: NO TREMOR.
TOETAPPING_RIGHT: NORMAL
AXIAL_SCORE: 7
AMPLITUDE_LLE: NORMAL: NO TREMOR.
RIGIDITY_RLE: NORMAL
FINGER_TAPPING_LEFT: SLIGHT: ANY OF THE FOLLOWING: A) THE REGULAR RHYTHM IS BROKEN WITH ONE WITH ONE OR TWO INTERRUPTIONS OR HESITATIONS OF THE MOVEMENT B) SLIGHT SLOWING C) THE AMPLITUDE DECREMENTS NEAR THE END OF THE 10 MOVEMENTS.
TOTAL_SCORE_LEFT: 3
LEG_AGILITY_LEFT: NORMAL
GAIT: NORMAL
TOTAL_SCORE: 17
RIGIDITY_RUE: SLIGHT: RIGIDITY ONLY DETECTED WITH ACTIVATION MANEUVER.
AMPLITUDE_LLE: NORMAL: NO TREMOR.
PRONATION_SUPINATION_LEFT: NORMAL
AXIAL_SCORE: 8
PRONATION_SUPINATION_RIGHT: NORMAL
RIGIDITY_LLE: SLIGHT: RIGIDITY ONLY DETECTED WITH ACTIVATION MANEUVER.
PRONATION_SUPINATION_RIGHT: NORMAL
AMPLITUDE_LIP_JAW: NORMAL: NO TREMOR.
SPONTANEITY_OF_MOVEMENT: 1: SLIGHT: SLIGHT GLOBAL SLOWNESS AND POVERTY OF SPONTANEOUS MOVEMENTS.
FACIAL_EXPRESSION: MODERATE: MASKED FACIES WITH LIPS PARTED SOME OF THE TIME WHEN THE MOUTH IS AT REST.
FREEZING_GAIT: NORMAL
FINGER_TAPPING_RIGHT: SLIGHT: ANY OF THE FOLLOWING: A) THE REGULAR RHYTHM IS BROKEN WITH ONE WITH ONE OR TWO INTERRUPTIONS OR HESITATIONS OF THE MOVEMENT B) SLIGHT SLOWING C) THE AMPLITUDE DECREMENTS NEAR THE END OF THE 10 MOVEMENTS.
AMPLITUDE_LIP_JAW: NORMAL: NO TREMOR.
FACIAL_EXPRESSION: MODERATE: MASKED FACIES WITH LIPS PARTED SOME OF THE TIME WHEN THE MOUTH IS AT REST.
TOETAPPING_LEFT: NORMAL
PARKINSONS_MEDS: ON
TOETAPPING_RIGHT: NORMAL
GAIT: NORMAL
HANDMOVEMENTS_LEFT: SLIGHT: ANY OF THE FOLLOWING: A) THE REGULAR RHYTHM IS BROKEN WITH ONE WITH ONE OR TWO INTERRUPTIONS OR HESITATIONS OF THE MOVEMENT B) SLIGHT SLOWING C) THE AMPLITUDE DECREMENTS NEAR THE END OF THE 10 MOVEMENTS.
AMPLITUDE_RLE: NORMAL: NO TREMOR.
SPONTANEITY_OF_MOVEMENT: 1: SLIGHT: SLIGHT GLOBAL SLOWNESS AND POVERTY OF SPONTANEOUS MOVEMENTS.
HANDMOVEMENTS_LEFT: SLIGHT: ANY OF THE FOLLOWING: A) THE REGULAR RHYTHM IS BROKEN WITH ONE WITH ONE OR TWO INTERRUPTIONS OR HESITATIONS OF THE MOVEMENT B) SLIGHT SLOWING C) THE AMPLITUDE DECREMENTS NEAR THE END OF THE 10 MOVEMENTS.
TOTAL_SCORE: 2
POSTURAL_STABILITY: NORMAL:  RECOVERS WITH ONE OR TWO STEPS.
PRONATION_SUPINATION_RIGHT: NORMAL
LEG_AGILITY_RIGHT: NORMAL
ARISING_CHAIR: SLIGHT: ARISING IS SLOWER THAN NORMAL, OR MAY NEED MORE THAN ONE ATTEMPT, OR MAY NEED TO MOVE FORWARD IN THE CHAIR TO ARISE.  NO NEED TO USE THE ARMS OF THE CHAIR.
AXIAL_SCORE: 8
RIGIDITY_RLE: NORMAL
RIGIDITY_LUE: NORMAL
SPONTANEITY_OF_MOVEMENT: 1: SLIGHT: SLIGHT GLOBAL SLOWNESS AND POVERTY OF SPONTANEOUS MOVEMENTS.
AMPLITUDE_RUE: NORMAL: NO TREMOR.
RIGIDITY_LUE: NORMAL
AMPLITUDE_RLE: NORMAL: NO TREMOR.
PARKINSONS_MEDS: OFF
LEG_AGILITY_LEFT: NORMAL
AMPLITUDE_LUE: NORMAL: NO TREMOR.
FREEZING_GAIT: NORMAL
POSTURE: 1 SLIGHT.  NOT QUITE ERECT BUT COULD BE NORMAL FOR OLDER PERSONS.
TOETAPPING_LEFT: NORMAL
RIGIDITY_NECK: SLIGHT: RIGIDITY ONLY DETECTED WITH ACTIVATION MANEUVER.
TOTAL_SCORE_LEFT: 5
FINGER_TAPPING_RIGHT: SLIGHT: ANY OF THE FOLLOWING: A) THE REGULAR RHYTHM IS BROKEN WITH ONE WITH ONE OR TWO INTERRUPTIONS OR HESITATIONS OF THE MOVEMENT B) SLIGHT SLOWING C) THE AMPLITUDE DECREMENTS NEAR THE END OF THE 10 MOVEMENTS.
TOTAL_SCORE: 6
POSTURAL_STABILITY: NORMAL:  RECOVERS WITH ONE OR TWO STEPS.
RIGIDITY_NECK: SLIGHT: RIGIDITY ONLY DETECTED WITH ACTIVATION MANEUVER.
TOETAPPING_RIGHT: SLIGHT: ANY OF THE FOLLOWING: A) THE REGULAR RHYTHM IS BROKEN WITH ONE WITH ONE OR TWO INTERRUPTIONS OR HESITATIONS OF THE MOVEMENT B) SLIGHT SLOWING C) THE AMPLITUDE DECREMENTS NEAR THE END OF THE 10 MOVEMENTS.
FINGER_TAPPING_LEFT: SLIGHT: ANY OF THE FOLLOWING: A) THE REGULAR RHYTHM IS BROKEN WITH ONE WITH ONE OR TWO INTERRUPTIONS OR HESITATIONS OF THE MOVEMENT B) SLIGHT SLOWING C) THE AMPLITUDE DECREMENTS NEAR THE END OF THE 10 MOVEMENTS.
HANDMOVEMENTS_RIGHT: MILD: ANY OF THE FOLLOWING: A) 3 TO 5 INTERRUPTIONS DURING TAPPING B) MILD SLOWING C) THE AMPLITUDE DECREMENTS MIDWAY IN THE 10-MOVEMENT SEQUENCE
RIGIDITY_RUE: NORMAL
RIGIDITY_NECK: SLIGHT: RIGIDITY ONLY DETECTED WITH ACTIVATION MANEUVER.
RIGIDITY_LUE: SLIGHT: RIGIDITY ONLY DETECTED WITH ACTIVATION MANEUVER.
TOTAL_SCORE_LEFT: 3
TOETAPPING_LEFT: NORMAL
TOTAL_SCORE: 2
GAIT: NORMAL
ARISING_CHAIR: NORMAL: ABLE TO ARISE QUICKLY WITHOUT HESITATION.
HANDMOVEMENTS_RIGHT: SLIGHT: ANY OF THE FOLLOWING: A) THE REGULAR RHYTHM IS BROKEN WITH ONE WITH ONE OR TWO INTERRUPTIONS OR HESITATIONS OF THE MOVEMENT B) SLIGHT SLOWING C) THE AMPLITUDE DECREMENTS NEAR THE END OF THE 10 MOVEMENTS.
AMPLITUDE_LUE: NORMAL: NO TREMOR.
SPEECH: SLIGHT: LOSS OF MODULATION, DICTION OR VOLUME, BUT STILL ALL WORDS EASY TO UNDERSTAND.
RIGIDITY_LLE: SLIGHT: RIGIDITY ONLY DETECTED WITH ACTIVATION MANEUVER.
SPEECH: SLIGHT: LOSS OF MODULATION, DICTION OR VOLUME, BUT STILL ALL WORDS EASY TO UNDERSTAND.
FINGER_TAPPING_RIGHT: SLIGHT: ANY OF THE FOLLOWING: A) THE REGULAR RHYTHM IS BROKEN WITH ONE WITH ONE OR TWO INTERRUPTIONS OR HESITATIONS OF THE MOVEMENT B) SLIGHT SLOWING C) THE AMPLITUDE DECREMENTS NEAR THE END OF THE 10 MOVEMENTS.
TOTAL_SCORE: 13
CONSTANCY_TREMOR_ATREST: NORMAL: NO TREMOR.
AMPLITUDE_RLE: NORMAL: NO TREMOR.
PARKINSONS_MEDS: OFF
AMPLITUDE_LUE: NORMAL: NO TREMOR.
AMPLITUDE_LIP_JAW: NORMAL: NO TREMOR.
FREEZING_GAIT: NORMAL
SPEECH: SLIGHT: LOSS OF MODULATION, DICTION OR VOLUME, BUT STILL ALL WORDS EASY TO UNDERSTAND.

## 2019-01-11 ASSESSMENT — PAIN SCALES - GENERAL: PAINLEVEL: NO PAIN (0)

## 2019-01-11 ASSESSMENT — MIFFLIN-ST. JEOR: SCORE: 2150.63

## 2019-01-11 NOTE — PROGRESS NOTES
[facility] Follow-up    CC: DBS follow-up    HPI: Stefan Odonnell is a 68 year-old male with PD (and possible concomitant ET) s/p left STN DBS who presents in follow-up. He wast last seen 9/14/18 and was doing well aside from some mild imbalance. No programming changes were made at that time.     Today he notes, since last being seen, having had bilateral CTS surgery and a cortisone shot in his right knee.     He also has been tapering his topiramate. He thinks he has had slight increase in his LEFT tremor but no right hand tremor noted.    Slightly off-balance when he first gets up but otherwise no issues. Was present prior to surgery, but now is worse but has not worsened since last visit. No falls.    He also notes tingling/discomfort in legs in evening, which resolves with walking.    PD-related Medications                   4 11 5 7   Carbidopa/levodopa 25/100                                 2 2 2     topiramate 100mg                        0.5   0.5     Last dose of carbidopa/levodopa at 11am yesterday  Topiramate: 7pm      Medications:  Current Outpatient Medications   Medication     allopurinol (ZYLOPRIM) 300 MG tablet     amLODIPine (NORVASC) 5 MG tablet     aspirin 81 MG chewable tablet     blood glucose monitoring (VITA CONTOUR NEXT MONITOR) meter device kit     blood glucose monitoring (VITA CONTOUR NEXT) test strip     blood glucose monitoring (VITA MICROLET) lancets     carbidopa-levodopa (SINEMET)  MG per tablet     doxepin (SINEQUAN) 10 MG capsule     finasteride (PROSCAR) 5 MG tablet     glipiZIDE (GLUCOTROL) 10 MG tablet     insulin glargine (LANTUS) 100 UNIT/ML injection     insulin pen needle (EASY TOUCH PEN NEEDLES) 32G X 5 MM     insulin pen needle 32G X 4 MM     liraglutide (VICTOZA PEN) 18 MG/3ML soln     potassium chloride (MICRO-K) 10 MEQ CR capsule     simvastatin (ZOCOR) 40 MG tablet     topiramate (TOPAMAX) 100 MG tablet      diphenoxylate-atropine (LOMOTIL) 2.5-0.025 MG per tablet     No current facility-administered medications for this visit.      UDPRS  Part I   1.1 Cognitive impairment: 0: Normal: No cognitive impairment.  1.2 Hallucinations and psychosis: 0: Normal: No hallucinations or psychotic behaviour.   1.3 Depressed mood: 0: Normal: No depressed mood.   1.4 Anxious mood: 0: Normal: No anxious feelings.   1.5 Apathy: 1: Slight: Apathy appreciated by patient and/or caregiver, but no interference with daily activities and social interactions.   1.6 Features of DDS: 0: Normal: No problems present.   1.7 Sleep problems: 1: Slight: Sleep problems are present but usually do not cause trouble getting a full night of sleep. Goes to sleep at 7pm but wakes at 3am.  1.8 Daytime sleepiness: 2: Mild: Sometimes I fall asleep when alone and relaxing. For example, while reading or watching TV.   1.9 Pain and other sensations: 1: Slight: I have these feelings. However, I can do things and be with other people without difficulty. Tingling/discomfort in legs in evening, resolves with walking  1.10 Urinary problems: 0: Normal: No urine control problems.   1.11 Constipation problems: 0: Normal: No constipation.   1.12 Light headedness on standin: Normal: No dizzy or foggy feelings.   1.13 Fatigue:0: Normal: No fatigue.   Total:     Part II   2.1 Speech: 0: Normal: Not at all (no problems).   2.2 Saliva and droolin: Normal: Not at all (no problems).   2.3 Chewing and swallowin: Normal: No problems.   2.4 Eating tasks: 1: Slight: I am slow, but I do not need any help handling my food and have not had food spills while eating. (Has trigger finger and bilateral CTS surgery recently)  2.5 Dressin: Normal: Not at all (no problems).   2.6 Hygiene: 0: Normal: Not at all (no problems).   2.7 Handwritin: Normal: Not at all (no problems).   2.8 Doing hobbies and other activities: 0: Normal: Not at all (no problems).   2.9 Turning in  "bed:0: Normal: Not at all (no problems).   2.10 Tremor: 1: Slight: Shaking or tremor occurs but does not cause problems with any activities. (Only on left now, just when stressed).  2.11 Getting out of bed: 0: Normal: Not at all (no problems).   2.12 Walking and balance: 1: Slight: I am slightly slow or may drag a leg. I never use a walking aid. Slightly off-balance when he first gets up but otherwise no issues. Was present prior to surgery, but now is worse. No falls.  2.13 Freezin: Normal: Not at all (no problems).   Total: 3/52 (previous 1)  Total Part I and II:     Part IV: 0    Exam:  /84 (BP Location: Left arm, Patient Position: Chair, Cuff Size: Adult Large)   Pulse 66   Resp 18   Ht 1.88 m (6' 2\")   Wt 131.6 kg (290 lb 1.6 oz)   SpO2 96%   BMI 37.25 kg/m    2 Sinemet taken at 9:30am. (1 was chewed).  DBS off at 9:33am  Turned on DBS at 9:40am  UPDRS Values 2019   Time: 8:40 AM 9:35 AM 10:20 AM   Medication Off Off On   R Brain DBS: None None None   L Brain DBS: On Off On   Speech 1 1 1   Facial Expression 3 3 3   Rigidity Neck 1 1 1   Rigidity RUE 0 1 0   Rigidity LUE 0 1 0   Rigidity RLE 0 0 0   Rigidity LLE 1 1 0   Finger Taps R 1 1 1   Finger Taps L 1 0 1   Hand Mvt R 1 2 1   Hand Mvt L 1 0 1   Pron-/Supinate R 0 0 0   Pron-/Supinate L 0 0 0   Toe Tap R 0 1 0   Toe Tap L 0 0 0   Leg Agility R 0 0 0   Leg Agility L 0 0 0   Arise From Chair 0 1 1   Gait 0 0 0   Gait Freezing 0 0 0   Postural Stability 0 0 0   Posture 1 1 1   Global Spont Mvt 1 1 1   Postural Tremor RUE 0 0 0   Postural Tremor LUE 1 0 0   Kinetic Tremor RUE 0 1 0   Kinetic Tremor LUE 1 1 1   Rest Tremor RUE 0 0 0   Rest Tremor LUE 0 0 0   Rest Tremor RLE 0 0 0   Rest Tremor LLE 0 0 0   Rest Tremor Lip/Jaw 0 0 0   Rest Tremor Constancy 0 0 0   Total Right 2 6 2   Total Left 5 3 3   Axial Total 7 8 8   Total 14 17 13         Battery status 2.96V  ON/OFF: ON  Implanted generator: Abbott [id number] " (5)  Lead site(s): L STN  System impedance Check: OK  Therapeutic: 862 ohms     Program 1 Left Brain         Right Brain N/A       Initial Final Initial Final   Amplitude (mA)  3.0 [0-3.5]  same       Pulse width (usec)  60         Freq (Hz)  130         Contacts: C  +         Contacts: 4           Contacts: 3  abc-         Contacts: 2           Contacts: 1           Range 0 to 3.50mA     Assessment: 68 year-old male with PD (and possible concomitant ET) s/p left STN DBS. Right hand tremor much improved. He has had some slight increase in left hand tremor which may be related to topiramate taper. Will follow with further topiramate decrease.     Plan:   -no changes to DBS today  -continue topiramate taper    Kalin Karimi MD  Movement Disorders Fellow    Patient seen and examined with Dr. Karimi and I agree with the assessment and plan as outlined.  Here for a thorough assessment of the current stimulator settings' effects.  Baseline's not perfectly stable: he's had surgery for trigger finger, and for carpal tunnel syndrome.  Neurologist is tapering Topamax for tremor;  with that, he reports slight increase in tremor.  Uncomfortable sensation in legs, in the evening, relieved by walking around (or by going to bed);  not every day, and it doesn't bother him enough that he'd consider taking additional medication for it. In fact, he asks about getting off levodopa;  explained that medication reduction is a possibiltiy, but that we don't generally aim for total discontinuation (effect on nonmotor symnptoms, also a buffer against device failure).  I participated in and closely supervised the neurostimulator interrogation:  , Analysis of neurostimulator simple/complex without programming [id number].    Stanford Sloan PhD, MD

## 2019-01-11 NOTE — Clinical Note
1/11/2019       RE: Stefan Odonnell  [address] [facility] [address]     Dear Colleague,    Thank you for referring your patient, Stefan Odonnell, to the [facility] NEUROLOGY at [facility]. Please see a copy of my visit note below.    [facility] Disorders Clinic Follow-up    CC: DBS follow-up    HPI: Stefan Odonnell is a 68 year-old male with PD (and possible concomitant ET) s/p left STN DBS who presents in follow-up. He wast last seen 9/14/18 and was doing well aside from some mild imbalance. No programming changes were made at that time.     Today he notes, since last being seen, having had bilateral CTS surgery and a cortisone shot in his right knee.     He also has been tapering his topiramate. He thinks he has had slight increase in his LEFT tremor but no right hand tremor noted.    Slightly off-balance when he first gets up but otherwise no issues. Was present prior to surgery, but now is worse but has not worsened since last visit. No falls.    He also notes tingling/discomfort in legs in evening, which resolves with walking.    PD-related Medications                   4 11 5 7   Carbidopa/levodopa 25/100                                 2 2 2     topiramate 100mg                        0.5   0.5     Last dose of carbidopa/levodopa at 11am yesterday  Topiramate: 7pm      Medications:  Current Outpatient Medications   Medication     allopurinol (ZYLOPRIM) 300 MG tablet     amLODIPine (NORVASC) 5 MG tablet     aspirin 81 MG chewable tablet     blood glucose monitoring (VITA CONTOUR NEXT MONITOR) meter device kit     blood glucose monitoring (VITA CONTOUR NEXT) test strip     blood glucose monitoring (VITA MICROLET) lancets     carbidopa-levodopa (SINEMET)  MG per tablet     doxepin (SINEQUAN) 10 MG capsule     finasteride (PROSCAR) 5 MG tablet     glipiZIDE (GLUCOTROL) 10 MG tablet     insulin glargine (LANTUS) 100 UNIT/ML injection     insulin pen  needle (EASY TOUCH PEN NEEDLES) 32G X 5 MM     insulin pen needle 32G X 4 MM     liraglutide (VICTOZA PEN) 18 MG/3ML soln     potassium chloride (MICRO-K) 10 MEQ CR capsule     simvastatin (ZOCOR) 40 MG tablet     topiramate (TOPAMAX) 100 MG tablet     diphenoxylate-atropine (LOMOTIL) 2.5-0.025 MG per tablet     No current facility-administered medications for this visit.      UDPRS  Part I   1.1 Cognitive impairment: 0: Normal: No cognitive impairment.  1.2 Hallucinations and psychosis: 0: Normal: No hallucinations or psychotic behaviour.   1.3 Depressed mood: 0: Normal: No depressed mood.   1.4 Anxious mood: 0: Normal: No anxious feelings.   1.5 Apathy: 1: Slight: Apathy appreciated by patient and/or caregiver, but no interference with daily activities and social interactions.   1.6 Features of DDS: 0: Normal: No problems present.   1.7 Sleep problems: 1: Slight: Sleep problems are present but usually do not cause trouble getting a full night of sleep. Goes to sleep at 7pm but wakes at 3am.  1.8 Daytime sleepiness: 2: Mild: Sometimes I fall asleep when alone and relaxing. For example, while reading or watching TV.   1.9 Pain and other sensations: 1: Slight: I have these feelings. However, I can do things and be with other people without difficulty. Tingling/discomfort in legs in evening, resolves with walking  1.10 Urinary problems: 0: Normal: No urine control problems.   1.11 Constipation problems: 0: Normal: No constipation.   1.12 Light headedness on standin: Normal: No dizzy or foggy feelings.   1.13 Fatigue:0: Normal: No fatigue.   Total:     Part II   2.1 Speech: 0: Normal: Not at all (no problems).   2.2 Saliva and droolin: Normal: Not at all (no problems).   2.3 Chewing and swallowin: Normal: No problems.   2.4 Eating tasks: 1: Slight: I am slow, but I do not need any help handling my food and have not had food spills while eating. (Has trigger finger and bilateral CTS surgery  "recently)  2.5 Dressin: Normal: Not at all (no problems).   2.6 Hygiene: 0: Normal: Not at all (no problems).   2.7 Handwritin: Normal: Not at all (no problems).   2.8 Doing hobbies and other activities: 0: Normal: Not at all (no problems).   2.9 Turning in bed:0: Normal: Not at all (no problems).   2.10 Tremor: 1: Slight: Shaking or tremor occurs but does not cause problems with any activities. (Only on left now, just when stressed).  2.11 Getting out of bed: 0: Normal: Not at all (no problems).   2.12 Walking and balance: 1: Slight: I am slightly slow or may drag a leg. I never use a walking aid. Slightly off-balance when he first gets up but otherwise no issues. Was present prior to surgery, but now is worse. No falls.  2.13 Freezin: Normal: Not at all (no problems).   Total: 3/52 (previous 1)  Total Part I and II:     Part IV: 0    Exam:  /84 (BP Location: Left arm, Patient Position: Chair, Cuff Size: Adult Large)   Pulse 66   Resp 18   Ht 1.88 m (6' 2\")   Wt 131.6 kg (290 lb 1.6 oz)   SpO2 96%   BMI 37.25 kg/m     2 Sinemet taken at 9:30am. (1 was chewed).  DBS off at 9:33am  Turned on DBS at 9:40am  UPDRS Values 2019   Time: 8:40 AM 9:35 AM 10:20 AM   Medication Off Off On   R Brain DBS: None None None   L Brain DBS: On Off On   Speech 1 1 1   Facial Expression 3 3 3   Rigidity Neck 1 1 1   Rigidity RUE 0 1 0   Rigidity LUE 0 1 0   Rigidity RLE 0 0 0   Rigidity LLE 1 1 0   Finger Taps R 1 1 1   Finger Taps L 1 0 1   Hand Mvt R 1 2 1   Hand Mvt L 1 0 1   Pron-/Supinate R 0 0 0   Pron-/Supinate L 0 0 0   Toe Tap R 0 1 0   Toe Tap L 0 0 0   Leg Agility R 0 0 0   Leg Agility L 0 0 0   Arise From Chair 0 1 1   Gait 0 0 0   Gait Freezing 0 0 0   Postural Stability 0 0 0   Posture 1 1 1   Global Spont Mvt 1 1 1   Postural Tremor RUE 0 0 0   Postural Tremor LUE 1 0 0   Kinetic Tremor RUE 0 1 0   Kinetic Tremor LUE 1 1 1   Rest Tremor RUE 0 0 0   Rest Tremor LUE 0 0 " 0   Rest Tremor RLE 0 0 0   Rest Tremor LLE 0 0 0   Rest Tremor Lip/Jaw 0 0 0   Rest Tremor Constancy 0 0 0   Total Right 2 6 2   Total Left 5 3 3   Axial Total 7 8 8   Total 14 17 13         Battery status 2.96V  ON/OFF: ON  Implanted generator: Abbott [id number] (5)  Lead site(s): L STN  System impedance Check: OK  Therapeutic: 862 ohms     Program 1 Left Brain         Right Brain       Initial Final Initial Final   Amplitude (mA)  3.0  same  N/A     Pulse width (usec)  60         Freq (Hz)  130         Contacts: C  +         Contacts: 4           Contacts: 3  abc-         Contacts: 2           Contacts: 1           Range 0 to 3.50mA     Assessment: 68 year-old male with PD (and possible concomitant ET) s/p left STN DBS. Right hand tremor much improved. He has had some slight increase in left hand tremor which may be related to topiramate taper. Will follow with further topiramate decrease.     Plan:   -no changes to DBS today  -continue topiramate taper    Kalin Karimi MD  Movement Disorders Fellow      Again, thank you for allowing me to participate in the care of your patient.      Sincerely,    Stanford Sloan MD

## 2019-01-11 NOTE — PATIENT INSTRUCTIONS
Continue your topiramate taper.    We will see you once this is done.    We did not change your stimulator today.

## 2019-01-11 NOTE — NURSING NOTE
Chief Complaint   Patient presents with     RECHECK     [facility] RETURN MOVEMENT DISORDER        Jessica Woodard, EMT

## 2019-01-20 NOTE — PROGRESS NOTES
"PATIENT: Stefan Odonnell    : 1950    YANELIS: 2018    REASON FOR VISIT:  Pre-DBS ON/OFF motor symptom evaluation.      HPI: Mr. Stefan Odonnell is a 67 year old right-handed male who came to the [email or web address] neurology clinic accompanied by his wife for ON/OFF motor evaluation as part of Deep Brain Stimulation surgery work-up for management of tremor.   He reports his tremor started 8-9 years in his right hand, did not notice tremor in the left hand until he was being examined initially here.      He was initially seen on 2017 and there was a concern for some parkinsonian features. He was started on a trial of Sinemet 25/100, 2 tabs TID. He states the tremor seems worse on this dose, and denies any other benefits.  Although he didn't think he got much benefit on Sinemet, after he stopped taking it, he thinks tremor might had been a little worse.      He has a history of anosmia x15 years, lost while hospitalized with kidney failure/infection?.  Father had tremor.      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      Current antiparkinsonian medication:   Medication Sig     carbidopa-levodopa (SINEMET)  MG per tablet Take 2 tablets 3 x per day for 2 weeks; if no benefit; increase dose to 1.5 tablets 4 x a day.       Last dose of antiparkinsonian & anti-tremor medication was taken:   Sinemet - yesterday at 5 pm  Topamax - yesterday at 5 pm    In clinic, OFF motor exam was completed and patient took 2 tabs of Sinemet 25/10 at 8:10 am.  After 45 minutes, ON motor exam was completed.    Physical Exam:    Vital Signs:  Blood pressure 114/79, pulse 87, height 1.88 m (6' 2\"), weight 132.6 kg (292 lb 4.8 oz), SpO2 92 %.  Body mass index is 37.53 kg/(m^2).    MDS Part II  -- Total Score: 17     -- Over the last week -- 0: Normal -- 1: Slight -- 2: Mild -- 3: " C/o vomiting, diarrhea, lower abdominal pain and I almost fainted 3 times today. LMP currently 1/19/19 Moderate -- 4: Severe     2.1 Speech: 0   2.2 Saliva and droolin   2.3 Chewing and swallowin   2.4 Eating tasks: 3   2.5 Dressin   2.6 Hygiene:  1   2.7 Handwritin   2.8 Doing hobbies and other activities:  3   2.9 Turning in bed:  0   2.10 Tremor:  4   2.11 Getting out of bed/car/deep chair:   0   2.12 Walking and balance: 0   2.13 Freezin     Total:    17         Fahn Carolyn Suggs Tremor Rating Scale scanned to chart     UPDRS Values 2018   Medication Off On   R Brain DBS: None None   L Brain DBS: None None   Speech 1 1   Facial Expression 2 2   Rigidity Neck 1 1   Rigidity RUE 1 1   Rigidity LUE 0 0   Rigidity RLE 0 0   Rigidity LLE 0 0   Finger Taps R 1 1   Finger Taps L 0 0   Hand Mvt R 2 2   Hand Mvt L 0 0   Pron-/Supinate R 0 0   Pron-/Supinate L 0 0   Toe Tap R 0 0   Toe Tap L 1 0   Leg Agility R 1 0   Leg Agility L 0 0   Arise From Chair 0 1   Gait 1 1   Gait Freezing 0 0   Postural Stability 0 0   Posture 1 1   Global Spont Mvt 1 1   Postural Tremor Rue 3 3   Postural Tremor LUE 1 1   Kinetic Tremor RUE 1 1   Kinetic Tremor LUE 1 1   Rest Tremor RUE 0 0   Rest Tremor LUE 0 0   Rest Tremor RLE 0 0   Rest Tremor LLE 0 0   Rest Tremor Lip/Jaw 0 0   Rest Tremor Constancy 0 0   Total Right 9 8   Total Left 3 2   Axial Total 7 8   Total 19 18     MOTOR TEST: UPDRS Flowsheet was completed in Epic. Exam was videotaped.   Total OFF score = 19  Total ON score = 18    Percentage: 18 - 19 = 1 --> 1 ÷ 19 = 0.05 = 5% motor improvement.      ASSESSMENT/PLAN: Tremor: Mr. 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      __  Pt had forgotten risk/benefits of DBS.   I briefly went over DBS risks, & benefits; the possibility of 1 - 3 % serious side effects including infection, bleeding, stroke, cognitive &  "speech impairment, seizures, . . . . & death; the possibility of lead misplacement; appropriate DBS candidates; and DBS programming & the need to return to clinic for reprogramming.  All questions were answered.    __  He reports holding off on his carpel tunnel surgery & knee problems, which he might need surgery.  He was informed that usually we recommend getting these surgeries first, the do the DBS.  Pt opted to do the DBS surgery first.  He was informed about the [employer] vs [place] scientific vs [employer] devices.  \"I would look for you brian's input to choose what's best for me.\"  They were informed that at this point the [employer] & [place] scientific devices don't have the MRI approval yet.  His wife responded, \"I had brain aneurysm & clip long time ago.  I can't have MRI & life goes on.\"  Both are open to hear the DBS Team's recommendation in which lead to use.     __  Pt & wife were informed that we'll contact them after the DBS team meets & discusses his work-up. They understands the plan.    The total time spent with the patient in ON/OFF motor evaluation & discussing about DBS surgery was 120 minutes and greater than 50% of the time was spent in counseling & coordination of care.  Kalin Karimi MD; Movement Disorders Fellow did the assessment & videotaping.     SAMARA Monreal, CNP  [facility]          "

## 2019-03-05 ASSESSMENT — MOVEMENT DISORDERS SOCIETY - UNIFIED PARKINSONS DISEASE RATING SCALE (MDS-UPDRS)
TOTAL_SCORE: 0
HOBBIES_AND_OTHER_ACTIVITIES: NORMAL:  NOT AT ALL (NO PROBLEMS).
HANDWRITING: NORMAL: NOT AT ALL (NO PROBLEMS).
GETTING_OUT_OF_BED_CAR_DEEP_CHAIR: NORMAL: NOT AT ALL (NO PROBLEMS).
TREMOR: NORMAL: NOT AT ALL (NO PROBLEMS).
HYGIENE: NORMAL: NOT AT ALL (NO PROBLEMS).
TURNING_IN_BED: NORMAL: NOT AT ALL (NO PROBLEMS).
WALKING_AND_BALANCE: NORMAL: NOT AT ALL (NO PROBLEMS).
CHEWING_AND_SWALLOWING: NORMAL: NO PROBLEMS.
EATING_TASKS: NORMAL: NOT AT ALL (NO PROBLEMS).
DRESSING: NORMAL: NOT AT ALL (NO PROBLEMS).
SALIVA_AND_DROOLING: NORMAL: NOT AT ALL (NO PROBLEMS).
FREEZING: NORMAL: NOT AT ALL (NO PROBLEMS).
SPEECH: NORMAL: NOT AT ALL (NO PROBLEMS).

## 2019-03-08 ENCOUNTER — OFFICE VISIT (OUTPATIENT)
Dept: NEUROLOGY | Facility: CLINIC | Age: 69
End: 2019-03-08
Payer: COMMERCIAL

## 2019-03-08 VITALS
RESPIRATION RATE: 20 BRPM | BODY MASS INDEX: 38.26 KG/M2 | WEIGHT: 298.1 LBS | DIASTOLIC BLOOD PRESSURE: 85 MMHG | HEART RATE: 64 BPM | HEIGHT: 74 IN | TEMPERATURE: 97.8 F | OXYGEN SATURATION: 95 % | SYSTOLIC BLOOD PRESSURE: 134 MMHG

## 2019-03-08 DIAGNOSIS — G20.A1 PARKINSON'S DISEASE (H): Primary | ICD-10-CM

## 2019-03-08 ASSESSMENT — UNIFIED PARKINSONS DISEASE RATING SCALE (UPDRS)
AMPLITUDE_RUE: NORMAL: NO TREMOR.
LEG_AGILITY_RIGHT: NORMAL
FINGER_TAPPING_LEFT: NORMAL
RIGIDITY_RLE: NORMAL
FINGER_TAPPING_RIGHT: SLIGHT: ANY OF THE FOLLOWING: A) THE REGULAR RHYTHM IS BROKEN WITH ONE WITH ONE OR TWO INTERRUPTIONS OR HESITATIONS OF THE MOVEMENT B) SLIGHT SLOWING C) THE AMPLITUDE DECREMENTS NEAR THE END OF THE 10 MOVEMENTS.
HANDMOVEMENTS_LEFT: SLIGHT: ANY OF THE FOLLOWING: A) THE REGULAR RHYTHM IS BROKEN WITH ONE WITH ONE OR TWO INTERRUPTIONS OR HESITATIONS OF THE MOVEMENT B) SLIGHT SLOWING C) THE AMPLITUDE DECREMENTS NEAR THE END OF THE 10 MOVEMENTS.
CONSTANCY_TREMOR_ATREST: NORMAL: NO TREMOR.
TOETAPPING_LEFT: NORMAL
GAIT: NORMAL
AMPLITUDE_LIP_JAW: NORMAL: NO TREMOR.
RIGIDITY_LUE: MILD: RIGIDITY DETECTED WITHOUT THE ACTIVATION MANEUVER.  FULL RANGE OF MOTION IS EASILY ACHIEVED.
TOTAL_SCORE: 2
FACIAL_EXPRESSION: MILD: IN ADDITION TO DECREASED EYE-BLINK FREQUENCY, MASKED FACIES PRESENT IN THE LOWER FACE AS WELL, NAMELY FEWER MOVEMENTS AROUND THE MOUTH, SUCH AS LESS SPONTANEOUS SMILING, BUT LIPS NOT PARTED.
TOTAL_SCORE: 16
LEG_AGILITY_LEFT: NORMAL
RIGIDITY_RUE: NORMAL
HANDMOVEMENTS_RIGHT: NORMAL
FREEZING_GAIT: NORMAL
TOETAPPING_RIGHT: NORMAL
AMPLITUDE_LLE: NORMAL: NO TREMOR.
POSTURAL_STABILITY: NORMAL:  RECOVERS WITH ONE OR TWO STEPS.
AMPLITUDE_RLE: NORMAL: NO TREMOR.
AXIAL_SCORE: 9
PARKINSONS_MEDS: OFF
PRONATION_SUPINATION_RIGHT: NORMAL
AMPLITUDE_LUE: NORMAL: NO TREMOR.
SPONTANEITY_OF_MOVEMENT: 1: SLIGHT: SLIGHT GLOBAL SLOWNESS AND POVERTY OF SPONTANEOUS MOVEMENTS.
RIGIDITY_NECK: MODERATE: RIGIDITY DETECTED WITHOUT THE ACTIVATION MANEUVER. FULL RANGE OF MOTION IS ACHIEVED WITH EFFORT.
TOTAL_SCORE_LEFT: 5
ARISING_CHAIR: SLIGHT: ARISING IS SLOWER THAN NORMAL, OR MAY NEED MORE THAN ONE ATTEMPT, OR MAY NEED TO MOVE FORWARD IN THE CHAIR TO ARISE.  NO NEED TO USE THE ARMS OF THE CHAIR.
PRONATION_SUPINATION_LEFT: NORMAL
RIGIDITY_LLE: NORMAL
POSTURE: 1 SLIGHT.  NOT QUITE ERECT BUT COULD BE NORMAL FOR OLDER PERSONS.
SPEECH: SLIGHT: LOSS OF MODULATION, DICTION OR VOLUME, BUT STILL ALL WORDS EASY TO UNDERSTAND.

## 2019-03-08 ASSESSMENT — MIFFLIN-ST. JEOR: SCORE: 2186.92

## 2019-03-08 ASSESSMENT — PAIN SCALES - GENERAL: PAINLEVEL: NO PAIN (0)

## 2019-03-08 NOTE — PATIENT INSTRUCTIONS
Plan:  - No changes made to DBS settings this visit  - After being off of topiramate for two weeks you may stop your last dose of CD/LD 25/100 mg 2 tabs at 5pm.   - Follow the below med regimen:   Movement Disorder-related Medications                   4 am 11 am   CD/LD 25/100 mg 2 2   - Continue to exercise daily and safely.   - You will be contacted regarding research that was discussed in clinic. Thank you for your willingness to consider participation.   - You will be contacted to schedule an appointment for your neuropsych exam.     We will see you back on 6/21/2019 at 8:20am. Please do not take CD/LD the morning of this clinic appointment.

## 2019-03-08 NOTE — PROGRESS NOTES
Department of Neurology  Movement Disorders Division     Patient: Stefan Odonnell   MRN: [id number]   : 1950   Date of Visit: 3/8/2019     Reason for visit: PD follow up    History of Present Illness  Mr. Odonnell is a 68 year old R handed male with PMH of PD (and possible concomitant ET) s/p left STN DBS that presents to Neurology Movement clinic for follow up. Patient was last seen on 2019 where he had a slight increase in left hand tremor which was thought to be due the topiramate taper. He was continued on topiramate taper managed by primary Neurologist. No changes were made to DBS at that time.    History obtained from patient accompanied by wife, Blanca. Patient reports that he is currently on Topirimate 100mg 0.5 tab qpm and will be done with this med on . Tremors are improved since last clinic visit. He continues to take CD/LD 25/100mg 2 tabs tid, however, he will often forget his 5pm dose. He doesn't notice that his tremors worsen if he misses his last dose. He denies SEs to CD/LD and doesn't notice wearing off. He forgets to take his 5pm dose of CD/LD at least 4 times a week. He is intererested in decreasing his dose of CD/LD. He is not as active in the winter and is hoping to exercise more when the weather warms. Denies issues with uncomfortable feeling in legs in the evening at this time.    UDPRS Part I and II  Part I   1.1 Cognitive impairment: 0: Normal: No cognitive impairment.  1.2 Hallucinations and psychosis: 0: Normal: No hallucinations or psychotic behaviour.   1.3 Depressed mood: 0: Normal: No depressed mood.   1.4 Anxious mood: 0: Normal: No anxious feelings.   1.5 Apathy: 0: Normal: No apathy.   1.6 Features of DDS: 0: Normal: No problems present.   1.7 Sleep problems: 0: Normal: No problems. . Sleeps 7 hours at night. No dream enactment. Gets up 3 times a night to urinate. He has lost 100lbs in a few years and does not snore anymore.   1.8 Daytime sleepiness: 1: Slight: Daytime  sleepiness occurs but I can resist and I stay awake.   1.9 Pain and other sensations: 0: Normal: No uncomfortable feelings.   1.10 Urinary problems: 0: Normal: No urine control problems.   1.11 Constipation problems: 0: Normal: No constipation. BM twice a day.   1.12 Light headedness on standin: Slight: Dizzy or foggy feelings occur. However, they do not cause me troubles doing things.   1.13 Fatigue:0: Normal: No fatigue.   Total: , previous 5    Part II   2.1 Speech: 0: Normal: Not at all (no problems).   2.2 Saliva and droolin: Normal: Not at all (no problems).   2.3 Chewing and swallowin: Normal: No problems.   2.4 Eating tasks: 0: Normal: Not at all (No problems).   2.5 Dressin: Normal: Not at all (no problems).   2.6 Hygiene: 0: Normal: Not at all (no problems).   2.7 Handwritin: Slight: My writing is slow, clumsy or uneven, but all words are clear. He is able to write legibly now. He has had carpal tunnel syndrome and trigger finger repair.   2.8 Doing hobbies and other activities: 1: Slight: I am a bit slow but do these activities easily.   2.9 Turning in bed:0: Normal: Not at all (no problems).   2.10 Tremor: 1: Slight: Shaking or tremor occurs but does not cause problems with any activities.   When frustrated, the L hand will shake. No shaking in R hand.   2.11 Getting out of bed: 0: Normal: Not at all (no problems).   2.12 Walking and balance: 0: Normal: Not at all (no problems).   2.13 Freezin: Normal: Not at all (no problems).   Total: 3/52, previous 3    Total Part I and II:     Review of Systems:  Other than that mentioned above, the remainder of 12 systems reviewed were negative.    Medications:  Current Outpatient Medications   Medication Sig Dispense Refill     allopurinol (ZYLOPRIM) 300 MG tablet TAKE 1 TABLET BY MOUTH DAILY IN THE EVENING       amLODIPine (NORVASC) 5 MG tablet Take 5 mg by mouth every morning        aspirin 81 MG chewable tablet Take 81 mg by  mouth daily       blood glucose monitoring ([employer] CONTOUR NEXT MONITOR) meter device kit        blood glucose monitoring ([employer] CONTOUR NEXT) test strip USE TO TEST BLOOD SUGARS 3 TIMES DAILY       blood glucose monitoring (VITA MICROLET) lancets Test Blood Sugar 3 Times Daily.  [id number]       carbidopa-levodopa (SINEMET)  MG per tablet Take 2 tablets by mouth 3 times daily Takes at 4 AM, 11 AM, 5 PM       diphenoxylate-atropine (LOMOTIL) 2.5-0.025 MG per tablet Take 2 tablets by mouth 4 times daily as needed        doxepin (SINEQUAN) 10 MG capsule Take 10 mg by mouth At Bedtime        finasteride (PROSCAR) 5 MG tablet Take 5 mg by mouth every morning       glipiZIDE (GLUCOTROL) 10 MG tablet TAKE 5mg (1/2 tablet) BY MOUTH TWICE DAILY BEFORE MEALS.       insulin glargine (LANTUS) 100 UNIT/ML injection Inject 27 units in morning and 24 units at night.       insulin pen needle (EASY TOUCH PEN NEEDLES) 32G X 5 MM USE WITH INSULIN THREE TIMES DAILY       insulin pen needle 32G X 4 MM 1 each       liraglutide (VICTOZA PEN) 18 MG/3ML soln INJECT 1.8 MG UNDER THE SKIN ONE TIME A DAY       potassium chloride (MICRO-K) 10 MEQ CR capsule TAKE 1 CAPSULE BY MOUTH TWICE DAILY WITH MEALS. SWALLOW WHOLE, DO NOT CHEW. CAPSULES MAY BE OPENED AND SPRINKLED ON FOOD.       simvastatin (ZOCOR) 40 MG tablet Take 40 mg by mouth At Bedtime        topiramate (TOPAMAX) 100 MG tablet Take half tablet twice a day          Movement Disorder-related Medications                   4 am 11 am 5 pm 7:30 pm   CD/LD 25/100 mg 2 2 2    Topiramate 100mg    0.5   Last taken at 11 am 3/7/2019     Physical Exam:  The patient's  weight is 135.2 kg (298 lb 1.6 oz).      Physical Exam:  Gen: alert, active, attentive, appropriately groomed   HEENT: normocephalic, eyes open with no discharge, nares patent, oropharynx clear-no lesions  Chest: normal configuration, chest rise equal b/l, non labored breathing   Extremities: no edema/cyanosis in BUE/BLE,  "distal pulses intact  Psych: mood stable     Neurologic Exam:  Mental status: Alert and oriented to person, place, time, and situation. Follows commands. Recent and remote memory intact. Attention span and concentration intact.   Speech: Fluent, intact comprehension and articulation, no vocal tremor with \"Ehhh\" or \"Ahhh\"   CN: visual fields intact in all fields, EOMIB,  no nystagmus, normal saccades, PERRL, facial sensation intact, facial movement symmetric, hearing grossly intact to conversation, tongue protrudes midline   Motor: Moves all extremities equally against gravity without difficulty, normal axial and appendicular tone  Reflexes (R/L): 2/2  in biceps, brachioradialis, triceps, patellars, achilles; no clonus, toes down going  Sensation: intact to light touch throughout   Coordination: grossly intact with FTN with kinetic tremor on R less then 1cm, 0.5-1cm on L  Gait: able to rise unassisted from a seated position though slowed, minimally decreased arm swing on L compared to R and normal length of gait, no en bloc turns, no ataxia    UPDRS III  UPDRS Values 3/8/2019   Time: 2:11 PM   Medication Off   R Brain DBS: None   L Brain DBS: On   Speech 1   Facial Expression 2   Rigidity Neck 3   Rigidity RUE 0   Rigidity LUE 2   Rigidity RLE 0   Rigidity LLE 0   Finger Taps R 1   Finger Taps L 0   Hand Mvt R 0   Hand Mvt L 1   Pron-/Supinate R 0   Pron-/Supinate L 0   Toe Tap R 0   Toe Tap L 0   Leg Agility R 0   Leg Agility L 0   Arise From Chair 1   Gait 0   Gait Freezing 0   Postural Stability 0   Posture 1   Global Spont Mvt 1   Postural Tremor RUE 0   Postural Tremor LUE 1   Kinetic Tremor RUE 1   Kinetic Tremor LUE 1   Rest Tremor RUE 0   Rest Tremor LUE 0   Rest Tremor RLE 0   Rest Tremor LLE 0   Rest Tremor Lip/Jaw 0   Rest Tremor Constancy 0   Total Right 2   Total Left 5   Axial Total 9   Total 16   Previous Off med, on DBS 14  Off med, Off DBS 17  On med, On DBS 13    Battery status: 2.96 " (2.96V)  ON/OFF: ON  Implanted generator: Abbott [id number] (5)  Lead site(s): L STN  System impedance Check: OK, no issues with any leads   Program impedance: 912 ohms     Program 1 Left Brain         Right Brain N/A       Initial Final Initial Final   Amplitude (mA)  3.0 No change        Pulse width (usec)  60         Freq (Hz)  130         Contacts: C  +         Contacts: 4           Contacts: 3  abc-         Contacts: 2           Contacts: 1           Pt range 0 to 3.50mA    Impression:  Mr. Odonnell is a 68 year old R handed male with PMH of PD (and possible concomitant ET) s/p left STN DBS that presents to Neurology Movement clinic for follow up. He reports tremors in hands are improved. He will be completely off of topiramate by this weekend.    Plan:   - No changes made to DBS settings this visit  - After being off of topiramate for two weeks stop your last dose of CD/LD 25/100 mg 2 tabs at 5pm.   - Follow the below med regimen:   Movement Disorder-related Medications                   4 am 11 am   CD/LD 25/100 mg 2 2   - Continue to exercise daily and safely.   - You will be contacted regarding research that was discussed in clinic. Thank you for your willingness to consider participation.   - You will be contacted to schedule an appointment for your neuropsych exam.     RTC: 6/21/2019 at 8:20am. Please do not take CD/LD the morning of this clinic appointment.     Patient seen and discussed with Dr. Sloan.     Kristen Mcbride,   Movement Disorders Fellow    Patient seen and examined with Dr. Mcbride and I agree with the assessment and plan as outlined.    Stanford Sloan PhD, MD

## 2019-03-08 NOTE — NURSING NOTE
Chief Complaint   Patient presents with     RECHECK     [facility] RETURN MOVEMENT DISORDER     Danyelle Fletcher

## 2019-03-12 ENCOUNTER — TRANSFERRED RECORDS (OUTPATIENT)
Dept: HEALTH INFORMATION MANAGEMENT | Facility: CLINIC | Age: 69
End: 2019-03-12

## 2019-03-20 ENCOUNTER — DOCUMENTATION ONLY (OUTPATIENT)
Dept: NEUROLOGY | Facility: CLINIC | Age: 69
End: 2019-03-20

## 2019-03-20 NOTE — PROGRESS NOTES
Received office notes from [facility] 3/20/19, records were sent to be scanned  3/20/19    Michelle Miranda CMA

## 2019-05-16 ENCOUNTER — MYC MEDICAL ADVICE (OUTPATIENT)
Dept: NEUROLOGY | Facility: CLINIC | Age: 69
End: 2019-05-16

## 2019-06-20 ENCOUNTER — OFFICE VISIT (OUTPATIENT)
Dept: NEUROPSYCHOLOGY | Facility: CLINIC | Age: 69
End: 2019-06-20
Payer: COMMERCIAL

## 2019-06-20 DIAGNOSIS — G25.0 ESSENTIAL TREMOR: ICD-10-CM

## 2019-06-20 DIAGNOSIS — G20.A1 PARKINSON'S DISEASE (H): Primary | ICD-10-CM

## 2019-06-20 DIAGNOSIS — F09 MENTAL DISORDER DUE TO GENERAL MEDICAL CONDITION: ICD-10-CM

## 2019-06-20 NOTE — NURSING NOTE
The patient was seen for neuropsychological evaluation at the request of Stanford Sloan for the purposes of diagnostic clarification and treatment planning.  170 minutes of test administration and scoring were provided by this writer, Dionna Roblero.  Please see Dr. Edwina Goldstein's report for a full interpretation of the findings.

## 2019-06-21 ENCOUNTER — OFFICE VISIT (OUTPATIENT)
Dept: NEUROLOGY | Facility: CLINIC | Age: 69
End: 2019-06-21
Payer: COMMERCIAL

## 2019-06-21 VITALS
HEART RATE: 81 BPM | SYSTOLIC BLOOD PRESSURE: 147 MMHG | TEMPERATURE: 98 F | HEIGHT: 73 IN | OXYGEN SATURATION: 95 % | DIASTOLIC BLOOD PRESSURE: 87 MMHG | WEIGHT: 295.8 LBS | BODY MASS INDEX: 39.2 KG/M2 | RESPIRATION RATE: 16 BRPM

## 2019-06-21 DIAGNOSIS — R20.2 PARESTHESIA: ICD-10-CM

## 2019-06-21 DIAGNOSIS — G20.A1 PARKINSON DISEASE (H): Primary | ICD-10-CM

## 2019-06-21 DIAGNOSIS — R29.818 PARKINSONIAN FEATURES: ICD-10-CM

## 2019-06-21 LAB
CREAT SERPL-MCNC: 1.25 MG/DL (ref 0.66–1.25)
GFR SERPL CREATININE-BSD FRML MDRD: 58 ML/MIN/{1.73_M2}

## 2019-06-21 RX ORDER — CARBIDOPA AND LEVODOPA 25; 100 MG/1; MG/1
TABLET ORAL
Qty: 360 TABLET | Refills: 3 | Status: SHIPPED | OUTPATIENT
Start: 2019-06-21 | End: 2024-07-19

## 2019-06-21 ASSESSMENT — UNIFIED PARKINSONS DISEASE RATING SCALE (UPDRS)
RIGIDITY_RUE: MILD: RIGIDITY DETECTED WITHOUT THE ACTIVATION MANEUVER.  FULL RANGE OF MOTION IS EASILY ACHIEVED.
AMPLITUDE_LUE: NORMAL: NO TREMOR.
CONSTANCY_TREMOR_ATREST: NORMAL: NO TREMOR.
FINGER_TAPPING_LEFT: SLIGHT: ANY OF THE FOLLOWING: A) THE REGULAR RHYTHM IS BROKEN WITH ONE WITH ONE OR TWO INTERRUPTIONS OR HESITATIONS OF THE MOVEMENT B) SLIGHT SLOWING C) THE AMPLITUDE DECREMENTS NEAR THE END OF THE 10 MOVEMENTS.
PRONATION_SUPINATION_RIGHT: SLIGHT: ANY OF THE FOLLOWING: A) THE REGULAR RHYTHM IS BROKEN WITH ONE WITH ONE OR TWO INTERRUPTIONS OR HESITATIONS OF THE MOVEMENT B) SLIGHT SLOWING C) THE AMPLITUDE DECREMENTS NEAR THE END OF THE 10 MOVEMENTS.
FINGER_TAPPING_RIGHT: MODERATE: ANY OF THE FOLLOWING:  A) MORE THAN 5 INTERRUPTIONS OR AT LEAST ONE LONGER ARREST (FREEZE) IN ONGOING MOVEMENT  B) MODERATE SLOWING C) THE AMPLITUDE DECREMENTS STARTING AFTER THE FIRST MOVEMENT.
TOETAPPING_RIGHT: NORMAL
PRONATION_SUPINATION_RIGHT: NORMAL
TOTAL_SCORE_LEFT: 11
GAIT: NORMAL
FREEZING_GAIT: NORMAL
AXIAL_SCORE: 7
TOTAL_SCORE: 2
RIGIDITY_LLE: NORMAL
FINGER_TAPPING_RIGHT: SLIGHT: ANY OF THE FOLLOWING: A) THE REGULAR RHYTHM IS BROKEN WITH ONE WITH ONE OR TWO INTERRUPTIONS OR HESITATIONS OF THE MOVEMENT B) SLIGHT SLOWING C) THE AMPLITUDE DECREMENTS NEAR THE END OF THE 10 MOVEMENTS.
FACIAL_EXPRESSION: SLIGHT: MINIMAL MASKED FACIES MANIFESTED ONLY BY DECREASED FREQUENCY OF BLINKING.
HANDMOVEMENTS_RIGHT: MODERATE: ANY OF THE FOLLOWING:  A) MORE THAN 5 INTERRUPTIONS  OR AT LEAST ONE LONGER ARREST (FREEZE) IN ONGOING MOVEMENT  B) MODERATE SLOWING C) THE AMPLITUDE DECREMENTS STARTING AFTER THE FIRST MOVEMENT.
ARISING_CHAIR: NORMAL: ABLE TO ARISE QUICKLY WITHOUT HESITATION.
TOTAL_SCORE: 23
FINGER_TAPPING_LEFT: MODERATE: ANY OF THE FOLLOWING:  A) MORE THAN 5 INTERRUPTIONS  OR AT LEAST ONE LONGER ARREST (FREEZE) IN ONGOING MOVEMENT  B) MODERATE SLOWING C) THE AMPLITUDE DECREMENTS STARTING AFTER THE FIRST MOVEMENT.
POSTURE: 0 NORMAL, NO PROBLEMS
AMPLITUDE_LUE: NORMAL: NO TREMOR.
SPEECH: SLIGHT: LOSS OF MODULATION, DICTION OR VOLUME, BUT STILL ALL WORDS EASY TO UNDERSTAND.
PRONATION_SUPINATION_LEFT: NORMAL
AMPLITUDE_RUE: NORMAL: NO TREMOR.
FREEZING_GAIT: NORMAL
LEG_AGILITY_LEFT: NORMAL
POSTURAL_STABILITY: SLIGHT: 3-5 STEPS, BUT RECOVERS UNAIDED.
SPONTANEITY_OF_MOVEMENT: 1: SLIGHT: SLIGHT GLOBAL SLOWNESS AND POVERTY OF SPONTANEOUS MOVEMENTS.
HANDMOVEMENTS_RIGHT: MODERATE: ANY OF THE FOLLOWING:  A) MORE THAN 5 INTERRUPTIONS  OR AT LEAST ONE LONGER ARREST (FREEZE) IN ONGOING MOVEMENT  B) MODERATE SLOWING C) THE AMPLITUDE DECREMENTS STARTING AFTER THE FIRST MOVEMENT.
POSTURAL_STABILITY: NORMAL:  RECOVERS WITH ONE OR TWO STEPS.
TOETAPPING_LEFT: NORMAL
RIGIDITY_RUE: NORMAL
SPONTANEITY_OF_MOVEMENT: 0: NORMAL.  NO PROBLEMS.
TOETAPPING_RIGHT: NORMAL
ARISING_CHAIR: SLIGHT: ARISING IS SLOWER THAN NORMAL, OR MAY NEED MORE THAN ONE ATTEMPT, OR MAY NEED TO MOVE FORWARD IN THE CHAIR TO ARISE.  NO NEED TO USE THE ARMS OF THE CHAIR.
LEG_AGILITY_LEFT: NORMAL
RIGIDITY_RLE: NORMAL
ARISING_CHAIR: SLIGHT: ARISING IS SLOWER THAN NORMAL, OR MAY NEED MORE THAN ONE ATTEMPT, OR MAY NEED TO MOVE FORWARD IN THE CHAIR TO ARISE.  NO NEED TO USE THE ARMS OF THE CHAIR.
RIGIDITY_LLE: NORMAL
RIGIDITY_RUE: SLIGHT: RIGIDITY ONLY DETECTED WITH ACTIVATION MANEUVER.
TOTAL_SCORE: 9
AMPLITUDE_RLE: NORMAL: NO TREMOR.
AMPLITUDE_RLE: NORMAL: NO TREMOR.
RIGIDITY_NECK: MODERATE: RIGIDITY DETECTED WITHOUT THE ACTIVATION MANEUVER. FULL RANGE OF MOTION IS ACHIEVED WITH EFFORT.
AMPLITUDE_RUE: NORMAL: NO TREMOR.
PARKINSONS_MEDS: OFF
RIGIDITY_RLE: NORMAL
GAIT: NORMAL
LEG_AGILITY_RIGHT: NORMAL
TOTAL_SCORE: 7
TOTAL_SCORE_LEFT: 5
POSTURE: 0 NORMAL, NO PROBLEMS
AMPLITUDE_LIP_JAW: NORMAL: NO TREMOR.
AMPLITUDE_LIP_JAW: NORMAL: NO TREMOR.
POSTURAL_STABILITY: SLIGHT: 3-5 STEPS, BUT RECOVERS UNAIDED.
AXIAL_SCORE: 8
RIGIDITY_LUE: MILD: RIGIDITY DETECTED WITHOUT THE ACTIVATION MANEUVER.  FULL RANGE OF MOTION IS EASILY ACHIEVED.
RIGIDITY_RLE: NORMAL
PRONATION_SUPINATION_RIGHT: NORMAL
RIGIDITY_LUE: MILD: RIGIDITY DETECTED WITHOUT THE ACTIVATION MANEUVER.  FULL RANGE OF MOTION IS EASILY ACHIEVED.
LEG_AGILITY_LEFT: NORMAL
PARKINSONS_MEDS: ON
SPEECH: SLIGHT: LOSS OF MODULATION, DICTION OR VOLUME, BUT STILL ALL WORDS EASY TO UNDERSTAND.
FREEZING_GAIT: NORMAL
TOTAL_SCORE_LEFT: 9
SPEECH: SLIGHT: LOSS OF MODULATION, DICTION OR VOLUME, BUT STILL ALL WORDS EASY TO UNDERSTAND.
RIGIDITY_NECK: MILD: RIGIDITY DETECTED WITHOUT THE ACTIVATION MANEUVER.  FULL RANGE OF MOTION IS EASILY ACHIEVED.
TOETAPPING_RIGHT: NORMAL
LEG_AGILITY_RIGHT: NORMAL
PARKINSONS_MEDS: OFF
AMPLITUDE_RLE: NORMAL: NO TREMOR.
PRONATION_SUPINATION_LEFT: SLIGHT: ANY OF THE FOLLOWING: A) THE REGULAR RHYTHM IS BROKEN WITH ONE WITH ONE OR TWO INTERRUPTIONS OR HESITATIONS OF THE MOVEMENT B) SLIGHT SLOWING C) THE AMPLITUDE DECREMENTS NEAR THE END OF THE 10 MOVEMENTS.
FACIAL_EXPRESSION: SLIGHT: MINIMAL MASKED FACIES MANIFESTED ONLY BY DECREASED FREQUENCY OF BLINKING.
AMPLITUDE_LIP_JAW: NORMAL: NO TREMOR.
SPONTANEITY_OF_MOVEMENT: 1: SLIGHT: SLIGHT GLOBAL SLOWNESS AND POVERTY OF SPONTANEOUS MOVEMENTS.
CONSTANCY_TREMOR_ATREST: NORMAL: NO TREMOR.
RIGIDITY_LLE: NORMAL
HANDMOVEMENTS_LEFT: MILD: ANY OF THE FOLLOWING: A) 3 TO 5 INTERRUPTIONS DURING TAPPING B) MILD SLOWING C) THE AMPLITUDE DECREMENTS MIDWAY IN THE 10-MOVEMENT SEQUENCE
AXIAL_SCORE: 2
RIGIDITY_LUE: MILD: RIGIDITY DETECTED WITHOUT THE ACTIVATION MANEUVER.  FULL RANGE OF MOTION IS EASILY ACHIEVED.
GAIT: NORMAL
FACIAL_EXPRESSION: NORMAL.
HANDMOVEMENTS_LEFT: SLIGHT: ANY OF THE FOLLOWING: A) THE REGULAR RHYTHM IS BROKEN WITH ONE WITH ONE OR TWO INTERRUPTIONS OR HESITATIONS OF THE MOVEMENT B) SLIGHT SLOWING C) THE AMPLITUDE DECREMENTS NEAR THE END OF THE 10 MOVEMENTS.
PRONATION_SUPINATION_LEFT: NORMAL
AMPLITUDE_LLE: NORMAL: NO TREMOR.
AMPLITUDE_LLE: NORMAL: NO TREMOR.
HANDMOVEMENTS_RIGHT: SLIGHT: ANY OF THE FOLLOWING: A) THE REGULAR RHYTHM IS BROKEN WITH ONE WITH ONE OR TWO INTERRUPTIONS OR HESITATIONS OF THE MOVEMENT B) SLIGHT SLOWING C) THE AMPLITUDE DECREMENTS NEAR THE END OF THE 10 MOVEMENTS.
AMPLITUDE_RUE: NORMAL: NO TREMOR.
FINGER_TAPPING_RIGHT: SLIGHT: ANY OF THE FOLLOWING: A) THE REGULAR RHYTHM IS BROKEN WITH ONE WITH ONE OR TWO INTERRUPTIONS OR HESITATIONS OF THE MOVEMENT B) SLIGHT SLOWING C) THE AMPLITUDE DECREMENTS NEAR THE END OF THE 10 MOVEMENTS.
AMPLITUDE_LLE: NORMAL: NO TREMOR.
HANDMOVEMENTS_LEFT: MILD: ANY OF THE FOLLOWING: A) 3 TO 5 INTERRUPTIONS DURING TAPPING B) MILD SLOWING C) THE AMPLITUDE DECREMENTS MIDWAY IN THE 10-MOVEMENT SEQUENCE
TOETAPPING_LEFT: NORMAL
TOTAL_SCORE: 31
TOETAPPING_LEFT: NORMAL
POSTURE: 0 NORMAL, NO PROBLEMS
TOTAL_SCORE: 12
AMPLITUDE_LUE: NORMAL: NO TREMOR.
FINGER_TAPPING_LEFT: NORMAL
CONSTANCY_TREMOR_ATREST: NORMAL: NO TREMOR.
LEG_AGILITY_RIGHT: NORMAL
RIGIDITY_NECK: SLIGHT: RIGIDITY ONLY DETECTED WITH ACTIVATION MANEUVER.

## 2019-06-21 ASSESSMENT — MOVEMENT DISORDERS SOCIETY - UNIFIED PARKINSONS DISEASE RATING SCALE (MDS-UPDRS)
HANDWRITING: NORMAL: NOT AT ALL (NO PROBLEMS).
FREEZING: NORMAL: NOT AT ALL (NO PROBLEMS).
EATING_TASKS: NORMAL: NOT AT ALL (NO PROBLEMS).
TREMOR: NORMAL: NOT AT ALL (NO PROBLEMS).
HOBBIES_AND_OTHER_ACTIVITIES: NORMAL:  NOT AT ALL (NO PROBLEMS).
DRESSING: NORMAL: NOT AT ALL (NO PROBLEMS).
WALKING_AND_BALANCE: NORMAL: NOT AT ALL (NO PROBLEMS).
GETTING_OUT_OF_BED_CAR_DEEP_CHAIR: NORMAL: NOT AT ALL (NO PROBLEMS).
TURNING_IN_BED: NORMAL: NOT AT ALL (NO PROBLEMS).
SALIVA_AND_DROOLING: NORMAL: NOT AT ALL (NO PROBLEMS).
HYGIENE: NORMAL: NOT AT ALL (NO PROBLEMS).
SPEECH: NORMAL: NOT AT ALL (NO PROBLEMS).
CHEWING_AND_SWALLOWING: NORMAL: NO PROBLEMS.
TOTAL_SCORE: 0

## 2019-06-21 ASSESSMENT — PAIN SCALES - GENERAL: PAINLEVEL: NO PAIN (1)

## 2019-06-21 ASSESSMENT — MIFFLIN-ST. JEOR: SCORE: 2161.57

## 2019-06-21 NOTE — PROGRESS NOTES
[facility] Neurology  Movement Disorders Clinic    Stefan Odonnell  YOB: 1950  MRN: [id number]    Diagnosis: Tremulous Parkinson disease  DBS Target(s): Left STN  Date(s) of DBS Lead Placement:   5/29/2019  Date(s) of IPG Placement: 6/8/2019    Reason for visit: DBS programming and follow up for PD    History of Present Illness:    Stefan Odonnell is a 69 year old right handed man with tremulous Parkinson disease and possible superimposed essential tremor s/p left STN DBS who returns for a clinical core 12 month post programming visit. He will complete on/off video taped assessments today.     He was last seen 3/8/2019 at which time there were no changes to DBS settings. He remains on a monopolar omnidirectional setting at contact 3abc- and 3.0 mA, 60 ms and 130 Hz. His maximum is 3.5 mA for patient controller. He was instructed to stop topiramate. He stopped the 5 pm dose of carbidopa/levodopa 25/100, so now he is taking it just twice daily.     Sometimes he feels an tingling along the right face, shoulder and chest occurring perhaps twice weekly and lasting thirty minutes. He found this started at the end of April after he updated his patient . He has not made any other changes. He has left the stimulation settings at 3.0 mA.      He denied dyskinesias. No motor fluctuations or wearing off. No dystonia. Tremor is well controlled. He is pleased with DBS results.     MEDICATIONS:    Outpatient Medications Marked as Taking for the 6/21/19 encounter (Office Visit) with Stanford Sloan MD   Medication Sig     allopurinol (ZYLOPRIM) 300 MG tablet TAKE 1 TABLET BY MOUTH DAILY IN THE EVENING     amLODIPine (NORVASC) 5 MG tablet Take 5 mg by mouth every morning      aspirin 81 MG chewable tablet Take 81 mg by mouth daily     blood glucose monitoring ([employer] CONTOUR NEXT MONITOR) meter device kit      blood glucose monitoring (VITA CONTOUR NEXT) test strip USE TO TEST BLOOD SUGARS 3 TIMES DAILY      "blood glucose monitoring (VITA MICROLET) lancets Test Blood Sugar 3 Times Daily.  Dx-E11.8     carbidopa-levodopa (SINEMET)  MG tablet Take 2 tablets twice daily at 4am and 11am     diphenoxylate-atropine (LOMOTIL) 2.5-0.025 MG per tablet Take 2 tablets by mouth 4 times daily as needed      doxepin (SINEQUAN) 10 MG capsule Take 10 mg by mouth At Bedtime      finasteride (PROSCAR) 5 MG tablet Take 5 mg by mouth every morning     glipiZIDE (GLUCOTROL) 10 MG tablet TAKE 5mg (1/2 tablet) BY MOUTH TWICE DAILY BEFORE MEALS.     insulin glargine (LANTUS) 100 UNIT/ML injection Inject 27 units in morning and 24 units at night.     insulin pen needle (EASY TOUCH PEN NEEDLES) 32G X 5 MM USE WITH INSULIN THREE TIMES DAILY     insulin pen needle 32G X 4 MM 1 each     liraglutide (VICTOZA PEN) 18 MG/3ML soln INJECT 1.8 MG UNDER THE SKIN ONE TIME A DAY     potassium chloride (MICRO-K) 10 MEQ CR capsule TAKE 1 CAPSULE BY MOUTH TWICE DAILY WITH MEALS. SWALLOW WHOLE, DO NOT CHEW. CAPSULES MAY BE OPENED AND SPRINKLED ON FOOD.     simvastatin (ZOCOR) 40 MG tablet Take 40 mg by mouth At Bedtime          PD Medications 4am  11am    Carbidopa/levodopa 25/100 mg   2 2                PAST MEDICAL HISTORY:  Reviewed and updated    On routine eye exam, he was noted to have start of macular degeneration. This will be followed. No treatments for now.   Right knee osteoarthritis has been bothering him. He is worried he may need a knee replacement. He was told arthritis is \"bone on bone\"      Review of Systems:  12 point review of systems completed. Pertinent positives and negatives above and in HPI    Physical Exam:    Vitals: /87 (BP Location: Left arm, Patient Position: Sitting, Cuff Size: Adult Large)   Pulse 81   Temp 98  F (36.7  C) (Oral)   Resp 16   Ht 1.864 m (6' 1.38\")   Wt 134.2 kg (295 lb 12.8 oz)   SpO2 95%   BMI 38.63 kg/m    General: Cooperative, no acute distress  HEENT: Normocephalic and nontraumatic, sclera " white, moist mucous membranes  Cardiac: Regular rate and rhythm  Respiratory: Nonlabored breathing  Extremities: Distal pulses intact. No LE edema    NEUROLOGIC:  MENTAL STATUS: Fully alert, attentive and oriented.  SPEECH: Mild hypophonia  FACIAL EXPRESSION: Reduced blinking.     CRANIAL NERVES: EOM full. Facial movements symmetric. No dysarthria. Tongue protrusion midline.     MOTOR:   See UPDRS below for details    COORDINATION: No dysmetria with FNF bilaterally  GAIT: Mild hesitation rising from a chair, but able to rise independent of arms. Posture upright. Normal base. No freezing. Antalgic gait with limping on the right. He has swelling and osteoarthritis of the right knee. Turns in a single pivot step. Pull test slightly abnormal, took 3 steps.         UDPRS Part I and II    Part I    -- Over the last week -- 0: Normal -- 1: Slight -- 2: Mild -- 3: Moderate -- 4: Severe  1.1 Cognitive Impairment: 0   1.2 Hallucinations and psychosis: 0   1.3 Depressed mood: 0   1.4 Anxious mood: 0   1.5 Apathy:  0   1.6 Features of DDS:  0   1.7 Sleep problems:  0   1.8 Daytime sleepiness:  2   1.9 Pain and other sensations:  0   1.10 Urinary problems:  0   1.11 Constipation problems:   0   1.12 Lightheadedness on standin   1.13 Fatigue:  0     Total:    2 (previous 2)       Part II    -- Over the last week -- 0: Normal -- 1: Slight -- 2: Mild -- 3: Moderate -- 4: Severe   2.1 Speech: 0   2.2 Saliva and droolin   2.3 Chewing and swallowin   2.4 Eating tasks: 0   2.5 Dressin   2.6 Hygiene:  0   2.7 Handwritin   2.8 Doing hobbies and other activities:  0   2.9 Turning in bed:  0   2.10 Tremor:  0   2.11 Getting out of bed/car/deep chair:   0   2.12 Walking and balance: 0   2.13 Freezin     Total:    0 (previous 5)     Total Part I and II:    MDS-UPDRS Part III   0: Normal -- 1: Slight -- 2: Mild -- 3: Moderate -- 4: Severe   Last dose of carbidopa/levodopa 11am yesterday  DBS on for first exam 9:03  am  DBS turned off at 9:14 am  DBS off for second exam 9:48 am  DBS turned back on at 10:09 am  Took carbidopa/levodopa 25/100 mg 2 tablets at 10:11 am, one of the tablets was chewed and one tablet swallowed    UPDRS Values 6/21/2019 6/21/2019 6/21/2019   Time: 9:03 AM 9:48 AM 10:57 AM   Medication Off Off On   R Brain DBS: None None None   L Brain DBS: On Off On   Speech 1 1 1   Facial Expression 1 1 0   Rigidity Neck 2 3 1   Rigidity RUE 1 2 0   Rigidity LUE 2 2 2   Rigidity RLE 0 0 0   Rigidity LLE 0 0 0   Finger Taps R 1 3 1   Finger Taps L 1 3 0   Hand Mvt R 3 3 1   Hand Mvt L 2 2 1   Pron-/Supinate R 0 1 0   Pron-/Supinate L 0 1 0   Toe Tap R 0 0 0   Toe Tap L 0 0 0   Leg Agility R 0 0 0   Leg Agility L 0 0 0   Arise From Chair 1 1 0   Gait 0 0 0   Gait Freezing 0 0 0   Postural Stability 1 1 0   Posture 0 0 0   Global Spont Mvt 1 1 0   Postural Tremor RUE 1 1 0   Postural Tremor LUE 2 1 0   Kinetic Tremor RUE 1 2 0   Kinetic Tremor LUE 2 2 2   Rest Tremor RUE 0 0 0   Rest Tremor LUE 0 0 0   Rest Tremor RLE 0 0 0   Rest Tremor LLE 0 0 0   Rest Tremor Lip/Jaw 0 0 0   Rest Tremor Constancy 0 0 0   Total Right 7 12 2   Total Left 9 11 5   Axial Total 7 8 2   Total 23 31 9       PROCEDURE - DBS PROGRAMMING RECORD    Procedure  DBS Interrogation & Analysis:    Model: Abbott Infinity  Site of implantation: Left STN  Date of implantation: 5/29/2019  Lead type: Abbott Infinity 0.5 mm, REF 6172, S/N [id number]  IPG: Abbott Infinity 5, REF 6660, S/N [id number]  Battery: 2.96 volts    Impedance Check:   No problems found. No open or closed circuits.   Program impedance 912 W      Left Brain             Initial Final   Amplitude (mA) 3.0  No change    Pulse width (usec)  60     Freq (Hz)  130     Contacts: C  +     Contacts: 4      Contacts: 3 abc-      Contacts: 2       Contacts: 1                 IMPRESSION:  Stefan Odonnell is a 69 year old right handed man with Parkinson disease s/p left STN DBS who returned for 12 month  clinical core visit. He is doing very well. His RUE tremor is well controlled.     He reported intermittent tingling radiating from the right face into the right shoulder and chest. This is most likely a phenomenon of the stimulation, but he did report one episode of this tingling with stimulation off today - which could raise concern for alternative etiologies. Out of an abundance of caution, we discussed looking into structural lesions and possible seizures.     PLAN:  - No changes to DBS parameters  - No changes to medications. Continue carbidopa/levodopa 25/100 mg 2 tabs bid  - MRI brain with contrast. Creatinine today  - Would recommend EEG. He would like to do this close to his home and will discuss with his local neurologist.     Patient seen and discussed with Dr. Luther Mcneill MD  Movement Disorders Fellow        Patient seen and examined with Dr. Mcneill and I agree with the assessment and plan as outlined.  Over 30 min for counselling and education:  programming time 1h (for on/off comparison, also, we did the off - to - on transition more rapidly than usual, to see if it reproduced  his right face / shoulder tingling.  It did, but stronger, and involving the whole hemibody.  On the other hand, he did get one episode of the tingling while the stimulation was off.  The spells don't bother him.  TIAs unlikely.  Seizures unlikely, but may be worth an EEG.  We'll repeat brain MRI.  These might be amyloid spells, but his pre-operative brain MRI SWI sequence not suggestive of cerebral amyloid angiopathy.  On the other hand, amyloid spells are attributed to sulcal blood products, which might be sequella of the DBS surgery.    Stanford Sloan PhD, MD.

## 2019-06-21 NOTE — PATIENT INSTRUCTIONS
Thank you for coming to the [facility] for your neurology visit.     We are pleased that you have responded well to DBS. We did not make any adjustments to your DBS settings.     Regarding the face & shoulder tingling, today, you had some of that when the stimulation ws off so we'll do some additional testin)  We ordered an MRI of the brain.    We'll have that done here, because of the neurostimulator. You can complete this at your convenience.      2)  Please talk with your neurologist, Dr. Gandhi regarding EEG

## 2019-07-05 NOTE — PROGRESS NOTES
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 Information was obtained via interview with the patient and his wife, and review of his medical records. Mr. Odonnell has a history of Parkinson s disease and possible concomitant essential tremor. He is s/p left STN DBS lead implantation on 5/29/2018. He has had good benefit from surgery including improvement in hand tremor. He was referred for neuropsychological evaluation by Stanford Sloan M.D., as part of the one year follow-up, to assist in determining whether there have been any changes in cognitive functioning as they pertain to treatment planning.    Mr. Odonnell first underwent a neuropsychological evaluation under the direction of my colleague, Dewey Cazares, PhD, on 1/23/2018. Please refer to the report from that date for full details regarding his history and the findings of the evaluation. Briefly, results indicated findings that were largely normal. He demonstrated overall average to low average cognitive abilities, generally commensurate with his estimated baseline. He was thought to be somewhat slowed at times and had relative weaknesses in complex attentional/executive control, abstraction, and conceptualization. Some of his cognitive profile was thought perhaps attributable to neurodevelopmental factors such as possible ADHD-like syndrome, and normal aging.    CLINICAL INTERVIEW FINDINGS    Upon interview, Mr. Odonnell and his wife indicated that he has had fantastic benefit from surgery on the left side of his brain, affecting the right side of his body. He is now able to eat with his right hand, which he had been unable to do before.  He and his wife have not noticed any changes in cognition, including memory, word finding, comprehension, attention or concentration, decision-making, organization. He lives with his wife, who has always managed their finances. He manages his own medications, apparently without difficulty. He is unable to tell if his symptoms are worse if he is late with the Parkinson s medications. They have been able to decrease the number of doses, as he is now down to taking them twice a day. He does not ever take extra doses to assist with his symptoms. He drives without difficulty, but has had two speeding tickets in the last year for driving 80 mph in a 70 mph zone. He handles his personal cares independently.    Mr. Odonnell denied any history of psychiatric illness. He described his mood as fine and he has not felt depressed, sad, hopeless, or guilty. He does experience some anxiety. His wife noted that he is conscious of his age, as he is the oldest employee and he gets worried about his job. He does not have specific fears and denied physiological concomitants of anxiety. His wife noted that he was easily irritated for about six months after his surgery but that this seems to have resolved. He sleeps 7 to 9 hours a night, from 7:00 p.m. to 2:00 or 4:00 a.m. He does not act out his dreams, although he was restless in his sleep before surgery. He does nap at times. His appetite has been good and his weight is fluctuating. His energy level, interest level, and motivation are good. He is active but has not been working out. He denied visual or auditory hallucinations. He denied suicidal ideation or any history of attempts to commit suicide.    Mr. Odonnell stated that he drinks one beer every six months. He denied illicit drug use or tobacco use. He does not lovett and he denied other compulsive behaviors. 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 Work has been easier  since the tremor has been better controlled.    Since his last evaluation, Mr. Odonnell has not had any major illnesses or injuries. His balance has been fairly good, although he noted that he has no cartilage in his right knee which affects his balance somewhat. He denied unilateral weakness or numbness. He is not been bothered by headaches. He has aches and pains which he attributes to normal aging.    PAST MEDICAL HISTORY: Medical records indicate a history of colon polyps, parkinsonism, obstructive sleep apnea, osteoarthritis of the knees, hypertension, gout, type II diabetes mellitus, chronic kidney disease stage III, basal cell carcinoma, essential tremor.    CURRENT MEDICATIONS:  Include allopurinol, amlodipine, aspirin 81 mg, carbidopa levodopa (Sinemet), diphenoxylate-atropine, doxepin, finasteride, glipizide, insulin glargine, liraglutide, potassium chloride, simvastatin.    FAMILY MEDICAL HISTORY:  Significant for tremor in his father and son. He is reportedly not particularly close with his family so he knows of no other neurologic history.    BEHAVIORAL OBSERVATIONS    During the evaluation, Mr. Odonnell was pleasant, cooperative, and seemed to understand the instructions. He was alert and oriented to person, place, month, and year, but not to date. No tremors were observed clinically. Mood was euthymic although affect was flat. He worked slowly throughout the evaluation. Speech was somewhat slow, with normal articulation and volume. Spontaneous conversation was present and entirely appropriate. Internal performance validity measures fell within normal limits. The results are believed to accurately reflect his current level of functioning.    MEASURES ADMINISTERED    The following measures were administered by a trained psychometrist, under the direct supervision of a licensed psychologist.    Subtests of the Wechsler Adult Intelligence Scale-4; Reading subtest of the Wide Range Achievement Test-4;  subtests of the Wechsler Memory Scale-Revised; Gomez Verbal Learning Test-Revised, Form 5; Brief Visual Memory Test - Revised, Form 6; Sweetser Naming Test; D-KEFS Verbal Fluency, Alternate Form; Trail Making Test; Stroop; Wisconsin Card Sorting Test - 64; Montiel Judgement of Line Orientation Form H; Clock Drawing; Dementia Rating Scale - 2 (DRS-2) Alternate Form; MoCA v 7.1; Rodney Depression Inventory-2 (BDI-2), HAM-D, HAM-A, Apathy Scale, RBDSQ; QUIP, PDQ-39, ESS.     RESULTS AND INTERPRETATION    Overall intellectual functioning was estimated to fall in the average range, marginally above premorbid estimates of low average based on single word reading abilities administered at his prior evaluation. Performance on a screening measure of dementia was average (DRS-2 Total Score = 137/144). On the MoCA, his score was 21/30, with errors in clock drawing, naming, repetition, abstraction, memory, and orientation.    Confrontation naming was high average for his age. Verbal abstract reasoning was low average. Ability to comprehend and articulate responses to complex social situations was average. Letter fluency was low average. Switching fluency was low average for accuracy and was mildly impaired with total number correct. Generative naming to category was moderately impaired.    Attention span was low average for his age. Divided attention was average. Performance on a measure of distractibility was mildly impaired. Psychomotor processing speed was high average.    Basic visual perception, including matching lines and angles, was high average for his age. Construction of a clock was impaired, and was notable for difficulty with conceptualization. On his first attempt, he started in the correct spot with the number 12, but wrote the subsequent numbers from one through nine within the upper right quadrant of the clock. He then tried again, and this time accurately spaced the numbers but used the numbers 12, 5, 10, 15, and  so on through 55. Copy of a clock was notable for difficulty with planning, and he inverted the hands. Nonverbal deductive reasoning was average.    Novel problem-solving, including the ability to generate strategies and solutions, was borderline impaired for his age. On this task, he demonstrated borderline difficulty with conceptualization.    Immediate recall of verbal narrative material was low average, with average recall following a 30 minute delay. On a multiple trial list learning task, immediate recall was low average, with moderately impaired retention (43%) following a 25 minute delay. Recognition memory on this task was borderline impaired. Immediate recall of visual material was average, with average recall following a 25 minute delay. Recognition memory on this task fell within normal limits.    On the BDI-2, a self-report questionnaire, Mr. Odonnell denied experiencing significant depressive symptomatology. Specifically, he acknowledged only that he has less energy than he used to have, and he wakes up one to two hours early and cannot get back to sleep. He denied significant apathy on a scale.     IMPRESSIONS AND RECOMMENDATIONS    Current results indicate mild executive dysfunction, including difficulty with problem solving and conceptualization, and he is mildly distractible. Learning and memory are variable, in some cases falling within normal limits, and in some cases reflecting mild difficulty with retrieval. Verbal fluency is slowed. Language, visual processing, and attention, with the exception of distractibility, fall within normal limits. He endorsed minimal anxiety, and is not experiencing significant depressive symptomatology or apathy.    Compared to his performance on his baseline evaluation on 1/23/2018, he has had declines in semantic fluency and switching fluency, and on measures of distractibility. He has had improvements in psychomotor processing speed and some aspects of memory.  Performance otherwise remains relatively stable across cognitive domains.    This pattern of performance does not reflect dementia at this time, although it is mildly abnormal. There is an indication of greater frontal and subcortical system involvement, consistent with his history of Parkinson s disease. There is not, however, significant cognitive decline over time, with the exception of fluency and distractibility. He has done well following surgery and is pleased with the outcome.    In terms of daily functioning, Mr. Odonnell s cognitive inefficiencies are not likely to interfere with his ability to actively participate in treatment or to manage his instrumental activities of daily living independently. He may find that he benefits from structure and routine. If he has difficulty managing large, complex tasks, others may assist by breaking down such tasks into smaller, more manageable parts. In view of subtle variability in memory, he may benefit from the use of written reminders or checklists. Others may also assist by providing him with written information when possible. When working on a task, he may find it helpful to work in an environment that is relatively free from distractions, such as noises or other interruptions. Although he is active at work, he is not getting formal exercise. Given the benefits of exercise with Parkinson s disease, he may benefit from further discussion about this with his neurologist, and perhaps referral to physical therapy to help him get started on an appropriate exercise regimen, if not already considered.     Results may serve as an updated baseline of his neurocognitive functioning, and the evaluation may be repeated in the future for comparison should a change in mental status occur.      Edwina Goldstein, Ph.D., ABPP  Licensed Psychologist, [place] [id number]  Board Certified in Clinical Neuropsychology      Time spent: 65 minutes neurobehavioral status exam including interview,  clinical assessment by licensed and board-certified neuropsychologist (CPT 15553). 60 minutes (1 unit) neuropsychological testing evaluation by licensed and board-certified neuropsychologist, including integration of patient data, interpretation of standardized test results and clinical data, clinical decision-making, treatment planning, report, and interactive feedback to the patient, first hour (CPT 57950). 95 minutes (2 units) of neuropsychological testing evaluation by licensed and board-certified neuropsychologist, including integration of patient data, interpretation of standardized test results and clinical data, clinical decision-making, treatment planning, report, and interactive feedback to the patient, subsequent hours (CPT 98164). 30 minutes of neuropsychological test administration and scoring by technician, first 30 minutes (CPT 07772). 140 additional minutes (5 units) neuropsychological test administration and scoring by technician, subsequent 30 minutes (CPT 00900). ICD-10 Diagnoses: G20; G25; F06.8.

## 2019-07-05 NOTE — PROGRESS NOTES
Name: Stefan Odonnell MRN: [id number]  : 1950  ABARCA: 2019  Staff: JUSTIN  Tech: ELMA Age: 69  Sex: Male  Hand: Right   Educ: 12   Occupation: [employer]  Vision:  ?with correction   Hearing: ?without correction    WAIS-IV     Raw SSa    Similarities  20 8     Comprehension  24 11     Letter Num. Seq. 17 -     Digit Span  19 7     Matrix Reasoning 10 9    WMS-Revised  Raw MAS     Info & Orientation 13       LM I   19 8     LM II   15 9     LM % retention  79       ALONZO VERBAL LEARNING TEST - REVISED  Form   5  Raw T     Trial 1   6     Trial 2   6     Trial 3   7     Total 1-3  19 41     Learning  1     Delayed Recall  3 29     Percent Retention 43 28     True Positives  9     Discrimination Index 9 38     False Positives  0    BRIEF VISUAL MEMORY TEST - REVISED  Form   6  Raw                   T     Trial 1   6     Trial 2   7     Trial 3   7     Total 1-3  20 48     Learning  1 35     Delay   8 50     Percent Retention 114 >16th%ile     True Positives  6     Discrimination Index 6 >16th%ile     False Positives  0    BOSTON NAMING TEST   Score: 57 MAS: 12  75%ile                    [ 57   w/o cues        2   w/phonemic cues]     D-KEFS VERBAL FLUENCY Alternate        Raw Age Scaled        Letter Fluency 25                   7        Category Fluency 20                   2  Switching Fluency        Total Correct    9                    6        Switching Accuracy      7         8    CLOCK DRAWING     Command   1/3             Copy     2/3    TRAIL MAKING TEST    Raw         Err  MAS  %ile   A 30            0               12             75   B 91        3               9             37    STROOP   Raw + Justus  =   Total          MAS %ile    Word 75 +     --     = 75 7 16   Color  50 +     --     = 50 7 16       Color/Word  19 +     --     = 19 5 5     WCST (64 cards)    Raw      T/%ile   Categories: 2 >16   #Persev. Err.: 10 54   Concept. Resp.: 26 38   FTMS:  1    MARTIN JUDGMENT OF LINE ORIENTATION Form  H   Raw: 26             MAS: 13  84%ile        DEMENTIA RATING SCALE - 2 Alternate       Raw       MAS            Raw     MAS  Attention  35  10        Concept   36              10  Init/Psv  37  11  Memory   23       9  Construct 6  10   Total     137/144     10     BDI-II:  4   APATHY SCALE:        3   ESS  RBDSQ  PDQ-39  QUIP-RS    MAS = [place] Older Adult Normative Study Age Adj. Scaled Score

## 2019-07-11 ENCOUNTER — TELEPHONE (OUTPATIENT)
Dept: NEUROLOGY | Facility: CLINIC | Age: 69
End: 2019-07-11

## 2019-07-11 NOTE — TELEPHONE ENCOUNTER
Received voicemail from Cindy [phone] at [facility] center, stating pt was under the impression that a neurosurgeon would be turning off his DBS system prior to MRI. Cindy was requesting clarification on this.    I called patient and spoke to his wife, Blanca, and confirmed with her that Stefan will need to be the one to place his system in MRI mode (which performs a self-check of the system and gives a message whether or not it is safe to proceed with MRI or not). We had practiced this at his last office visit with Dr. Sloan. I also confirmed the 4:00pm MRI time with Blanca as she had thought the scan was at 1:30.    I confirmed this time with Cindy at MRI center and called patient back that it is indeed at 4:00pm due to the scanner needed, and that patient can check in between 3:30-3:45.      Patient controller bharat user guide emailed to patient to review steps for MRI mode and encouraged them to call me with questions. I also reminded patient to exit MRI mode AND turn his system back ON as well. All of these changes must be done outside of the MRI scanner room.     All questions answered.     Gela Reynolds, RN, MPH  Research Nurse, Movement Disorders

## 2019-07-12 ENCOUNTER — HOSPITAL ENCOUNTER (OUTPATIENT)
Dept: MRI IMAGING | Facility: CLINIC | Age: 69
Discharge: HOME | End: 2019-07-12
Attending: PSYCHIATRY & NEUROLOGY | Admitting: PSYCHIATRY & NEUROLOGY
Payer: COMMERCIAL

## 2019-07-12 DIAGNOSIS — R20.2 PARESTHESIA: ICD-10-CM

## 2019-07-12 PROCEDURE — 70551 MRI BRAIN STEM W/O DYE: CPT

## 2019-07-18 ENCOUNTER — TRANSFERRED RECORDS (OUTPATIENT)
Dept: HEALTH INFORMATION MANAGEMENT | Facility: CLINIC | Age: 69
End: 2019-07-18

## 2019-08-15 ENCOUNTER — TELEPHONE (OUTPATIENT)
Dept: NEUROLOGY | Facility: CLINIC | Age: 69
End: 2019-08-15

## 2019-08-15 NOTE — TELEPHONE ENCOUNTER
Received records from [facility]  Records Date: 8/15/19  Copy has been sent to scanning and encounter routed to specialty nurse for review.

## 2019-11-04 ENCOUNTER — TELEPHONE (OUTPATIENT)
Dept: NEUROLOGY | Facility: CLINIC | Age: 69
End: 2019-11-04
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
## 2019-12-04 ASSESSMENT — MOVEMENT DISORDERS SOCIETY - UNIFIED PARKINSONS DISEASE RATING SCALE (MDS-UPDRS)
FREEZING: NORMAL: NOT AT ALL (NO PROBLEMS).
HANDWRITING: NORMAL: NOT AT ALL (NO PROBLEMS).
SPEECH: NORMAL: NOT AT ALL (NO PROBLEMS).
GETTING_OUT_OF_BED_CAR_DEEP_CHAIR: NORMAL: NOT AT ALL (NO PROBLEMS).
HOBBIES_AND_OTHER_ACTIVITIES: NORMAL:  NOT AT ALL (NO PROBLEMS).
TREMOR: MILD: SHAKING OR TREMOR CAUSES PROBLEMS WITH ONLY A FEW ACTIVITES.
EATING_TASKS: NORMAL: NOT AT ALL (NO PROBLEMS).
DRESSING: NORMAL: NOT AT ALL (NO PROBLEMS).
SALIVA_AND_DROOLING: NORMAL: NOT AT ALL (NO PROBLEMS).
TOTAL_SCORE: 2
WALKING_AND_BALANCE: NORMAL: NOT AT ALL (NO PROBLEMS).
CHEWING_AND_SWALLOWING: NORMAL: NO PROBLEMS.
TURNING_IN_BED: NORMAL: NOT AT ALL (NO PROBLEMS).
HYGIENE: NORMAL: NOT AT ALL (NO PROBLEMS).

## 2019-12-13 ENCOUNTER — OFFICE VISIT (OUTPATIENT)
Dept: NEUROLOGY | Facility: CLINIC | Age: 69
End: 2019-12-13
Payer: COMMERCIAL

## 2019-12-13 VITALS
OXYGEN SATURATION: 95 % | DIASTOLIC BLOOD PRESSURE: 89 MMHG | BODY MASS INDEX: 37.88 KG/M2 | SYSTOLIC BLOOD PRESSURE: 143 MMHG | HEART RATE: 81 BPM | WEIGHT: 290.1 LBS

## 2019-12-13 DIAGNOSIS — G20.A1 PARKINSON DISEASE (H): Primary | ICD-10-CM

## 2019-12-13 ASSESSMENT — UNIFIED PARKINSONS DISEASE RATING SCALE (UPDRS)
HANDMOVEMENTS_LEFT: SLIGHT: ANY OF THE FOLLOWING: A) THE REGULAR RHYTHM IS BROKEN WITH ONE WITH ONE OR TWO INTERRUPTIONS OR HESITATIONS OF THE MOVEMENT B) SLIGHT SLOWING C) THE AMPLITUDE DECREMENTS NEAR THE END OF THE 10 MOVEMENTS.
TOETAPPING_LEFT: SLIGHT: ANY OF THE FOLLOWING: A) THE REGULAR RHYTHM IS BROKEN WITH ONE WITH ONE OR TWO INTERRUPTIONS OR HESITATIONS OF THE MOVEMENT B) SLIGHT SLOWING C) THE AMPLITUDE DECREMENTS NEAR THE END OF THE 10 MOVEMENTS.
RIGIDITY_LLE: NORMAL
PRONATION_SUPINATION_LEFT: SLIGHT: ANY OF THE FOLLOWING: A) THE REGULAR RHYTHM IS BROKEN WITH ONE WITH ONE OR TWO INTERRUPTIONS OR HESITATIONS OF THE MOVEMENT B) SLIGHT SLOWING C) THE AMPLITUDE DECREMENTS NEAR THE END OF THE 10 MOVEMENTS.
GAIT: NORMAL
TOTAL_SCORE: 18
TOTAL_SCORE_LEFT: 9
RIGIDITY_RLE: NORMAL
AMPLITUDE_LUE: NORMAL: NO TREMOR.
PARKINSONS_MEDS: ON
SPONTANEITY_OF_MOVEMENT: 0: NORMAL.  NO PROBLEMS.
AMPLITUDE_LLE: NORMAL: NO TREMOR.
RIGIDITY_LUE: MILD: RIGIDITY DETECTED WITHOUT THE ACTIVATION MANEUVER.  FULL RANGE OF MOTION IS EASILY ACHIEVED.
AMPLITUDE_LIP_JAW: NORMAL: NO TREMOR.
FINGER_TAPPING_RIGHT: SLIGHT: ANY OF THE FOLLOWING: A) THE REGULAR RHYTHM IS BROKEN WITH ONE WITH ONE OR TWO INTERRUPTIONS OR HESITATIONS OF THE MOVEMENT B) SLIGHT SLOWING C) THE AMPLITUDE DECREMENTS NEAR THE END OF THE 10 MOVEMENTS.
FREEZING_GAIT: NORMAL
AXIAL_SCORE: 4
LEG_AGILITY_LEFT: NORMAL
TOTAL_SCORE: 5
HANDMOVEMENTS_RIGHT: SLIGHT: ANY OF THE FOLLOWING: A) THE REGULAR RHYTHM IS BROKEN WITH ONE WITH ONE OR TWO INTERRUPTIONS OR HESITATIONS OF THE MOVEMENT B) SLIGHT SLOWING C) THE AMPLITUDE DECREMENTS NEAR THE END OF THE 10 MOVEMENTS.
ARISING_CHAIR: SLIGHT: ARISING IS SLOWER THAN NORMAL, OR MAY NEED MORE THAN ONE ATTEMPT, OR MAY NEED TO MOVE FORWARD IN THE CHAIR TO ARISE.  NO NEED TO USE THE ARMS OF THE CHAIR.
FACIAL_EXPRESSION: NORMAL.
POSTURE: 0 NORMAL, NO PROBLEMS
AMPLITUDE_RUE: NORMAL: NO TREMOR.
SPEECH: SLIGHT: LOSS OF MODULATION, DICTION OR VOLUME, BUT STILL ALL WORDS EASY TO UNDERSTAND.
FINGER_TAPPING_LEFT: SLIGHT: ANY OF THE FOLLOWING: A) THE REGULAR RHYTHM IS BROKEN WITH ONE WITH ONE OR TWO INTERRUPTIONS OR HESITATIONS OF THE MOVEMENT B) SLIGHT SLOWING C) THE AMPLITUDE DECREMENTS NEAR THE END OF THE 10 MOVEMENTS.
PRONATION_SUPINATION_RIGHT: SLIGHT: ANY OF THE FOLLOWING: A) THE REGULAR RHYTHM IS BROKEN WITH ONE WITH ONE OR TWO INTERRUPTIONS OR HESITATIONS OF THE MOVEMENT B) SLIGHT SLOWING C) THE AMPLITUDE DECREMENTS NEAR THE END OF THE 10 MOVEMENTS.
CONSTANCY_TREMOR_ATREST: NORMAL: NO TREMOR.
TOETAPPING_RIGHT: NORMAL
RIGIDITY_NECK: MILD: RIGIDITY DETECTED WITHOUT THE ACTIVATION MANEUVER.  FULL RANGE OF MOTION IS EASILY ACHIEVED.
RIGIDITY_RUE: SLIGHT: RIGIDITY ONLY DETECTED WITH ACTIVATION MANEUVER.
LEG_AGILITY_RIGHT: NORMAL
POSTURAL_STABILITY: NORMAL:  RECOVERS WITH ONE OR TWO STEPS.
AMPLITUDE_RLE: NORMAL: NO TREMOR.

## 2019-12-13 ASSESSMENT — PAIN SCALES - GENERAL: PAINLEVEL: NO PAIN (0)

## 2019-12-13 NOTE — NURSING NOTE
Chief Complaint   Patient presents with     RECHECK     [facility] RETURN MOVEMENT DISORDER 6 MONTH FOLLOW UP       Jessica Woodard, EMT
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
## 2019-12-13 NOTE — PATIENT INSTRUCTIONS
Plan:   - No changes to DBS parameters this visit.  - No changes to medications. Continue carbidopa/levodopa 25/100 mg 2 tabs bid (meds filled by Dr. Gandhi, primary neurologist).  - Please contact Gela Ny if you would like to be seen sooner than 6 months.   - We discussed potential for right DBS placement for treatment of right body. We can re-discuss at your next clinic visit. Please consider your goals for this procedure.     We will see you back in 6 months, sooner if needed.

## 2019-12-17 ENCOUNTER — TRANSFERRED RECORDS (OUTPATIENT)
Dept: HEALTH INFORMATION MANAGEMENT | Facility: CLINIC | Age: 69
End: 2019-12-17

## 2020-01-07 ENCOUNTER — TELEPHONE (OUTPATIENT)
Dept: NEUROLOGY | Facility: CLINIC | Age: 70
End: 2020-01-07

## 2020-01-07 NOTE — TELEPHONE ENCOUNTER
"I called patient to follow up on clinical core study visit from 12/13/2020 where Reanna, , served as backup. Patient had no further questions but did mention that a little over two weeks ago his right hand tremor increased at work, \"It just hit me and I couldn't control my fingers\".  He said he increased his stimulator from 3.00 mA to 3.25 mA for a few days, which helped, and then when he went back down to 3.00 mA the tremor returned.  He turned back up to 3.25 mA and has been stable there for two weeks and tremor has not returned. He is happy with this change and has not noted any bothersome side effects.  His maximum amplitude limit is 3.50 mA.  I asked patient to call us if his tremor returns in the future so that we can walk him through any changes.    I also discussed future appointment times for his 24 month clinical core study visit, and patient would prefer to have neuropsych eval on a Thursday and on/off motor testing on a Friday to minimize his travel. I have sent a message to schedulers requesting these be scheduled between 6/19 and 8/18 if possible and will update patient with confirmed dates.       Patient had no further questions.Dr. Sloan updated.     Gela Ny, RN, MPH  Research Nurse, Movement Disorders        "

## 2020-01-24 ENCOUNTER — TELEPHONE (OUTPATIENT)
Dept: NEUROLOGY | Facility: CLINIC | Age: 70
End: 2020-01-24

## 2020-01-24 NOTE — TELEPHONE ENCOUNTER
Received records from [facility]   Records Date of service: 12/17/19  Copy has been sent to scanning and encounter routed to specialty nurse for review. FYI was placed in [provider older

## 2020-03-11 ENCOUNTER — HEALTH MAINTENANCE LETTER (OUTPATIENT)
Age: 70
End: 2020-03-11

## 2020-04-20 ENCOUNTER — DOCUMENTATION ONLY (OUTPATIENT)
Dept: CARE COORDINATION | Facility: CLINIC | Age: 70
End: 2020-04-20

## 2020-06-15 ENCOUNTER — TELEPHONE (OUTPATIENT)
Dept: NEUROLOGY | Facility: CLINIC | Age: 70
End: 2020-06-15

## 2020-06-15 ASSESSMENT — MOVEMENT DISORDERS SOCIETY - UNIFIED PARKINSONS DISEASE RATING SCALE (MDS-UPDRS)
EATING_TASKS: NORMAL: NOT AT ALL (NO PROBLEMS).
TREMOR: MILD: SHAKING OR TREMOR CAUSES PROBLEMS WITH ONLY A FEW ACTIVITIES.
SALIVA_AND_DROOLING: NORMAL: NOT AT ALL (NO PROBLEMS).
DRESSING: NORMAL: NOT AT ALL (NO PROBLEMS).
TURNING_IN_BED: NORMAL: NOT AT ALL (NO PROBLEMS).
HYGIENE: NORMAL: NOT AT ALL (NO PROBLEMS).
SPEECH: NORMAL: NOT AT ALL (NO PROBLEMS).
HANDWRITING: NORMAL: NOT AT ALL (NO PROBLEMS).
FREEZING: NORMAL: NOT AT ALL (NO PROBLEMS).
GETTING_OUT_OF_BED_CAR_DEEP_CHAIR: NORMAL: NOT AT ALL (NO PROBLEMS).
TOTAL_SCORE: 2
HOBBIES_AND_OTHER_ACTIVITIES: NORMAL:  NOT AT ALL (NO PROBLEMS).
CHEWING_AND_SWALLOWING: NORMAL: NO PROBLEMS.
WALKING_AND_BALANCE: NORMAL: NOT AT ALL (NO PROBLEMS).

## 2020-06-15 NOTE — TELEPHONE ENCOUNTER
Sent email to patient to notify him that Friday's research visit with Dr. Sloan will be conducted over Video and not in-person.    Attached PDF instructions to enter visit via [employer]. Asked patient to contact me directly to confirm receipt of message, whether he will use phone vs computer, and if he has any problems accessing his [employer] account.    Patient does not need to hold any medications for this visit as we will not be able to perform ON/OFF motor testing. I also asked patient to have his DBS patient  nearby to check settings. I informed patient that he will be asked to return to clinic to collect remainder of research procedures when in-person research visits resume.     Will contact scheduling to transfer to video visit and change length to 60 minutes.    Gela Ny RN, MPH  Research Nurse, Movement Disorders

## 2020-06-19 ENCOUNTER — VIRTUAL VISIT (OUTPATIENT)
Dept: NEUROLOGY | Facility: CLINIC | Age: 70
End: 2020-06-19
Payer: COMMERCIAL

## 2020-06-19 DIAGNOSIS — G20.A1 PARKINSON DISEASE (H): Primary | ICD-10-CM

## 2020-06-19 RX ORDER — HYDROCHLOROTHIAZIDE 25 MG/1
25 TABLET ORAL DAILY
COMMUNITY
End: 2021-01-15

## 2020-06-19 ASSESSMENT — UNIFIED PARKINSONS DISEASE RATING SCALE (UPDRS)
HANDMOVEMENTS_LEFT: MILD: ANY OF THE FOLLOWING: A) 3 TO 5 INTERRUPTIONS DURING TAPPING B) MILD SLOWING C) THE AMPLITUDE DECREMENTS MIDWAY IN THE 10-MOVEMENT SEQUENCE
FACIAL_EXPRESSION: SLIGHT: MINIMAL MASKED FACIES MANIFESTED ONLY BY DECREASED FREQUENCY OF BLINKING.
PRONATION_SUPINATION_RIGHT: NORMAL
CONSTANCY_TREMOR_ATREST: SLIGHT: TREMOR AT REST IS PRESENT  25% OF THE ENTIRE EXAMINATION PERIOD.
FREEZING_GAIT: NORMAL
HANDMOVEMENTS_RIGHT: SLIGHT: ANY OF THE FOLLOWING: A) THE REGULAR RHYTHM IS BROKEN WITH ONE WITH ONE OR TWO INTERRUPTIONS OR HESITATIONS OF THE MOVEMENT B) SLIGHT SLOWING C) THE AMPLITUDE DECREMENTS NEAR THE END OF THE 10 MOVEMENTS.
GAIT: NORMAL
AMPLITUDE_RLE: NORMAL: NO TREMOR.
FINGER_TAPPING_LEFT: SLIGHT: ANY OF THE FOLLOWING: A) THE REGULAR RHYTHM IS BROKEN WITH ONE WITH ONE OR TWO INTERRUPTIONS OR HESITATIONS OF THE MOVEMENT B) SLIGHT SLOWING C) THE AMPLITUDE DECREMENTS NEAR THE END OF THE 10 MOVEMENTS.
LEG_AGILITY_RIGHT: NORMAL
TOETAPPING_LEFT: NORMAL
LEG_AGILITY_LEFT: NORMAL
AMPLITUDE_LLE: NORMAL: NO TREMOR.
SPONTANEITY_OF_MOVEMENT: 1: SLIGHT: SLIGHT GLOBAL SLOWNESS AND POVERTY OF SPONTANEOUS MOVEMENTS.
PARKINSONS_MEDS: ON
ARISING_CHAIR: NORMAL: ABLE TO ARISE QUICKLY WITHOUT HESITATION.
AMPLITUDE_LIP_JAW: NORMAL: NO TREMOR.
POSTURE: 1 SLIGHT.  NOT QUITE ERECT BUT COULD BE NORMAL FOR OLDER PERSONS.
AMPLITUDE_LUE: SLIGHT: < 1 CM IN MAXIMAL AMPLITUDE.
AMPLITUDE_RUE: NORMAL: NO TREMOR.
SPEECH: SLIGHT: LOSS OF MODULATION, DICTION OR VOLUME, BUT STILL ALL WORDS EASY TO UNDERSTAND.
PRONATION_SUPINATION_LEFT: NORMAL
FINGER_TAPPING_RIGHT: MILD: ANY OF THE FOLLOWING: A) 3 TO 5 INTERRUPTIONS DURING TAPPING B) MILD SLOWING C) THE AMPLITUDE DECREMENTS MIDWAY IN THE 10-MOVEMENT SEQUENCE
TOETAPPING_RIGHT: NORMAL

## 2020-06-19 NOTE — PATIENT INSTRUCTIONS
Today you spoke with Reed Her and Gela Ny RN. Your main concern today is your left hand tremor. We discussed options for treatment of this tremor including watching and observing, medicatoon management or potential for second side DBS placement. Patient elected for medication management, which is detailed below.    Plan:   - No changes to DBS settings this visit.  - Increase dose of carbidopa/levodopa 25/100 mg by adding a third dose in the mid morning. Follow the titration table below.  Carbidopa/Levodopa 25/100 mg 4 am 7 am 11 am   Week 1 2 1 2   Week 2                         2 2 2   - Call Gela Ny RN, in 3 weeks for an update.  - Give the above regimen at least 3 weeks and observe your symptoms. May go back to the original regimen of CD/LD 25/100 mg 2 tabs bid at 4 am and 11 am, if the above regimen does not improve left hand tremor. But prior to making this change, call the clinic first.

## 2020-06-19 NOTE — PROGRESS NOTES
"Stefan Odonnell is a 70 year old male who is being evaluated via a billable video visit.      The patient has been notified of following:     \"This video visit will be conducted via a call between you and your physician/provider. We have found that certain health care needs can be provided without the need for an in-person physical exam.  This service lets us provide the care you need with a video conversation.  If a prescription is necessary we can send it directly to your pharmacy.  If lab work is needed we can place an order for that and you can then stop by our lab to have the test done at a later time.    Video visits are billed at different rates depending on your insurance coverage.  Please reach out to your insurance provider with any questions.    If during the course of the call the physician/provider feels a video visit is not appropriate, you will not be charged for this service.\"    Patient has given verbal consent for Video visit? YES    Will anyone else be joining your video visit? Yes, Gela Ny RN, [facility] research nurse.        Video-Visit Details    Type of service:  Video Visit    Video Start Time: refer below  Video End Time: refer below    Originating Location (pt. Location): Home    Distant Location (provider location):  [facility] NEUROLOGY     Platform used for Video Visit: Pola Cuellar, EMT  "

## 2020-06-19 NOTE — LETTER
"2020       RE: Stefan Odonnell  [address] [facility] [address]     Dear Colleague,    Thank you for referring your patient, Stefan Odonnell, to the [facility] NEUROLOGY at [facility]. Please see a copy of my visit note below.    MOVEMENT DISORDERS TELEMEDICINE (video and/or telephone) VISIT:     PATIENT: Stefan Odonnell    : 1950    DATE: 2020    REASON FOR VISIT: PD follow up, research patient    After a review of the patient s situation, this visit was changed from an in-person visit to a Telemedicine visit to reduce the risk of COVID-19 exposure. The patient is being evaluated via a billable Telemedicine visit.    Mr. Odonnell is a 70 year old right handed man with tremulous Parkinson disease and possible superimposed essential tremor s/p left STN DBS who returns for a clinical core 24 month post programming visit. The patient's last visit was on 2019 where no changes were made to his DBS settings or PD meds. Regarding his right facial tingling, this had completely resolved. We discussed that the sensation may have been caused from a microbleed associated with placement of the DBS lead which has now resorbed. MRI brain was unrevealing for acute abnormalities.       History obtained from patient and accompanied by wife. Patient reports he is doing \"pretty good\". Left hand having more tremors. He has to concentrate more on left hand when he uses it. This is his biggest complaint. No changes to tremor of left hand after taking CD/LD. Currently, he shares that his left hand feels \"normal\". Left hand tremors are worse with certain activities such as when he is working and with fine details activities. Denies dyskinesias or fluctuations.    He had his right knee replaced on 3/16/2020. He shares that his recovery went well. He is back to work full time since 2020. Spouse is doing much better since the serious car accident last summer. She was in " "rehab/nursing facility from August - November. She is feeling more fatigued. Son (35-36 years old) is starting to get tremors in his right hand.    Patient called a time after his last clinic appointment with Dr. Sloan and increased his stimulation from 3.0 mA to 3.25 mA and has not changed his setting since. DBS on at all times. Carries patient  and checks battery at least once a week.    For remainder of HPI, please refer to UPDRS I and II below.    UPDRS Part I and II  Part I     1.1 Cognitive impairment:  0: Normal: No cognitive impairment.    1.2 Hallucinations and psychosis:  0: Normal: No hallucinations or psychotic behaviour.     1.3 Depressed mood:  0: Normal: No depressed mood.    1.4 Anxious mood:  0: Normal: No anxious feelings.     1.5 Apathy:  0: Normal: No apathy.     1.6 Features of DDS: 0: Normal: No problems present.     1.7 Sleep problems:  1: Slight: Sleep problems are present but usually do not cause trouble getting a full night of sleep.  Sleeps 5-6 hours a night, up to use the bathroom 3-4 times a night due to taking a \"water pill\". No issues going to sleep, usually. May or not feel rested in the am. No dream enactment. Does not snore.    1.8 Daytime sleepiness:  2: Mild: Sometimes I fall asleep when alone and relaxing. For example, while reading or watching TV.     1.9 Pain and other sensations:  0: Normal: No uncomfortable feelings.     1.10 Urinary problems:  1: Slight: I need to urinate often or urgently. However, these problems do not cause difficulties with my daily activities.  Drinks a lot of water in the day.   1.11 Constipation problems:  0: Normal: No constipation.  2 BMs a day.   1.12 Light headedness on standin: Slight: Dizzy or foggy feelings occur. However, they do not cause me troubles doing things.    1.13 Fatigue:  0: Normal: No fatigue.     Total: , previous 2     Part II    2.1 Speech:   0: Normal: Not at all (no problems).    2.2 Saliva and droolin: " Normal: Not at all (no problems).    2.3 Chewing and swallowin: Normal: No problems.    2.4 Eating tasks:  1: Slight: I am slow, but I do not need any help handling my food and have not had food spills while eating.    2.5 Dressin: Normal: Not at all (no problems).     2.6 Hygiene:   0: Normal: Not at all (no problems).    2.7 Handwritin: Normal: Not at all (no problems).    2.8 Doing hobbies and other activities:  0: Normal: Not at all (no problems).    2.9 Turning in bed: 0: Normal: Not at all (no problems).    2.10 Tremor:  2: Mild: Shaking or tremor causes problems with only a few activities. See above for details on tremors.   2.11 Getting out of bed:  0: Normal: Not at all (no problems).    2.12 Walking and balance:  0: Normal: Not at all (no problems).    2.13 Freezin: Normal: Not at all (no problems).     Total: 3/52, previous 6     Total Part I and II:     I have reviewed and updated the patient's Past Medical History, Social History, Family History and Medication List.    PMH: as stated above  PSH: as stated above  FH: unchanged  SH: unchanged      Medications:  Current Outpatient Medications   Medication Sig Dispense Refill     allopurinol (ZYLOPRIM) 300 MG tablet TAKE 1 TABLET BY MOUTH DAILY IN THE EVENING       amLODIPine (NORVASC) 5 MG tablet Take 5 mg by mouth every morning        aspirin 81 MG chewable tablet Take 81 mg by mouth daily       blood glucose monitoring ([employer] CONTOUR NEXT MONITOR) meter device kit        blood glucose monitoring ([employer] CONTOUR NEXT) test strip USE TO TEST BLOOD SUGARS 3 TIMES DAILY       blood glucose monitoring (VITA MICROLET) lancets Test Blood Sugar 3 Times Daily.  Dx-E11.8       carbidopa-levodopa (SINEMET)  MG tablet Take 2 tablets twice daily at 4am and 11am 360 tablet 3     diphenoxylate-atropine (LOMOTIL) 2.5-0.025 MG per tablet Take 2 tablets by mouth 4 times daily as needed        doxepin (SINEQUAN) 10 MG capsule Take 10 mg by  mouth At Bedtime        finasteride (PROSCAR) 5 MG tablet Take 5 mg by mouth every morning       glipiZIDE (GLUCOTROL) 10 MG tablet Take 10 mg by mouth 2 times daily       hydrochlorothiazide (HYDRODIURIL) 25 MG tablet Take 25 mg by mouth daily       insulin glargine (LANTUS) 100 UNIT/ML injection Inject 55 Units Subcutaneous 2 times daily Inject 25 units qpm, 30 units qam       insulin pen needle (EASY TOUCH PEN NEEDLES) 32G X 5 MM USE WITH INSULIN THREE TIMES DAILY       insulin pen needle 32G X 4 MM 1 each       liraglutide (VICTOZA PEN) 18 MG/3ML soln INJECT 1.8 MG UNDER THE SKIN ONE TIME A DAY       simvastatin (ZOCOR) 40 MG tablet Take 40 mg by mouth At Bedtime          Movement Disorder-related Medications                   4 am 11 am   CD/LD 25/100 mg 2 2   Last taken at 6:30 am    Allergies:  Atorvastatin; Clindamycin; and Gabapentin    Physical Exam:  GENERAL: alert, active, attentive, appropriately groomed   HEENT: normocephalic, eyes open with no discharge  CHEST: non labored breathing   PSYCH: mood stable     Neurologic Exam:  MENTAL STATUS: Alert and oriented to person, place, time, and situation. Follows commands. Recent and remote memory intact. Attention span and concentration intact.  SPEECH: Fluent, intact comprehension and hypophonia  CN: visual fields intact, EOMIB,  no nystagmus, normal saccades, facial movement symmetric except for left eye ptosis vs redundant eyelid, hearing grossly intact to conversation, tongue protrudes midline and moves horizontally without issue  MOTOR: Moves all extremities equally against gravity without difficulty  Involuntary movements: refer to UPDRS III - truncated due to limitations of a video visit  Tone: increased in left arm   COORDINATION: no dysmetria with FTN  GAIT: able to rise unassisted from a deep seated position, diminished right arm swing and no left arm swing, normal stride length, 2 turns with some instability requiring him to side step, no ataxia,  left hand tremor observed with ambulation    UPDRS III  UPDRS Values 6/19/2020   Time: 9:10 AM   Medication On   R Brain DBS: None   L Brain DBS: On   Speech 1   Facial Expression 1   Rigidity Neck    Rigidity RUE    Rigidity LUE    Rigidity RLE    Rigidity LLE    Finger Taps R 2   Finger Taps L 1   Hand Mvt R 1   Hand Mvt L 2   Pron-/Supinate R 0   Pron-/Supinate L 0   Toe Tap R 0   Toe Tap L 0   Leg Agility R 0   Leg Agility L 0   Arise From Chair 0   Gait 0   Gait Freezing 0   Postural Stability    Posture 1   Global Spont Mvt 1   Postural Tremor RUE 0   Postural Tremor LUE 1   Kinetic Tremor RUE 0   Kinetic Tremor LUE 0   Rest Tremor RUE 0   Rest Tremor LUE 1   Rest Tremor RLE 0   Rest Tremor LLE 0   Rest Tremor Lip/Jaw 0   Rest Tremor Constancy 1   Total Right    Total Left    Axial Total    Total    Previous 18    Procedure  DBS Interrogation & Analysis:     Model: Abbott Infinity  Site of implantation: Left STN  Date of implantation: 5/29/2019  Lead type: Abbott Infinity 0.5 mm, REF 6172, S/N [id number]  IPG: Abbott Infinity 5, REF 6660, S/N [id number]    Therapy is ON  Battery: unable to verify voltage (2.96 volts)  Green check porfirio confirmed, indicating that battery charge is adequate  Impedance Check: unable to perform    Left brain stimulation at 3.25 mA    Assessment:  Stefan Odonnell is a 70 year old right handed man with Parkinson disease s/p left STN DBS who returned for 24 month clinical core visit. He continues to do well. His RUE tremor is well controlled with CD/LD and DBS. He is developing tremors in his left hand. His main concern today is his left hand tremor. We discussed options for treatment of this tremor including watching and observing, medication management vs potential for second side DBS placement. Patient elected for medication management, which is detailed below.    Plan:   - No changes to DBS settings this visit.  - Increase dose of carbidopa/levodopa 25/100 mg by adding a third dose in  "the mid morning. Follow the titration table below.  Carbidopa/Levodopa 25/100 mg 4 am 7 am 11 am   Week 1 2 1 2   Week 2                         2 2 2   - Call Gela Ny RN, in 3 weeks for an update.  - May go back to the original regimen of CD/LD 25/100 mg 2 tabs bid at 4 am and 11 am, if the above regimen does not improve left hand tremor.    Follow up in 6 months or sooner as needed.    Kristen Mcbride DO  Movement Disorders Fellow    Time was a aguilar factor in today's visit, and greater than 50% of this 99 minute visit was spent discussing the therapeutic plan, counseling, and coordinating care.  20 minutes were spent in documentation review on June 19, 2020.    Contact time:  Call Started at 8:21 am  Call Ended at 10 am    Patient seen and plan of care discussed with Dr. Sloan, whom was involved throughout the entirety of this TeleHealth visit.     Kristen Mcbride DO  Movement Disorders Fellow  [facility]    I have reviewed the note as documented above.  This accurately captures the substance of my conversation with the patient.  I was present throughout and participated in this Telehealth visit.    Stanford Sloan PhD, MD      Stefan Odonnell is a 70 year old male who is being evaluated via a billable video visit.      The patient has been notified of following:     \"This video visit will be conducted via a call between you and your physician/provider. We have found that certain health care needs can be provided without the need for an in-person physical exam.  This service lets us provide the care you need with a video conversation.  If a prescription is necessary we can send it directly to your pharmacy.  If lab work is needed we can place an order for that and you can then stop by our lab to have the test done at a later time.    Video visits are billed at different rates depending on your insurance coverage.  Please reach out to your insurance provider with any questions.    If during " "the course of the call the physician/provider feels a video visit is not appropriate, you will not be charged for this service.\"    Patient has given verbal consent for Video visit? YES    Will anyone else be joining your video visit? Yes, Gela Ny RN, [facility] research nurse.  {If patient encounters technical issues they should call [phone] :[phone]}      Video-Visit Details    Type of service:  Video Visit    Video Start Time: refer below  Video End Time: refer below    Originating Location (pt. Location): Home    Distant Location (provider location):  [facility] NEUROLOGY     Platform used for Video Visit: Pola Cuellar, EMT    Again, thank you for allowing me to participate in the care of your patient.      Sincerely,    Stanford Sloan MD      "

## 2020-06-19 NOTE — PROGRESS NOTES
"MOVEMENT DISORDERS TELEMEDICINE (video and/or telephone) VISIT:     PATIENT: Stefan Odonnell    : 1950    DATE: 2020    REASON FOR VISIT: PD follow up, research patient    After a review of the patient s situation, this visit was changed from an in-person visit to a Telemedicine visit to reduce the risk of COVID-19 exposure. The patient is being evaluated via a billable Telemedicine visit.    Mr. Odonnell is a 70 year old right handed man with tremulous Parkinson disease and possible superimposed essential tremor s/p left STN DBS who returns for a clinical core 24 month post programming visit. The patient's last visit was on 2019 where no changes were made to his DBS settings or PD meds. Regarding his right facial tingling, this had completely resolved. We discussed that the sensation may have been caused from a microbleed associated with placement of the DBS lead which has now resorbed. MRI brain was unrevealing for acute abnormalities.       History obtained from patient and accompanied by wife. Patient reports he is doing \"pretty good\". Left hand having more tremors. He has to concentrate more on left hand when he uses it. This is his biggest complaint. No changes to tremor of left hand after taking CD/LD. Currently, he shares that his left hand feels \"normal\". Left hand tremors are worse with certain activities such as when he is working and with fine details activities. Denies dyskinesias or fluctuations.    He had his right knee replaced on 3/16/2020. He shares that his recovery went well. He is back to work full time since 2020. Spouse is doing much better since the serious car accident last summer. She was in rehab/nursing facility from August - November. She is feeling more fatigued. Son (35-36 years old) is starting to get tremors in his right hand.    Patient called a time after his last clinic appointment with Dr. Sloan and increased his stimulation from 3.0 mA to 3.25 mA and has " "not changed his setting since. DBS on at all times. Carries patient  and checks battery at least once a week.    For remainder of HPI, please refer to UPDRS I and II below.    UPDRS Part I and II  Part I     1.1 Cognitive impairment:  0: Normal: No cognitive impairment.    1.2 Hallucinations and psychosis:  0: Normal: No hallucinations or psychotic behaviour.     1.3 Depressed mood:  0: Normal: No depressed mood.    1.4 Anxious mood:  0: Normal: No anxious feelings.     1.5 Apathy:  0: Normal: No apathy.     1.6 Features of DDS: 0: Normal: No problems present.     1.7 Sleep problems:  1: Slight: Sleep problems are present but usually do not cause trouble getting a full night of sleep.  Sleeps 5-6 hours a night, up to use the bathroom 3-4 times a night due to taking a \"water pill\". No issues going to sleep, usually. May or not feel rested in the am. No dream enactment. Does not snore.    1.8 Daytime sleepiness:  2: Mild: Sometimes I fall asleep when alone and relaxing. For example, while reading or watching TV.     1.9 Pain and other sensations:  0: Normal: No uncomfortable feelings.     1.10 Urinary problems:  1: Slight: I need to urinate often or urgently. However, these problems do not cause difficulties with my daily activities.  Drinks a lot of water in the day.   1.11 Constipation problems:  0: Normal: No constipation.  2 BMs a day.   1.12 Light headedness on standin: Slight: Dizzy or foggy feelings occur. However, they do not cause me troubles doing things.    1.13 Fatigue:  0: Normal: No fatigue.     Total: , previous 2     Part II    2.1 Speech:   0: Normal: Not at all (no problems).    2.2 Saliva and droolin: Normal: Not at all (no problems).    2.3 Chewing and swallowin: Normal: No problems.    2.4 Eating tasks:  1: Slight: I am slow, but I do not need any help handling my food and have not had food spills while eating.    2.5 Dressin: Normal: Not at all (no problems).   "   2.6 Hygiene:   0: Normal: Not at all (no problems).    2.7 Handwritin: Normal: Not at all (no problems).    2.8 Doing hobbies and other activities:  0: Normal: Not at all (no problems).    2.9 Turning in bed: 0: Normal: Not at all (no problems).    2.10 Tremor:  2: Mild: Shaking or tremor causes problems with only a few activities. See above for details on tremors.   2.11 Getting out of bed:  0: Normal: Not at all (no problems).    2.12 Walking and balance:  0: Normal: Not at all (no problems).    2.13 Freezin: Normal: Not at all (no problems).     Total: 3/52, previous 6     Total Part I and II:     I have reviewed and updated the patient's Past Medical History, Social History, Family History and Medication List.    PMH: as stated above  PSH: as stated above  FH: unchanged  SH: unchanged      Medications:  Current Outpatient Medications   Medication Sig Dispense Refill     allopurinol (ZYLOPRIM) 300 MG tablet TAKE 1 TABLET BY MOUTH DAILY IN THE EVENING       amLODIPine (NORVASC) 5 MG tablet Take 5 mg by mouth every morning        aspirin 81 MG chewable tablet Take 81 mg by mouth daily       blood glucose monitoring ([employer] CONTOUR NEXT MONITOR) meter device kit        blood glucose monitoring ([employer] CONTOUR NEXT) test strip USE TO TEST BLOOD SUGARS 3 TIMES DAILY       blood glucose monitoring ([employer] MICROLET) lancets Test Blood Sugar 3 Times Daily.  Dx-E11.8       carbidopa-levodopa (SINEMET)  MG tablet Take 2 tablets twice daily at 4am and 11am 360 tablet 3     diphenoxylate-atropine (LOMOTIL) 2.5-0.025 MG per tablet Take 2 tablets by mouth 4 times daily as needed        doxepin (SINEQUAN) 10 MG capsule Take 10 mg by mouth At Bedtime        finasteride (PROSCAR) 5 MG tablet Take 5 mg by mouth every morning       glipiZIDE (GLUCOTROL) 10 MG tablet Take 10 mg by mouth 2 times daily       hydrochlorothiazide (HYDRODIURIL) 25 MG tablet Take 25 mg by mouth daily       insulin glargine (LANTUS) 100  UNIT/ML injection Inject 55 Units Subcutaneous 2 times daily Inject 25 units qpm, 30 units qam       insulin pen needle ([employer]AS[employer] TOUCH PEN NEEDLES) 32G X 5 MM USE WITH INSULIN THREE TIMES DAILY       insulin pen needle 32G X 4 MM 1 each       liraglutide (VICTOZA PEN) 18 MG/3ML soln INJECT 1.8 MG UNDER THE SKIN ONE TIME A DAY       simvastatin (ZOCOR) 40 MG tablet Take 40 mg by mouth At Bedtime          Movement Disorder-related Medications                   4 am 11 am   CD/LD 25/100 mg 2 2   Last taken at 6:30 am    Allergies:  Atorvastatin; Clindamycin; and Gabapentin    Physical Exam:  GENERAL: alert, active, attentive, appropriately groomed   HEENT: normocephalic, eyes open with no discharge  CHEST: non labored breathing   PSYCH: mood stable     Neurologic Exam:  MENTAL STATUS: Alert and oriented to person, place, time, and situation. Follows commands. Recent and remote memory intact. Attention span and concentration intact.  SPEECH: Fluent, intact comprehension and hypophonia  CN: visual fields intact, EOMIB,  no nystagmus, normal saccades, facial movement symmetric except for left eye ptosis vs redundant eyelid, hearing grossly intact to conversation, tongue protrudes midline and moves horizontally without issue  MOTOR: Moves all extremities equally against gravity without difficulty  Involuntary movements: refer to UPDRS III - truncated due to limitations of a video visit  Tone: increased in left arm   COORDINATION: no dysmetria with FTN  GAIT: able to rise unassisted from a deep seated position, diminished right arm swing and no left arm swing, normal stride length, 2 turns with some instability requiring him to side step, no ataxia, left hand tremor observed with ambulation    UPDRS III  UPDRS Values 6/19/2020   Time: 9:10 AM   Medication On   R Brain DBS: None   L Brain DBS: On   Speech 1   Facial Expression 1   Rigidity Neck    Rigidity RUE    Rigidity LUE    Rigidity RLE    Rigidity LLE    Finger Taps R 2    Finger Taps L 1   Hand Mvt R 1   Hand Mvt L 2   Pron-/Supinate R 0   Pron-/Supinate L 0   Toe Tap R 0   Toe Tap L 0   Leg Agility R 0   Leg Agility L 0   Arise From Chair 0   Gait 0   Gait Freezing 0   Postural Stability    Posture 1   Global Spont Mvt 1   Postural Tremor RUE 0   Postural Tremor LUE 1   Kinetic Tremor RUE 0   Kinetic Tremor LUE 0   Rest Tremor RUE 0   Rest Tremor LUE 1   Rest Tremor RLE 0   Rest Tremor LLE 0   Rest Tremor Lip/Jaw 0   Rest Tremor Constancy 1   Total Right    Total Left    Axial Total    Total    Previous 18    Procedure  DBS Interrogation & Analysis:     Model: [employer] Infinity  Site of implantation: Left STN  Date of implantation: 5/29/2019  Lead type: [employer] 0.5 mm, REF 6172, S/N [id number]  IPG: [employer] 5, REF 6660, S/N [id number]    Therapy is ON  Battery: unable to verify voltage (2.96 volts)  Green check porfirio confirmed, indicating that battery charge is adequate  Impedance Check: unable to perform    Left brain stimulation at 3.25 mA    Assessment:  Stefan Odonnell is a 70 year old right handed man with Parkinson disease s/p left STN DBS who returned for 24 month clinical core visit. He continues to do well. His RUE tremor is well controlled with CD/LD and DBS. He is developing tremors in his left hand. His main concern today is his left hand tremor. We discussed options for treatment of this tremor including watching and observing, medication management vs potential for second side DBS placement. Patient elected for medication management, which is detailed below.    Plan:   - No changes to DBS settings this visit.  - Increase dose of carbidopa/levodopa 25/100 mg by adding a third dose in the mid morning. Follow the titration table below.  Carbidopa/Levodopa 25/100 mg 4 am 7 am 11 am   Week 1 2 1 2   Week 2                         2 2 2   - Call Gela Ny RN, in 3 weeks for an update.  - May go back to the original regimen of CD/LD 25/100 mg 2 tabs bid at 4 am  and 11 am, if the above regimen does not improve left hand tremor.    Follow up in 6 months or sooner as needed.    Kristen Mcbride DO  Movement Disorders Fellow    Time was a aguilar factor in today's visit, and greater than 50% of this 99 minute visit was spent discussing the therapeutic plan, counseling, and coordinating care.  20 minutes were spent in documentation review on June 19, 2020.    Contact time:  Call Started at 8:21 am  Call Ended at 10 am    Patient seen and plan of care discussed with Dr. Sloan, whom was involved throughout the entirety of this TeleHealth visit.     Kristen Mcbride DO  Movement Disorders Fellow  [facility]    I have reviewed the note as documented above.  This accurately captures the substance of my conversation with the patient.  I was present throughout and participated in this Telehealth visit.    Stanford Sloan PhD, MD

## 2020-07-27 ENCOUNTER — MYC MEDICAL ADVICE (OUTPATIENT)
Dept: NEUROLOGY | Facility: CLINIC | Age: 70
End: 2020-07-27

## 2020-07-29 ENCOUNTER — MYC MEDICAL ADVICE (OUTPATIENT)
Dept: NEUROLOGY | Facility: CLINIC | Age: 70
End: 2020-07-29

## 2020-08-14 ENCOUNTER — VIRTUAL VISIT (OUTPATIENT)
Dept: NEUROPSYCHOLOGY | Facility: CLINIC | Age: 70
End: 2020-08-14
Payer: COMMERCIAL

## 2020-08-14 DIAGNOSIS — Z00.6 EXAMINATION OF PARTICIPANT IN CLINICAL TRIAL: Primary | ICD-10-CM

## 2020-08-14 NOTE — PROGRESS NOTES
"Stefan Odonnell is a 70 year old male who is being evaluated via a billable video visit.      The patient has been notified of following:     \"This video visit will be conducted via a call between you and your provider. This service lets us provide the care you need with a video conversation, and testing will be completed at the clinic in the future.\"    Patient has given verbal consent for Video visit? Yes  If you are dropped from the video visit, the video invite should be resent to: Send to e-mail at: [email or web address]  Will anyone else be joining your video visit? No    Mr. Odonnell completed the interview portion of the 24 month [place] Clinical Core Visit via video, including the HAM-D, HAM-A, and additional questions including those relating to alcohol, tobacco and drug use. He understands that he will need to be seen in person in the future to complete the formal testing component of this visit. He did not endorse significant depressive symptomatology, anxiety, or apathy, although he has had reduced energy. 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 He is worn out after his long days at work. He denied alcohol, tobacco, or illicit drug use, and he has not had suicidal ideation. He denied cognitive difficulties.  He is pleased with the outcome of his surgery and is hoping to proceed with surgery for the second side.      Video-Visit Details    Type of service:  Video Visit    Video Start Time: 10:00 a.m.  Video End Time: 10:19 a.m.    Originating Location (pt. Location): Home    Distant Location (provider location):  [facility] NEUROPSYCHOLOGY     Platform used for Video Visit: Phil Goldstein, PhD LP        "

## 2020-11-24 ENCOUNTER — MYC MEDICAL ADVICE (OUTPATIENT)
Dept: NEUROLOGY | Facility: CLINIC | Age: 70
End: 2020-11-24

## 2020-11-30 ENCOUNTER — TELEPHONE (OUTPATIENT)
Dept: NEUROLOGY | Facility: CLINIC | Age: 70
End: 2020-11-30

## 2020-11-30 NOTE — TELEPHONE ENCOUNTER
I was notified that via [employer], patient canceled this week's [place] Clinical Core appointments (Neuropsych testing and neurology ON/OFF testing). I called patient and spoke to spouse, their  tested positive for COVID-19 and they do not have any alternatives for care while away, also a close friend passed away from COVID-19.  They plan to say home for at least two weeks.    Research visit window closes on January 20th, and I do see openings in Dr. Sloan's schedule for Dec 18th and January 15th. Will work with Dr. Goldstein to see if we can coordinate both appointments in same week as patient travels from hours away and stays with friends.    Gela Ny, RN, MPH  Research Nurse, Movement Disorders

## 2020-12-02 NOTE — TELEPHONE ENCOUNTER
Spoke with pt and pt's spouse regarding rescheduling in-person Clinical Core research study appointments. New dates agreed upon: Thurs Jan 14th 8am for neuropsych testing and Fri Jan 15th 7am with Dr. Sloan for on/off testing. Medication instructions given.      Plan to re-consent patient via [employer] e-consent tool on Friday Dec 4th at 3:30pm.    Gela Ny RN, MPH  Research Nurse, Movement Disorders

## 2020-12-27 ENCOUNTER — HEALTH MAINTENANCE LETTER (OUTPATIENT)
Age: 70
End: 2020-12-27

## 2021-01-12 ASSESSMENT — MOVEMENT DISORDERS SOCIETY - UNIFIED PARKINSONS DISEASE RATING SCALE (MDS-UPDRS)
TREMOR: MODERATE: SHAKING OR TREMOR CAUSES PROBLEMS WITH MANY OF MY DAILY ACTIVITIES.
WALKING_AND_BALANCE: NORMAL: NOT AT ALL (NO PROBLEMS).
DRESSING: NORMAL: NOT AT ALL (NO PROBLEMS).
TURNING_IN_BED: NORMAL: NOT AT ALL (NO PROBLEMS).
SPEECH: NORMAL: NOT AT ALL (NO PROBLEMS).
HYGIENE: NORMAL: NOT AT ALL (NO PROBLEMS).
EATING_TASKS: NORMAL: NOT AT ALL (NO PROBLEMS).
HANDWRITING: NORMAL: NOT AT ALL (NO PROBLEMS).
GETTING_OUT_OF_BED_CAR_DEEP_CHAIR: NORMAL: NOT AT ALL (NO PROBLEMS).
FREEZING: NORMAL: NOT AT ALL (NO PROBLEMS).
CHEWING_AND_SWALLOWING: NORMAL: NO PROBLEMS.
HOBBIES_AND_OTHER_ACTIVITIES: NORMAL:  NOT AT ALL (NO PROBLEMS).
SALIVA_AND_DROOLING: NORMAL: NOT AT ALL (NO PROBLEMS).
TOTAL_SCORE: 3

## 2021-01-14 ENCOUNTER — OFFICE VISIT (OUTPATIENT)
Dept: NEUROPSYCHOLOGY | Facility: CLINIC | Age: 71
End: 2021-01-14
Payer: COMMERCIAL

## 2021-01-14 DIAGNOSIS — Z00.6 EXAMINATION OF PARTICIPANT IN CLINICAL TRIAL: Primary | ICD-10-CM

## 2021-01-14 PROCEDURE — 96139 PSYCL/NRPSYC TST TECH EA: CPT

## 2021-01-14 PROCEDURE — 96138 PSYCL/NRPSYC TECH 1ST: CPT

## 2021-01-14 NOTE — PROGRESS NOTES
Department of Neurology  Movement Disorders Division   DBS Follow-up Note    Patient: Stefan Odonnell  MRN: [id number]   : 1950   Date of Visit: 1/15/2021    Diagnosis: Parkinson's disease  DBS Target(s): Left STN  Date(s) of DBS Lead Placement: 2018  Date(s) of IPG Placement: Left chest 2018  Device: Abbott    Chief Complaint:  Mr. Odonnell is a 70 year old right handed man with tremulous Parkinson disease and possible superimposed essential tremor s/p left STN DBS who returns for a clinical core 24 month videotaping session. The patient's last visit was on 2020 at which time a third dose of carbidopa/levodopa was added.    Interval History:  Trigger finger reccured on the right hand after he started therapy.  Also started to develop locking of the left hand finger.  CTS s/p release seems resolved.  He reports trouble gripping boards at work.  Feels his right knee is improved after surgery and therapy.  Rare knee pain, occurs during sleep.  Eye floaters improved.  Ongoing right leg swelling that he was told is due to his surgery.  Hydrochlorothiazide was changed to lasix 4 days ago due to kidney injury.  Uses Norco occasionally for burning pain of the feet.    Fell a few months ago when he missed a step off the ladder.  Lost his balance 2 days ago while walking up steps.   He was able to catch himself.    Increased sinemet has helped with worsening left hand tremor.  He is bothered by this tremor.  Denies dyskinesias, dystonia, and motor fluctuations.  His wife reports more apathy at home than prior.  She notes that the evening meal can be frustrating due to gripping the fork and cutting.  This meal is at 1730.  His wife also mentioned that he fell out of bed a few weeks ago while sleeping even though he sleeps quieter and calmer now.  He has stopped snoring since he lost weight.    We discussed second side surgery.  He feels that the left hand doesn't do what he wants.  Gives the example of  being unable to use the spray bottle with it.  He wonders if he'll have improvement.  His wife is worried because she recalls what happens when he had side effects during programming.      UPDRS  Part I     1.1 Cognitive impairment:  0: Normal: No cognitive impairment.    1.2 Hallucinations and psychosis:  0: Normal: No hallucinations or psychotic behaviour.     1.3 Depressed mood:  0: Normal: No depressed mood.    1.4 Anxious mood:  1: Slight: Anxious feelings present but not sustained for more than one day at a time. No interference with patient's ability to carry out normal activities and social interactions.     1.5 Apathy:  2: Mild: Apathy interferes with isolated activities and social interactions.     1.6 Features of DDS: 0: Normal: No problems present.     1.7 Sleep problems:  2: Mild: Sleep problems usually cause some difficulties getting a full night of sleep.    1.8 Daytime sleepiness:  2: Mild: Sometimes I fall asleep when alone and relaxing. For example, while reading or watching TV.     1.9 Pain and other sensations:  1: Slight: I have these feelings. However, I can do things and be with other people without difficulty.     1.10 Urinary problems:  1: Slight: I need to urinate often or urgently. However, these problems do not cause difficulties with my daily activities.     1.11 Constipation problems:  0: Normal: No constipation.     1.12 Light headedness on standin: Normal: No dizzy or foggy feelings.    1.13 Fatigue:  1: Slight: Fatigue occurs. However it does not cause me troubles doing things or being with people.     Total: 10/52, previous 5      Part II  2.1 Speech (P) 0 (P) Normal: Not at all (no problems).   2.2 Saliva and drooling (P) 0 (P) Normal: Not at all (no problems).   2.3 Chewing and swallowing (P) 0 (P) Normal: No problems.   2.4 Eating tasks (P) 0 (P) Normal: Not at all (no problems).   2.5 Dressing (P) 0 (P) Normal: Not at all (no problems).   2.6 Hygiene (P) 0 (P) Normal: Not at  all (no problems).   2.7 Handwriting (P) 0 (P) Normal: Not at all (no problems).   2.8 Doing hobbies and other activities (P) 0 (P) Normal:  Not at all (no problems).   2.9 Turning in bed (P) 0 (P) Normal: Not at all (no problems).   2.10 Tremor (P) 3 (P) Moderate: Shaking or tremor causes problems with many of my daily activities.   2.11 Getting out of bed  (P) 0 (P) Normal: Not at all (no problems).   2.12 Walking and balance  (P) 0 (P) Normal: Not at all (no problems).   2.13 Freezing (P) 0 (P) Normal: Not at all (no problems).   Total (P) 3        Review of Systems:  Other than that noted at the end of this note, the remainder of 12 systems reviewed were negative.    Medications:  Current Outpatient Medications   Medication Sig Dispense Refill     allopurinol (ZYLOPRIM) 300 MG tablet TAKE 1 TABLET BY MOUTH DAILY IN THE EVENING       amLODIPine (NORVASC) 5 MG tablet Take 5 mg by mouth every morning        aspirin 81 MG chewable tablet Take 81 mg by mouth daily       augmented betamethasone dipropionate (DIPROLENE-AF) 0.05 % external ointment Apply topically 2 times daily       blood glucose monitoring (VITA CONTOUR NEXT MONITOR) meter device kit        blood glucose monitoring (VITA CONTOUR NEXT) test strip USE TO TEST BLOOD SUGARS 3 TIMES DAILY       blood glucose monitoring (VITA MICROLET) lancets Test Blood Sugar 3 Times Daily.  Dx-E11.8       carbidopa-levodopa (SINEMET CR)  MG CR tablet Take half a tab at 3pm.  Increase to 1 tab after 1 week. 90 tablet 3     carbidopa-levodopa (SINEMET)  MG tablet Take 2 tablets twice daily at 4am and 11am (Patient taking differently: 2 tablets 4 times daily ) 360 tablet 3     cholecalciferol 50 MCG (2000 UT) CAPS Take 4,000 Units by mouth       diphenoxylate-atropine (LOMOTIL) 2.5-0.025 MG per tablet Take 2 tablets by mouth 4 times daily as needed        doxepin (SINEQUAN) 10 MG capsule Take 10 mg by mouth At Bedtime        finasteride (PROSCAR) 5 MG tablet  Take 5 mg by mouth every morning       furosemide (LASIX) 20 MG tablet Take 20 mg by mouth as needed        glipiZIDE (GLUCOTROL) 10 MG tablet Take 10 mg by mouth 2 times daily       HYDROcodone-acetaminophen (NORCO)  MG per tablet Take 1 tablet by mouth as needed        insulin glargine (LANTUS) 100 UNIT/ML injection Inject Subcutaneous 2 times daily Inject 26 units qpm, 31 units qam       insulin pen needle (EASY TOUCH PEN NEEDLES) 32G X 5 MM USE WITH INSULIN THREE TIMES DAILY       insulin pen needle 32G X 4 MM 1 each       liraglutide (VICTOZA PEN) 18 MG/3ML soln INJECT 1.8 MG UNDER THE SKIN ONE TIME A DAY       Misc Natural Products (TART CHERRY ADVANCED) CAPS Take 1 capsule by mouth 2 times daily        omeprazole (PRILOSEC) 40 MG DR capsule Take 40 mg by mouth daily       pregabalin (LYRICA) 50 MG capsule Take 1 capsule (50 mg) by mouth daily as needed (for burning foot pain) 90 capsule 3     simvastatin (ZOCOR) 40 MG tablet Take 40 mg by mouth At Bedtime           PD Medications 0400 0700 1100 1500   CD/LD 25/100mg 2 2 2 2       Allergies: is allergic to atorvastatin; clindamycin; and gabapentin.    Past Medical History:  Past Medical History:   Diagnosis Date     Anosmia      Bleeding ulcer 1983     Carpal tunnel syndrome, bilateral      CKD (chronic kidney disease) stage 3, GFR 30-59 ml/min      Diabetes mellitus (H)      Gout      Hx of skin cancer, basal cell 2013    removed from face     Hyperlipidemia      Hypertension      Insomnia      Macular degeneration      Neuropathy      Obese      RENÉE (obstructive sleep apnea)      Osteoarthritis of knees, bilateral      Periodic limb movements of sleep      Restless legs syndrome (RLS)        Past Surgical History:  Past Surgical History:   Procedure Laterality Date     ABDOMEN SURGERY  6/1983    ulcers     COLONOSCOPY       HEMORRHOID SURGERY       IMPLANT DEEP BRAIN STIMULATION GENERATOR / BATTERY Left 6/8/2018    Procedure: IMPLANT DEEP BRAIN  STIMULATION GENERATOR / BATTERY;  Deep Brain Stimulator Placement, Phase II, Placement Of Deep Brain Stimulator Generator/Battery Over The Left Chest Wall;  Surgeon: Wayne Marshall MD;  Location: [facility] OR     Open Stomach Ulcer Repair       OPTICAL TRACKING SYSTEM INSERTION DEEP BRAIN STIMULATION Left 5/29/2018    Procedure: OPTICAL TRACKING SYSTEM INSERTION DEEP BRAIN STIMULATION;  Stealth Assisted Left Deep Brain Stimulator Placement, Phase I, Placement Of Left Deep Brain Stimulator Electrode, Target Left Subthalamic Nucleus With Microelectrode Recording;  Surgeon: Wayne Marshall MD;  Location: [facility] OR     TONSILLECTOMY         Social History:  Social History     Socioeconomic History     Marital status:      Spouse name: Blanca Odonnell     Number of children: 1     Years of education: Not on file     Highest education level: Not on file   Occupational History     Occupation: Special [employer]     Comment: [employer]   Social Needs     Financial resource strain: Not on file     Food insecurity     Worry: Not on file     Inability: Not on file     Transportation needs     Medical: Not on file     Non-medical: Not on file   Tobacco Use     Smoking status: Never Smoker     Smokeless tobacco: Never Used   Substance and Sexual Activity     Alcohol use: No     Comment: 2 - 3 x a year     Drug use: No     Sexual activity: Yes     Partners: Female   Lifestyle     Physical activity     Days per week: Not on file     Minutes per session: Not on file     Stress: Not on file   Relationships     Social connections     Talks on phone: Not on file     Gets together: Not on file     Attends Roman [employer] service: Not on file     Active member of club or organization: Not on file     Attends meetings of clubs or organizations: Not on file     Relationship status: Not on file     Intimate partner violence     Fear of current or ex partner: Not on file     Emotionally abused: Not on file     Physically abused:  "Not on file     Forced sexual activity: Not on file   Other Topics Concern     Parent/sibling w/ CABG, MI or angioplasty before 65F 55M? No   Social History Narrative    On second marriage.  Has a son from first marriage.   for 27 years.  Works at [employer] as special [employer].  Never smoked.  He rarely drinks alcohol about 2 - 3 x per year.  Doesn't use illicit drugs.        Family History:  Family History   Problem Relation Age of Onset     Heart Disease Mother      Diabetes Mother      Arthritis Mother      Depression Mother      Anxiety Disorder Mother      Dementia Mother      Hypertension Mother      LUNG DISEASE Mother      Prostate Cancer Father      Tremor Father      Cancer Father         Prostate     Obesity Sister      Heart Disease Brother      Bone Cancer Brother      Heart Disease Brother      Tremor Son      Heart Disease Maternal Grandmother      Heart Disease Maternal Grandfather      Heart Disease Paternal Grandmother      Heart Disease Paternal Grandfather      Unknown/Adopted Sister        Physical Exam:  The patient's  height is 1.88 m (6' 2\") and weight is 134.7 kg (297 lb). His temperature is 98.6  F (37  C). His blood pressure is 146/89 (abnormal) and his pulse is 71. His respiration is 17 and oxygen saturation is 94%.       Neurological Examination:   Last medication dose: 1/14/2021 at 1500, 2 tabs CD/LD  DBS off at 7:20am  DBS on at 8:00am  Meds taken: 2 tabs CD/LD at 8:00 1/15/2020  CTS provocative maneuvers were negative.  UPDRS Values 1/15/2021 1/15/2021 1/15/2021   Time: 7:09 AM 7:53 AM 9:10 AM   Medication Off Off On   R Brain DBS: None None None   L Brain DBS: On Off On   Dyskinesia (LID) No No No   Speech 1 1 1   Facial Expression 1 1 1   Rigidity Neck 4 4 4   Rigidity RUE 1 2 1   Rigidity LUE 1 2 2   Rigidity RLE 0 1 1   Rigidity LLE 0 0 2   Finger Taps R 1 3 1   Finger Taps L 1 1 2   Hand Mvt R 1 2 1   Hand Mvt L 1 1 1   Pron-/Supinate R 1 3 1   Pron-/Supinate L " "1 1 0   Toe Tap R 0 1 0   Toe Tap L 1 1 1   Leg Agility R 0 1 0   Leg Agility L 0 0 1   Arise From Chair 1 1 1   Gait 1 1 0   Gait Freezing 0 0 0   Postural Stability 0 0 0   Posture 2 1 1   Global Spont Mvt 2 2 2   Postural Tremor RUE 1 1 0   Postural Tremor LUE 1 1 1   Kinetic Tremor RUE 0 1 0   Kinetic Tremor LUE 2 2 2   Rest Tremor RUE 0 1 0   Rest Tremor LUE 0 0 2   Rest Tremor RLE 0 0 0   Rest Tremor LLE 0 0 0   Rest Tremor Lip/Jaw 0 0 0   Rest Tremor Constancy 0 1 1   Total Right 5 16 5   Total Left 8 9 14   Axial Total 12 11 10   Total 25 37 30       Procedure: DBS Interrogation & Programming    Lead(s):    Left   DBS Target STN   DBS Lead Type Infinity 0.5   Lead Implant Date 5/29/2018     IPG(s):   1   IPG Infinity 5   IPG Implant Date 6/8/2018   Location Left chest   Battery (V) 2.94V (2.96V)     Impedance Check: See scanned report for impedance details.  Therapy 575 Ohms  Short circuits identified at 3b     Left STN       Initial Final   Amplitude (mA) 3.25 [0-3.5 by 0.25] 3.25 [0-4.5 by 0.25]   Pulse width ( s)  60  60   Freq (Hz)  130  130   Contacts: C  +  +   Contacts: 4       Contacts: 3 3abc-  3abc-   Contacts: 2       Contacts: 1        Clinical Core Study Programming Form  Please complete upon initial monopolar review and any subsequent \"mini-monopolar reviews\".    Please avoid any extra spaces at the end of cells.    Generator Serial No.  ex: [id number] Lead Hemisphere  Ex: Right Lead Region  Ex: STN  Initials  Ex: LS, KD    Left STN AC, SC         Enter value only with  change No blank cells No blank cells   Contacts  Cpos, 2bcneg Ampl  0.5 Ampl Unit  V or mA Pulse Width (ms)  60   Rate (Hz)  130 Effect on Symptoms   NA not assessed  NC no change from setting above  NN none noted Side Effects  NA  NC  NN     Cpos 3abcneg 3.25 mA 60 130 See UPDRS III NN    3.75    Same NN    4.25    NA NN    4.5    Bradykinesias improved, RUE rigidity improved NN       Assessment/plan:    Mr. Odonnell is a " 70 year old right handed man with tremulous Parkinson disease and possible superimposed essential tremor s/p left STN DBS who returns for a clinical core 24 month videotaping session. He is noticing worsening left hand tremor. We discussed a second side surgery for his left hand which he will consider.    He is also having trouble gripping things which could be related to PD, CTS, or trigger finger.  Will have him repeat EMG to assess for median neuropathy at the wrist.  Will add also long acting LD to his afternoon dose to address trouble with utensils at dinner.  For his neuropathic foot pain, will order pregabalin to replace Norco.      He has good symptom control on the right, the stimulated side.  Interrogation of his DBS revealed a short circuit.  XR of the head, neck, and chest did not reveal a cause.  Based upon his clinical improvement with stimulation, this is not consequential.  His stimulation was not changed but the upper limit was increased.    - Pregabalin 50mg at bedtime PRN for neuropathic pain in feet.  Contact Amber Austin for authorization  - EMG bilateral for CTS, external referral placed  - Continue CD/LD 25/100mg 2 tabs QID  - Add CD/LD 50/200 CR at 1500, titrate to 1 tab  - OT referral for assistive devices and hand therapy  - XR DBS device to look for any causes of short-circuit  - RTC 6 months in person      Imelda Villavicencio MD  Movement Disorders Fellow    Patient seen and examined with Dr. Villavicencio and I agree with the assessment and plan as outlined.  I was present throughout, participated in, and closely supervised the neurostimulator programming 0.5h.    Stanford Sloan PhD, MD

## 2021-01-15 ENCOUNTER — ANCILLARY PROCEDURE (OUTPATIENT)
Dept: GENERAL RADIOLOGY | Facility: CLINIC | Age: 71
End: 2021-01-15
Attending: STUDENT IN AN ORGANIZED HEALTH CARE EDUCATION/TRAINING PROGRAM
Payer: COMMERCIAL

## 2021-01-15 ENCOUNTER — OFFICE VISIT (OUTPATIENT)
Dept: NEUROLOGY | Facility: CLINIC | Age: 71
End: 2021-01-15
Payer: COMMERCIAL

## 2021-01-15 VITALS
SYSTOLIC BLOOD PRESSURE: 146 MMHG | BODY MASS INDEX: 38.12 KG/M2 | HEIGHT: 74 IN | OXYGEN SATURATION: 94 % | HEART RATE: 71 BPM | RESPIRATION RATE: 17 BRPM | TEMPERATURE: 98.6 F | DIASTOLIC BLOOD PRESSURE: 89 MMHG | WEIGHT: 297 LBS

## 2021-01-15 DIAGNOSIS — G20.A1 PARKINSON DISEASE (H): ICD-10-CM

## 2021-01-15 DIAGNOSIS — G20.A1 PARKINSON DISEASE (H): Primary | ICD-10-CM

## 2021-01-15 DIAGNOSIS — M79.2 NEUROPATHIC PAIN: ICD-10-CM

## 2021-01-15 DIAGNOSIS — G56.03 BILATERAL CARPAL TUNNEL SYNDROME: ICD-10-CM

## 2021-01-15 PROCEDURE — 70250 X-RAY EXAM OF SKULL: CPT | Mod: GC | Performed by: STUDENT IN AN ORGANIZED HEALTH CARE EDUCATION/TRAINING PROGRAM

## 2021-01-15 PROCEDURE — 95983 ALYS BRN NPGT PRGRMG 15 MIN: CPT | Performed by: PSYCHIATRY & NEUROLOGY

## 2021-01-15 PROCEDURE — 71046 X-RAY EXAM CHEST 2 VIEWS: CPT | Mod: GC | Performed by: RADIOLOGY

## 2021-01-15 PROCEDURE — 99214 OFFICE O/P EST MOD 30 MIN: CPT | Mod: GC | Performed by: PSYCHIATRY & NEUROLOGY

## 2021-01-15 PROCEDURE — 95984 ALYS BRN NPGT PRGRMG ADDL 15: CPT | Performed by: PSYCHIATRY & NEUROLOGY

## 2021-01-15 PROCEDURE — 70360 X-RAY EXAM OF NECK: CPT | Mod: GC | Performed by: STUDENT IN AN ORGANIZED HEALTH CARE EDUCATION/TRAINING PROGRAM

## 2021-01-15 RX ORDER — PREGABALIN 50 MG/1
50 CAPSULE ORAL DAILY PRN
Qty: 90 CAPSULE | Refills: 3 | Status: SHIPPED | OUTPATIENT
Start: 2021-01-15 | End: 2021-10-19

## 2021-01-15 RX ORDER — FUROSEMIDE 20 MG
20 TABLET ORAL PRN
COMMUNITY
Start: 2021-01-04 | End: 2022-08-19

## 2021-01-15 RX ORDER — BETAMETHASONE DIPROPIONATE 0.5 MG/G
OINTMENT, AUGMENTED TOPICAL 2 TIMES DAILY
COMMUNITY

## 2021-01-15 RX ORDER — HYDROCODONE BITARTRATE AND ACETAMINOPHEN 10; 325 MG/1; MG/1
1 TABLET ORAL PRN
COMMUNITY
Start: 2021-01-04 | End: 2021-10-07

## 2021-01-15 RX ORDER — LORAZEPAM 0.5 MG
1 TABLET ORAL 2 TIMES DAILY
COMMUNITY

## 2021-01-15 RX ORDER — OMEPRAZOLE 40 MG/1
40 CAPSULE, DELAYED RELEASE ORAL DAILY
COMMUNITY
End: 2021-10-07

## 2021-01-15 RX ORDER — CARBIDOPA AND LEVODOPA 50; 200 MG/1; MG/1
TABLET, EXTENDED RELEASE ORAL
Qty: 90 TABLET | Refills: 3 | Status: SHIPPED | OUTPATIENT
Start: 2021-01-15

## 2021-01-15 RX ORDER — OMEPRAZOLE 40 MG/1
40 CAPSULE, DELAYED RELEASE ORAL DAILY PRN
COMMUNITY
End: 2021-01-15

## 2021-01-15 ASSESSMENT — UNIFIED PARKINSONS DISEASE RATING SCALE (UPDRS)
PRONATION_SUPINATION_LEFT: NORMAL
AMPLITUDE_RLE: NORMAL: NO TREMOR.
RIGIDITY_RUE: SLIGHT: RIGIDITY ONLY DETECTED WITH ACTIVATION MANEUVER.
POSTURAL_STABILITY: NORMAL:  RECOVERS WITH ONE OR TWO STEPS.
RIGIDITY_LLE: NORMAL
CONSTANCY_TREMOR_ATREST: NORMAL: NO TREMOR.
FACIAL_EXPRESSION: SLIGHT: MINIMAL MASKED FACIES MANIFESTED ONLY BY DECREASED FREQUENCY OF BLINKING.
RIGIDITY_NECK: SEVERE: RIGIDITY DETECTED WITHOUT THE ACTIVATION MANEUVER AND FULL RANGE OF MOTION NOT ACHIEVED.
TOTAL_SCORE: 25
FACIAL_EXPRESSION: SLIGHT: MINIMAL MASKED FACIES MANIFESTED ONLY BY DECREASED FREQUENCY OF BLINKING.
AXIAL_SCORE: 10
TOTAL_SCORE: 30
TOETAPPING_LEFT: SLIGHT: ANY OF THE FOLLOWING: A) THE REGULAR RHYTHM IS BROKEN WITH ONE WITH ONE OR TWO INTERRUPTIONS OR HESITATIONS OF THE MOVEMENT B) SLIGHT SLOWING C) THE AMPLITUDE DECREMENTS NEAR THE END OF THE 10 MOVEMENTS.
LEG_AGILITY_LEFT: NORMAL
AMPLITUDE_LIP_JAW: NORMAL: NO TREMOR.
GAIT: SLIGHT: INDEPENDENT WALKING WITH MINOR GAIT IMPAIRMENT.
AMPLITUDE_RUE: SLIGHT: < 1 CM IN MAXIMAL AMPLITUDE.
PARKINSONS_MEDS: OFF
PRONATION_SUPINATION_RIGHT: SLIGHT: ANY OF THE FOLLOWING: A) THE REGULAR RHYTHM IS BROKEN WITH ONE WITH ONE OR TWO INTERRUPTIONS OR HESITATIONS OF THE MOVEMENT B) SLIGHT SLOWING C) THE AMPLITUDE DECREMENTS NEAR THE END OF THE 10 MOVEMENTS.
POSTURAL_STABILITY: NORMAL:  RECOVERS WITH ONE OR TWO STEPS.
AMPLITUDE_RLE: NORMAL: NO TREMOR.
TOETAPPING_RIGHT: NORMAL
SPONTANEITY_OF_MOVEMENT: 2: MILD: MILD GLOBAL SLOWNESS AND POVERTY OF SPONTANEOUS MOVEMENTS.
TOETAPPING_LEFT: SLIGHT: ANY OF THE FOLLOWING: A) THE REGULAR RHYTHM IS BROKEN WITH ONE WITH ONE OR TWO INTERRUPTIONS OR HESITATIONS OF THE MOVEMENT B) SLIGHT SLOWING C) THE AMPLITUDE DECREMENTS NEAR THE END OF THE 10 MOVEMENTS.
LEG_AGILITY_RIGHT: NORMAL
AMPLITUDE_LIP_JAW: NORMAL: NO TREMOR.
AMPLITUDE_LLE: NORMAL: NO TREMOR.
FINGER_TAPPING_LEFT: SLIGHT: ANY OF THE FOLLOWING: A) THE REGULAR RHYTHM IS BROKEN WITH ONE WITH ONE OR TWO INTERRUPTIONS OR HESITATIONS OF THE MOVEMENT B) SLIGHT SLOWING C) THE AMPLITUDE DECREMENTS NEAR THE END OF THE 10 MOVEMENTS.
RIGIDITY_LUE: SLIGHT: RIGIDITY ONLY DETECTED WITH ACTIVATION MANEUVER.
AMPLITUDE_LUE: NORMAL: NO TREMOR.
ARISING_CHAIR: SLIGHT: ARISING IS SLOWER THAN NORMAL, OR MAY NEED MORE THAN ONE ATTEMPT, OR MAY NEED TO MOVE FORWARD IN THE CHAIR TO ARISE.  NO NEED TO USE THE ARMS OF THE CHAIR.
AMPLITUDE_RUE: NORMAL: NO TREMOR.
FREEZING_GAIT: NORMAL
HANDMOVEMENTS_LEFT: SLIGHT: ANY OF THE FOLLOWING: A) THE REGULAR RHYTHM IS BROKEN WITH ONE WITH ONE OR TWO INTERRUPTIONS OR HESITATIONS OF THE MOVEMENT B) SLIGHT SLOWING C) THE AMPLITUDE DECREMENTS NEAR THE END OF THE 10 MOVEMENTS.
CONSTANCY_TREMOR_ATREST: SLIGHT: TREMOR AT REST IS PRESENT  25% OF THE ENTIRE EXAMINATION PERIOD.
PRONATION_SUPINATION_RIGHT: SLIGHT: ANY OF THE FOLLOWING: A) THE REGULAR RHYTHM IS BROKEN WITH ONE WITH ONE OR TWO INTERRUPTIONS OR HESITATIONS OF THE MOVEMENT B) SLIGHT SLOWING C) THE AMPLITUDE DECREMENTS NEAR THE END OF THE 10 MOVEMENTS.
AMPLITUDE_LUE: NORMAL: NO TREMOR.
SPONTANEITY_OF_MOVEMENT: 2: MILD: MILD GLOBAL SLOWNESS AND POVERTY OF SPONTANEOUS MOVEMENTS.
SPONTANEITY_OF_MOVEMENT: 2: MILD: MILD GLOBAL SLOWNESS AND POVERTY OF SPONTANEOUS MOVEMENTS.
TOTAL_SCORE: 37
DYSKINESIAS_PRESENT: NO
FINGER_TAPPING_RIGHT: SLIGHT: ANY OF THE FOLLOWING: A) THE REGULAR RHYTHM IS BROKEN WITH ONE WITH ONE OR TWO INTERRUPTIONS OR HESITATIONS OF THE MOVEMENT B) SLIGHT SLOWING C) THE AMPLITUDE DECREMENTS NEAR THE END OF THE 10 MOVEMENTS.
DYSKINESIAS_PRESENT: NO
TOTAL_SCORE_LEFT: 9
GAIT: SLIGHT: INDEPENDENT WALKING WITH MINOR GAIT IMPAIRMENT.
TOTAL_SCORE: 5
RIGIDITY_RLE: NORMAL
RIGIDITY_NECK: SEVERE: RIGIDITY DETECTED WITHOUT THE ACTIVATION MANEUVER AND FULL RANGE OF MOTION NOT ACHIEVED.
PRONATION_SUPINATION_LEFT: SLIGHT: ANY OF THE FOLLOWING: A) THE REGULAR RHYTHM IS BROKEN WITH ONE WITH ONE OR TWO INTERRUPTIONS OR HESITATIONS OF THE MOVEMENT B) SLIGHT SLOWING C) THE AMPLITUDE DECREMENTS NEAR THE END OF THE 10 MOVEMENTS.
GAIT: NORMAL
SPEECH: SLIGHT: LOSS OF MODULATION, DICTION OR VOLUME, BUT STILL ALL WORDS EASY TO UNDERSTAND.
PRONATION_SUPINATION_RIGHT: MODERATE: ANY OF THE FOLLOWING:  A) MORE THAN 5 INTERRUPTIONS  OR AT LEAST ONE LONGER ARREST (FREEZE) IN ONGOING MOVEMENT  B) MODERATE SLOWING C) THE AMPLITUDE DECREMENTS STARTING AFTER THE FIRST MOVEMENT.
SPEECH: SLIGHT: LOSS OF MODULATION, DICTION OR VOLUME, BUT STILL ALL WORDS EASY TO UNDERSTAND.
AMPLITUDE_RUE: NORMAL: NO TREMOR.
HANDMOVEMENTS_RIGHT: SLIGHT: ANY OF THE FOLLOWING: A) THE REGULAR RHYTHM IS BROKEN WITH ONE WITH ONE OR TWO INTERRUPTIONS OR HESITATIONS OF THE MOVEMENT B) SLIGHT SLOWING C) THE AMPLITUDE DECREMENTS NEAR THE END OF THE 10 MOVEMENTS.
PARKINSONS_MEDS: OFF
LEG_AGILITY_RIGHT: NORMAL
RIGIDITY_RLE: SLIGHT: RIGIDITY ONLY DETECTED WITH ACTIVATION MANEUVER.
LEG_AGILITY_RIGHT: SLIGHT: ANY OF THE FOLLOWING: A) THE REGULAR RHYTHM IS BROKEN WITH ONE WITH ONE OR TWO INTERRUPTIONS OR HESITATIONS OF THE MOVEMENT B) SLIGHT SLOWING C) THE AMPLITUDE DECREMENTS NEAR THE END OF THE 10 MOVEMENTS.
AMPLITUDE_RLE: NORMAL: NO TREMOR.
FACIAL_EXPRESSION: SLIGHT: MINIMAL MASKED FACIES MANIFESTED ONLY BY DECREASED FREQUENCY OF BLINKING.
CONSTANCY_TREMOR_ATREST: SLIGHT: TREMOR AT REST IS PRESENT  25% OF THE ENTIRE EXAMINATION PERIOD.
TOTAL_SCORE: 5
LEG_AGILITY_LEFT: SLIGHT: ANY OF THE FOLLOWING: A) THE REGULAR RHYTHM IS BROKEN WITH ONE WITH ONE OR TWO INTERRUPTIONS OR HESITATIONS OF THE MOVEMENT B) SLIGHT SLOWING C) THE AMPLITUDE DECREMENTS NEAR THE END OF THE 10 MOVEMENTS.
TOETAPPING_RIGHT: SLIGHT: ANY OF THE FOLLOWING: A) THE REGULAR RHYTHM IS BROKEN WITH ONE WITH ONE OR TWO INTERRUPTIONS OR HESITATIONS OF THE MOVEMENT B) SLIGHT SLOWING C) THE AMPLITUDE DECREMENTS NEAR THE END OF THE 10 MOVEMENTS.
RIGIDITY_LUE: MILD: RIGIDITY DETECTED WITHOUT THE ACTIVATION MANEUVER.  FULL RANGE OF MOTION IS EASILY ACHIEVED.
PARKINSONS_MEDS: ON
FREEZING_GAIT: NORMAL
RIGIDITY_LUE: MILD: RIGIDITY DETECTED WITHOUT THE ACTIVATION MANEUVER.  FULL RANGE OF MOTION IS EASILY ACHIEVED.
POSTURAL_STABILITY: NORMAL:  RECOVERS WITH ONE OR TWO STEPS.
POSTURE: 2 MILD.  DEFINITE FLEXION, SCOLIOSIS OR LEANING OT ONE SIDE, BUT CAN CORRECT POSTURE TO NORMAL WHEN ASKED.
AMPLITUDE_LUE: MILD > 1 CM BUT < 3 CM IN MAXIMAL AMPLITUDE.
FINGER_TAPPING_RIGHT: MODERATE: ANY OF THE FOLLOWING:  A) MORE THAN 5 INTERRUPTIONS OR AT LEAST ONE LONGER ARREST (FREEZE) IN ONGOING MOVEMENT  B) MODERATE SLOWING C) THE AMPLITUDE DECREMENTS STARTING AFTER THE FIRST MOVEMENT.
FINGER_TAPPING_LEFT: MILD: ANY OF THE FOLLOWING: A) 3 TO 5 INTERRUPTIONS DURING TAPPING B) MILD SLOWING C) THE AMPLITUDE DECREMENTS MIDWAY IN THE 10-MOVEMENT SEQUENCE
HANDMOVEMENTS_RIGHT: MILD: ANY OF THE FOLLOWING: A) 3 TO 5 INTERRUPTIONS DURING TAPPING B) MILD SLOWING C) THE AMPLITUDE DECREMENTS MIDWAY IN THE 10-MOVEMENT SEQUENCE
RIGIDITY_RUE: SLIGHT: RIGIDITY ONLY DETECTED WITH ACTIVATION MANEUVER.
AXIAL_SCORE: 12
DYSKINESIAS_PRESENT: NO
POSTURE: 1 SLIGHT.  NOT QUITE ERECT BUT COULD BE NORMAL FOR OLDER PERSONS.
FINGER_TAPPING_LEFT: SLIGHT: ANY OF THE FOLLOWING: A) THE REGULAR RHYTHM IS BROKEN WITH ONE WITH ONE OR TWO INTERRUPTIONS OR HESITATIONS OF THE MOVEMENT B) SLIGHT SLOWING C) THE AMPLITUDE DECREMENTS NEAR THE END OF THE 10 MOVEMENTS.
TOETAPPING_RIGHT: NORMAL
TOTAL_SCORE: 16
TOTAL_SCORE_LEFT: 14
AMPLITUDE_LLE: NORMAL: NO TREMOR.
POSTURE: 1 SLIGHT.  NOT QUITE ERECT BUT COULD BE NORMAL FOR OLDER PERSONS.
TOTAL_SCORE_LEFT: 8
PRONATION_SUPINATION_LEFT: SLIGHT: ANY OF THE FOLLOWING: A) THE REGULAR RHYTHM IS BROKEN WITH ONE WITH ONE OR TWO INTERRUPTIONS OR HESITATIONS OF THE MOVEMENT B) SLIGHT SLOWING C) THE AMPLITUDE DECREMENTS NEAR THE END OF THE 10 MOVEMENTS.
LEG_AGILITY_LEFT: NORMAL
AXIAL_SCORE: 11
HANDMOVEMENTS_RIGHT: SLIGHT: ANY OF THE FOLLOWING: A) THE REGULAR RHYTHM IS BROKEN WITH ONE WITH ONE OR TWO INTERRUPTIONS OR HESITATIONS OF THE MOVEMENT B) SLIGHT SLOWING C) THE AMPLITUDE DECREMENTS NEAR THE END OF THE 10 MOVEMENTS.
FINGER_TAPPING_RIGHT: SLIGHT: ANY OF THE FOLLOWING: A) THE REGULAR RHYTHM IS BROKEN WITH ONE WITH ONE OR TWO INTERRUPTIONS OR HESITATIONS OF THE MOVEMENT B) SLIGHT SLOWING C) THE AMPLITUDE DECREMENTS NEAR THE END OF THE 10 MOVEMENTS.
RIGIDITY_RUE: MILD: RIGIDITY DETECTED WITHOUT THE ACTIVATION MANEUVER.  FULL RANGE OF MOTION IS EASILY ACHIEVED.
RIGIDITY_NECK: SEVERE: RIGIDITY DETECTED WITHOUT THE ACTIVATION MANEUVER AND FULL RANGE OF MOTION NOT ACHIEVED.
RIGIDITY_LLE: NORMAL
RIGIDITY_RLE: SLIGHT: RIGIDITY ONLY DETECTED WITH ACTIVATION MANEUVER.
RIGIDITY_LLE: MILD: RIGIDITY DETECTED WITHOUT THE ACTIVATION MANEUVER.  FULL RANGE OF MOTION IS EASILY ACHIEVED.
ARISING_CHAIR: SLIGHT: ARISING IS SLOWER THAN NORMAL, OR MAY NEED MORE THAN ONE ATTEMPT, OR MAY NEED TO MOVE FORWARD IN THE CHAIR TO ARISE.  NO NEED TO USE THE ARMS OF THE CHAIR.
TOETAPPING_LEFT: SLIGHT: ANY OF THE FOLLOWING: A) THE REGULAR RHYTHM IS BROKEN WITH ONE WITH ONE OR TWO INTERRUPTIONS OR HESITATIONS OF THE MOVEMENT B) SLIGHT SLOWING C) THE AMPLITUDE DECREMENTS NEAR THE END OF THE 10 MOVEMENTS.
ARISING_CHAIR: SLIGHT: ARISING IS SLOWER THAN NORMAL, OR MAY NEED MORE THAN ONE ATTEMPT, OR MAY NEED TO MOVE FORWARD IN THE CHAIR TO ARISE.  NO NEED TO USE THE ARMS OF THE CHAIR.
HANDMOVEMENTS_LEFT: SLIGHT: ANY OF THE FOLLOWING: A) THE REGULAR RHYTHM IS BROKEN WITH ONE WITH ONE OR TWO INTERRUPTIONS OR HESITATIONS OF THE MOVEMENT B) SLIGHT SLOWING C) THE AMPLITUDE DECREMENTS NEAR THE END OF THE 10 MOVEMENTS.
HANDMOVEMENTS_LEFT: SLIGHT: ANY OF THE FOLLOWING: A) THE REGULAR RHYTHM IS BROKEN WITH ONE WITH ONE OR TWO INTERRUPTIONS OR HESITATIONS OF THE MOVEMENT B) SLIGHT SLOWING C) THE AMPLITUDE DECREMENTS NEAR THE END OF THE 10 MOVEMENTS.
FREEZING_GAIT: NORMAL
AMPLITUDE_LLE: NORMAL: NO TREMOR.
SPEECH: SLIGHT: LOSS OF MODULATION, DICTION OR VOLUME, BUT STILL ALL WORDS EASY TO UNDERSTAND.
AMPLITUDE_LIP_JAW: NORMAL: NO TREMOR.

## 2021-01-15 ASSESSMENT — PAIN SCALES - GENERAL: PAINLEVEL: NO PAIN (0)

## 2021-01-15 ASSESSMENT — MIFFLIN-ST. JEOR: SCORE: 2171.93

## 2021-01-15 NOTE — LETTER
1/15/2021       RE: Stefan Odonnell  [address] [facility] [address]     Dear Colleague,    Thank you for referring your patient, Stefan Odonnell, to the [facility] NEUROLOGY CLINIC [place] [facility]. Please see a copy of my visit note below.    Department of Neurology  Movement Disorders Division   DBS Follow-up Note    Patient: Stefan Odonnell  MRN: [id number]   : 1950   Date of Visit: 1/15/2021    Diagnosis: Parkinson's disease  DBS Target(s): Left STN  Date(s) of DBS Lead Placement: 2018  Date(s) of IPG Placement: Left chest 2018  Device: Abbott    Chief Complaint:  Mr. Odonnell is a 70 year old right handed man with tremulous Parkinson disease and possible superimposed essential tremor s/p left STN DBS who returns for a clinical core 24 month videotaping session. The patient's last visit was on 2020 at which time a third dose of carbidopa/levodopa was added.    Interval History:  Trigger finger reccured on the right hand after he started therapy.  Also started to develop locking of the left hand finger.  CTS s/p release seems resolved.  He reports trouble gripping boards at work.  Feels his right knee is improved after surgery and therapy.  Rare knee pain, occurs during sleep.  Eye floaters improved.  Ongoing right leg swelling that he was told is due to his surgery.  Hydrochlorothiazide was changed to lasix 4 days ago due to kidney injury.  Uses Norco occasionally for burning pain of the feet.    Fell a few months ago when he missed a step off the ladder.  Lost his balance 2 days ago while walking up steps.   He was able to catch himself.    Increased sinemet has helped with worsening left hand tremor.  He is bothered by this tremor.  Denies dyskinesias, dystonia, and motor fluctuations.  His wife reports more apathy at home than prior.  She notes that the evening meal can be frustrating due to gripping the fork and cutting.   This meal is at 1730.  His wife also mentioned that he fell out of bed a few weeks ago while sleeping even though he sleeps quieter and calmer now.  He has stopped snoring since he lost weight.    We discussed second side surgery.  He feels that the left hand doesn't do what he wants.  Gives the example of being unable to use the spray bottle with it.  He wonders if he'll have improvement.  His wife is worried because she recalls what happens when he had side effects during programming.      UPDRS  Part I     1.1 Cognitive impairment:  0: Normal: No cognitive impairment.    1.2 Hallucinations and psychosis:  0: Normal: No hallucinations or psychotic behaviour.     1.3 Depressed mood:  0: Normal: No depressed mood.    1.4 Anxious mood:  1: Slight: Anxious feelings present but not sustained for more than one day at a time. No interference with patient's ability to carry out normal activities and social interactions.     1.5 Apathy:  2: Mild: Apathy interferes with isolated activities and social interactions.     1.6 Features of DDS: 0: Normal: No problems present.     1.7 Sleep problems:  2: Mild: Sleep problems usually cause some difficulties getting a full night of sleep.    1.8 Daytime sleepiness:  2: Mild: Sometimes I fall asleep when alone and relaxing. For example, while reading or watching TV.     1.9 Pain and other sensations:  1: Slight: I have these feelings. However, I can do things and be with other people without difficulty.     1.10 Urinary problems:  1: Slight: I need to urinate often or urgently. However, these problems do not cause difficulties with my daily activities.     1.11 Constipation problems:  0: Normal: No constipation.     1.12 Light headedness on standin: Normal: No dizzy or foggy feelings.    1.13 Fatigue:  1: Slight: Fatigue occurs. However it does not cause me troubles doing things or being with people.     Total: 10/52, previous 5      Part II  2.1 Speech (P) 0 (P) Normal: Not at  all (no problems).   2.2 Saliva and drooling (P) 0 (P) Normal: Not at all (no problems).   2.3 Chewing and swallowing (P) 0 (P) Normal: No problems.   2.4 Eating tasks (P) 0 (P) Normal: Not at all (no problems).   2.5 Dressing (P) 0 (P) Normal: Not at all (no problems).   2.6 Hygiene (P) 0 (P) Normal: Not at all (no problems).   2.7 Handwriting (P) 0 (P) Normal: Not at all (no problems).   2.8 Doing hobbies and other activities (P) 0 (P) Normal:  Not at all (no problems).   2.9 Turning in bed (P) 0 (P) Normal: Not at all (no problems).   2.10 Tremor (P) 3 (P) Moderate: Shaking or tremor causes problems with many of my daily activities.   2.11 Getting out of bed  (P) 0 (P) Normal: Not at all (no problems).   2.12 Walking and balance  (P) 0 (P) Normal: Not at all (no problems).   2.13 Freezing (P) 0 (P) Normal: Not at all (no problems).   Total (P) 3        Review of Systems:  Other than that noted at the end of this note, the remainder of 12 systems reviewed were negative.    Medications:  Current Outpatient Medications   Medication Sig Dispense Refill     allopurinol (ZYLOPRIM) 300 MG tablet TAKE 1 TABLET BY MOUTH DAILY IN THE EVENING       amLODIPine (NORVASC) 5 MG tablet Take 5 mg by mouth every morning        aspirin 81 MG chewable tablet Take 81 mg by mouth daily       augmented betamethasone dipropionate (DIPROLENE-AF) 0.05 % external ointment Apply topically 2 times daily       blood glucose monitoring (hint CONTOUR NEXT MONITOR) meter device kit        blood glucose monitoring (VITA CONTOUR NEXT) test strip USE TO TEST BLOOD SUGARS 3 TIMES DAILY       blood glucose monitoring (VITA MICROLET) lancets Test Blood Sugar 3 Times Daily.  Dx-E11.8       carbidopa-levodopa (SINEMET CR)  MG CR tablet Take half a tab at 3pm.  Increase to 1 tab after 1 week. 90 tablet 3     carbidopa-levodopa (SINEMET)  MG tablet Take 2 tablets twice daily at 4am and 11am (Patient taking differently: 2 tablets 4 times  daily ) 360 tablet 3     cholecalciferol 50 MCG (2000 UT) CAPS Take 4,000 Units by mouth       diphenoxylate-atropine (LOMOTIL) 2.5-0.025 MG per tablet Take 2 tablets by mouth 4 times daily as needed        doxepin (SINEQUAN) 10 MG capsule Take 10 mg by mouth At Bedtime        finasteride (PROSCAR) 5 MG tablet Take 5 mg by mouth every morning       furosemide (LASIX) 20 MG tablet Take 20 mg by mouth as needed        glipiZIDE (GLUCOTROL) 10 MG tablet Take 10 mg by mouth 2 times daily       HYDROcodone-acetaminophen (NORCO)  MG per tablet Take 1 tablet by mouth as needed        insulin glargine (LANTUS) 100 UNIT/ML injection Inject Subcutaneous 2 times daily Inject 26 units qpm, 31 units qam       insulin pen needle (EASY TOUCH PEN NEEDLES) 32G X 5 MM USE WITH INSULIN THREE TIMES DAILY       insulin pen needle 32G X 4 MM 1 each       liraglutide (VICTOZA PEN) 18 MG/3ML soln INJECT 1.8 MG UNDER THE SKIN ONE TIME A DAY       Misc Natural Products (TART CHERRY ADVANCED) CAPS Take 1 capsule by mouth 2 times daily        omeprazole (PRILOSEC) 40 MG DR capsule Take 40 mg by mouth daily       pregabalin (LYRICA) 50 MG capsule Take 1 capsule (50 mg) by mouth daily as needed (for burning foot pain) 90 capsule 3     simvastatin (ZOCOR) 40 MG tablet Take 40 mg by mouth At Bedtime           PD Medications 0400 0700 1100 1500   CD/LD 25/100mg 2 2 2 2       Allergies: is allergic to atorvastatin; clindamycin; and gabapentin.    Past Medical History:  Past Medical History:   Diagnosis Date     Anosmia      Bleeding ulcer 1983     Carpal tunnel syndrome, bilateral      CKD (chronic kidney disease) stage 3, GFR 30-59 ml/min      Diabetes mellitus (H)      Gout      Hx of skin cancer, basal cell 2013    removed from face     Hyperlipidemia      Hypertension      Insomnia      Macular degeneration      Neuropathy      Obese      RENÉE (obstructive sleep apnea)      Osteoarthritis of knees, bilateral      Periodic limb movements of  sleep      Restless legs syndrome (RLS)        Past Surgical History:  Past Surgical History:   Procedure Laterality Date     ABDOMEN SURGERY  6/1983    ulcers     COLONOSCOPY       HEMORRHOID SURGERY       IMPLANT DEEP BRAIN STIMULATION GENERATOR / BATTERY Left 6/8/2018    Procedure: IMPLANT DEEP BRAIN STIMULATION GENERATOR / BATTERY;  Deep Brain Stimulator Placement, Phase II, Placement Of Deep Brain Stimulator Generator/Battery Over The Left Chest Wall;  Surgeon: Wayne Marshall MD;  Location: [facility] OR     Open Stomach Ulcer Repair       OPTICAL TRACKING SYSTEM INSERTION DEEP BRAIN STIMULATION Left 5/29/2018    Procedure: OPTICAL TRACKING SYSTEM INSERTION DEEP BRAIN STIMULATION;  Stealth Assisted Left Deep Brain Stimulator Placement, Phase I, Placement Of Left Deep Brain Stimulator Electrode, Target Left Subthalamic Nucleus With Microelectrode Recording;  Surgeon: Wayne Marshall MD;  Location: [facility] OR     TONSILLECTOMY         Social History:  Social History     Socioeconomic History     Marital status:      Spouse name: Blanca Odonnell     Number of children: 1     Years of education: Not on file     Highest education level: Not on file   Occupational History     Occupation: Special [employer]     Comment: [employer]   Social Needs     Financial resource strain: Not on file     Food insecurity     Worry: Not on file     Inability: Not on file     Transportation needs     Medical: Not on file     Non-medical: Not on file   Tobacco Use     Smoking status: Never Smoker     Smokeless tobacco: Never Used   Substance and Sexual Activity     Alcohol use: No     Comment: 2 - 3 x a year     Drug use: No     Sexual activity: Yes     Partners: Female   Lifestyle     Physical activity     Days per week: Not on file     Minutes per session: Not on file     Stress: Not on file   Relationships     Social connections     Talks on phone: Not on file     Gets together: Not on file     Attends [employer]  "service: Not on file     Active member of club or organization: Not on file     Attends meetings of clubs or organizations: Not on file     Relationship status: Not on file     Intimate partner violence     Fear of current or ex partner: Not on file     Emotionally abused: Not on file     Physically abused: Not on file     Forced sexual activity: Not on file   Other Topics Concern     Parent/sibling w/ CABG, MI or angioplasty before 65F 55M? No   Social History Narrative    On second marriage.  Has a son from first marriage.   for 27 years.  Works at [employer] as special [employer].  Never smoked.  He rarely drinks alcohol about 2 - 3 x per year.  Doesn't use illicit drugs.        Family History:  Family History   Problem Relation Age of Onset     Heart Disease Mother      Diabetes Mother      Arthritis Mother      Depression Mother      Anxiety Disorder Mother      Dementia Mother      Hypertension Mother      LUNG DISEASE Mother      Prostate Cancer Father      Tremor Father      Cancer Father         Prostate     Obesity Sister      Heart Disease Brother      Bone Cancer Brother      Heart Disease Brother      Tremor Son      Heart Disease Maternal Grandmother      Heart Disease Maternal Grandfather      Heart Disease Paternal Grandmother      Heart Disease Paternal Grandfather      Unknown/Adopted Sister        Physical Exam:  The patient's  height is 1.88 m (6' 2\") and weight is 134.7 kg (297 lb). His temperature is 98.6  F (37  C). His blood pressure is 146/89 (abnormal) and his pulse is 71. His respiration is 17 and oxygen saturation is 94%.       Neurological Examination:   Last medication dose: 1/14/2021 at 1500, 2 tabs CD/LD  DBS off at 7:20am  DBS on at 8:00am  Meds taken: 2 tabs CD/LD at 8:00 1/15/2020  CTS provocative maneuvers were negative.  UPDRS Values 1/15/2021 1/15/2021 1/15/2021   Time: 7:09 AM 7:53 AM 9:10 AM   Medication Off Off On   R Brain DBS: None None None   L Brain DBS: On " "Off On   Dyskinesia (LID) No No No   Speech 1 1 1   Facial Expression 1 1 1   Rigidity Neck 4 4 4   Rigidity RUE 1 2 1   Rigidity LUE 1 2 2   Rigidity RLE 0 1 1   Rigidity LLE 0 0 2   Finger Taps R 1 3 1   Finger Taps L 1 1 2   Hand Mvt R 1 2 1   Hand Mvt L 1 1 1   Pron-/Supinate R 1 3 1   Pron-/Supinate L 1 1 0   Toe Tap R 0 1 0   Toe Tap L 1 1 1   Leg Agility R 0 1 0   Leg Agility L 0 0 1   Arise From Chair 1 1 1   Gait 1 1 0   Gait Freezing 0 0 0   Postural Stability 0 0 0   Posture 2 1 1   Global Spont Mvt 2 2 2   Postural Tremor RUE 1 1 0   Postural Tremor LUE 1 1 1   Kinetic Tremor RUE 0 1 0   Kinetic Tremor LUE 2 2 2   Rest Tremor RUE 0 1 0   Rest Tremor LUE 0 0 2   Rest Tremor RLE 0 0 0   Rest Tremor LLE 0 0 0   Rest Tremor Lip/Jaw 0 0 0   Rest Tremor Constancy 0 1 1   Total Right 5 16 5   Total Left 8 9 14   Axial Total 12 11 10   Total 25 37 30       Procedure: DBS Interrogation & Programming    Lead(s):    Left   DBS Target STN   DBS Lead Type Infinity 0.5   Lead Implant Date 5/29/2018     IPG(s):   1   IPG Infinity 5   IPG Implant Date 6/8/2018   Location Left chest   Battery (V) 2.94V (2.96V)     Impedance Check: See scanned report for impedance details.  Therapy 575 Ohms  Short circuits identified at 3b     Left STN       Initial Final   Amplitude (mA) 3.25 [0-3.5 by 0.25] 3.25 [0-4.5 by 0.25]   Pulse width ( s)  60  60   Freq (Hz)  130  130   Contacts: C  +  +   Contacts: 4       Contacts: 3 3abc-  3abc-   Contacts: 2       Contacts: 1        Clinical Core Study Programming Form  Please complete upon initial monopolar review and any subsequent \"mini-monopolar reviews\".    Please avoid any extra spaces at the end of cells.    Generator Serial No.  ex: [id number] Lead Hemisphere  Ex: Right Lead Region  Ex: STN  Initials  Ex: LS, KD    Left STN AC, SC         Enter value only with  change No blank cells No blank cells   Contacts  Cpos, 2bcneg Ampl  0.5 Ampl Unit  V or mA Pulse Width (ms)  60   Rate " (Hz)  130 Effect on Symptoms   NA not assessed  NC no change from setting above  NN none noted Side Effects  NA  NC  NN     Cpos 3abcneg 3.25 mA 60 130 See UPDRS III NN    3.75    Same NN    4.25    NA NN    4.5    Bradykinesias improved, RUE rigidity improved NN       Assessment/plan:    Mr. Odonnell is a 70 year old right handed man with tremulous Parkinson disease and possible superimposed essential tremor s/p left STN DBS who returns for a clinical core 24 month videotaping session. He is noticing worsening left hand tremor. We discussed a second side surgery for his left hand which he will consider.    He is also having trouble gripping things which could be related to PD, CTS, or trigger finger.  Will have him repeat EMG to assess for median neuropathy at the wrist.  Will add also long acting LD to his afternoon dose to address trouble with utensils at dinner.  For his neuropathic foot pain, will order pregabalin to replace Norco.      He has good symptom control on the right, the stimulated side.  Interrogation of his DBS revealed a short circuit.  XR of the head, neck, and chest did not reveal a cause.  Based upon his clinical improvement with stimulation, this is not consequential.  His stimulation was not changed but the upper limit was increased.    - Pregabalin 50mg at bedtime PRN for neuropathic pain in feet.  Contact Amber Avila for authorization  - EMG bilateral for CTS, external referral placed  - Continue CD/LD 25/100mg 2 tabs QID  - Add CD/LD 50/200 CR at 1500, titrate to 1 tab  - OT referral for assistive devices and hand therapy  - XR DBS device to look for any causes of short-circuit  - RTC 6 months in person      Imelda Villavicencio MD  Movement Disorders Fellow    Patient seen and examined with Dr. Villavicencio and I agree with the assessment and plan as outlined.  I was present throughout, participated in, and closely supervised the neurostimulator programming 0.5h.    Stanford Sloan PhD,  MD

## 2021-01-15 NOTE — NURSING NOTE
Chief Complaint   Patient presents with     DBS Programming     [facility] DBS     Solis Rojas

## 2021-01-15 NOTE — PATIENT INSTRUCTIONS
We are going to add a long active version of carbidopa/levodopa at 3pm.  For 1 week take half a tab.  Then increase to 1 full tab if you need more improvement.    Continue taking your carbidopa/levodopa short acting 2 tabs 4 times a day.    Instead of taking Norco for your burning foot pain, instead take pregabalin.  I sent a prescription to your pharmacy.    For your gripping problem, we will order another EMG to test for carpal tunnel syndrome.    I also placed an order for occupational therapy for hand therapy and any recommendations for assistive devices.    We will let you know if we discover anything on your x-rays.    We didn't increase your stimulation today, but we increased the upper limit that you could increase it to if necessary.  You are at 3.25.  The upper limit is set to 4.5.    We'll see you again in 6 months.  Please call if you have any questions, concerns, or if you think you need stimulation changes.

## 2021-01-21 NOTE — PROGRESS NOTES
Pt was seen for neuropsychological evaluation at the request of Stanford Sloan MD for the purposes of diagnostic clarification and treatment planning. 220 minutes of test administration and scoring were provided by this writer. Please see Dr. Edwina Goldstein's report for a full interpretation of the findings.

## 2021-01-27 NOTE — PROGRESS NOTES
The patient completed the 24 month neuropsychological evaluation for the [facility] Clinical Core. There were 220 minutes of psychometrist time (78589: 1 unit; 67706: 6 units). OnCore ID: [id number]     Measures administered:    Dementia Rating Scale - 2 Standard Form  (136/144)  WAIS-IV: Similarities, Comprehension, Matrix Reasoning, Letter Number Sequencing, Digit Span  WMS-R Information & Orientation, Logical Memory I and II  D-KEFS Verbal Fluency - Standard  Adams Naming Test  Clock Drawing  Trail Making Test  Stroop  [place] Card Sorting Test - 64 items  Montiel Judgment of Line Orientation Form V  Gomez Verbal Learning Test - Revised Form 6  Brief Visual Memory Test - Revised Form 1  MoCA v 7.2 (Score = 21/30)    BDI-2  QUIP  ESS  RBDSQ  PDQ-39  Apathy Scale  HAM-D (Administered 8/14/20 over video platform)  HAM-A (Administered 8/14/2020 over video platform)    Edwina Goldstein, Ph.D., ABPP  Licensed Psychologist, [employer] [id number]  Board Certified in Clinical Neuropsychology

## 2021-02-10 ENCOUNTER — TELEPHONE (OUTPATIENT)
Dept: NEUROLOGY | Facility: CLINIC | Age: 71
End: 2021-02-10

## 2021-02-10 DIAGNOSIS — G20.A1 PARKINSON DISEASE (H): Primary | ICD-10-CM

## 2021-02-10 NOTE — TELEPHONE ENCOUNTER
"Spouse left a VM to provide an update after medication changes, \"they have made a big difference in the shaking when we're having supper\".     I called back to gather more information and spoke with both spouse and patient. Patient states that since adding the carbidopa-levodopa 50/200 CR 1 tab at 3pm, his hands don't shake as bad when he's eating.  Gripping utensils is still a problem. Regarding the Lyrica/pregabilin, patient states he is taking this everyday (script says PRN), and that it has completely resolved his burning foot pain.     Spouse said they had a delay with scheduling the OT/hand therapy appointments. These referrals were initially made internally, however referrals were handed to patient at appointment to have the option to take to their local facility. Spouse says she has sent these to [facility] and have been in contact with someone, and are hoping to have something scheduled soon.    Spouse and patient would also prefer to have the repeat EMG for carpal tunnel done in [facility] rather than at the  to save a trip. I will work on faxing this order to patient's preferred facility.    Patient prefers to have follow up DBS appt with Dr. Sloan on the schedule (6 months). I've requested August 20th at 7am for a standard of care follow up.     They state they have been in contact with Surya Spivey regarding their request for workup for 2nd side surgery and are awaiting details of next steps.    No further questions.    Gela Ny, RN, MPH  Research Nurse, Movement Disorders    "

## 2021-02-11 NOTE — TELEPHONE ENCOUNTER
"----- Message from Daniela Odell RN sent at 2/11/2021  9:47 AM CST -----  Regarding: Fax  Juan Alberto Martinez,    Can you fax his EMG referral (listed as \"Neurology adult referral\" placed on 1/15), face sheet, and last office visit note with us to [facility] at ? Please put a note on it saying call and schedule ASAP.    Thank you!  Daniela"

## 2021-02-11 NOTE — TELEPHONE ENCOUNTER
Referral and demographics and office notes were fax to [phone], with message for clinic to call patient to schedule appointment

## 2021-02-12 DIAGNOSIS — G20.A1 PARKINSON'S DISEASE (H): ICD-10-CM

## 2021-02-12 DIAGNOSIS — G20.A1 PARKINSON DISEASE (H): Primary | ICD-10-CM

## 2021-02-12 NOTE — TELEPHONE ENCOUNTER
"Added details for MRI order and signed per written order by Dr. Sloan:     \"Since he has Deep Brain Stimulation, he can only have 1.5 T MRI w/wo contrast.     1.5T MRI of brain to be done at [facility] for DBS work up 2nd side.     T1 sequence should be done WITH contrast; T2 sequences should be done WITHOUT contrast.     Thin cuts, no spaces please.     Assess for loss of swallow tail sign \"    Gela Ny RN, MPH  Research Nurse, Movement Disorders    "

## 2021-02-15 ENCOUNTER — VIRTUAL VISIT (OUTPATIENT)
Dept: NEUROSURGERY | Facility: CLINIC | Age: 71
End: 2021-02-15
Payer: COMMERCIAL

## 2021-02-15 DIAGNOSIS — E11.8 TYPE 2 DIABETES MELLITUS WITH COMPLICATION, WITH LONG-TERM CURRENT USE OF INSULIN (H): ICD-10-CM

## 2021-02-15 DIAGNOSIS — G20.A1 PARKINSON DISEASE (H): Primary | ICD-10-CM

## 2021-02-15 DIAGNOSIS — G25.0 ESSENTIAL TREMOR: ICD-10-CM

## 2021-02-15 DIAGNOSIS — N18.31 STAGE 3A CHRONIC KIDNEY DISEASE (H): ICD-10-CM

## 2021-02-15 DIAGNOSIS — Z79.4 TYPE 2 DIABETES MELLITUS WITH COMPLICATION, WITH LONG-TERM CURRENT USE OF INSULIN (H): ICD-10-CM

## 2021-02-15 PROCEDURE — 99215 OFFICE O/P EST HI 40 MIN: CPT | Mod: 95 | Performed by: NEUROLOGICAL SURGERY

## 2021-02-15 NOTE — PROGRESS NOTES
Stefan Odonnell is a 70 year old who is being evaluated via a billable video visit.      How would you like to obtain your AVS? Mychart  If the video visit is dropped, the invitation should be resent by: [email or web address]      Video Start Time: 4:15 PM  Video-Visit Details    Type of service:  Video Visit    Video End Time:4:30 PM    Originating Location (pt. Location): Home    Distant Location (provider location):  [facility] NEUROSURGERY [facility]     Platform used for Video Visit: Grability      Additional provider notes: insert own note template here      Mr. Stefan Odonnell complains of    Chief Complaint   Patient presents with     Consult     DBS WORKUP, SECOND SIDE IMPLANTATION CONSIDERATION         I have reviewed and updated the patient's Past Medical History, Social History, Family History and Medication List.    ALLERGIES       Allergies   Allergen Reactions     Atorvastatin Muscle Pain (Myalgia)     Clindamycin GI Disturbance     Gabapentin Swelling         ASSESSMENT  70 year old right handed male   Postural/action tremor of his bilateral hand, right worse than left.  Essential Tremor and Parkinsonian Features.  S/p left side deep brain stimulator placement, phase I, placement of left side deep brain stimulator electrode, target left subthalamic nucleus, with microelectrode recording on 5/29/2018.  S/p deep brain stimulator placement, phase II, placement of deep brain stimulator generator/battery over the left chest wall on 6/8/2018.      I have reviewed the note as documented above.  This accurately captures the substance of my conversation with the patient.  The following were discussed in detail.      Mr. Stefan Odonnell is a 70 year old right handed male who is seen in video consultation/clinic visit for consideration of DBS implantation for his second side, right side implantation for control of his left body symptoms.  He is s/p left side deep brain stimulator placement, phase I,  placement of left side deep brain stimulator electrode, target left subthalamic nucleus, with microelectrode recording on 5/29/2018 and s/p deep brain stimulator placement, phase II, placement of deep brain stimulator generator/battery over the left chest wall on 6/8/2018.  He tolerated both of the procedures well and there were no complications.  He recovered well from the surgeries.  His incisions all healed well with no issues.    He has been followed by Dr. Sloan and neurology/DBS clinical core.  It appears that he has good symptom control on his right side with the implanted left sided DBS.  As for his left side, patient has noticed that the tremor is worsening.  He feels that the left hand is not doing the things he wanted it to do.  Therefore, he is being considered for the second side, right side, implantation for control of his left side symptoms.    On the left side, he has an Abbott system implanted, with Infinity 5 generator/battery implanted at the left chest.  According to Dr. 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  He was worked up with x-ray of the head, neck and chest and there was no obvious disconnect or cause.  Per Dr. Sloan, this short circuit is not clinically consequential in terms of his stimulation parameters.    Patient is familiar with the DBS implantation surgeries.  However, we did discuss it again.  We discussed both phase I and II of DBS surgery.  We discussed that during phase I, we would place the DBS electrode on the right side under MAC and microelectrode recording.  He would then return one week later for the phase II with placement of the DBS generator/battery over the right chest wall under general anesthesia.     Briefly, following topic was discussed again.    EZDOCTOR  MRI compatible.  Non-rechargeable and rechargeable generator/battery available.  4 contact electrode.  Communication method: have the  or patient controller on the skin  directly over the generator/battery.    Abbott  MRI compatible.  Non-rechargeable generator/battery.  8 contact segmented/directional electrode.  Communication method: Wireless/bluetooth.    [employer]ives[employer]es  Rechargeable generator/battery is MRI compatible.    Non-rechargeable is now MRI compatible.  Rechargeable and non-rechargeable generator/battery.  8 contact electrode.  Ring contacts or segmented contacts.  Independent current control at each contact.  Communication method: Wireless.    I did discuss that the implant technique for these devices are relatively the same which was discussed again.  They all have similar risks associated with the surgery.  Given that he already has an Abbott system on his left side, the likely device will be Abbott, as having two different system would introduce logistic challenges.    Risks, benefits, alternative therapies were discussed with the patient, including but not limited to infection and bleeding (intracranial), injury to the brain, stroke, death, hardware failure and possible need for more surgeries. Surgical procedure was discussed in detail.  All questions were answered, and he expressed understanding.     His case will be discussed at the Movement Disorder Consensus Group Meeting to determine whether he is a good candidate for DBS surgery.      PLAN  1.  We will discuss his case at the Movement Disorder Consensus Group Meeting, and we will contact him regarding his DBS candidacy.       Video-Visit Details    Type of service:  Video Visit    Video Start Time: 4:15 PM  Video End Time: 4:30 PM  Video -Visit Time: 15 minutes    Originating Location (pt. Location): Home    Distant Location (provider location):  [facility] NEUROSURGERY     Platform used for Video Visit: Pola Marshall MD, PhD      15 minutes were spent face to face/on video with the patient of which more than 50% of the time was spent counseling and discussing the above issues regarding  diagnosis, differential, treatment options, and steps for further evaluation.  10 minutes were spent reviewing patient's chart, imaging in PACS.  15 minutes were spent on documentation for this encounter.  40 minutes total were spent on this encounter.

## 2021-02-15 NOTE — LETTER
2/15/2021      RE: Stefan Odonnell  [address] [place] [id number]       Stefan Odonnell is a 70 year old who is being evaluated via a billable video visit.      How would you like to obtain your AVS? Brittnihart  If the video visit is dropped, the invitation should be resent by: [email or web address]      Video-Visit Details    Type of service:  Video Visit    Video Start Time: 4:15 PM  Video End Time:4:30 PM    Originating Location (pt. Location): Home    Distant Location (provider location):  [facility] NEUROSURGERY [facility]     Platform used for Video Visit: HaloSource      Additional provider notes: insert own note template here      Mr. Stefan Odonnell complains of    Chief Complaint   Patient presents with     Consult     DBS WORKUP, SECOND SIDE IMPLANTATION CONSIDERATION         I have reviewed and updated the patient's Past Medical History, Social History, Family History and Medication List.    ALLERGIES       Allergies   Allergen Reactions     Atorvastatin Muscle Pain (Myalgia)     Clindamycin GI Disturbance     Gabapentin Swelling         ASSESSMENT  70 year old right handed male   Postural/action tremor of his bilateral hand, right worse than left.  Essential Tremor and Parkinsonian Features.  S/p left side deep brain stimulator placement, phase I, placement of left side deep brain stimulator electrode, target left subthalamic nucleus, with microelectrode recording on 5/29/2018.  S/p deep brain stimulator placement, phase II, placement of deep brain stimulator generator/battery over the left chest wall on 6/8/2018.      I have reviewed the note as documented above.  This accurately captures the substance of my conversation with the patient.  The following were discussed in detail.      Mr. Stefan Odonnell is a 70 year old right handed male who is seen in video consultation/clinic visit for consideration of DBS implantation for his second side, right side implantation for control of his left  body symptoms.  He is s/p left side deep brain stimulator placement, phase I, placement of left side deep brain stimulator electrode, target left subthalamic nucleus, with microelectrode recording on 5/29/2018 and s/p deep brain stimulator placement, phase II, placement of deep brain stimulator generator/battery over the left chest wall on 6/8/2018.  He tolerated both of the procedures well and there were no complications.  He recovered well from the surgeries.  His incisions all healed well with no issues.    He has been followed by Dr. Sloan and neurology/DBS clinical core.  It appears that he has good symptom control on his right side with the implanted left sided DBS.  As for his left side, patient has noticed that the tremor is worsening.  He feels that the left hand is not doing the things he wanted it to do.  Therefore, he is being considered for the second side, right side, implantation for control of his left side symptoms.    On the left side, he has an Abbott system implanted, with Infinity 5 generator/battery implanted at the left chest.  According to Dr. 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  He was worked up with x-ray of the head, neck and chest and there was no obvious disconnect or cause.  Per Dr. Sloan, this short circuit is not clinically consequential in terms of his stimulation parameters.    Patient is familiar with the DBS implantation surgeries.  However, we did discuss it again.  We discussed both phase I and II of DBS surgery.  We discussed that during phase I, we would place the DBS electrode on the right side under MAC and microelectrode recording.  He would then return one week later for the phase II with placement of the DBS generator/battery over the right chest wall under general anesthesia.     Briefly, following topic was discussed again.    GetO2  MRI compatible.  Non-rechargeable and rechargeable generator/battery available.  4 contact  electrode.  Communication method: have the  or patient controller on the skin directly over the generator/battery.    Abbott  MRI compatible.  Non-rechargeable generator/battery.  8 contact segmented/directional electrode.  Communication method: Wireless/bluetooth.    [employer]  Rechargeable generator/battery is MRI compatible.    Non-rechargeable is now MRI compatible.  Rechargeable and non-rechargeable generator/battery.  8 contact electrode.  Ring contacts or segmented contacts.  Independent current control at each contact.  Communication method: Wireless.    I did discuss that the implant technique for these devices are relatively the same which was discussed again.  They all have similar risks associated with the surgery.  Given that he already has an Abbott system on his left side, the likely device will be Abbott, as having two different system would introduce logistic challenges.    Risks, benefits, alternative therapies were discussed with the patient, including but not limited to infection and bleeding (intracranial), injury to the brain, stroke, death, hardware failure and possible need for more surgeries. Surgical procedure was discussed in detail.  All questions were answered, and he expressed understanding.     His case will be discussed at the Movement Disorder Consensus Group Meeting to determine whether he is a good candidate for DBS surgery.      PLAN  1.  We will discuss his case at the Movement Disorder Consensus Group Meeting, and we will contact him regarding his DBS candidacy.       Video-Visit Details    Type of service:  Video Visit    Video Start Time: 4:15 PM  Video End Time: 4:30 PM  Video -Visit Time: 15 minutes    Originating Location (pt. Location): Home    Distant Location (provider location):  [facility] NEUROSURGERY     Platform used for Video Visit: Pola Marshall MD, PhD      15 minutes were spent face to face/on video with the patient of which  more than 50% of the time was spent counseling and discussing the above issues regarding diagnosis, differential, treatment options, and steps for further evaluation.  10 minutes were spent reviewing patient's chart, imaging in PACS.  15 minutes were spent on documentation for this encounter.  40 minutes total were spent on this encounter.

## 2021-04-24 ENCOUNTER — HEALTH MAINTENANCE LETTER (OUTPATIENT)
Age: 71
End: 2021-04-24

## 2021-06-22 ENCOUNTER — VIRTUAL VISIT (OUTPATIENT)
Dept: NEUROPSYCHOLOGY | Facility: CLINIC | Age: 71
End: 2021-06-22
Attending: PSYCHIATRY & NEUROLOGY
Payer: COMMERCIAL

## 2021-06-22 DIAGNOSIS — F09 MENTAL DISORDER DUE TO GENERAL MEDICAL CONDITION: ICD-10-CM

## 2021-06-22 DIAGNOSIS — G20.A1 PARKINSON'S DISEASE (H): Primary | ICD-10-CM

## 2021-06-22 PROCEDURE — 99207 PR PSYCHIATRIC DIAGNOSTIC EVALUATION: CPT | Mod: 95

## 2021-06-22 NOTE — LETTER
6/22/2021       RE: Stefan Odonnell  [address] [facility] [address]     Dear Colleague,    Thank you for referring your patient, Stefan Odonnell, to the [facility] NEUROPSYCHOLOGY [place] at [facility]. Please see a copy of my visit note below.    Stefan Odonnell is a 71 year old who is being evaluated via a billable video visit.      How would you like to obtain your AVS? MyChart  If the video visit is dropped, the invitation should be resent by: Send to e-mail at: [email or web address]  Will anyone else be joining your video visit? No    Video Start Time: 12:25 PM    NEUROPSYCHOLOGICAL EVALUATION    RELEVANT HISTORY AND REASON FOR REFERRAL    Stefan Odonnell is a 71 year old, right-handed allen with 12 years of formal education. Information was obtained via video interview with the patient, and review of his medical records, including prior neuropsychological evaluations. Mr. Odonnell has a history of Parkinson s disease and possible concomitant essential tremor. He is s/p left STN DBS lead implantation on 5/29/2018. He has had good benefit from surgery including improvement in hand tremor. He is now considering surgery for the second side. He was referred for neuropsychological evaluation by Stanford Sloan M.D., as part of the pre-surgery protocol, to assist in determining whether there have been any changes in cognitive functioning as they pertain to his ability to make well reasoned medical decisions, and to establish and updated neurocognitive baseline.    Mr. Odonnell has undergone prior neuropsychological evaluations.  Initially, he underwent an evaluation with my colleague, Dewey Cazares, PhD, on 1/23/2018.  Results indicated findings that were largely normal, with relative weaknesses in complex attention/executive control.  Some of his cognitive profile was thought perhaps attributable to neurodevelopmental factors such as possible ADHD-like  syndrome, along with normal aging.  He then underwent a neuropsychological evaluation under my direction on 6/20/2019.  Results indicated mild executive dysfunction, variable learning and memory, and slowed verbal fluency, with performance otherwise falling within normal limits.  Compared to 2018, he had declines in semantic fluency and switching fluency, and on measures of distractibility.  He had improvements in psychomotor processing speed and some aspects of memory.    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  He underwent a full research neuropsychological evaluation under my direction on 1/14/2021.  The battery is identical to the clinical battery, and findings are summarized here.  Learning and memory reflected a weakness, and improved only marginally with cues.  He was distractible, and there was evidence of mild executive dysfunction.  Language and visual processing fell within normal limits, and notably, verbal fluency also fell within normal limits, reflecting an improvement from his last evaluation.  Memory had declined.    Precautions are in place related to COVID-19. In an effort to reduce the amount of time spent in clinic, the interview took place remotely today, and he is scheduled to be seen in the clinic in the future to complete the testing portion of the evaluation.     CLINICAL INTERVIEW FINDINGS    During the interview, Mr. Odonnell was pleasant and cooperative, and seemed to understand interview questions.  No tremors could be observed clinically over the video connection.  Speech was fluent, with normal articulation, volume, and rate.  He presented his thoughts in a clear, logical manner.  Memory and attention appeared to be adequate.  Judgment and insight appeared to be good.    Upon interview, Mr. Odonnell stated that he has maintained good benefit from his surgery, and that now his left hand is starting to shake like his right side was. He is still working full time,  and it sometimes affects his work. If he is nervous, upset, or tired, the tremor worsens. His goals for surgery including stopping his hand from shaking. He has a good understanding of the surgical procedure. He feels confident about being awake during the surgery. He understands that risks of the procedure include stroke, and death; however, he feels that the potential benefits outweigh the risks. His wife feels more comfortable about him proceeding after meeting with his neurologist.     Since his last evaluation, Mr. Odonnell believes that his cognition is about the same.  He has not noticed difficulty with memory, and his wife agrees.  He feels that his attention and concentration are generally good.  He has not noticed difficulty with decision-making, and has not noticed changes in his organizational abilities.  His wife noted that at his job he has to keep his tools organized, but at home he tends to lose his tools.  He lives with his wife, who has always managed their finances.  He manages his own medications, generally without difficulty.  He sets an alarm on his phone, and sometimes turns it off and takes the medications 30 minutes late.  He can usually tell if he is late with the medication, and does not take any extra doses of his Parkinson's medications.  He drives without difficulty.  He does not often cook.  He handles his personal cares independently.    Mr. Odonnell described his mood as frustrated and mad because of work.  He has 3 big projects and his employers are not entirely following through with supplying what he needs.  He does not feel depressed or hopeless, but has had feelings of frustration.  He has not been feeling guilty.  He feels somewhat anxious about not getting enough done in the day, and his left-sided tremor gets worse when he is stressed, and then that affects his right hand.  He falls asleep at 7:30 p.m., and is wide awake by 2:45 a.m.  In the winter he has to get up at that time  "to clear the driveway in order to get work at time, so he decided to stick with that all year.  He gets to work at 4:10, and takes a nap until 4:50 a.m., and then punches in.  His appetite has been good.  His weight has been fairly stable.  His energy level is good.  His interest level is good.  He looks forward to going to work, as there are different projects so there is always something to work on, which he likes.  He denied visual or auditory hallucinations.  He denied suicidal ideation or any history of suicide attempts.    Mr. Simon stopped drinking alcohol over a year ago.  He stated that he never drank much, but that he no longer thinks it is worth it because he has to get up for work, and he does not like spending the money. He denied illicit drug use or tobacco use.    Since his last evaluation, in addition to DBS surgery, he underwent knee replacement on 3/16/2020.  His balance has been good.  He has had some tingling in his left thigh.  He seldom experiences headaches.  He described \"normal aches and pains\" for his age.    PAST MEDICAL HISTORY: Medical records indicate a history of Parkinson's disease, osteoarthritis, obstructive sleep apnea, neuropathy, gout, hypertension, type 2 diabetes mellitus, chronic kidney disease, carpal tunnel syndrome, basal cell carcinoma, essential tremor.    CURRENT MEDICATIONS:  Include allopurinol, amlodipine, aspirin 81 mg, augmented betamethasone dipropionate, carbidopa-levodopa (Sinemet CR, Sinemet), cholecalciferol, diphenoxylate-atropine, doxepin, finasteride, furosemide, glipizide, hydrocodone-acetaminophen, insulin glargine, liraglutide, omeprazole, pregabalin, simvastatin.    FAMILY MEDICAL HISTORY:  Significant for tremor in his father and son. He is reportedly not particularly close with his family so he knows of no other neurologic history.     IMPRESSIONS AND RECOMMENDATIONS    Stefan Odonnell is a 71-year-old man with a history of Parkinson's disease and " possible concomitant essential tremor.  He is s/p STN DBS lead implantation in May 2018, with good benefit to his tremor.  He is now considering surgery for the second side.      Precautions are in place related to COVID-19. In an effort to reduce the amount of time spent in the clinic, Mr. Odonnell completed the interview portion of the evaluation today over video, and is scheduled to be seen for the testing portion of the evaluation later. Additionally, he participates in the [place] clinical core, and his most recent research neuropsychological evaluation, which was a 24-month follow-up, took place in January 2021.  Results suggested some decline in memory from previous evaluations. Given these findings, and since he does not plan to move forward with surgery until November or December, it will be important to repeat the testing to ensure there is no further cognitive decline. Testing is scheduled for early September, when he is in the [facility] for other appointments.     In the meantime, he and his wife have not been noticing significant cognitive changes. He is not reporting significant depressed mood or anxiety. Although he reported some apathy at a neurology visit in January, he is not currently reporting significant apathy. He continues to have a good understanding of the surgical procedure and the risks involved. He also has a good support system in his family.    Final recommendations regarding surgical candidacy will be made once he completes the testing portion of this evaluation.      Edwina Goldstein, Ph.D., ABPP  Licensed Psychologist, [place] [id number]  Board Certified in Clinical Neuropsychology    Time spent:  One hour professional time, including interview, biopsychosocial assessment, and report writing (CPT 45109) ICD-10 diagnosis: G20; F06.8.    Video-Visit Details    Type of service:  Video Visit    Video End Time:1:00 PM    Originating Location (pt. Location): Home    Distant Location (provider location):   [facility] NEUROPSYCHOLOGY [place]     Platform used for Video Visit: Pola        Again, thank you for allowing me to participate in the care of your patient.      Sincerely,    Triston

## 2021-06-23 DIAGNOSIS — G20.A1 PARKINSON DISEASE (H): Primary | ICD-10-CM

## 2021-06-23 NOTE — PROGRESS NOTES
"Patient ineligible for MRI due to short circuit in one segment of Abbott Infinity DBS system and is pursuing workup for 2nd side DBS.     Orders placed via Written order mode under Dr. Marshall for Head CT without contrast.    Gela Ny RN, MPH  Research Nurse, Movement Disorders              Wayne Marshall MD Brown, Kelly, RN             Yes, without contrast.  His MRI should have contrast and that should be enough.    Previous Messages    ----- Message -----   From: Gela Ny RN   Sent: 6/23/2021   2:33 PM CDT   To: Wayne Marshall MD   Subject: RE: 2nd side workup imaging - low impedance *     OK--just to confirm, this is the same CT order we use for stereotactic CT that we get for their initial programming vist?     This CT is *without* contrast.     The smartphrase reads:   \"Stereotactic CT of head to verify DBS lead placement to be done at [facility]. PLEASE align orientation correctly with the gantry. Please page/email Kay with questions. Protocol should be same as [facility] DBS CT protocol     Please use DBS protocol ([facility]ing)\"     If so, I can put in the written order under your name.     Thank you,   Gela     ----- Message -----   From: Wayne Marshall MD   Sent: 6/22/2021   5:47 PM CDT   To: Gela Ny RN   Subject: RE: 2nd side workup imaging - low impedance *     We could use the MRI from before to do the surgery. Order a CT head stereotactic protocol that you usually get and we can merge it with his original MRI and we can target off of that if there isn't much change.           "

## 2021-06-24 ENCOUNTER — TELEPHONE (OUTPATIENT)
Dept: NEUROLOGY | Facility: CLINIC | Age: 71
End: 2021-06-24

## 2021-06-24 NOTE — TELEPHONE ENCOUNTER
Blanca was informed of the change from MRI to CT and that neuropsych testing was needed for the work up and the patient's 36 month clinical core research study.     sent a new appointment itinerary via [employer].

## 2021-06-24 NOTE — TELEPHONE ENCOUNTER
"----- Message from Lala Day RN sent at 6/24/2021  9:35 AM CDT -----  Regarding: RE: 2nd side workup imaging - low impedance in one contact  Probably because he already had the MRA part. Just thinking it through!  ----- Message -----  From: Madeline Perez, ARLIN  Sent: 6/23/2021   4:29 PM CDT  To: Surya Spivey MA, Gela Ny RN, #  Subject: RE: 2nd side workup imaging - low impedance #    It doesn't seem like he will need more than the CT, per Dr. Marshall's message below.   ----- Message -----  From: Lala Day RN  Sent: 6/23/2021   4:16 PM CDT  To: Surya Spivey MA, Madeline Perez, RN, #  Subject: RE: 2nd side workup imaging - low impedance #    Will he also need a CTA and CTV?    Lala  ----- Message -----  From: Gela yN RN  Sent: 6/23/2021   3:13 PM CDT  To: Surya Spivey MA, Madeline Perez, RN, #  Subject: FW: 2nd side workup imaging - low impedance #    I've placed an order for head CT w/o contrast under Dr. Marshall to be used for workup for 2nd side.    Surya, could you please schedule CT (can be at the [facility] now), either on same day as neuropsych testing or ON/OFF testing with Dr. Sloan 9/3? I will cancel the MRI order and MRI appt.    Thank you,  Gela     ----- Message -----  From: Wayne Marshall MD  Sent: 6/23/2021   2:47 PM CDT  To: Gela Ny RN  Subject: RE: 2nd side workup imaging - low impedance #    Yes, without contrast.  His MRI should have contrast and that should be enough.  ----- Message -----  From: Gela Ny RN  Sent: 6/23/2021   2:33 PM CDT  To: Wayne Marshall MD  Subject: RE: 2nd side workup imaging - low impedance #    OK--just to confirm, this is the same CT order we use for stereotactic CT that we get for their initial programming vist?    This CT is #without# contrast.    The smartphrase reads:  \"Stereotactic CT of head to verify DBS lead placement to be done at [facility]. PLEASE align orientation correctly with the gantry. Please page/email Kay with questions. " "Protocol should be same as [facility] DBS CT protocol    Please use DBS protocol ([facility])\"    If so, I can put in the written order under your name.    Thank you,  Gela    ----- Message -----  From: Wayne Marshall MD  Sent: 6/22/2021   5:47 PM CDT  To: Gela Ny RN  Subject: RE: 2nd side workup imaging - low impedance #    We could use the MRI from before to do the surgery. Order a CT head stereotactic protocol that you usually get and we can merge it with his original MRI and we can target off of that if there isn't much change.  ----- Message -----  From: Gela Ny RN  Sent: 6/22/2021   3:02 PM CDT  To: Edwina Goldstein, PhD LP, Surya Spivey MA, #  Subject: 2nd side workup imaging - low impedance in o#    Hi Madeline and Dr. Marshall (Dr. Sloan, Dr. Goldstein, and Surya cc'carlee),    This clinical core patient is interested in continuing workup for 2nd side (has already met with Dr. Marshall in February), and if approved, he would like to have surgery Nov/Dec 2021. Dr. Goldstein met with patient today for neuropsych interview and discussed pt's preferred timeline.    Pt is currently scheduled for an MRI on 9/3, but he has a low impedance on contact 3B. Pt had an xray series ordered by Dr. Sloan, which didn't reveal a cause.  If pt cannot have a 1.5T MRI anymore, is there an alternative?    FYI, on 9/3, pt will have on/off testing for the 36 month clinical core study with Dr. Sloan, which can also be used for workup.  Surya and Dr. Goldstein are looking at dates to have neuropsych testing that same week.     Dr. Marshall or Madeline, please let Surya and I know if you would like any additional imaging.    Thank you,  Gela                           "

## 2021-06-24 NOTE — TELEPHONE ENCOUNTER
Called the patient and the patient asked that the writer to call his wife, Blanca.    Writer left a message for the Blanca to call back.

## 2021-07-08 NOTE — PROGRESS NOTES
Stefan Odonnell is a 71 year old who is being evaluated via a billable video visit.      How would you like to obtain your AVS? MyChart  If the video visit is dropped, the invitation should be resent by: Send to e-mail at: [email or web address]  Will anyone else be joining your video visit? No    Video Start Time: 12:25 PM    NEUROPSYCHOLOGICAL EVALUATION    RELEVANT HISTORY AND REASON FOR REFERRAL    Stefan Odonnell is a 71 year old, right-handed allen with 12 years of formal education. Information was obtained via video interview with the patient, and review of his medical records, including prior neuropsychological evaluations. Mr. Odonnell has a history of Parkinson s disease and possible concomitant essential tremor. He is s/p left STN DBS lead implantation on 5/29/2018. He has had good benefit from surgery including improvement in hand tremor. He is now considering surgery for the second side. He was referred for neuropsychological evaluation by Satnford Sloan M.D., as part of the pre-surgery protocol, to assist in determining whether there have been any changes in cognitive functioning as they pertain to his ability to make well reasoned medical decisions, and to establish and updated neurocognitive baseline.    Mr. Odonnell has undergone prior neuropsychological evaluations.  Initially, he underwent an evaluation with my colleague, Dewey Cazares, PhD, on 1/23/2018.  Results indicated findings that were largely normal, with relative weaknesses in complex attention/executive control.  Some of his cognitive profile was thought perhaps attributable to neurodevelopmental factors such as possible ADHD-like syndrome, along with normal aging.  He then underwent a neuropsychological evaluation under my direction on 6/20/2019.  Results indicated mild executive dysfunction, variable learning and memory, and slowed verbal fluency, with performance otherwise falling within normal limits.  Compared to 2018, he had declines in  semantic fluency and switching fluency, and on measures of distractibility.  He had improvements in psychomotor processing speed and some aspects of memory.    Mr. Odonnell participates in research in the [facility].  He underwent a full research neuropsychological evaluation under my direction on 1/14/2021.  The battery is identical to the clinical battery, and findings are summarized here.  Learning and memory reflected a weakness, and improved only marginally with cues.  He was distractible, and there was evidence of mild executive dysfunction.  Language and visual processing fell within normal limits, and notably, verbal fluency also fell within normal limits, reflecting an improvement from his last evaluation.  Memory had declined.    Precautions are in place related to COVID-19. In an effort to reduce the amount of time spent in clinic, the interview took place remotely today, and he is scheduled to be seen in the clinic in the future to complete the testing portion of the evaluation.     CLINICAL INTERVIEW FINDINGS    During the interview, Mr. Odonnell was pleasant and cooperative, and seemed to understand interview questions.  No tremors could be observed clinically over the video connection.  Speech was fluent, with normal articulation, volume, and rate.  He presented his thoughts in a clear, logical manner.  Memory and attention appeared to be adequate.  Judgment and insight appeared to be good.    Upon interview, Mr. Odonnell stated that he has maintained good benefit from his surgery, and that now his left hand is starting to shake like his right side was. He is still working full time, and it sometimes affects his work. If he is nervous, upset, or tired, the tremor worsens. His goals for surgery including stopping his hand from shaking. He has a good understanding of the surgical procedure. He feels confident about being awake during the surgery. He understands that risks of the procedure include  stroke, and death; however, he feels that the potential benefits outweigh the risks. His wife feels more comfortable about him proceeding after meeting with his neurologist.     Since his last evaluation, Mr. Odonnell believes that his cognition is about the same.  He has not noticed difficulty with memory, and his wife agrees.  He feels that his attention and concentration are generally good.  He has not noticed difficulty with decision-making, and has not noticed changes in his organizational abilities.  His wife noted that at his job he has to keep his tools organized, but at home he tends to lose his tools.  He lives with his wife, who has always managed their finances.  He manages his own medications, generally without difficulty.  He sets an alarm on his phone, and sometimes turns it off and takes the medications 30 minutes late.  He can usually tell if he is late with the medication, and does not take any extra doses of his Parkinson's medications.  He drives without difficulty.  He does not often cook.  He handles his personal cares independently.    Mr. Odonnell described his mood as frustrated and mad because of work.  He has 3 big projects and his employers are not entirely following through with supplying what he needs.  He does not feel depressed or hopeless, but has had feelings of frustration.  He has not been feeling guilty.  He feels somewhat anxious about not getting enough done in the day, and his left-sided tremor gets worse when he is stressed, and then that affects his right hand.  He falls asleep at 7:30 p.m., and is wide awake by 2:45 a.m.  In the winter he has to get up at that time to clear the driveway in order to get work at time, so he decided to stick with that all year.  He gets to work at 4:10, and takes a nap until 4:50 a.m., and then punches in.  His appetite has been good.  His weight has been fairly stable.  His energy level is good.  His interest level is good.  He looks forward to  "going to work, as there are different projects so there is always something to work on, which he likes.  He denied visual or auditory hallucinations.  He denied suicidal ideation or any history of suicide attempts.    Mr. Simon stopped drinking alcohol over a year ago.  He stated that he never drank much, but that he no longer thinks it is worth it because he has to get up for work, and he does not like spending the money. He denied illicit drug use or tobacco use.    Since his last evaluation, in addition to DBS surgery, he underwent knee replacement on 3/16/2020.  His balance has been good.  He has had some tingling in his left thigh.  He seldom experiences headaches.  He described \"normal aches and pains\" for his age.    PAST MEDICAL HISTORY: Medical records indicate a history of Parkinson's disease, osteoarthritis, obstructive sleep apnea, neuropathy, gout, hypertension, type 2 diabetes mellitus, chronic kidney disease, carpal tunnel syndrome, basal cell carcinoma, essential tremor.    CURRENT MEDICATIONS:  Include allopurinol, amlodipine, aspirin 81 mg, augmented betamethasone dipropionate, carbidopa-levodopa (Sinemet CR, Sinemet), cholecalciferol, diphenoxylate-atropine, doxepin, finasteride, furosemide, glipizide, hydrocodone-acetaminophen, insulin glargine, liraglutide, omeprazole, pregabalin, simvastatin.    FAMILY MEDICAL HISTORY:  Significant for tremor in his father and son. He is reportedly not particularly close with his family so he knows of no other neurologic history.     IMPRESSIONS AND RECOMMENDATIONS    Stefan Odonnell is a 71-year-old man with a history of Parkinson's disease and possible concomitant essential tremor.  He is s/p STN DBS lead implantation in May 2018, with good benefit to his tremor.  He is now considering surgery for the second side.      Precautions are in place related to COVID-19. In an effort to reduce the amount of time spent in the clinic, Mr. Odonnell completed the " interview portion of the evaluation today over video, and is scheduled to be seen for the testing portion of the evaluation later. Additionally, he participates in the [facility] clinical core, and his most recent research neuropsychological evaluation, which was a 24-month follow-up, took place in January 2021.  Results suggested some decline in memory from previous evaluations. Given these findings, and since he does not plan to move forward with surgery until November or December, it will be important to repeat the testing to ensure there is no further cognitive decline. Testing is scheduled for early September, when he is in the [facility] for other appointments.     In the meantime, he and his wife have not been noticing significant cognitive changes. He is not reporting significant depressed mood or anxiety. Although he reported some apathy at a neurology visit in January, he is not currently reporting significant apathy. He continues to have a good understanding of the surgical procedure and the risks involved. He also has a good support system in his family.    Final recommendations regarding surgical candidacy will be made once he completes the testing portion of this evaluation.      Edwina Goldstein, Ph.D., ABPP  Licensed Psychologist, [employer] [id number]  Board Certified in Clinical Neuropsychology    Time spent:  One hour professional time, including interview, biopsychosocial assessment, and report writing (CPT 36187) ICD-10 diagnosis: G20; F06.8.    Video-Visit Details    Type of service:  Video Visit    Video End Time:1:00 PM    Originating Location (pt. Location): Home    Distant Location (provider location):  [facility] [place]     Platform used for Video Visit: RoomReveal

## 2021-08-14 ENCOUNTER — HEALTH MAINTENANCE LETTER (OUTPATIENT)
Age: 71
End: 2021-08-14

## 2021-08-24 ENCOUNTER — TELEPHONE (OUTPATIENT)
Dept: NEUROLOGY | Facility: CLINIC | Age: 71
End: 2021-08-24

## 2021-08-24 NOTE — TELEPHONE ENCOUNTER
Late entry (Pt's spouse left voicemail on writers direct line when out of office):    Pt's spouse called to ask to reschedule pt's upcoming appointments on 9/2 and 9/3 (neuropsych testing, neurology on/off testing, and MRI). Pt has not found a hotel they prefer and does not want to come during Labor Day traffic.    These appointments are combined 36M clinical core study visits with DBS workup for 2nd side surgery (MRI).     Will work with Surya Spivey to determine an alternate date where appts can be in the same week for pt's convenience.    Gela Ny, RN, MPH  Research Nurse, Movement Disorders

## 2021-09-02 ENCOUNTER — OFFICE VISIT (OUTPATIENT)
Dept: NEUROPSYCHOLOGY | Facility: CLINIC | Age: 71
End: 2021-09-02
Payer: COMMERCIAL

## 2021-09-02 DIAGNOSIS — F09 MENTAL DISORDER DUE TO GENERAL MEDICAL CONDITION: ICD-10-CM

## 2021-09-02 DIAGNOSIS — G20.A1 PARKINSON'S DISEASE (H): Primary | ICD-10-CM

## 2021-09-02 PROCEDURE — 96132 NRPSYC TST EVAL PHYS/QHP 1ST: CPT

## 2021-09-02 PROCEDURE — 96139 PSYCL/NRPSYC TST TECH EA: CPT

## 2021-09-02 PROCEDURE — 96138 PSYCL/NRPSYC TECH 1ST: CPT

## 2021-09-02 PROCEDURE — 96133 NRPSYC TST EVAL PHYS/QHP EA: CPT

## 2021-09-02 NOTE — LETTER
9/2/2021      RE: Stefan Odonnell  [address] [place] [id number]       NEUROPSYCHOLOGICAL EVALUATION    RELEVANT HISTORY AND REASON FOR REFERRAL    Stefan Odonnell is a 71 year old, right-handed allen with 12 years of formal education. Mr. Odonnell has a history of Parkinson s disease and possible concomitant essential tremor. He is s/p left STN DBS lead implantation on 5/29/2018. He has had good benefit from surgery including improvement in hand tremor. He is now considering surgery for the second side. He was referred for neuropsychological evaluation by Stanford Sloan M.D., as part of the pre-surgical protocol, to assist in determining whether there have been any changes in cognitive functioning as they pertain to his ability to make well reasoned medical decisions, and to establish and updated neurocognitive baseline.     Mr. Odonnell has undergone prior neuropsychological evaluations.  Initially, he underwent an evaluation with my colleague, Dewey Cazares, PhD, on 1/23/2018.  Results indicated findings that were largely normal, with relative weaknesses in complex attention/executive control.  Some of his cognitive profile was thought perhaps attributable to neurodevelopmental factors such as possible ADHD-like syndrome, along with normal aging.  He then underwent a neuropsychological evaluation under my direction on 6/20/2019.  Results indicated mild executive dysfunction, variable learning and memory, and slowed verbal fluency, with performance otherwise falling within normal limits.  Compared to 2018, he had declines in semantic fluency and switching fluency, and on measures of distractibility.  He had improvements in psychomotor processing speed and some aspects of memory.     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  He underwent a full research neuropsychological evaluation under my direction on 1/14/2021.  The battery is identical to the clinical battery, and findings are  summarized here.  Learning and memory reflected a weakness, and improved only marginally with cues.  He was distractible, and there was evidence of mild executive dysfunction.  Language and visual processing fell within normal limits, and notably, verbal fluency also fell within normal limits, reflecting an improvement from his last evaluation.  Memory had declined.     Precautions are in place related to COVID-19. In an effort to reduce the amount of time spent in clinic, the interview took place remotely on 6/22/2021, with the testing portion of the evaluation delayed until today, to be coordinated with other presurgical appointments. The findings from the interview are summarized in the note dated 6/22/2021. Briefly, he and his wife had not noticed significant cognitive changes. He was not reporting significant depressed mood or anxiety. He had reported some apathy at a neurology visit in January, but was not reporting apathy in June.     PAST MEDICAL HISTORY: Medical records indicate a history of Parkinson's disease, osteoarthritis, obstructive sleep apnea, neuropathy, gout, hypertension, type 2 diabetes mellitus, chronic kidney disease, carpal tunnel syndrome, basal cell carcinoma, essential tremor.    CURRENT MEDICATIONS:  Include allopurinol, amlodipine, aspirin 81 mg, augmented betamethasone dipropionate, carbidopa-levodopa (Sinemet CR, Sinemet), cholecalciferol, diphenoxylate-atropine, doxepin, finasteride, furosemide, glipizide, hydrocodone-acetaminophen, insulin glargine, liraglutide, omeprazole, pregabalin, simvastatin.    FAMILY MEDICAL HISTORY:  Significant for tremor in his father and son. He is reportedly not particularly close with his family so he knows of no other neurologic history.     BEHAVIORAL OBSERVATIONS    During the evaluation, Mr. Odonnell was pleasant, cooperative, and seemed to understand the instructions. He was alert and oriented to person, place, and time. A slight tremor was  observed clinically in his right arm and hand. Mood was neutral.  Speech was fluent, with normal articulation, volume, and rate. He presented his thoughts in a clear, logical manner. Internal performance validity measures fell within normal limits. The results are believed to accurately reflect his current level of functioning.     MEASURES ADMINISTERED    The following measures were administered by a trained psychometrist, under the direct supervision of a licensed psychologist.    Subtests of the Wechsler Adult Intelligence Scale-4; subtests of the Wechsler Memory Scale-Revised; Gomez Verbal Learning Test-Revised, Form 6; Brief Visual Memory Test - Revised, Form 1; Snowflake Naming Test; D-KEFS Verbal Fluency, Standard Form; Trail Making Test; Stroop; Wisconsin Card Sorting Test - 64; Montiel Judgement of Line Orientation Form V; Clock Drawing; Dementia Rating Scale - 2 (DRS-2) Standard Form;  MoCA v. 7.3; Rodney Depression Inventory-2 (BDI-2), HAM-D, HAM-A, Apathy Scale, RBDSQ; QUIP, PDQ-39, ESS.     RESULTS AND INTERPRETATION    Overall intellectual functioning was estimated to fall in the average range, marginally above premorbid estimates of low average based on single word reading abilities, administered at a prior evaluation.  Performance on a screening measure of dementia was low average (128/144).  He had a relative weakness on tasks of initiation/perseveration on this measure.  Performance on the MoCA fell below expected (19/30), with errors in clock drawing, attention, repetition, fluency, abstraction, and memory.    Confrontation naming was high average for his age.  Verbal abstract reasoning was average.  Ability to comprehend and articulate responses to complex social situations was high average.  Switching fluency was below average.  Letter fluency was low average, and generative naming to category was average.    Attention span was average for his age.  Divided attention was high average.  Performance  on a measure of distractibility was average.  Psychomotor processing speed was average.    Basic visual perception, including matching lines and angles, was high average.  Construction of a clock was impaired, and was notable for difficulty with conceptualization.  Despite being instructed to write the numbers 1 through 12, he wrote in all of the numbers between 1 and 60, although he also included the number 12 at the 12 o'clock position.  When asked to set the hands to 11:10, he asa the hands pointing to the 10 and 11, which were on the right side of the clock.  Nonverbal deductive reasoning was average.    Novel problem-solving, including the ability to generate strategies and solutions, was low average.  On this measure, he had difficulty maintaining set.    Immediate recall of verbal narrative material was low average, with average recall following a 30-minute delay.  On a multiple trial list learning task, immediate recall was low average, with exceptionally low recall following a 25-minute delay.  Recognition memory on this task was not better than free recall.  Immediate recall of visual material was below average, with below average recall following a 25-minute delay.  Recognition memory on this task fell within normal limits.    On the BDI-2, a self-report questionnaire, he endorsed a minimal level of depressive symptomatology.  He did not endorse significant apathy on a scale.  He endorsed excessive daytime sleepiness, but did not endorse dream enactment behavior on questionnaires.  He also did not report compulsive behaviors.     IMPRESSIONS AND RECOMMENDATIONS    Stefan Odonnell is a 71-year-old man with a history of Parkinson's disease and possible concomitent essential tremor.  He is s/p left STN DBS lead implantation in May 2018, with good benefit to his tremor.  He is considering surgery to address the left side of his body now.  Prior neuropsychological evaluations have been suggestive of mild  executive dysfunction and distractibility, and declining memory; he is noted to have a likely history of ADHD.    Results of today's evaluation indicate mild executive dysfunction.  Learning reflects a weakness, and memory is variable.  Fluency is slowed.  Otherwise, language, visual processing, and complex attentional processing fall within normal limits.  He does not endorse significant depressive symptomatology, anxiety, apathy, or compulsive behaviors.    Compared to his most recent clinical evaluation, Mr. Odonnell has had improvements in basic and complex attention, and in semantic fluency, while other measures of fluency remained stable.  He has had declines memory, although some memory tests remain within normal limits; there are very subtle declines in executive functioning as well.    This pattern of performance does not reflect dementia at this time, although it is mildly abnormal.  The pattern is suggestive of frontal and subcortical system involvement, consistent with his history of Parkinson's disease, along with a possible history of ADHD.  He continues to be capable of comprehending medical information and making well reasoned decisions for himself.  He has a good understanding of the surgical procedure and the risks involved.  He has a good support system in his family.  He appears to be an adequate candidate for surgery from a neurocognitive perspective.    In terms of daily functioning, Mr. Odnonell's cognitive difficulties are not likely to interfere with his ability to actively participate in treatment, or to manage his instrumental activities of daily living independently.  He may find that he benefits from structure and routine.  If he has difficulty managing large, complex tasks, others may assist by breaking down such tasks into smaller, more manageable parts.  He may benefit from the use of written reminders or checklists.  He may find it helpful to use a smart phone or similar device for  alarms and reminders.    Results may serve as an updated baseline of his neurocognitive functioning.  The evaluation may be repeated in the future for comparison should a change in mental status occur.      Edwina Goldstein, Ph.D., [facility]  Licensed Psychologist, [employer] [id number]  Board Certified in Clinical Neuropsychology    Time spent: 60 minutes (1 unit) neuropsychological testing evaluation by licensed and board-certified neuropsychologist, including integration of patient data, interpretation of standardized test results and clinical data, clinical decision-making, treatment planning, report, and interactive feedback to the patient, first hour (CPT 19981). 100 minutes (2 units) of neuropsychological testing evaluation by licensed and board-certified neuropsychologist, including integration of patient data, interpretation of standardized test results and clinical data, clinical decision-making, treatment planning, report, and interactive feedback to the patient, subsequent hours (CPT 24256). 30 minutes of neuropsychological test administration and scoring by technician, first 30 minutes (CPT 51157). 165 additional minutes (5 units) neuropsychological test administration and scoring by technician, subsequent 30 minutes (CPT 95185). ICD-10 Diagnoses: G20; F06.8.      Edwina Goldstein, PhD LP

## 2021-09-03 ENCOUNTER — ANCILLARY PROCEDURE (OUTPATIENT)
Dept: CT IMAGING | Facility: CLINIC | Age: 71
End: 2021-09-03
Attending: NEUROLOGICAL SURGERY
Payer: COMMERCIAL

## 2021-09-03 ENCOUNTER — OFFICE VISIT (OUTPATIENT)
Dept: NEUROLOGY | Facility: CLINIC | Age: 71
End: 2021-09-03
Payer: COMMERCIAL

## 2021-09-03 VITALS
OXYGEN SATURATION: 96 % | WEIGHT: 299 LBS | RESPIRATION RATE: 16 BRPM | BODY MASS INDEX: 38.39 KG/M2 | SYSTOLIC BLOOD PRESSURE: 144 MMHG | DIASTOLIC BLOOD PRESSURE: 89 MMHG | HEART RATE: 66 BPM

## 2021-09-03 VITALS — TEMPERATURE: 98.1 F | HEIGHT: 72 IN | BODY MASS INDEX: 40.06 KG/M2

## 2021-09-03 DIAGNOSIS — G20.A1 PARKINSON DISEASE (H): Primary | ICD-10-CM

## 2021-09-03 DIAGNOSIS — G20.A1 PARKINSON DISEASE (H): ICD-10-CM

## 2021-09-03 PROCEDURE — 99214 OFFICE O/P EST MOD 30 MIN: CPT | Mod: GC | Performed by: PSYCHIATRY & NEUROLOGY

## 2021-09-03 PROCEDURE — 70450 CT HEAD/BRAIN W/O DYE: CPT | Mod: GC | Performed by: RADIOLOGY

## 2021-09-03 ASSESSMENT — UNIFIED PARKINSONS DISEASE RATING SCALE (UPDRS)
DYSKINESIAS_PRESENT: NO
AMPLITUDE_RUE: NORMAL: NO TREMOR.
AMPLITUDE_LUE: NORMAL: NO TREMOR.
FREEZING_GAIT: NORMAL
TOTAL_SCORE: 4
SPEECH: SLIGHT: LOSS OF MODULATION, DICTION OR VOLUME, BUT STILL ALL WORDS EASY TO UNDERSTAND.
HANDMOVEMENTS_LEFT: MILD: ANY OF THE FOLLOWING: A) 3 TO 5 INTERRUPTIONS DURING TAPPING B) MILD SLOWING C) THE AMPLITUDE DECREMENTS MIDWAY IN THE 10-MOVEMENT SEQUENCE
ARISING_CHAIR: SLIGHT: ARISING IS SLOWER THAN NORMAL, OR MAY NEED MORE THAN ONE ATTEMPT, OR MAY NEED TO MOVE FORWARD IN THE CHAIR TO ARISE.  NO NEED TO USE THE ARMS OF THE CHAIR.
FACIAL_EXPRESSION: MILD: IN ADDITION TO DECREASED EYE-BLINK FREQUENCY, MASKED FACIES PRESENT IN THE LOWER FACE AS WELL, NAMELY FEWER MOVEMENTS AROUND THE MOUTH, SUCH AS LESS SPONTANEOUS SMILING, BUT LIPS NOT PARTED.
AMPLITUDE_LIP_JAW: NORMAL: NO TREMOR.
TOTAL_SCORE_LEFT: 8
AMPLITUDE_LUE: SLIGHT: < 1 CM IN MAXIMAL AMPLITUDE.
TOETAPPING_RIGHT: NORMAL
TOTAL_SCORE: 30
SPEECH: SLIGHT: LOSS OF MODULATION, DICTION OR VOLUME, BUT STILL ALL WORDS EASY TO UNDERSTAND.
PRONATION_SUPINATION_LEFT: MILD: ANY OF THE FOLLOWING: A) 3 TO 5 INTERRUPTIONS DURING TAPPING B) MILD SLOWING C) THE AMPLITUDE DECREMENTS MIDWAY IN THE 10-MOVEMENT SEQUENCE
GAIT: SLIGHT: INDEPENDENT WALKING WITH MINOR GAIT IMPAIRMENT.
GAIT: SLIGHT: INDEPENDENT WALKING WITH MINOR GAIT IMPAIRMENT.
TOETAPPING_RIGHT: NORMAL
RIGIDITY_RLE: NORMAL
POSTURAL_STABILITY: NORMAL:  RECOVERS WITH ONE OR TWO STEPS.
PRONATION_SUPINATION_RIGHT: NORMAL
SPONTANEITY_OF_MOVEMENT: 1: SLIGHT: SLIGHT GLOBAL SLOWNESS AND POVERTY OF SPONTANEOUS MOVEMENTS.
TOTAL_SCORE: 3
HANDMOVEMENTS_RIGHT: SLIGHT: ANY OF THE FOLLOWING: A) THE REGULAR RHYTHM IS BROKEN WITH ONE WITH ONE OR TWO INTERRUPTIONS OR HESITATIONS OF THE MOVEMENT B) SLIGHT SLOWING C) THE AMPLITUDE DECREMENTS NEAR THE END OF THE 10 MOVEMENTS.
LEG_AGILITY_RIGHT: NORMAL
GAIT: NORMAL
RIGIDITY_LUE: MILD: RIGIDITY DETECTED WITHOUT THE ACTIVATION MANEUVER.  FULL RANGE OF MOTION IS EASILY ACHIEVED.
POSTURE: 0 NORMAL, NO PROBLEMS
RIGIDITY_RLE: SLIGHT: RIGIDITY ONLY DETECTED WITH ACTIVATION MANEUVER.
AMPLITUDE_RLE: NORMAL: NO TREMOR.
FREEZING_GAIT: NORMAL
TOTAL_SCORE_LEFT: 15
CONSTANCY_TREMOR_ATREST: SLIGHT: TREMOR AT REST IS PRESENT  25% OF THE ENTIRE EXAMINATION PERIOD.
MOVEMENTS_INTERFERE_WITH_RATINGS: NO
POSTURE: 0 NORMAL, NO PROBLEMS
LEG_AGILITY_RIGHT: SLIGHT: ANY OF THE FOLLOWING: A) THE REGULAR RHYTHM IS BROKEN WITH ONE WITH ONE OR TWO INTERRUPTIONS OR HESITATIONS OF THE MOVEMENT B) SLIGHT SLOWING C) THE AMPLITUDE DECREMENTS NEAR THE END OF THE 10 MOVEMENTS.
PRONATION_SUPINATION_LEFT: MILD: ANY OF THE FOLLOWING: A) 3 TO 5 INTERRUPTIONS DURING TAPPING B) MILD SLOWING C) THE AMPLITUDE DECREMENTS MIDWAY IN THE 10-MOVEMENT SEQUENCE
ARISING_CHAIR: SLIGHT: ARISING IS SLOWER THAN NORMAL, OR MAY NEED MORE THAN ONE ATTEMPT, OR MAY NEED TO MOVE FORWARD IN THE CHAIR TO ARISE.  NO NEED TO USE THE ARMS OF THE CHAIR.
TOETAPPING_LEFT: SLIGHT: ANY OF THE FOLLOWING: A) THE REGULAR RHYTHM IS BROKEN WITH ONE WITH ONE OR TWO INTERRUPTIONS OR HESITATIONS OF THE MOVEMENT B) SLIGHT SLOWING C) THE AMPLITUDE DECREMENTS NEAR THE END OF THE 10 MOVEMENTS.
PRONATION_SUPINATION_RIGHT: MILD: ANY OF THE FOLLOWING: A) 3 TO 5 INTERRUPTIONS DURING TAPPING B) MILD SLOWING C) THE AMPLITUDE DECREMENTS MIDWAY IN THE 10-MOVEMENT SEQUENCE
PARKINSONS_MEDS: OFF
FACIAL_EXPRESSION: MILD: IN ADDITION TO DECREASED EYE-BLINK FREQUENCY, MASKED FACIES PRESENT IN THE LOWER FACE AS WELL, NAMELY FEWER MOVEMENTS AROUND THE MOUTH, SUCH AS LESS SPONTANEOUS SMILING, BUT LIPS NOT PARTED.
ARISING_CHAIR: SLIGHT: ARISING IS SLOWER THAN NORMAL, OR MAY NEED MORE THAN ONE ATTEMPT, OR MAY NEED TO MOVE FORWARD IN THE CHAIR TO ARISE.  NO NEED TO USE THE ARMS OF THE CHAIR.
AMPLITUDE_RLE: NORMAL: NO TREMOR.
RIGIDITY_LLE: MILD: RIGIDITY DETECTED WITHOUT THE ACTIVATION MANEUVER.  FULL RANGE OF MOTION IS EASILY ACHIEVED.
RIGIDITY_RUE: SLIGHT: RIGIDITY ONLY DETECTED WITH ACTIVATION MANEUVER.
RIGIDITY_NECK: SEVERE: RIGIDITY DETECTED WITHOUT THE ACTIVATION MANEUVER AND FULL RANGE OF MOTION NOT ACHIEVED.
TOETAPPING_LEFT: SLIGHT: ANY OF THE FOLLOWING: A) THE REGULAR RHYTHM IS BROKEN WITH ONE WITH ONE OR TWO INTERRUPTIONS OR HESITATIONS OF THE MOVEMENT B) SLIGHT SLOWING C) THE AMPLITUDE DECREMENTS NEAR THE END OF THE 10 MOVEMENTS.
TOETAPPING_LEFT: NORMAL
RIGIDITY_LLE: SLIGHT: RIGIDITY ONLY DETECTED WITH ACTIVATION MANEUVER.
RIGIDITY_LUE: MILD: RIGIDITY DETECTED WITHOUT THE ACTIVATION MANEUVER.  FULL RANGE OF MOTION IS EASILY ACHIEVED.
AXIAL_SCORE: 10
POSTURAL_STABILITY: NORMAL:  RECOVERS WITH ONE OR TWO STEPS.
FINGER_TAPPING_RIGHT: MILD: ANY OF THE FOLLOWING: A) 3 TO 5 INTERRUPTIONS DURING TAPPING B) MILD SLOWING C) THE AMPLITUDE DECREMENTS MIDWAY IN THE 10-MOVEMENT SEQUENCE
RIGIDITY_RUE: SLIGHT: RIGIDITY ONLY DETECTED WITH ACTIVATION MANEUVER.
TOTAL_SCORE: 12
LEG_AGILITY_RIGHT: NORMAL
TOTAL_SCORE: 40
HANDMOVEMENTS_RIGHT: SLIGHT: ANY OF THE FOLLOWING: A) THE REGULAR RHYTHM IS BROKEN WITH ONE WITH ONE OR TWO INTERRUPTIONS OR HESITATIONS OF THE MOVEMENT B) SLIGHT SLOWING C) THE AMPLITUDE DECREMENTS NEAR THE END OF THE 10 MOVEMENTS.
PARKINSONS_MEDS: OFF
FINGER_TAPPING_LEFT: SLIGHT: ANY OF THE FOLLOWING: A) THE REGULAR RHYTHM IS BROKEN WITH ONE WITH ONE OR TWO INTERRUPTIONS OR HESITATIONS OF THE MOVEMENT B) SLIGHT SLOWING C) THE AMPLITUDE DECREMENTS NEAR THE END OF THE 10 MOVEMENTS.
FINGER_TAPPING_RIGHT: SLIGHT: ANY OF THE FOLLOWING: A) THE REGULAR RHYTHM IS BROKEN WITH ONE WITH ONE OR TWO INTERRUPTIONS OR HESITATIONS OF THE MOVEMENT B) SLIGHT SLOWING C) THE AMPLITUDE DECREMENTS NEAR THE END OF THE 10 MOVEMENTS.
RIGIDITY_RUE: MODERATE: RIGIDITY DETECTED WITHOUT THE ACTIVATION MANEUVER. FULL RANGE OF MOTION IS ACHIEVED WITH EFFORT.
MOVEMENTS_INTERFERE_WITH_RATINGS: NO
HANDMOVEMENTS_LEFT: SLIGHT: ANY OF THE FOLLOWING: A) THE REGULAR RHYTHM IS BROKEN WITH ONE WITH ONE OR TWO INTERRUPTIONS OR HESITATIONS OF THE MOVEMENT B) SLIGHT SLOWING C) THE AMPLITUDE DECREMENTS NEAR THE END OF THE 10 MOVEMENTS.
PRONATION_SUPINATION_RIGHT: NORMAL
HANDMOVEMENTS_LEFT: MILD: ANY OF THE FOLLOWING: A) 3 TO 5 INTERRUPTIONS DURING TAPPING B) MILD SLOWING C) THE AMPLITUDE DECREMENTS MIDWAY IN THE 10-MOVEMENT SEQUENCE
LEG_AGILITY_LEFT: SLIGHT: ANY OF THE FOLLOWING: A) THE REGULAR RHYTHM IS BROKEN WITH ONE WITH ONE OR TWO INTERRUPTIONS OR HESITATIONS OF THE MOVEMENT B) SLIGHT SLOWING C) THE AMPLITUDE DECREMENTS NEAR THE END OF THE 10 MOVEMENTS.
LEG_AGILITY_LEFT: SLIGHT: ANY OF THE FOLLOWING: A) THE REGULAR RHYTHM IS BROKEN WITH ONE WITH ONE OR TWO INTERRUPTIONS OR HESITATIONS OF THE MOVEMENT B) SLIGHT SLOWING C) THE AMPLITUDE DECREMENTS NEAR THE END OF THE 10 MOVEMENTS.
SPONTANEITY_OF_MOVEMENT: 1: SLIGHT: SLIGHT GLOBAL SLOWNESS AND POVERTY OF SPONTANEOUS MOVEMENTS.
CONSTANCY_TREMOR_ATREST: SLIGHT: TREMOR AT REST IS PRESENT  25% OF THE ENTIRE EXAMINATION PERIOD.
AMPLITUDE_RUE: NORMAL: NO TREMOR.
RIGIDITY_NECK: SEVERE: RIGIDITY DETECTED WITHOUT THE ACTIVATION MANEUVER AND FULL RANGE OF MOTION NOT ACHIEVED.
AMPLITUDE_LIP_JAW: NORMAL: NO TREMOR.
AXIAL_SCORE: 8
LEG_AGILITY_LEFT: SLIGHT: ANY OF THE FOLLOWING: A) THE REGULAR RHYTHM IS BROKEN WITH ONE WITH ONE OR TWO INTERRUPTIONS OR HESITATIONS OF THE MOVEMENT B) SLIGHT SLOWING C) THE AMPLITUDE DECREMENTS NEAR THE END OF THE 10 MOVEMENTS.
HANDMOVEMENTS_RIGHT: MILD: ANY OF THE FOLLOWING: A) 3 TO 5 INTERRUPTIONS DURING TAPPING B) MILD SLOWING C) THE AMPLITUDE DECREMENTS MIDWAY IN THE 10-MOVEMENT SEQUENCE
FACIAL_EXPRESSION: MILD: IN ADDITION TO DECREASED EYE-BLINK FREQUENCY, MASKED FACIES PRESENT IN THE LOWER FACE AS WELL, NAMELY FEWER MOVEMENTS AROUND THE MOUTH, SUCH AS LESS SPONTANEOUS SMILING, BUT LIPS NOT PARTED.
DYSKINESIAS_PRESENT: NO
CONSTANCY_TREMOR_ATREST: NORMAL: NO TREMOR.
POSTURAL_STABILITY: NORMAL:  RECOVERS WITH ONE OR TWO STEPS.
SPEECH: NORMAL
PARKINSONS_MEDS: ON
DYSKINESIAS_PRESENT: NO
TOTAL_SCORE_LEFT: 15
FINGER_TAPPING_LEFT: MILD: ANY OF THE FOLLOWING: A) 3 TO 5 INTERRUPTIONS DURING TAPPING B) MILD SLOWING C) THE AMPLITUDE DECREMENTS MIDWAY IN THE 10-MOVEMENT SEQUENCE
RIGIDITY_LUE: MILD: RIGIDITY DETECTED WITHOUT THE ACTIVATION MANEUVER.  FULL RANGE OF MOTION IS EASILY ACHIEVED.
AMPLITUDE_RUE: NORMAL: NO TREMOR.
AMPLITUDE_RLE: NORMAL: NO TREMOR.
AMPLITUDE_LLE: NORMAL: NO TREMOR.
POSTURE: 0 NORMAL, NO PROBLEMS
AMPLITUDE_LUE: NORMAL: NO TREMOR.
RIGIDITY_NECK: SEVERE: RIGIDITY DETECTED WITHOUT THE ACTIVATION MANEUVER AND FULL RANGE OF MOTION NOT ACHIEVED.
SPONTANEITY_OF_MOVEMENT: 2: MILD: MILD GLOBAL SLOWNESS AND POVERTY OF SPONTANEOUS MOVEMENTS.
RIGIDITY_LLE: SLIGHT: RIGIDITY ONLY DETECTED WITH ACTIVATION MANEUVER.
RIGIDITY_RLE: SLIGHT: RIGIDITY ONLY DETECTED WITH ACTIVATION MANEUVER.
FINGER_TAPPING_RIGHT: SLIGHT: ANY OF THE FOLLOWING: A) THE REGULAR RHYTHM IS BROKEN WITH ONE WITH ONE OR TWO INTERRUPTIONS OR HESITATIONS OF THE MOVEMENT B) SLIGHT SLOWING C) THE AMPLITUDE DECREMENTS NEAR THE END OF THE 10 MOVEMENTS.
AMPLITUDE_LLE: NORMAL: NO TREMOR.
TOTAL_SCORE: 19
TOETAPPING_RIGHT: NORMAL
AXIAL_SCORE: 11
FREEZING_GAIT: NORMAL
AMPLITUDE_LIP_JAW: SLIGHT: < 1 CM IN MAXIMAL AMPLITUDE.
FINGER_TAPPING_LEFT: MILD: ANY OF THE FOLLOWING: A) 3 TO 5 INTERRUPTIONS DURING TAPPING B) MILD SLOWING C) THE AMPLITUDE DECREMENTS MIDWAY IN THE 10-MOVEMENT SEQUENCE
PRONATION_SUPINATION_LEFT: SLIGHT: ANY OF THE FOLLOWING: A) THE REGULAR RHYTHM IS BROKEN WITH ONE WITH ONE OR TWO INTERRUPTIONS OR HESITATIONS OF THE MOVEMENT B) SLIGHT SLOWING C) THE AMPLITUDE DECREMENTS NEAR THE END OF THE 10 MOVEMENTS.
AMPLITUDE_LLE: NORMAL: NO TREMOR.
MOVEMENTS_INTERFERE_WITH_RATINGS: NO

## 2021-09-03 ASSESSMENT — MOVEMENT DISORDERS SOCIETY - UNIFIED PARKINSONS DISEASE RATING SCALE (MDS-UPDRS)
SPEECH: NORMAL: NOT AT ALL (NO PROBLEMS).
GETTING_OUT_OF_BED_CAR_DEEP_CHAIR: NORMAL: NOT AT ALL (NO PROBLEMS).
TURNING_IN_BED: NORMAL: NOT AT ALL (NO PROBLEMS).
SALIVA_AND_DROOLING: NORMAL: NOT AT ALL (NO PROBLEMS).
EATING_TASKS: MODERATE: I NEED HELP WITH MANY EATING TASKS BUT CAN MANAGE SOME ALONE.
HOBBIES_AND_OTHER_ACTIVITIES: NORMAL:  NOT AT ALL (NO PROBLEMS).
HANDWRITING: SLIGHT: MY WRITING IS SLOW, CLUMSY OR UNEVEN, BUT ALL WORDS ARE CLEAR.
FREEZING: NORMAL: NOT AT ALL (NO PROBLEMS).
TOTAL_SCORE: 6
TREMOR: MILD: SHAKING OR TREMOR CAUSES PROBLEMS WITH ONLY A FEW ACTIVITIES.
CHEWING_AND_SWALLOWING: NORMAL: NO PROBLEMS.
WALKING_AND_BALANCE: NORMAL: NOT AT ALL (NO PROBLEMS).
HYGIENE: NORMAL: NOT AT ALL (NO PROBLEMS).
DRESSING: NORMAL: NOT AT ALL (NO PROBLEMS).

## 2021-09-03 NOTE — PATIENT INSTRUCTIONS
__ No changes were made to your DBS programming or your medications today.  __ We have not received the results of your EMG/NCS. Please contact the clinic you had this completed at and ask them to fax the results to us.    __  You have completed the ON/OFF Motor Assessment.    __  Continue with your DBS workup appointments.  __  Once you're done with your workup, we'll discuss you at the DBS Consensus meeting & will let you know the decision.  __  You may contact us with any question.

## 2021-09-03 NOTE — PROGRESS NOTES
Department of Neurology  Movement Disorders Division   DBS Follow-up Note    Patient: Stefan Odonnell  MRN: [id number]   : 1950   Date of Visit: 1/15/2021    Diagnosis: Parkinson's disease  DBS Target(s): Left STN  Date(s) of DBS Lead Placement: 2018  Date(s) of IPG Placement: Left chest 2018  Device: Abbott    Chief Complaint:  Mr. Odonnell is a 71 year old right handed man with tremulous Parkinson disease and possible superimposed essential tremor s/p left STN DBS who returns for a clinical core 24 month videotaping session. The patient's last visit was on 1/15/2021 at which time carbidopa-levodopa  mg CR was added to the afternoon.    Interval History:  He thinks that the shaking has improved after adding C/L CR in both UE. He notes that it's difficult cutting food due to both hands not gripping quite as well. He also noticed his L hand in the morning it will tremor for maybe 15 min, causing him to stop working with saw. Shaking is also worse with anxiety, particularly if he's behind schedule and trying to rush. He does also think he's slower with tasks, possibly old age, possibly tremor. He also has unpredictable breakthrough tremor in the left hand, he estimates on average 2 hours/day. He describes locking of left hand finger for approximately 20 minutes per day.    No hallucinations. He goes to bed at 7:30, wakes up 1-2 times overnight to use the restroom and has difficulty falling back asleep. He goes to work around 2:30. He does have a history of rolling out of bed before being treated with CPAP. He has not been quite as active during sleep recently.    Goals:  To make left hand more steady with goal to work another 5 years. He would also like to improve quality of life with eating and drinking. He can only hold things in his right hand due to tremor.     UPDRS I    1.1 Cognitive impairment:  0: Normal: No cognitive impairment.    1.2 Hallucinations and psychosis:  0: Normal: No  hallucinations or psychotic behaviour.     1.3 Depressed mood:  1: Slight: Episodes of depressed mood that are not sustained for more than one day at a time. No interference with patient's ability to carry out normal activities and social interactions.    1.4 Anxious mood:  2: Mild: Anxious feelings are sustained over more than one day at a time, but without interference with patient's ability to carry out normal activities and social interactions.     1.5 Apathy:  1: Slight: Apathy appreciated by patient and/or caregiver, but no interference with daily activities and social interactions.     1.6 Features of DDS: 0: Normal: No problems present.     1.7 Sleep problems:  3: Moderate: Sleep problems cause a lot of difficulties getting a full night of sleep, but I still usually sleep for more than half the night.    1.8 Daytime sleepiness:  2: Mild: Sometimes I fall asleep when alone and relaxing. For example, while reading or watching TV.     1.9 Pain and other sensations:  1: Slight: I have these feelings. However, I can do things and be with other people without difficulty.     1.10 Urinary problems:  0: Normal: No urine control problems.     1.11 Constipation problems:  0: Normal: No constipation.     1.12 Light headedness on standin: Normal: No dizzy or foggy feelings.    1.13 Fatigue:  3: Moderate: Fatigue causes me a lot of troubles doing things or being with people. However, it does not stop me from doing anything.     Total: , previous 10 on 1/15/2021        UPDRS II  UPDRS Questionnaire 9/3/2021   Over the past week, have you had problems with your speech? 0   Over the past week, have you usually had too much saliva during when you are awake or when you sleep? 0   Over the past week, have you usually had problems swallowing pills or eating meals?  Do you need your pills cut or crushed or your meals to be made soft, chopped or blended to avoid choking? 0   Over the past week, have you usually had  troubles handling your food and using eating utensils?  For example, do you have trouble handling finger foods or using forks, knifes, spoons, chopsticks? 3   Over the past week, have you usually had problems dressing?  For example, are you slow or do you need help with buttoning, using zippers, putting on or taking off your clothes or jewelry? 0   Over the past week, have you usually been slow or do you need help with washing, bathing, shaving, brushing teeth, combing your hair or with other personal hygiene? 0   Over the past week, have people usually had trouble reading your handwriting? 1   Over the past week, have you usually had trouble doing your hobbies or other things that you like to do? 0   Over the past week, do you usually have trouble turning over in bed? 0   Over the past week, have you usually had shaking or tremor? 2   Over the past week, have you usually had trouble getting out of bed, a car seat, or a deep chair? 0   Over the past week, have you usually had problems with balance and walking? 0   Over the past week, on your usual day when walking, do you suddenly stop or freeze as if your feet are stuck to the floor. 0   MDS-UPDRS II Total Score 6     Review of Systems:  Other than that noted at the end of this note, the remainder of 12 systems reviewed were negative.    Medications:  Current Outpatient Medications   Medication Sig Dispense Refill     allopurinol (ZYLOPRIM) 300 MG tablet TAKE 1 TABLET BY MOUTH DAILY IN THE EVENING       amLODIPine (NORVASC) 5 MG tablet Take 5 mg by mouth every morning        aspirin 81 MG chewable tablet Take 81 mg by mouth daily       augmented betamethasone dipropionate (DIPROLENE-AF) 0.05 % external ointment Apply topically 2 times daily       blood glucose monitoring (Databanq CONTOUR NEXT MONITOR) meter device kit        blood glucose monitoring (Databanq CONTOUR NEXT) test strip USE TO TEST BLOOD SUGARS 3 TIMES DAILY       blood glucose monitoring (Databanq MICROLET)  lancets Test Blood Sugar 3 Times Daily.  [id number]       carbidopa-levodopa (SINEMET CR)  MG CR tablet Take half a tab at 3pm.  Increase to 1 tab after 1 week. 90 tablet 3     carbidopa-levodopa (SINEMET)  MG tablet Take 2 tablets twice daily at 4am and 11am (Patient taking differently: 2 tablets 4 times daily ) 360 tablet 3     cholecalciferol 50 MCG (2000 UT) CAPS Take 4,000 Units by mouth       diphenoxylate-atropine (LOMOTIL) 2.5-0.025 MG per tablet Take 2 tablets by mouth 4 times daily as needed        doxepin (SINEQUAN) 10 MG capsule Take 10 mg by mouth At Bedtime        finasteride (PROSCAR) 5 MG tablet Take 5 mg by mouth every morning       furosemide (LASIX) 20 MG tablet Take 20 mg by mouth as needed        glipiZIDE (GLUCOTROL) 10 MG tablet Take 10 mg by mouth 2 times daily       insulin glargine (LANTUS) 100 UNIT/ML injection Inject Subcutaneous 2 times daily Inject 26 units qpm, 31 units qam       insulin pen needle (EASY TOUCH PEN NEEDLES) 32G X 5 MM USE WITH INSULIN THREE TIMES DAILY       insulin pen needle 32G X 4 MM 1 each       liraglutide (VICTOZA PEN) 18 MG/3ML soln INJECT 1.8 MG UNDER THE SKIN ONE TIME A DAY       Misc Natural Products (TART CHERRY ADVANCED) CAPS Take 1 capsule by mouth 2 times daily        pregabalin (LYRICA) 50 MG capsule Take 1 capsule (50 mg) by mouth daily as needed (for burning foot pain) 90 capsule 3     simvastatin (ZOCOR) 40 MG tablet Take 40 mg by mouth At Bedtime        HYDROcodone-acetaminophen (NORCO)  MG per tablet Take 1 tablet by mouth as needed        omeprazole (PRILOSEC) 40 MG DR capsule Take 40 mg by mouth daily          PD Medications 0330 0700 1100 1600   CD/LD 25/100mg 2 2 2 2   CD/LD  mg CR    1     He last took carbidopa-levodopa at 4 pm yesterday (9/2/21)    Allergies: is allergic to atorvastatin, clindamycin, and gabapentin.    Past Medical History:  Past Medical History:   Diagnosis Date     Anosmia      Bleeding ulcer 1983      Carpal tunnel syndrome, bilateral      CKD (chronic kidney disease) stage 3, GFR 30-59 ml/min      Diabetes mellitus (H)      Gout      Hx of skin cancer, basal cell 2013    removed from face     Hyperlipidemia      Hypertension      Insomnia      Macular degeneration      Neuropathy      Obese      RENÉE (obstructive sleep apnea)      Osteoarthritis of knees, bilateral      Periodic limb movements of sleep      Restless legs syndrome (RLS)      Past Surgical History:  Past Surgical History:   Procedure Laterality Date     ABDOMEN SURGERY  6/1983    ulcers     COLONOSCOPY       HEMORRHOID SURGERY       IMPLANT DEEP BRAIN STIMULATION GENERATOR / BATTERY Left 6/8/2018    Procedure: IMPLANT DEEP BRAIN STIMULATION GENERATOR / BATTERY;  Deep Brain Stimulator Placement, Phase II, Placement Of Deep Brain Stimulator Generator/Battery Over The Left Chest Wall;  Surgeon: Wayne Marshall MD;  Location: [facility] OR     Open Stomach Ulcer Repair       OPTICAL TRACKING SYSTEM INSERTION DEEP BRAIN STIMULATION Left 5/29/2018    Procedure: OPTICAL TRACKING SYSTEM INSERTION DEEP BRAIN STIMULATION;  Stealth Assisted Left Deep Brain Stimulator Placement, Phase I, Placement Of Left Deep Brain Stimulator Electrode, Target Left Subthalamic Nucleus With Microelectrode Recording;  Surgeon: Wayne Marshall MD;  Location: [facility] OR     TONSILLECTOMY         Social History:  Social History     Socioeconomic History     Marital status:      Spouse name: Blanca Odonnell     Number of children: 1     Years of education: Not on file     Highest education level: Not on file   Occupational History     Occupation: [employer]     Comment: [email or web address]   Social Needs     Financial resource strain: Not on file     Food insecurity     Worry: Not on file     Inability: Not on file     Transportation needs     Medical: Not on file     Non-medical: Not on file   Tobacco Use     Smoking status: Never Smoker     Smokeless tobacco: Never  Used   Substance and Sexual Activity     Alcohol use: No     Comment: 2 - 3 x a year     Drug use: No     Sexual activity: Yes     Partners: Female   Lifestyle     Physical activity     Days per week: Not on file     Minutes per session: Not on file     Stress: Not on file   Relationships     Social connections     Talks on phone: Not on file     Gets together: Not on file     Attends Confucianism service: Not on file     Active member of club or organization: Not on file     Attends meetings of clubs or organizations: Not on file     Relationship status: Not on file     Intimate partner violence     Fear of current or ex partner: Not on file     Emotionally abused: Not on file     Physically abused: Not on file     Forced sexual activity: Not on file   Other Topics Concern     Parent/sibling w/ CABG, MI or angioplasty before 65F 55M? No   Social History Narrative    On second marriage.  Has a son from first marriage.   for 27 years.  Works at [employer] as special [employer].  Never smoked.  He rarely drinks alcohol about 2 - 3 x per year.  Doesn't use illicit drugs.        Family History:  Family History   Problem Relation Age of Onset     Heart Disease Mother      Diabetes Mother      Arthritis Mother      Depression Mother      Anxiety Disorder Mother      Dementia Mother      Hypertension Mother      LUNG DISEASE Mother      Prostate Cancer Father      Tremor Father      Cancer Father         Prostate     Obesity Sister      Heart Disease Brother      Bone Cancer Brother      Heart Disease Brother      Tremor Son      Heart Disease Maternal Grandmother      Heart Disease Maternal Grandfather      Heart Disease Paternal Grandmother      Heart Disease Paternal Grandfather      Unknown/Adopted Sister        Physical Exam:  The patient's  weight is 135.6 kg (299 lb). His blood pressure is 144/89 (abnormal) and his pulse is 66. His respiration is 16 and oxygen saturation is 96%.       Neurological  Examination:   Last medication dose: 9/2/2021 at 1600, 2 tabs CD/LD  DBS off at 9:58 AM  DBS on at 10:41 AM  Carbidopa-levodopa  mg 2 tabs taken at 10:41 AM    UPDRS Values 9/3/2021 9/3/2021 9/3/2021   Time: 9:40 AM 10:29 AM 11:09 AM   Medication Off Off On   R Brain DBS: None None None   L Brain DBS: On Off On   Dyskinesia (LID) No No No   Did LID interfere No No No   Speech 1 1 0   Facial Expression 2 2 2   Rigidity Neck 4 4 4   Rigidity RUE 1 3 1   Rigidity LUE 2 2 2   Rigidity RLE 1 1 0   Rigidity LLE 1 2 1   Finger Taps R 1 2 1   Finger Taps L 2 2 1   Hand Mvt R 1 2 1   Hand Mvt L 2 2 1   Pron-/Supinate R 0 2 0   Pron-/Supinate L 2 2 1   Toe Tap R 0 0 0   Toe Tap L 1 1 0   Leg Agility R 0 1 0   Leg Agility L 1 1 1   Arise From Chair 1 1 1   Gait 1 1 0   Gait Freezing 0 0 0   Postural Stability 0 0 0   Posture 0 0 0   Global Spont Mvt 1 2 1   Postural Tremor RUE 0 0 0   Postural Tremor LUE 1 1 0   Kinetic Tremor RUE 0 1 0   Kinetic Tremor LUE 2 2 1   Rest Tremor RUE 0 0 0   Rest Tremor LUE 1 0 0   Rest Tremor RLE 0 0 0   Rest Tremor LLE 0 0 0   Rest Tremor Lip/Jaw 0 1 0   Rest Tremor Constancy 1 1 0   Total Right 4 12 3   Total Left 15 15 8   Axial Total 10 11 8   Total 30 40 19     Gait with decreased arm swing on left, but normal stride length and retropulsion test was intact    % change = 40-19 = 21/40 = 0.53 = 53% improvement    Procedure: DBS Interrogation & Programming    Lead(s):    Left   DBS Target STN   DBS Lead Type Infinity 0.5   Lead Implant Date 5/29/2018     IPG(s):   1   IPG Infinity 5   IPG Implant Date 6/8/2018   Location Left chest   Battery (V) 2.91V (2.94V)     Impedance Check: See scanned report for impedance details.  Therapy 512 Ohms  Short circuits identified at 3b and 4     Left STN     Initial & Final   Amplitude (mA) 3.25 [0-4.5 by 0.25]   Pulse width ( s)  60   Freq (Hz)  130   Contacts: C  +   Contacts: 4     Contacts: 3 3abc-   Contacts: 2     Contacts: 1       Clinical Core  "Study Programming Form  Please complete upon initial monopolar review and any subsequent \"mini-monopolar reviews\".    Please avoid any extra spaces at the end of cells.    Generator Serial No.  ex: [id number] Lead Hemisphere  Ex: Right Lead Region  Ex: STN  Initials  Ex: LS, KD    Left STN KG, SC     No programming changes were made during today's visit.    Assessment/plan:    Mr. Odonnell is a 71 year old right handed man with tremulous Parkinson disease and possible superimposed essential tremor s/p left STN DBS who returns for a clinical core 24 month videotaping session. He is noticing worsening left hand tremor. He is in clinic today to complete on/off testing to evaluate for candidacy for right DBS placement. No DBS programming or medication changes were made during today's visit. He reports he had EMG/NCS completed at another clinic but we have not received the results. He will ask them to fax the results to our clinic.    __  Pt had good knowledge about DBS.   I briefly went over DBS risks, & benefits; the possibility of 1 - 3 % serious side effects including infection, bleeding, stroke, cognitive & speech impairment, seizures, & death; the possibility of lead misplacement; appropriate DBS candidates; and DBS programming & the need to return to clinic for reprogramming.  All questions were answered.    __ He was informed that we'll contact him after we discuss his work-up at the DBS Consensus Meeting.  He understands the plan.    __ Continue your current medication regimen. No DBS programming changes were completed today.      Patient and plan was examined & discussed with attending physician, Dr. Sloan.    Freya Mosquera MD  Movement Disorder Fellow      Patient seen and examined with Dr. Mosquera and I agree with the assessment and plan as outlined.    Stanford Sloan PhD, Md  "

## 2021-09-03 NOTE — NURSING NOTE
Chief Complaint   Patient presents with     RECHECK     P RETURN MOTOR TESTING      [facility]

## 2021-09-03 NOTE — NURSING NOTE
Pt was seen for neuropsychological evaluation at the request of Dr. Stanford Sloan for the purposes of diagnostic clarification and treatment planning. 195 minutes of test administration and scoring were provided by this writer. Please see Dr. Edwina Goldstein's report for a full interpretation of the findings.    Mehran Espino  Psychometrist

## 2021-09-03 NOTE — LETTER
9/3/2021       RE: Stefan Odonnell  [address] [facility] [address]     Dear Colleague,    Thank you for referring your patient, Stefan Odonnell, to the [facility] NEUROLOGY [facility]INIC [place] at [facility]. Please see a copy of my visit note below.    Department of Neurology  Movement Disorders Division   DBS Follow-up Note    Patient: Stefan Odonnell  MRN: [id number]   : 1950   Date of Visit: 1/15/2021    Diagnosis: Parkinson's disease  DBS Target(s): Left STN  Date(s) of DBS Lead Placement: 2018  Date(s) of IPG Placement: Left chest 2018  Device: Abbott    Chief Complaint:  Mr. Odonnell is a 71 year old right handed man with tremulous Parkinson disease and possible superimposed essential tremor s/p left STN DBS who returns for a clinical core 24 month videotaping session. The patient's last visit was on 1/15/2021 at which time carbidopa-levodopa  mg CR was added to the afternoon.    Interval History:  He thinks that the shaking has improved after adding C/L CR in both UE. He notes that it's difficult cutting food due to both hands not gripping quite as well. He also noticed his L hand in the morning it will tremor for maybe 15 min, causing him to stop working with saw. Shaking is also worse with anxiety, particularly if he's behind schedule and trying to rush. He does also think he's slower with tasks, possibly old age, possibly tremor. He also has unpredictable breakthrough tremor in the left hand, he estimates on average 2 hours/day. He describes locking of left hand finger for approximately 20 minutes per day.    No hallucinations. He goes to bed at 7:30, wakes up 1-2 times overnight to use the restroom and has difficulty falling back asleep. He goes to work around 2:30. He does have a history of rolling out of bed before being treated with CPAP. He has not been quite as active during sleep recently.    Goals:  To make left hand more  steady with goal to work another 5 years. He would also like to improve quality of life with eating and drinking. He can only hold things in his right hand due to tremor.     UPDRS I    1.1 Cognitive impairment:  0: Normal: No cognitive impairment.    1.2 Hallucinations and psychosis:  0: Normal: No hallucinations or psychotic behaviour.     1.3 Depressed mood:  1: Slight: Episodes of depressed mood that are not sustained for more than one day at a time. No interference with patient's ability to carry out normal activities and social interactions.    1.4 Anxious mood:  2: Mild: Anxious feelings are sustained over more than one day at a time, but without interference with patient's ability to carry out normal activities and social interactions.     1.5 Apathy:  1: Slight: Apathy appreciated by patient and/or caregiver, but no interference with daily activities and social interactions.     1.6 Features of DDS: 0: Normal: No problems present.     1.7 Sleep problems:  3: Moderate: Sleep problems cause a lot of difficulties getting a full night of sleep, but I still usually sleep for more than half the night.    1.8 Daytime sleepiness:  2: Mild: Sometimes I fall asleep when alone and relaxing. For example, while reading or watching TV.     1.9 Pain and other sensations:  1: Slight: I have these feelings. However, I can do things and be with other people without difficulty.     1.10 Urinary problems:  0: Normal: No urine control problems.     1.11 Constipation problems:  0: Normal: No constipation.     1.12 Light headedness on standin: Normal: No dizzy or foggy feelings.    1.13 Fatigue:  3: Moderate: Fatigue causes me a lot of troubles doing things or being with people. However, it does not stop me from doing anything.     Total: , previous 10 on 1/15/2021        UPDRS II  UPDRS Questionnaire 9/3/2021   Over the past week, have you had problems with your speech? 0   Over the past week, have you usually had too  much saliva during when you are awake or when you sleep? 0   Over the past week, have you usually had problems swallowing pills or eating meals?  Do you need your pills cut or crushed or your meals to be made soft, chopped or blended to avoid choking? 0   Over the past week, have you usually had troubles handling your food and using eating utensils?  For example, do you have trouble handling finger foods or using forks, knifes, spoons, chopsticks? 3   Over the past week, have you usually had problems dressing?  For example, are you slow or do you need help with buttoning, using zippers, putting on or taking off your clothes or jewelry? 0   Over the past week, have you usually been slow or do you need help with washing, bathing, shaving, brushing teeth, combing your hair or with other personal hygiene? 0   Over the past week, have people usually had trouble reading your handwriting? 1   Over the past week, have you usually had trouble doing your hobbies or other things that you like to do? 0   Over the past week, do you usually have trouble turning over in bed? 0   Over the past week, have you usually had shaking or tremor? 2   Over the past week, have you usually had trouble getting out of bed, a car seat, or a deep chair? 0   Over the past week, have you usually had problems with balance and walking? 0   Over the past week, on your usual day when walking, do you suddenly stop or freeze as if your feet are stuck to the floor. 0   MDS-UPDRS II Total Score 6     Review of Systems:  Other than that noted at the end of this note, the remainder of 12 systems reviewed were negative.    Medications:  Current Outpatient Medications   Medication Sig Dispense Refill     allopurinol (ZYLOPRIM) 300 MG tablet TAKE 1 TABLET BY MOUTH DAILY IN THE EVENING       amLODIPine (NORVASC) 5 MG tablet Take 5 mg by mouth every morning        aspirin 81 MG chewable tablet Take 81 mg by mouth daily       augmented betamethasone dipropionate  (DIPROLENE-AF) 0.05 % external ointment Apply topically 2 times daily       blood glucose monitoring (VITA CONTOUR NEXT MONITOR) meter device kit        blood glucose monitoring (VITA CONTOUR NEXT) test strip USE TO TEST BLOOD SUGARS 3 TIMES DAILY       blood glucose monitoring (VITA MICROLET) lancets Test Blood Sugar 3 Times Daily.  Dx-E11.8       carbidopa-levodopa (SINEMET CR)  MG CR tablet Take half a tab at 3pm.  Increase to 1 tab after 1 week. 90 tablet 3     carbidopa-levodopa (SINEMET)  MG tablet Take 2 tablets twice daily at 4am and 11am (Patient taking differently: 2 tablets 4 times daily ) 360 tablet 3     cholecalciferol 50 MCG (2000 UT) CAPS Take 4,000 Units by mouth       diphenoxylate-atropine (LOMOTIL) 2.5-0.025 MG per tablet Take 2 tablets by mouth 4 times daily as needed        doxepin (SINEQUAN) 10 MG capsule Take 10 mg by mouth At Bedtime        finasteride (PROSCAR) 5 MG tablet Take 5 mg by mouth every morning       furosemide (LASIX) 20 MG tablet Take 20 mg by mouth as needed        glipiZIDE (GLUCOTROL) 10 MG tablet Take 10 mg by mouth 2 times daily       insulin glargine (LANTUS) 100 UNIT/ML injection Inject Subcutaneous 2 times daily Inject 26 units qpm, 31 units qam       insulin pen needle (EASY TOUCH PEN NEEDLES) 32G X 5 MM USE WITH INSULIN THREE TIMES DAILY       insulin pen needle 32G X 4 MM 1 each       liraglutide (VICTOZA PEN) 18 MG/3ML soln INJECT 1.8 MG UNDER THE SKIN ONE TIME A DAY       Misc Natural Products (TART CHERRY ADVANCED) CAPS Take 1 capsule by mouth 2 times daily        pregabalin (LYRICA) 50 MG capsule Take 1 capsule (50 mg) by mouth daily as needed (for burning foot pain) 90 capsule 3     simvastatin (ZOCOR) 40 MG tablet Take 40 mg by mouth At Bedtime        HYDROcodone-acetaminophen (NORCO)  MG per tablet Take 1 tablet by mouth as needed        omeprazole (PRILOSEC) 40 MG DR capsule Take 40 mg by mouth daily          PD Medications 0330 0700 1100  1600   CD/LD 25/100mg 2 2 2 2   CD/LD  mg CR    1     He last took carbidopa-levodopa at 4 pm yesterday (9/2/21)    Allergies: is allergic to atorvastatin, clindamycin, and gabapentin.    Past Medical History:  Past Medical History:   Diagnosis Date     Anosmia      Bleeding ulcer 1983     Carpal tunnel syndrome, bilateral      CKD (chronic kidney disease) stage 3, GFR 30-59 ml/min      Diabetes mellitus (H)      Gout      Hx of skin cancer, basal cell 2013    removed from face     Hyperlipidemia      Hypertension      Insomnia      Macular degeneration      Neuropathy      Obese      RENÉE (obstructive sleep apnea)      Osteoarthritis of knees, bilateral      Periodic limb movements of sleep      Restless legs syndrome (RLS)      Past Surgical History:  Past Surgical History:   Procedure Laterality Date     ABDOMEN SURGERY  6/1983    ulcers     COLONOSCOPY       HEMORRHOID SURGERY       IMPLANT DEEP BRAIN STIMULATION GENERATOR / BATTERY Left 6/8/2018    Procedure: IMPLANT DEEP BRAIN STIMULATION GENERATOR / BATTERY;  Deep Brain Stimulator Placement, Phase II, Placement Of Deep Brain Stimulator Generator/Battery Over The Left Chest Wall;  Surgeon: Wayne Marshall MD;  Location: [facility] OR     Open Stomach Ulcer Repair       OPTICAL TRACKING SYSTEM INSERTION DEEP BRAIN STIMULATION Left 5/29/2018    Procedure: OPTICAL TRACKING SYSTEM INSERTION DEEP BRAIN STIMULATION;  Stealth Assisted Left Deep Brain Stimulator Placement, Phase I, Placement Of Left Deep Brain Stimulator Electrode, Target Left Subthalamic Nucleus With Microelectrode Recording;  Surgeon: Wayne Marshall MD;  Location: [facility] OR     TONSILLECTOMY         Social History:  Social History     Socioeconomic History     Marital status:      Spouse name: Blanca Odonnell     Number of children: 1     Years of education: Not on file     Highest education level: Not on file   Occupational History     Occupation: Special [employer]     Comment: [employer]   Social Needs     Financial resource strain: Not on file     Food insecurity     Worry: Not on file     Inability: Not on file     Transportation needs     Medical: Not on file     Non-medical: Not on file   Tobacco Use     Smoking status: Never Smoker     Smokeless tobacco: Never Used   Substance and Sexual Activity     Alcohol use: No     Comment: 2 - 3 x a year     Drug use: No     Sexual activity: Yes     Partners: Female   Lifestyle     Physical activity     Days per week: Not on file     Minutes per session: Not on file     Stress: Not on file   Relationships     Social connections     Talks on phone: Not on file     Gets together: Not on file     Attends Jehovah's witness service: Not on file     Active member of club or organization: Not on file     Attends meetings of clubs or organizations: Not on file     Relationship status: Not on file     Intimate partner violence     Fear of current or ex partner: Not on file     Emotionally abused: Not on file     Physically abused: Not on file     Forced sexual activity: Not on file   Other Topics Concern     Parent/sibling w/ CABG, MI or angioplasty before 65F 55M? No   Social History Narrative    On second marriage.  Has a son from first marriage.   for 27 years.  Works at [employer] as special [employer].  Never smoked.  He rarely drinks alcohol about 2 - 3 x per year.  Doesn't use illicit drugs.        Family History:  Family History   Problem Relation Age of Onset     Heart Disease Mother      Diabetes Mother      Arthritis Mother      Depression Mother      Anxiety Disorder Mother      Dementia Mother      Hypertension Mother      LUNG DISEASE Mother      Prostate Cancer Father      Tremor Father      Cancer Father         Prostate     Obesity Sister      Heart Disease Brother      Bone Cancer Brother      Heart Disease Brother      Tremor Son      Heart Disease Maternal Grandmother      Heart Disease Maternal Grandfather      Heart  Disease Paternal Grandmother      Heart Disease Paternal Grandfather      Unknown/Adopted Sister        Physical Exam:  The patient's  weight is 135.6 kg (299 lb). His blood pressure is 144/89 (abnormal) and his pulse is 66. His respiration is 16 and oxygen saturation is 96%.       Neurological Examination:   Last medication dose: 9/2/2021 at 1600, 2 tabs CD/LD  DBS off at 9:58 AM  DBS on at 10:41 AM  Carbidopa-levodopa  mg 2 tabs taken at 10:41 AM    UPDRS Values 9/3/2021 9/3/2021 9/3/2021   Time: 9:40 AM 10:29 AM 11:09 AM   Medication Off Off On   R Brain DBS: None None None   L Brain DBS: On Off On   Dyskinesia (LID) No No No   Did LID interfere No No No   Speech 1 1 0   Facial Expression 2 2 2   Rigidity Neck 4 4 4   Rigidity RUE 1 3 1   Rigidity LUE 2 2 2   Rigidity RLE 1 1 0   Rigidity LLE 1 2 1   Finger Taps R 1 2 1   Finger Taps L 2 2 1   Hand Mvt R 1 2 1   Hand Mvt L 2 2 1   Pron-/Supinate R 0 2 0   Pron-/Supinate L 2 2 1   Toe Tap R 0 0 0   Toe Tap L 1 1 0   Leg Agility R 0 1 0   Leg Agility L 1 1 1   Arise From Chair 1 1 1   Gait 1 1 0   Gait Freezing 0 0 0   Postural Stability 0 0 0   Posture 0 0 0   Global Spont Mvt 1 2 1   Postural Tremor RUE 0 0 0   Postural Tremor LUE 1 1 0   Kinetic Tremor RUE 0 1 0   Kinetic Tremor LUE 2 2 1   Rest Tremor RUE 0 0 0   Rest Tremor LUE 1 0 0   Rest Tremor RLE 0 0 0   Rest Tremor LLE 0 0 0   Rest Tremor Lip/Jaw 0 1 0   Rest Tremor Constancy 1 1 0   Total Right 4 12 3   Total Left 15 15 8   Axial Total 10 11 8   Total 30 40 19     Gait with decreased arm swing on left, but normal stride length and retropulsion test was intact    % change = 40-19 = 21/40 = 0.53 = 53% improvement    Procedure: DBS Interrogation & Programming    Lead(s):    Left   DBS Target STN   DBS Lead Type Infinity 0.5   Lead Implant Date 5/29/2018     IPG(s):   1   IPG Infinity 5   IPG Implant Date 6/8/2018   Location Left chest   Battery (V) 2.91V (2.94V)     Impedance Check: See scanned  "report for impedance details.  Therapy 512 Ohms  Short circuits identified at 3b and 4     Left STN     Initial & Final   Amplitude (mA) 3.25 [0-4.5 by 0.25]   Pulse width ( s)  60   Freq (Hz)  130   Contacts: C  +   Contacts: 4     Contacts: 3 3abc-   Contacts: 2     Contacts: 1       Clinical Core Study Programming Form  Please complete upon initial monopolar review and any subsequent \"mini-monopolar reviews\".    Please avoid any extra spaces at the end of cells.    Generator Serial No.  ex: [id number] Lead Hemisphere  Ex: Right Lead Region  Ex: STN  Initials  Ex: LS, KD    Left STN KG, SC     No programming changes were made during today's visit.    Assessment/plan:    Mr. Odonnell is a 71 year old right handed man with tremulous Parkinson disease and possible superimposed essential tremor s/p left STN DBS who returns for a clinical core 24 month videotaping session. He is noticing worsening left hand tremor. He is in clinic today to complete on/off testing to evaluate for candidacy for right DBS placement. No DBS programming or medication changes were made during today's visit. He reports he had EMG/NCS completed at another clinic but we have not received the results. He will ask them to fax the results to our clinic.    __  Pt had good knowledge about DBS.   I briefly went over DBS risks, & benefits; the possibility of 1 - 3 % serious side effects including infection, bleeding, stroke, cognitive & speech impairment, seizures, & death; the possibility of lead misplacement; appropriate DBS candidates; and DBS programming & the need to return to clinic for reprogramming.  All questions were answered.    __ He was informed that we'll contact him after we discuss his work-up at the DBS Consensus Meeting.  He understands the plan.    __ Continue your current medication regimen. No DBS programming changes were completed today.      Patient and plan was examined & discussed with attending physician,  " Luther.    Freya Mosquera MD  Movement Disorder Fellow      Patient seen and examined with Dr. Mosquera and I agree with the assessment and plan as outlined.    Stanford Sloan PhD, Md        Again, thank you for allowing me to participate in the care of your patient.      Sincerely,    Stanford Sloan MD

## 2021-09-13 ENCOUNTER — TELEPHONE (OUTPATIENT)
Dept: NEUROLOGY | Facility: CLINIC | Age: 71
End: 2021-09-13

## 2021-09-13 NOTE — TELEPHONE ENCOUNTER
"                                                      Deep Brain Stimulation Surgery Surgical Candidacy Form    Referring Provider: Self   Patient Information: Lives in [place], [place]  Name: Stefan Odonnell  YOB: 1950  Age: 71 year old  Height: 6'0\"  Weight: 299 lbs  BMI:  40  Blood Pressure: 144/89  Diagnosis: Parkinson's disease and possible essential tremor  Age of Onset of Symptoms: 59. Year of Onset of Symptoms: 2000.  Age of Diagnosis: ?. Year of Diagnosis: ?.  Handedness: Right.  Side of Onset: Right upper extremity.   Disease Features: Rigidity and Tremor     Surgery Goals: Decrease tremor.Steady left hand to continue work for 5 years.  Improve eating and drinking.     Medications: As of 9/13/21  Current Outpatient Medications   Medication Sig Dispense Refill     allopurinol (ZYLOPRIM) 300 MG tablet TAKE 1 TABLET BY MOUTH DAILY IN THE EVENING       amLODIPine (NORVASC) 5 MG tablet Take 5 mg by mouth every morning        aspirin 81 MG chewable tablet Take 81 mg by mouth daily       augmented betamethasone dipropionate (DIPROLENE-AF) 0.05 % external ointment Apply topically 2 times daily       blood glucose monitoring (VITA CONTOUR NEXT MONITOR) meter device kit        blood glucose monitoring (VITA CONTOUR NEXT) test strip USE TO TEST BLOOD SUGARS 3 TIMES DAILY       blood glucose monitoring (VITA MICROLET) lancets Test Blood Sugar 3 Times Daily.  Dx-E11.8       carbidopa-levodopa (SINEMET CR)  MG CR tablet Take half a tab at 3pm.  Increase to 1 tab after 1 week. 90 tablet 3     carbidopa-levodopa (SINEMET)  MG tablet Take 2 tablets twice daily at 4am and 11am (Patient taking differently: 2 tablets 4 times daily ) 360 tablet 3     cholecalciferol 50 MCG (2000 UT) CAPS Take 4,000 Units by mouth       diphenoxylate-atropine (LOMOTIL) 2.5-0.025 MG per tablet Take 2 tablets by mouth 4 times daily as needed        doxepin (SINEQUAN) 10 MG capsule Take 10 mg by mouth At Bedtime        " finasteride (PROSCAR) 5 MG tablet Take 5 mg by mouth every morning       furosemide (LASIX) 20 MG tablet Take 20 mg by mouth as needed        glipiZIDE (GLUCOTROL) 10 MG tablet Take 10 mg by mouth 2 times daily       HYDROcodone-acetaminophen (NORCO)  MG per tablet Take 1 tablet by mouth as needed        insulin glargine (LANTUS) 100 UNIT/ML injection Inject Subcutaneous 2 times daily Inject 26 units qpm, 31 units qam       insulin pen needle (EASY TOUCH PEN NEEDLES) 32G X 5 MM USE WITH INSULIN THREE TIMES DAILY       insulin pen needle 32G X 4 MM 1 each       liraglutide (VICTOZA PEN) 18 MG/3ML soln INJECT 1.8 MG UNDER THE SKIN ONE TIME A DAY       Misc Natural Products (TART CHERRY ADVANCED) CAPS Take 1 capsule by mouth 2 times daily        omeprazole (PRILOSEC) 40 MG DR capsule Take 40 mg by mouth daily       pregabalin (LYRICA) 50 MG capsule Take 1 capsule (50 mg) by mouth daily as needed (for burning foot pain) 90 capsule 3     simvastatin (ZOCOR) 40 MG tablet Take 40 mg by mouth At Bedtime        PD Medications 0330 0700 1100 1600   CD/LD 25/100mg 2 2 2 2   CD/LD  mg CR       1        Motor Assessment:  ON DBS/OFF meds: 30  OFF DBS/OFF meds: 40  ON DBS/ON meds: 19  % Change due to meds: (30-19)/30 = 37%    UPDRS part II: 6/52     Neuropsychological Evaluation:    Cognitive Issues: Results indicate mild executive dysfunction. Learning reflects a weakness, and memory is variable. Fluency is slowed. Otherwise, language, visual processing, and complex attentional processing fall within normal limits. Compared to his most recent clinical evaluation, he has had improvements in basic and complex attention, and in semantic fluency, while other measures of fluency remain stable. He has had declines in memory, although some tests of memory remain within normal limits. These findings do not reflect dementia at this time, although they are mildly abnormal.    Psychiatric Issues: He does not have a significant  psychiatric history. He endorsed work-related stress, and has noticed that his left-sided tremor worsens when he is stressed. He is not reporting significant depressive symptomatology or apathy, and does not report compulsive behaviors. He has a good understanding of the procedure and the risks, and a good support system in his family.    Depression: No depression.    Cognitive Function: Mild memory difficulties or frontal deficits.    Psychosis: No hallucinations.     CTH Date: 9/3/2021    Impression: Mild global atrophy. Left STN DBS lead.     PMH: -  Past Medical History:   Diagnosis Date     Anosmia      Bleeding ulcer 1983     Carpal tunnel syndrome, bilateral      CKD (chronic kidney disease) stage 3, GFR 30-59 ml/min      Diabetes mellitus (H)      Gout      Hx of skin cancer, basal cell 2013    removed from face     Hyperlipidemia      Hypertension      Insomnia      Macular degeneration      Neuropathy      Obese      RENÉE (obstructive sleep apnea)      Osteoarthritis of knees, bilateral      Periodic limb movements of sleep      Restless legs syndrome (RLS)    A1C - 7.5 7/24/2021    PSH  Past Surgical History:   Procedure Laterality Date     ABDOMEN SURGERY  6/1983    ulcers     COLONOSCOPY       HEMORRHOID SURGERY       IMPLANT DEEP BRAIN STIMULATION GENERATOR / BATTERY Left 6/8/2018    Procedure: IMPLANT DEEP BRAIN STIMULATION GENERATOR / BATTERY;  Deep Brain Stimulator Placement, Phase II, Placement Of Deep Brain Stimulator Generator/Battery Over The Left Chest Wall;  Surgeon: Wayne Marshall MD;  Location: [facility] OR     Open Stomach Ulcer Repair       OPTICAL TRACKING SYSTEM INSERTION DEEP BRAIN STIMULATION Left 5/29/2018    Procedure: OPTICAL TRACKING SYSTEM INSERTION DEEP BRAIN STIMULATION;  Stealth Assisted Left Deep Brain Stimulator Placement, Phase I, Placement Of Left Deep Brain Stimulator Electrode, Target Left Subthalamic Nucleus With Microelectrode Recording;  Surgeon: Wayne Marshall  MD Gi;  Location: [facility] OR     TONSILLECTOMY       Soc Hx: Lives with wife.  Works as allne.    Family Hx of Movement Disorders: Tremor in his father and son.    Consult Gary Sloan/Joanna   Patient discussed at conference on: 9/23/21     DBS Meeting Notes/Further Work-up Needed: -    Authorization to discuss Protected Health Information:  Yes for Aldo (step-son), Dewey (step-son) and Blanca (spouse)  Healthcare Directive:  Yes, Primary:  Blanca (spouse), Alternative: Dewey (step-son)  Mychart use:  Uses reliably    From 9/23/21 Consensus:     1. Candidate RSTN, unilateral  2. Abbott Infinity 5  Set up for Remote programming?  3. Treating tremor (kinetic and postural), he has unpredictable motor fluctuations, CT brain is normal for age  4. Research?    Interested in research during surgery.

## 2021-09-23 NOTE — PROGRESS NOTES
Patient Stefan Odonnell  ABARCA 20    MRN [id number]   Provider      48   Tech     Sex Male   Occupation [employer]    Education 12   Language English    Preferred Hand Right   Station OP    Age 71                 DEMENTIA RATING SCALE - 2    TEST FORM Standard     Raw MAS       Attention 35 10       Init/Psv 31 6       Construct 6 10       Concept 33 7       Memory 23 10       Total / 144 128 6                WAIS-IV          Raw SS       Similarities 22 9       Comprehension 27 12       Letter Num Seq 18 9       Digit Span 25 10       Matrix Reasoning 9 8                WIDE RANGE ACHIEVEMENT TEST - 4         Raw SS %ile Grade Eq.     Reading  85 16 7.5              WECHSLER MEMORY SCALE - REVISED         Raw MAS/SS       Info/Orientation 14        LM Immediate 15 6       LM 30 Minute 18 10                [place] NAMING TEST         Score (Correct+Stim Cue)  57 MAS 13     # Correct Stimulus Cue  0 T 59     # Correct Phonemic Cue  2                D-KEFS VERBAL FLUENCY    TEST FORM Standard     Raw SS       Letter Fluency 23 6       Category Fluency 35 11       Switching Fluency             Total Correct 8 5       Switching Accuracy 6 5                CLOCK DRAWING         Command (1) Abnormal /3 Rating       Copy (2) Borderline /3 Rating      TRAIL MAKING TEST          Seconds Errors MAS z     Trails A 42 1 9 -0.05     Trails B 69 0 12 0.59              STROOP             Raw    +    Justus    = Justus Tot.         T  Raw MAS   Word 72 14 86 39 72 7   Color  46 11 57 35 46 7   Color/Word 23 15 38 43 23 8            [place] CARD SORTING TEST - 1 deck        # Categories 1 %ile 11-16      # Persev Error 9 T 56      Concept. Lev Resp. 45 T 55      FMS 4                 MONTIEL JUDGMENT OF LINE ORIENTATION   TEST FORM V    Raw Score 25 Raw Correction 2     MAS 13 Montiel %ile  74              HVLT-R    TEST FORM 6     Raw T       Trial 1 4        Trial 2 7        Trial 3 7        Total 1-3 18 39       Learning 3         Delay 0 <20       Percent Retained 0 <20       True Positives 4        Discrimination Index 3 <20       False Positives 1                 BRIEF VISUAL MEMORY TEST - REVISED   TEST FORM 1     Raw T       Trial 1 3        Trial 2 3        Trial 3 6        Total 1-3 12 35       Learning 3 46       Delay 4 33       Percent Retained 0.67 6-10 %ile      Recognition Hits 5        Discrimination Index 0 5 %ile      False Alarms 5                 NANCY COGNITVE ASSESSMENT (MOCA)  TEST FORM 7.3    Score 19 /30                BDI         Score 4        Interpretation MINIMAL                 APATHY SCALE         Score 4                 PD QUESTIONNAIRES         ESS         RDBSQ         PDQ-39         QUIP                  NON-PD QUESTIONNAIRES         ESS         RDBSQ         QUEST                  EDDA         MMPI                  MAS = [place]on Older Adult Normative Study Age Adj. Scaled Score

## 2021-09-23 NOTE — PROGRESS NOTES
NEUROPSYCHOLOGICAL EVALUATION    RELEVANT HISTORY AND REASON FOR REFERRAL    Stefan Odonnell is a 71 year old, right-handed allen with 12 years of formal education. Mr. Odonnell has a history of Parkinson s disease and possible concomitant essential tremor. He is s/p left STN DBS lead implantation on 5/29/2018. He has had good benefit from surgery including improvement in hand tremor. He is now considering surgery for the second side. He was referred for neuropsychological evaluation by Stanford Sloan M.D., as part of the pre-surgical protocol, to assist in determining whether there have been any changes in cognitive functioning as they pertain to his ability to make well reasoned medical decisions, and to establish and updated neurocognitive baseline.     Mr. Odonnell has undergone prior neuropsychological evaluations.  Initially, he underwent an evaluation with my colleague, Dewey Cazares, PhD, on 1/23/2018.  Results indicated findings that were largely normal, with relative weaknesses in complex attention/executive control.  Some of his cognitive profile was thought perhaps attributable to neurodevelopmental factors such as possible ADHD-like syndrome, along with normal aging.  He then underwent a neuropsychological evaluation under my direction on 6/20/2019.  Results indicated mild executive dysfunction, variable learning and memory, and slowed verbal fluency, with performance otherwise falling within normal limits.  Compared to 2018, he had declines in semantic fluency and switching fluency, and on measures of distractibility.  He had improvements in psychomotor processing speed and some aspects of memory.     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  He underwent a full research neuropsychological evaluation under my direction on 1/14/2021.  The battery is identical to the clinical battery, and findings are summarized here.  Learning and memory reflected a weakness, and improved only  marginally with cues.  He was distractible, and there was evidence of mild executive dysfunction.  Language and visual processing fell within normal limits, and notably, verbal fluency also fell within normal limits, reflecting an improvement from his last evaluation.  Memory had declined.     Precautions are in place related to COVID-19. In an effort to reduce the amount of time spent in clinic, the interview took place remotely on 6/22/2021, with the testing portion of the evaluation delayed until today, to be coordinated with other presurgical appointments. The findings from the interview are summarized in the note dated 6/22/2021. Briefly, he and his wife had not noticed significant cognitive changes. He was not reporting significant depressed mood or anxiety. He had reported some apathy at a neurology visit in January, but was not reporting apathy in June.     PAST MEDICAL HISTORY: Medical records indicate a history of Parkinson's disease, osteoarthritis, obstructive sleep apnea, neuropathy, gout, hypertension, type 2 diabetes mellitus, chronic kidney disease, carpal tunnel syndrome, basal cell carcinoma, essential tremor.    CURRENT MEDICATIONS:  Include allopurinol, amlodipine, aspirin 81 mg, augmented betamethasone dipropionate, carbidopa-levodopa (Sinemet CR, Sinemet), cholecalciferol, diphenoxylate-atropine, doxepin, finasteride, furosemide, glipizide, hydrocodone-acetaminophen, insulin glargine, liraglutide, omeprazole, pregabalin, simvastatin.    FAMILY MEDICAL HISTORY:  Significant for tremor in his father and son. He is reportedly not particularly close with his family so he knows of no other neurologic history.     BEHAVIORAL OBSERVATIONS    During the evaluation, Mr. Odonnell was pleasant, cooperative, and seemed to understand the instructions. He was alert and oriented to person, place, and time. A slight tremor was observed clinically in his right arm and hand. Mood was neutral.  Speech was fluent,  with normal articulation, volume, and rate. He presented his thoughts in a clear, logical manner. Internal performance validity measures fell within normal limits. The results are believed to accurately reflect his current level of functioning.     MEASURES ADMINISTERED    The following measures were administered by a trained psychometrist, under the direct supervision of a licensed psychologist.    Subtests of the Wechsler Adult Intelligence Scale-4; subtests of the Wechsler Memory Scale-Revised; Gomez Verbal Learning Test-Revised, Form 6; Brief Visual Memory Test - Revised, Form 1; [place] Naming Test; D-KEFS Verbal Fluency, Standard Form; Trail Making Test; Stroop; Wisconsin Card Sorting Test - 64; Montiel Judgement of Line Orientation Form V; Clock Drawing; Dementia Rating Scale - 2 (DRS-2) Standard Form;  MoCA v. 7.3; Rodney Depression Inventory-2 (BDI-2), HAM-D, HAM-A, Apathy Scale, RBDSQ; QUIP, PDQ-39, ESS.     RESULTS AND INTERPRETATION    Overall intellectual functioning was estimated to fall in the average range, marginally above premorbid estimates of low average based on single word reading abilities, administered at a prior evaluation.  Performance on a screening measure of dementia was low average (128/144).  He had a relative weakness on tasks of initiation/perseveration on this measure.  Performance on the MoCA fell below expected (19/30), with errors in clock drawing, attention, repetition, fluency, abstraction, and memory.    Confrontation naming was high average for his age.  Verbal abstract reasoning was average.  Ability to comprehend and articulate responses to complex social situations was high average.  Switching fluency was below average.  Letter fluency was low average, and generative naming to category was average.    Attention span was average for his age.  Divided attention was high average.  Performance on a measure of distractibility was average.  Psychomotor processing speed was  average.    Basic visual perception, including matching lines and angles, was high average.  Construction of a clock was impaired, and was notable for difficulty with conceptualization.  Despite being instructed to write the numbers 1 through 12, he wrote in all of the numbers between 1 and 60, although he also included the number 12 at the 12 o'clock position.  When asked to set the hands to 11:10, he asa the hands pointing to the 10 and 11, which were on the right side of the clock.  Nonverbal deductive reasoning was average.    Novel problem-solving, including the ability to generate strategies and solutions, was low average.  On this measure, he had difficulty maintaining set.    Immediate recall of verbal narrative material was low average, with average recall following a 30-minute delay.  On a multiple trial list learning task, immediate recall was low average, with exceptionally low recall following a 25-minute delay.  Recognition memory on this task was not better than free recall.  Immediate recall of visual material was below average, with below average recall following a 25-minute delay.  Recognition memory on this task fell within normal limits.    On the BDI-2, a self-report questionnaire, he endorsed a minimal level of depressive symptomatology.  He did not endorse significant apathy on a scale.  He endorsed excessive daytime sleepiness, but did not endorse dream enactment behavior on questionnaires.  He also did not report compulsive behaviors.     IMPRESSIONS AND RECOMMENDATIONS    Stefan Odonnell is a 71-year-old man with a history of Parkinson's disease and possible concomitent essential tremor.  He is s/p left STN DBS lead implantation in May 2018, with good benefit to his tremor.  He is considering surgery to address the left side of his body now.  Prior neuropsychological evaluations have been suggestive of mild executive dysfunction and distractibility, and declining memory; he is noted to have a  likely history of ADHD.    Results of today's evaluation indicate mild executive dysfunction.  Learning reflects a weakness, and memory is variable.  Fluency is slowed.  Otherwise, language, visual processing, and complex attentional processing fall within normal limits.  He does not endorse significant depressive symptomatology, anxiety, apathy, or compulsive behaviors.    Compared to his most recent clinical evaluation, Mr. Odonnell has had improvements in basic and complex attention, and in semantic fluency, while other measures of fluency remained stable.  He has had declines memory, although some memory tests remain within normal limits; there are very subtle declines in executive functioning as well.    This pattern of performance does not reflect dementia at this time, although it is mildly abnormal.  The pattern is suggestive of frontal and subcortical system involvement, consistent with his history of Parkinson's disease, along with a possible history of ADHD.  He continues to be capable of comprehending medical information and making well reasoned decisions for himself.  He has a good understanding of the surgical procedure and the risks involved.  He has a good support system in his family.  He appears to be an adequate candidate for surgery from a neurocognitive perspective.    In terms of daily functioning, Mr. Odonnell's cognitive difficulties are not likely to interfere with his ability to actively participate in treatment, or to manage his instrumental activities of daily living independently.  He may find that he benefits from structure and routine.  If he has difficulty managing large, complex tasks, others may assist by breaking down such tasks into smaller, more manageable parts.  He may benefit from the use of written reminders or checklists.  He may find it helpful to use a smart phone or similar device for alarms and reminders.    Results may serve as an updated baseline of his neurocognitive  functioning.  The evaluation may be repeated in the future for comparison should a change in mental status occur.      Edwina Goldstein, Ph.D., ABPP  Licensed Psychologist, LP [id number]  Board Certified in Clinical Neuropsychology    Time spent: 60 minutes (1 unit) neuropsychological testing evaluation by licensed and board-certified neuropsychologist, including integration of patient data, interpretation of standardized test results and clinical data, clinical decision-making, treatment planning, report, and interactive feedback to the patient, first hour (CPT 06564). 100 minutes (2 units) of neuropsychological testing evaluation by licensed and board-certified neuropsychologist, including integration of patient data, interpretation of standardized test results and clinical data, clinical decision-making, treatment planning, report, and interactive feedback to the patient, subsequent hours (CPT 89562). 30 minutes of neuropsychological test administration and scoring by technician, first 30 minutes (CPT 57228). 165 additional minutes (5 units) neuropsychological test administration and scoring by technician, subsequent 30 minutes (CPT 74251). ICD-10 Diagnoses: G20; F06.8.

## 2021-09-28 ENCOUNTER — TELEPHONE (OUTPATIENT)
Dept: NEUROLOGY | Facility: CLINIC | Age: 71
End: 2021-09-28

## 2021-09-28 NOTE — TELEPHONE ENCOUNTER
From 9/23/21 Consensus:    1. Candidate RSTN, unilateral  2. [employer] Infinity 5  Set up for Remote programming?  3. Treating tremor (kinetic and postural), he has unpredictable motor fluctuations, CT brain is normal for age  4. Research?    We have authorization to discuss phi, have HCD and uses [employer] reliably  ------------    Interested in research during surgery.

## 2021-09-29 ENCOUNTER — TELEPHONE (OUTPATIENT)
Dept: NEUROLOGY | Facility: CLINIC | Age: 71
End: 2021-09-29

## 2021-09-29 NOTE — TELEPHONE ENCOUNTER
Pt and spouse called asking for some more information about the timeline for surgery. I let patient and spouse know that I don't have scheduling information but I can pass their information on to neurosurgery and the surgery scheduling team. Patient is current working on a large project at work and would prefer for surgery not to be scheduled until November if possible.     Pt was also notified that his insurance will be changing as of the first of the year. I explained that our financial advisors will do a prior authorization for insurance prior to surgery.  Pt currently has insurance through the [employer] (administered by [facility]) and the  of the policy is changing to [employer]. Spouse thinks that the plan will be similar, but wanted to make sure the team was aware that there may be a change coming up.    I reviewed with spouse that there will be two separate surgeries, about a week apart, and then device will not be turned on until initial programming which is about 4-6 weeks after the lead implant. I explained that since pt's first surgeryin 2018, we do have some more educational resources in the form of a DBS handbook and videos, and will ask that they be sent for reference.    Pt and spouse had no further questions.     Gela Ny, RN, MPH  Research Nurse, Movement Disorders

## 2021-10-06 NOTE — TELEPHONE ENCOUNTER
DBS videos were sent to the patient on 9/13/21 and the DBS handbook was mailed to the patient on 10/6/21.

## 2021-10-07 DIAGNOSIS — G20.A1 PARKINSON DISEASE (H): Primary | ICD-10-CM

## 2021-10-07 DIAGNOSIS — Z79.4 TYPE 2 DIABETES MELLITUS WITH COMPLICATION, WITH LONG-TERM CURRENT USE OF INSULIN (H): ICD-10-CM

## 2021-10-07 DIAGNOSIS — G25.0 ESSENTIAL TREMOR: ICD-10-CM

## 2021-10-07 DIAGNOSIS — E11.8 TYPE 2 DIABETES MELLITUS WITH COMPLICATION, WITH LONG-TERM CURRENT USE OF INSULIN (H): ICD-10-CM

## 2021-10-07 DIAGNOSIS — N18.31 STAGE 3A CHRONIC KIDNEY DISEASE (H): ICD-10-CM

## 2021-10-09 ENCOUNTER — HEALTH MAINTENANCE LETTER (OUTPATIENT)
Age: 71
End: 2021-10-09

## 2021-10-11 ENCOUNTER — TELEPHONE (OUTPATIENT)
Dept: NEUROSURGERY | Facility: CLINIC | Age: 71
End: 2021-10-11

## 2021-10-11 NOTE — TELEPHONE ENCOUNTER
Called patient to schedule surgery with Dr. Marshall. Patients covid test needs to be scheduled and pre op will be taken care of with PAC team within the 30 day time frame requirement. Patient is aware that pre op nurse will be calling 2-3 days prior to confirm arrival time and instructions. Packet to be sent out within next few days.    Surgeon: Dr. Marshall  Date of Surgery: 11/26/2021 and 12/3/2021  Location of surgery: [place] OR  Pre-Op H&P: 11/8/2021  Post-Op Appt Date: 12/16/2021   Imaging needed:  No  Discussed COVID-19 testing:  Yes  Pre-cert/Authorization completed:  Yes  Patient aware that pre-op RN will call 2-3 days prior to surgery with arrival time and instructions Yes  Packet sent out: Yes 10/11/21  Patient was instructed to review packet and call back with any questions or concerns.     *Covid orders need to be sent to:    [place], [place]    *Surgery packet is needed*    Chaim Paz on 10/11/2021 at 3:44 PM

## 2021-10-12 DIAGNOSIS — Z01.818 PREOPERATIVE EVALUATION OF A MEDICAL CONDITION TO RULE OUT SURGICAL CONTRAINDICATIONS (TAR REQUIRED): Primary | ICD-10-CM

## 2021-10-12 DIAGNOSIS — G20.A1 PARKINSON DISEASE (H): Primary | ICD-10-CM

## 2021-10-12 NOTE — TELEPHONE ENCOUNTER
FUTURE VISIT INFORMATION      SURGERY INFORMATION:    Date: 21    Location: [facility] OR    Surgeon:  Wayne Marshall MD    Anesthesia Type:  Combined MAC with Local    Procedure: Right side deep brain stimulator placement, phase I, placement of right side deep brain stimulator electrode, target right subthalamic nucleus, with microelectrode recording    RECORDS REQUESTED FROM:       Primary Care Provider: Dorian Last MD  - Bryce    Pertinent Medical History: RENÉE, Hypertension    Most recent EKG+ Tracin20- Bryce

## 2021-10-13 ENCOUNTER — TELEPHONE (OUTPATIENT)
Dept: NEUROSURGERY | Facility: CLINIC | Age: 71
End: 2021-10-13

## 2021-10-13 ENCOUNTER — DOCUMENTATION ONLY (OUTPATIENT)
Dept: NEUROSURGERY | Facility: CLINIC | Age: 71
End: 2021-10-13

## 2021-10-13 DIAGNOSIS — Z11.59 ENCOUNTER FOR SCREENING FOR OTHER VIRAL DISEASES: Primary | ICD-10-CM

## 2021-10-13 NOTE — PROGRESS NOTES
Per pt's spouse's request, faxed covid orders to [facility] at [phone]. Surgery dates, time frame for tests, return fax and my contact info included. Pt/spouse will call to schedule preop covid testing.

## 2021-10-13 NOTE — CONFIDENTIAL NOTE
Left  for pt letting him know that I spoke with [facility] in [place] and they do not do preop covid-19 testing at their clinic. Requested that pt let me know where I should fax covid-19 test orders. Left my direct number.

## 2021-10-13 NOTE — CONFIDENTIAL NOTE
Called [facility] to get a fax number to fax preop covid-19 test orders. Informed that the clinic does not do preop covid-19 testing. Will follow-up with pt.

## 2021-10-18 ENCOUNTER — TELEPHONE (OUTPATIENT)
Dept: NEUROSURGERY | Facility: CLINIC | Age: 71
End: 2021-10-18

## 2021-10-18 ENCOUNTER — TELEPHONE (OUTPATIENT)
Dept: NEUROLOGY | Facility: CLINIC | Age: 71
End: 2021-10-18

## 2021-10-18 DIAGNOSIS — G20.A1 PARKINSON DISEASE (H): Primary | ICD-10-CM

## 2021-10-18 DIAGNOSIS — M79.2 NEUROPATHIC PAIN: ICD-10-CM

## 2021-10-18 NOTE — TELEPHONE ENCOUNTER
Spoke with pt's spouse Blanca. She would like to move PAC appt to an earlier time. Appt changed to 11/9 at 1:30. Spouse expressed understanding. No further questions at this time.

## 2021-10-18 NOTE — TELEPHONE ENCOUNTER
CT head ordered and scheduled x  Neuropsych order placed for 1 year follow up x  Letter composed x  Datebase updated  Appointment held x    Spoke to Blanca, patient's wife and informed her of the patient's initial programming appointments scheduled on 1/7/22. Blanca asked if the pre-op physical can be done at the patient's PCP's office. Blanca was informed yes but that the pre-op notes would need to be faxed to our office. Blanca was informed to try to secure an appointment first at the patient's PCP's then call Madeline about canceling the pre-op at PAC, before canceling the PAC appointment.    Blanca verbalized understanding and had no further questions.

## 2021-10-18 NOTE — TELEPHONE ENCOUNTER
Rx Authorization:    Requested Medication/ Dose: Pregabalin 50 MG caps    Date last refill ordered: 1/15/21    Quantity ordered: 90 caps    # refills: 3    Date of last clinic visit with ordering provider: 9/3/21    Date of next clinic visit with ordering provider:F/U 1 year     All pertinent protocol data (lab date/result):     Include pertinent information from patients message:

## 2021-10-19 RX ORDER — PREGABALIN 50 MG/1
50 CAPSULE ORAL DAILY PRN
Qty: 90 CAPSULE | Refills: 3 | Status: SHIPPED | OUTPATIENT
Start: 2021-10-19 | End: 2022-04-25

## 2021-10-24 DIAGNOSIS — Z11.59 ENCOUNTER FOR SCREENING FOR OTHER VIRAL DISEASES: ICD-10-CM

## 2021-10-26 ENCOUNTER — DOCUMENTATION ONLY (OUTPATIENT)
Dept: NEUROSURGERY | Facility: CLINIC | Age: 71
End: 2021-10-26

## 2021-10-26 NOTE — PROGRESS NOTES
Per pt's Staff Rankerhart request, preop lab orders faxed to Dr. Last at [phone]. Included pt's surgery dates, time frame for preop labs, return fax and my contact information.

## 2021-11-08 ENCOUNTER — PRE VISIT (OUTPATIENT)
Dept: SURGERY | Facility: CLINIC | Age: 71
End: 2021-11-08

## 2021-11-11 ENCOUNTER — DOCUMENTATION ONLY (OUTPATIENT)
Dept: NEUROSURGERY | Facility: CLINIC | Age: 71
End: 2021-11-11
Payer: COMMERCIAL

## 2021-11-11 NOTE — PROGRESS NOTES
Per pt request, faxed preop lab orders to [phone]. Lab orders have been faxed multiple times over the last few weeks to different fax numbers provided by the patient and clinic. Pt reports that lab orders have not been received. Pt's surgery dates, return fax, and my contact information included on fax cover.

## 2021-11-13 ENCOUNTER — TRANSFERRED RECORDS (OUTPATIENT)
Dept: HEALTH INFORMATION MANAGEMENT | Facility: CLINIC | Age: 71
End: 2021-11-13
Payer: COMMERCIAL

## 2021-11-13 LAB
ALT SERPL-CCNC: <6 IU/L (ref 6–40)
AST SERPL-CCNC: 15 IU/L (ref 10–40)
CREATININE (EXTERNAL): 1.22 MG/DL (ref 0.7–1.2)
GFR ESTIMATED (EXTERNAL): 58 ML/MIN/1.73M2
GLUCOSE (EXTERNAL): 96 MG/DL (ref 70–99)
INR (EXTERNAL): 1 (ref 0.9–1.1)
POTASSIUM (EXTERNAL): 4.1 MEQ/L (ref 3.4–5.1)

## 2021-11-15 ENCOUNTER — TELEPHONE (OUTPATIENT)
Dept: NEUROLOGY | Facility: CLINIC | Age: 71
End: 2021-11-15
Payer: COMMERCIAL

## 2021-11-15 RX ORDER — CEFAZOLIN SODIUM IN 0.9 % NACL 3 G/100 ML
3 INTRAVENOUS SOLUTION, PIGGYBACK (ML) INTRAVENOUS SEE ADMIN INSTRUCTIONS
Status: CANCELLED | OUTPATIENT
Start: 2021-11-15

## 2021-11-15 RX ORDER — CEFAZOLIN SODIUM IN 0.9 % NACL 3 G/100 ML
3 INTRAVENOUS SOLUTION, PIGGYBACK (ML) INTRAVENOUS
Status: CANCELLED | OUTPATIENT
Start: 2021-11-15

## 2021-11-15 NOTE — TELEPHONE ENCOUNTER
Called patient and discussed research consents for upcoming DBS surgery  with Dr. Marshall. Patient consented via [employer] e-consent for both externalization and  (repeat participation, no ECoG). Will discuss details with study team about externalization and determine if reservations are available for clinical research unit. I will call patient later this week with a schedule after study team has discussed logistics further.     Patient also was wondering if someone could confirm his check-in time for his surgery on , as well as what kind of surgical soap he should buy (he has some leftover from a previous surgery that was not used). Update sent to Madeline Perez RN.    Gela Ny, RN, MPH  Research Nurse, Movement Disorders

## 2021-11-15 NOTE — CONFIDENTIAL NOTE
Gela Avendano. I sent pt a MyChart on Friday about the surgical scrub wipes that he already had, so he should be squared away on that issue. I also included that information on the Smisht message sent on 10/13 with the subject line Preparing for surgery.I can send a message re surgery time and check-in.  Dr. Marshall mentioned the possibility of externalization but that some things still need to be worked out. So, I guess I'll just wait to hear back from him or from you on that.     Thanks,  Madeline

## 2021-11-17 DIAGNOSIS — G25.0 ESSENTIAL TREMOR: ICD-10-CM

## 2021-11-17 DIAGNOSIS — Z11.59 ENCOUNTER FOR SCREENING FOR OTHER VIRAL DISEASES: ICD-10-CM

## 2021-11-17 DIAGNOSIS — G20.A1 PARKINSON DISEASE (H): Primary | ICD-10-CM

## 2021-11-17 DIAGNOSIS — E11.8 TYPE 2 DIABETES MELLITUS WITH COMPLICATION, WITH LONG-TERM CURRENT USE OF INSULIN (H): ICD-10-CM

## 2021-11-17 DIAGNOSIS — Z79.4 TYPE 2 DIABETES MELLITUS WITH COMPLICATION, WITH LONG-TERM CURRENT USE OF INSULIN (H): ICD-10-CM

## 2021-11-19 ENCOUNTER — TELEPHONE (OUTPATIENT)
Dept: NEUROLOGY | Facility: CLINIC | Age: 71
End: 2021-11-19
Payer: COMMERCIAL

## 2021-11-19 NOTE — TELEPHONE ENCOUNTER
Called patient to review instructions and schedule for upcoming research participation (Ruy -3).     Patient is wondering if he can take his pregabalin/Lyrica prior to surgery and prior to research unit (Off PD medication testing).  He uses this once daily (50mg)  for burning sensations in his feet. Pt stated that when he misses a dose, he can tell, but he can tolerate going without it if needed.    Patient also reports that about a month and half ago, his Victoza injection was switched by his provider from early morning (3am) to later morning (11am) in hopes to reduce his blood sugars. (Pt's glipizide has also changed from 10 mg twice daily to 10mg AM and 5 mg PM.) Since this change, pt sometimes notices that his sugars come down to 68 (preferred ) and he needs to eat crackers or have a candy bar. When his sugars run a bit more low, he sometimes skips his bedtime Lantus injection and this has worked well for him. Patient is aware of tools to manage his blood sugar and is a good historian.  I encouraged patient to bring preferred snacks to the Clinical Research Unit - and inform staff if he needs snacks or to check his sugars.     I let patient know that I will inform research staff of this information and that I will let him know about the Lyrica as soon as possible. Patient has no further questions.     Gela Ny, RN, MPH  Research Nurse, Movement Disorders     No

## 2021-11-22 ENCOUNTER — ANESTHESIA EVENT (OUTPATIENT)
Dept: SURGERY | Facility: CLINIC | Age: 71
End: 2021-11-22
Payer: COMMERCIAL

## 2021-11-22 NOTE — TELEPHONE ENCOUNTER
Sent message to patient regarding no issues with Lyrica/pregabalin for surgery per neurosurgery (no need to hold).     Gela Ny RN, MPH  Research Nurse, Movement Disorders

## 2021-11-23 ENCOUNTER — HOSPITAL ENCOUNTER (OUTPATIENT)
Dept: CT IMAGING | Facility: CLINIC | Age: 71
End: 2021-11-23
Attending: NEUROLOGICAL SURGERY | Admitting: NEUROLOGICAL SURGERY
Payer: COMMERCIAL

## 2021-11-23 ENCOUNTER — ANESTHESIA (OUTPATIENT)
Dept: SURGERY | Facility: CLINIC | Age: 71
End: 2021-11-23
Payer: COMMERCIAL

## 2021-11-23 ENCOUNTER — APPOINTMENT (OUTPATIENT)
Dept: GENERAL RADIOLOGY | Facility: CLINIC | Age: 71
End: 2021-11-23
Attending: STUDENT IN AN ORGANIZED HEALTH CARE EDUCATION/TRAINING PROGRAM
Payer: COMMERCIAL

## 2021-11-23 ENCOUNTER — HOSPITAL ENCOUNTER (INPATIENT)
Facility: CLINIC | Age: 71
LOS: 1 days | Discharge: HOME | End: 2021-11-24
Attending: NEUROLOGICAL SURGERY | Admitting: NEUROLOGICAL SURGERY
Payer: COMMERCIAL

## 2021-11-23 ENCOUNTER — APPOINTMENT (OUTPATIENT)
Dept: GENERAL RADIOLOGY | Facility: CLINIC | Age: 71
End: 2021-11-23
Attending: NEUROLOGICAL SURGERY
Payer: COMMERCIAL

## 2021-11-23 ENCOUNTER — TELEPHONE (OUTPATIENT)
Dept: NEUROLOGY | Facility: CLINIC | Age: 71
End: 2021-11-23

## 2021-11-23 ENCOUNTER — APPOINTMENT (OUTPATIENT)
Dept: CT IMAGING | Facility: CLINIC | Age: 71
End: 2021-11-23
Attending: STUDENT IN AN ORGANIZED HEALTH CARE EDUCATION/TRAINING PROGRAM
Payer: COMMERCIAL

## 2021-11-23 DIAGNOSIS — E11.8 TYPE 2 DIABETES MELLITUS WITH COMPLICATION, WITH LONG-TERM CURRENT USE OF INSULIN (H): ICD-10-CM

## 2021-11-23 DIAGNOSIS — G25.0 ESSENTIAL TREMOR: ICD-10-CM

## 2021-11-23 DIAGNOSIS — Z79.4 TYPE 2 DIABETES MELLITUS WITH COMPLICATION, WITH LONG-TERM CURRENT USE OF INSULIN (H): ICD-10-CM

## 2021-11-23 DIAGNOSIS — G20.A1 PARKINSON DISEASE (H): ICD-10-CM

## 2021-11-23 DIAGNOSIS — Z96.89 S/P DEEP BRAIN STIMULATOR PLACEMENT: Primary | ICD-10-CM

## 2021-11-23 LAB
CREAT SERPL-MCNC: 1.25 MG/DL (ref 0.66–1.25)
GFR SERPL CREATININE-BSD FRML MDRD: 57 ML/MIN/1.73M2
GLUCOSE BLDC GLUCOMTR-MCNC: 107 MG/DL (ref 70–99)
GLUCOSE BLDC GLUCOMTR-MCNC: 126 MG/DL (ref 70–99)
GLUCOSE BLDC GLUCOMTR-MCNC: 158 MG/DL (ref 70–99)
HGB BLD-MCNC: 14.2 G/DL (ref 13.3–17.7)
MAGNESIUM SERPL-MCNC: 2 MG/DL (ref 1.6–2.3)
PHOSPHATE SERPL-MCNC: 3.3 MG/DL (ref 2.5–4.5)
POTASSIUM BLD-SCNC: 4 MMOL/L (ref 3.4–5.3)
POTASSIUM BLD-SCNC: 4.1 MMOL/L (ref 3.4–5.3)

## 2021-11-23 PROCEDURE — 250N000009 HC RX 250: Performed by: NEUROLOGICAL SURGERY

## 2021-11-23 PROCEDURE — 999N000065 XR SKULL PORT 1/3 VIEWS

## 2021-11-23 PROCEDURE — C1778 LEAD, NEUROSTIMULATOR: HCPCS | Performed by: NEUROLOGICAL SURGERY

## 2021-11-23 PROCEDURE — 85018 HEMOGLOBIN: CPT | Performed by: ANESTHESIOLOGY

## 2021-11-23 PROCEDURE — 250N000009 HC RX 250: Performed by: ANESTHESIOLOGY

## 2021-11-23 PROCEDURE — 272N000004 HC RX 272: Performed by: NEUROLOGICAL SURGERY

## 2021-11-23 PROCEDURE — 258N000003 HC RX IP 258 OP 636: Performed by: ANESTHESIOLOGY

## 2021-11-23 PROCEDURE — 84132 ASSAY OF SERUM POTASSIUM: CPT | Performed by: STUDENT IN AN ORGANIZED HEALTH CARE EDUCATION/TRAINING PROGRAM

## 2021-11-23 PROCEDURE — 70450 CT HEAD/BRAIN W/O DYE: CPT | Mod: 26 | Performed by: RADIOLOGY

## 2021-11-23 PROCEDURE — 710N000010 HC RECOVERY PHASE 1, LEVEL 2, PER MIN: Performed by: NEUROLOGICAL SURGERY

## 2021-11-23 PROCEDURE — 70250 X-RAY EXAM OF SKULL: CPT | Mod: 26 | Performed by: RADIOLOGY

## 2021-11-23 PROCEDURE — 250N000013 HC RX MED GY IP 250 OP 250 PS 637: Performed by: STUDENT IN AN ORGANIZED HEALTH CARE EDUCATION/TRAINING PROGRAM

## 2021-11-23 PROCEDURE — 250N000011 HC RX IP 250 OP 636: Performed by: ANESTHESIOLOGY

## 2021-11-23 PROCEDURE — 36415 COLL VENOUS BLD VENIPUNCTURE: CPT | Performed by: ANESTHESIOLOGY

## 2021-11-23 PROCEDURE — 83735 ASSAY OF MAGNESIUM: CPT | Performed by: STUDENT IN AN ORGANIZED HEALTH CARE EDUCATION/TRAINING PROGRAM

## 2021-11-23 PROCEDURE — 4A11X4G MONITORING OF PERIPHERAL NERVOUS ELECTRICAL ACTIVITY, INTRAOPERATIVE, EXTERNAL APPROACH: ICD-10-PCS | Performed by: NEUROLOGICAL SURGERY

## 2021-11-23 PROCEDURE — 258N000003 HC RX IP 258 OP 636: Performed by: STUDENT IN AN ORGANIZED HEALTH CARE EDUCATION/TRAINING PROGRAM

## 2021-11-23 PROCEDURE — 250N000013 HC RX MED GY IP 250 OP 250 PS 637: Performed by: ANESTHESIOLOGY

## 2021-11-23 PROCEDURE — 82565 ASSAY OF CREATININE: CPT | Performed by: ANESTHESIOLOGY

## 2021-11-23 PROCEDURE — 70450 CT HEAD/BRAIN W/O DYE: CPT

## 2021-11-23 PROCEDURE — 250N000025 HC SEVOFLURANE, PER MIN: Performed by: NEUROLOGICAL SURGERY

## 2021-11-23 PROCEDURE — 250N000013 HC RX MED GY IP 250 OP 250 PS 637: Performed by: NEUROLOGICAL SURGERY

## 2021-11-23 PROCEDURE — 250N000011 HC RX IP 250 OP 636: Performed by: NEUROLOGICAL SURGERY

## 2021-11-23 PROCEDURE — 36415 COLL VENOUS BLD VENIPUNCTURE: CPT | Performed by: STUDENT IN AN ORGANIZED HEALTH CARE EDUCATION/TRAINING PROGRAM

## 2021-11-23 PROCEDURE — 8E09XBG COMPUTER ASSISTED PROCEDURE OF HEAD AND NECK REGION, WITH COMPUTERIZED TOMOGRAPHY: ICD-10-PCS | Performed by: NEUROLOGICAL SURGERY

## 2021-11-23 PROCEDURE — 360N000086 HC SURGERY LEVEL 6 W/ FLUORO, PER MIN: Performed by: NEUROLOGICAL SURGERY

## 2021-11-23 PROCEDURE — 278N000051 HC OR IMPLANT GENERAL: Performed by: NEUROLOGICAL SURGERY

## 2021-11-23 PROCEDURE — 120N000002 HC R&B MED SURG/OB UMMC

## 2021-11-23 PROCEDURE — 272N000001 HC OR GENERAL SUPPLY STERILE: Performed by: NEUROLOGICAL SURGERY

## 2021-11-23 PROCEDURE — 84132 ASSAY OF SERUM POTASSIUM: CPT | Performed by: ANESTHESIOLOGY

## 2021-11-23 PROCEDURE — 84100 ASSAY OF PHOSPHORUS: CPT | Performed by: STUDENT IN AN ORGANIZED HEALTH CARE EDUCATION/TRAINING PROGRAM

## 2021-11-23 PROCEDURE — 999N000179 XR SURGERY CARM FLUORO LESS THAN 5 MIN W STILLS: Mod: TC

## 2021-11-23 PROCEDURE — 370N000017 HC ANESTHESIA TECHNICAL FEE, PER MIN: Performed by: NEUROLOGICAL SURGERY

## 2021-11-23 PROCEDURE — 250N000011 HC RX IP 250 OP 636: Performed by: STUDENT IN AN ORGANIZED HEALTH CARE EDUCATION/TRAINING PROGRAM

## 2021-11-23 PROCEDURE — 61867 IMPLANT NEUROELECTRODE: CPT | Mod: RT | Performed by: NEUROLOGICAL SURGERY

## 2021-11-23 PROCEDURE — C1883 ADAPT/EXT, PACING/NEURO LEAD: HCPCS | Performed by: NEUROLOGICAL SURGERY

## 2021-11-23 PROCEDURE — 999N000141 HC STATISTIC PRE-PROCEDURE NURSING ASSESSMENT: Performed by: NEUROLOGICAL SURGERY

## 2021-11-23 PROCEDURE — 70450 CT HEAD/BRAIN W/O DYE: CPT | Mod: 77

## 2021-11-23 PROCEDURE — 00H03MZ INSERTION OF NEUROSTIMULATOR LEAD INTO BRAIN, PERCUTANEOUS APPROACH: ICD-10-PCS | Performed by: NEUROLOGICAL SURGERY

## 2021-11-23 DEVICE — LEAD EXTENSION W/EXTEND TECHNOLOGY 50CM 6371ANS
Type: IMPLANTABLE DEVICE | Site: NECK | Status: NON-FUNCTIONAL
Removed: 2021-12-03

## 2021-11-23 DEVICE — IMPLANTABLE DEVICE: Type: IMPLANTABLE DEVICE | Site: ABDOMEN | Status: FUNCTIONAL

## 2021-11-23 DEVICE — IMP BUR HOLE COVER GUARDIAN 6010ANS: Type: IMPLANTABLE DEVICE | Site: CRANIAL | Status: FUNCTIONAL

## 2021-11-23 DEVICE — KIT DBS LEAD DBS 8CH 40CM 1-3,3-1 SPACE 0.5 BLACK 6172ANS: Type: IMPLANTABLE DEVICE | Site: BRAIN | Status: FUNCTIONAL

## 2021-11-23 RX ORDER — BISACODYL 10 MG
10 SUPPOSITORY, RECTAL RECTAL DAILY PRN
Status: DISCONTINUED | OUTPATIENT
Start: 2021-11-23 | End: 2021-11-24 | Stop reason: HOSPADM

## 2021-11-23 RX ORDER — LIDOCAINE 40 MG/G
CREAM TOPICAL
Status: DISCONTINUED | OUTPATIENT
Start: 2021-11-23 | End: 2021-11-23 | Stop reason: HOSPADM

## 2021-11-23 RX ORDER — POLYETHYLENE GLYCOL 3350 17 G/17G
17 POWDER, FOR SOLUTION ORAL DAILY
Status: DISCONTINUED | OUTPATIENT
Start: 2021-11-24 | End: 2021-11-24 | Stop reason: HOSPADM

## 2021-11-23 RX ORDER — HYDRALAZINE HYDROCHLORIDE 20 MG/ML
10-20 INJECTION INTRAMUSCULAR; INTRAVENOUS EVERY 30 MIN PRN
Status: DISCONTINUED | OUTPATIENT
Start: 2021-11-23 | End: 2021-11-24 | Stop reason: HOSPADM

## 2021-11-23 RX ORDER — SIMVASTATIN 40 MG
40 TABLET ORAL AT BEDTIME
Status: DISCONTINUED | OUTPATIENT
Start: 2021-11-23 | End: 2021-11-24 | Stop reason: HOSPADM

## 2021-11-23 RX ORDER — ONDANSETRON 2 MG/ML
4 INJECTION INTRAMUSCULAR; INTRAVENOUS EVERY 30 MIN PRN
Status: DISCONTINUED | OUTPATIENT
Start: 2021-11-23 | End: 2021-11-23 | Stop reason: HOSPADM

## 2021-11-23 RX ORDER — DOXEPIN HYDROCHLORIDE 10 MG/1
10 CAPSULE ORAL AT BEDTIME
Status: DISCONTINUED | OUTPATIENT
Start: 2021-11-23 | End: 2021-11-24 | Stop reason: HOSPADM

## 2021-11-23 RX ORDER — AMOXICILLIN 250 MG
1 CAPSULE ORAL 2 TIMES DAILY
Status: DISCONTINUED | OUTPATIENT
Start: 2021-11-23 | End: 2021-11-24 | Stop reason: HOSPADM

## 2021-11-23 RX ORDER — PROPOFOL 10 MG/ML
INJECTION, EMULSION INTRAVENOUS PRN
Status: DISCONTINUED | OUTPATIENT
Start: 2021-11-23 | End: 2021-11-23

## 2021-11-23 RX ORDER — FENTANYL CITRATE 50 UG/ML
INJECTION, SOLUTION INTRAMUSCULAR; INTRAVENOUS PRN
Status: DISCONTINUED | OUTPATIENT
Start: 2021-11-23 | End: 2021-11-23

## 2021-11-23 RX ORDER — OXYCODONE HYDROCHLORIDE 5 MG/1
5 TABLET ORAL EVERY 4 HOURS PRN
Status: DISCONTINUED | OUTPATIENT
Start: 2021-11-23 | End: 2021-11-23 | Stop reason: HOSPADM

## 2021-11-23 RX ORDER — FENTANYL CITRATE 50 UG/ML
25-50 INJECTION, SOLUTION INTRAMUSCULAR; INTRAVENOUS EVERY 5 MIN PRN
Status: DISCONTINUED | OUTPATIENT
Start: 2021-11-23 | End: 2021-11-23 | Stop reason: HOSPADM

## 2021-11-23 RX ORDER — ONDANSETRON 4 MG/1
4 TABLET, ORALLY DISINTEGRATING ORAL EVERY 30 MIN PRN
Status: DISCONTINUED | OUTPATIENT
Start: 2021-11-23 | End: 2021-11-23 | Stop reason: HOSPADM

## 2021-11-23 RX ORDER — HYDROMORPHONE HYDROCHLORIDE 1 MG/ML
0.2 INJECTION, SOLUTION INTRAMUSCULAR; INTRAVENOUS; SUBCUTANEOUS EVERY 5 MIN PRN
Status: DISCONTINUED | OUTPATIENT
Start: 2021-11-23 | End: 2021-11-23 | Stop reason: HOSPADM

## 2021-11-23 RX ORDER — PROCHLORPERAZINE MALEATE 5 MG
5 TABLET ORAL EVERY 6 HOURS PRN
Status: DISCONTINUED | OUTPATIENT
Start: 2021-11-23 | End: 2021-11-24 | Stop reason: HOSPADM

## 2021-11-23 RX ORDER — CEFAZOLIN SODIUM 1 G/3ML
1 INJECTION, POWDER, FOR SOLUTION INTRAMUSCULAR; INTRAVENOUS EVERY 8 HOURS
Status: DISCONTINUED | OUTPATIENT
Start: 2021-11-23 | End: 2021-11-24 | Stop reason: HOSPADM

## 2021-11-23 RX ORDER — LIDOCAINE 40 MG/G
CREAM TOPICAL
Status: DISCONTINUED | OUTPATIENT
Start: 2021-11-23 | End: 2021-11-24 | Stop reason: HOSPADM

## 2021-11-23 RX ORDER — OXYCODONE HYDROCHLORIDE 5 MG/1
5 TABLET ORAL EVERY 4 HOURS PRN
Status: DISCONTINUED | OUTPATIENT
Start: 2021-11-23 | End: 2021-11-24 | Stop reason: HOSPADM

## 2021-11-23 RX ORDER — NICOTINE POLACRILEX 4 MG
15-30 LOZENGE BUCCAL
Status: DISCONTINUED | OUTPATIENT
Start: 2021-11-23 | End: 2021-11-24 | Stop reason: HOSPADM

## 2021-11-23 RX ORDER — PROPOFOL 10 MG/ML
INJECTION, EMULSION INTRAVENOUS CONTINUOUS PRN
Status: DISCONTINUED | OUTPATIENT
Start: 2021-11-23 | End: 2021-11-23

## 2021-11-23 RX ORDER — CARBIDOPA AND LEVODOPA 25; 100 MG/1; MG/1
2 TABLET ORAL 4 TIMES DAILY
Status: DISCONTINUED | OUTPATIENT
Start: 2021-11-24 | End: 2021-11-24 | Stop reason: HOSPADM

## 2021-11-23 RX ORDER — ACETAMINOPHEN 325 MG/1
975 TABLET ORAL EVERY 8 HOURS
Status: DISCONTINUED | OUTPATIENT
Start: 2021-11-23 | End: 2021-11-24 | Stop reason: HOSPADM

## 2021-11-23 RX ORDER — ACETAMINOPHEN 325 MG/1
975 TABLET ORAL
Status: DISCONTINUED | OUTPATIENT
Start: 2021-11-23 | End: 2021-11-23 | Stop reason: HOSPADM

## 2021-11-23 RX ORDER — CEFAZOLIN SODIUM IN 0.9 % NACL 3 G/100 ML
3 INTRAVENOUS SOLUTION, PIGGYBACK (ML) INTRAVENOUS
Status: DISCONTINUED | OUTPATIENT
Start: 2021-11-23 | End: 2021-11-23 | Stop reason: HOSPADM

## 2021-11-23 RX ORDER — FENTANYL CITRATE 50 UG/ML
25 INJECTION, SOLUTION INTRAMUSCULAR; INTRAVENOUS
Status: DISCONTINUED | OUTPATIENT
Start: 2021-11-23 | End: 2021-11-23 | Stop reason: HOSPADM

## 2021-11-23 RX ORDER — NALOXONE HYDROCHLORIDE 0.4 MG/ML
0.2 INJECTION, SOLUTION INTRAMUSCULAR; INTRAVENOUS; SUBCUTANEOUS
Status: DISCONTINUED | OUTPATIENT
Start: 2021-11-23 | End: 2021-11-24 | Stop reason: HOSPADM

## 2021-11-23 RX ORDER — SODIUM CHLORIDE, SODIUM LACTATE, POTASSIUM CHLORIDE, CALCIUM CHLORIDE 600; 310; 30; 20 MG/100ML; MG/100ML; MG/100ML; MG/100ML
INJECTION, SOLUTION INTRAVENOUS CONTINUOUS PRN
Status: DISCONTINUED | OUTPATIENT
Start: 2021-11-23 | End: 2021-11-23

## 2021-11-23 RX ORDER — HYDROMORPHONE HCL IN WATER/PF 6 MG/30 ML
0.4 PATIENT CONTROLLED ANALGESIA SYRINGE INTRAVENOUS
Status: DISCONTINUED | OUTPATIENT
Start: 2021-11-23 | End: 2021-11-24 | Stop reason: HOSPADM

## 2021-11-23 RX ORDER — MAGNESIUM OXIDE 400 MG/1
400 TABLET ORAL 2 TIMES DAILY
Status: DISCONTINUED | OUTPATIENT
Start: 2021-11-23 | End: 2021-11-24 | Stop reason: HOSPADM

## 2021-11-23 RX ORDER — ACETAMINOPHEN 325 MG/1
975 TABLET ORAL ONCE
Status: COMPLETED | OUTPATIENT
Start: 2021-11-23 | End: 2021-11-23

## 2021-11-23 RX ORDER — LIDOCAINE HYDROCHLORIDE 20 MG/ML
INJECTION, SOLUTION INFILTRATION; PERINEURAL PRN
Status: DISCONTINUED | OUTPATIENT
Start: 2021-11-23 | End: 2021-11-23

## 2021-11-23 RX ORDER — ESMOLOL HYDROCHLORIDE 10 MG/ML
INJECTION INTRAVENOUS PRN
Status: DISCONTINUED | OUTPATIENT
Start: 2021-11-23 | End: 2021-11-23

## 2021-11-23 RX ORDER — ONDANSETRON 2 MG/ML
4 INJECTION INTRAMUSCULAR; INTRAVENOUS EVERY 6 HOURS PRN
Status: DISCONTINUED | OUTPATIENT
Start: 2021-11-23 | End: 2021-11-24 | Stop reason: HOSPADM

## 2021-11-23 RX ORDER — SODIUM CHLORIDE, SODIUM LACTATE, POTASSIUM CHLORIDE, CALCIUM CHLORIDE 600; 310; 30; 20 MG/100ML; MG/100ML; MG/100ML; MG/100ML
INJECTION, SOLUTION INTRAVENOUS CONTINUOUS
Status: DISCONTINUED | OUTPATIENT
Start: 2021-11-23 | End: 2021-11-23 | Stop reason: HOSPADM

## 2021-11-23 RX ORDER — LABETALOL HYDROCHLORIDE 5 MG/ML
10-40 INJECTION, SOLUTION INTRAVENOUS EVERY 10 MIN PRN
Status: DISCONTINUED | OUTPATIENT
Start: 2021-11-23 | End: 2021-11-24 | Stop reason: HOSPADM

## 2021-11-23 RX ORDER — OXYCODONE HYDROCHLORIDE 10 MG/1
10 TABLET ORAL EVERY 4 HOURS PRN
Status: DISCONTINUED | OUTPATIENT
Start: 2021-11-23 | End: 2021-11-24 | Stop reason: HOSPADM

## 2021-11-23 RX ORDER — DEXMEDETOMIDINE HYDROCHLORIDE 4 UG/ML
0.2-1.2 INJECTION, SOLUTION INTRAVENOUS CONTINUOUS
Status: DISCONTINUED | OUTPATIENT
Start: 2021-11-23 | End: 2021-11-23 | Stop reason: HOSPADM

## 2021-11-23 RX ORDER — LIDOCAINE HYDROCHLORIDE AND EPINEPHRINE 10; 10 MG/ML; UG/ML
INJECTION, SOLUTION INFILTRATION; PERINEURAL PRN
Status: DISCONTINUED | OUTPATIENT
Start: 2021-11-23 | End: 2021-11-23 | Stop reason: HOSPADM

## 2021-11-23 RX ORDER — LABETALOL HYDROCHLORIDE 5 MG/ML
INJECTION, SOLUTION INTRAVENOUS PRN
Status: DISCONTINUED | OUTPATIENT
Start: 2021-11-23 | End: 2021-11-23

## 2021-11-23 RX ORDER — LIDOCAINE HYDROCHLORIDE 20 MG/ML
JELLY TOPICAL PRN
Status: DISCONTINUED | OUTPATIENT
Start: 2021-11-23 | End: 2021-11-23 | Stop reason: HOSPADM

## 2021-11-23 RX ORDER — HYDROMORPHONE HYDROCHLORIDE 1 MG/ML
.2-.4 INJECTION, SOLUTION INTRAMUSCULAR; INTRAVENOUS; SUBCUTANEOUS EVERY 5 MIN PRN
Status: DISCONTINUED | OUTPATIENT
Start: 2021-11-23 | End: 2021-11-23 | Stop reason: HOSPADM

## 2021-11-23 RX ORDER — ALLOPURINOL 300 MG/1
300 TABLET ORAL EVERY EVENING
Status: DISCONTINUED | OUTPATIENT
Start: 2021-11-23 | End: 2021-11-24 | Stop reason: HOSPADM

## 2021-11-23 RX ORDER — FINASTERIDE 5 MG/1
5 TABLET, FILM COATED ORAL EVERY MORNING
Status: DISCONTINUED | OUTPATIENT
Start: 2021-11-24 | End: 2021-11-24 | Stop reason: HOSPADM

## 2021-11-23 RX ORDER — HYDRALAZINE HYDROCHLORIDE 20 MG/ML
2.5-5 INJECTION INTRAMUSCULAR; INTRAVENOUS EVERY 10 MIN PRN
Status: DISCONTINUED | OUTPATIENT
Start: 2021-11-23 | End: 2021-11-23 | Stop reason: HOSPADM

## 2021-11-23 RX ORDER — PREGABALIN 50 MG/1
50 CAPSULE ORAL DAILY PRN
Status: DISCONTINUED | OUTPATIENT
Start: 2021-11-23 | End: 2021-11-24 | Stop reason: HOSPADM

## 2021-11-23 RX ORDER — NALOXONE HYDROCHLORIDE 0.4 MG/ML
0.4 INJECTION, SOLUTION INTRAMUSCULAR; INTRAVENOUS; SUBCUTANEOUS
Status: DISCONTINUED | OUTPATIENT
Start: 2021-11-23 | End: 2021-11-24 | Stop reason: HOSPADM

## 2021-11-23 RX ORDER — ONDANSETRON 2 MG/ML
INJECTION INTRAMUSCULAR; INTRAVENOUS PRN
Status: DISCONTINUED | OUTPATIENT
Start: 2021-11-23 | End: 2021-11-23

## 2021-11-23 RX ORDER — CARBIDOPA AND LEVODOPA 50; 200 MG/1; MG/1
1 TABLET, EXTENDED RELEASE ORAL DAILY
Status: DISCONTINUED | OUTPATIENT
Start: 2021-11-23 | End: 2021-11-24 | Stop reason: HOSPADM

## 2021-11-23 RX ORDER — DEXTROSE MONOHYDRATE 25 G/50ML
25-50 INJECTION, SOLUTION INTRAVENOUS
Status: DISCONTINUED | OUTPATIENT
Start: 2021-11-23 | End: 2021-11-24 | Stop reason: HOSPADM

## 2021-11-23 RX ORDER — ACETAMINOPHEN 325 MG/1
650 TABLET ORAL EVERY 4 HOURS PRN
Status: DISCONTINUED | OUTPATIENT
Start: 2021-11-26 | End: 2021-11-24 | Stop reason: HOSPADM

## 2021-11-23 RX ORDER — CEFAZOLIN SODIUM IN 0.9 % NACL 3 G/100 ML
3 INTRAVENOUS SOLUTION, PIGGYBACK (ML) INTRAVENOUS SEE ADMIN INSTRUCTIONS
Status: DISCONTINUED | OUTPATIENT
Start: 2021-11-23 | End: 2021-11-23 | Stop reason: HOSPADM

## 2021-11-23 RX ORDER — HYDROMORPHONE HCL IN WATER/PF 6 MG/30 ML
0.2 PATIENT CONTROLLED ANALGESIA SYRINGE INTRAVENOUS
Status: DISCONTINUED | OUTPATIENT
Start: 2021-11-23 | End: 2021-11-24 | Stop reason: HOSPADM

## 2021-11-23 RX ORDER — ONDANSETRON 4 MG/1
4 TABLET, ORALLY DISINTEGRATING ORAL EVERY 6 HOURS PRN
Status: DISCONTINUED | OUTPATIENT
Start: 2021-11-23 | End: 2021-11-24 | Stop reason: HOSPADM

## 2021-11-23 RX ORDER — SODIUM CHLORIDE 9 MG/ML
INJECTION, SOLUTION INTRAVENOUS CONTINUOUS
Status: DISCONTINUED | OUTPATIENT
Start: 2021-11-23 | End: 2021-11-24 | Stop reason: HOSPADM

## 2021-11-23 RX ORDER — AMLODIPINE BESYLATE 5 MG/1
5 TABLET ORAL EVERY MORNING
Status: DISCONTINUED | OUTPATIENT
Start: 2021-11-24 | End: 2021-11-24 | Stop reason: HOSPADM

## 2021-11-23 RX ORDER — FENTANYL CITRATE 50 UG/ML
25 INJECTION, SOLUTION INTRAMUSCULAR; INTRAVENOUS EVERY 5 MIN PRN
Status: DISCONTINUED | OUTPATIENT
Start: 2021-11-23 | End: 2021-11-23 | Stop reason: HOSPADM

## 2021-11-23 RX ORDER — ALBUTEROL SULFATE 0.83 MG/ML
2.5 SOLUTION RESPIRATORY (INHALATION) EVERY 4 HOURS PRN
Status: DISCONTINUED | OUTPATIENT
Start: 2021-11-23 | End: 2021-11-23 | Stop reason: HOSPADM

## 2021-11-23 RX ORDER — LABETALOL HYDROCHLORIDE 5 MG/ML
5 INJECTION, SOLUTION INTRAVENOUS EVERY 5 MIN PRN
Status: DISCONTINUED | OUTPATIENT
Start: 2021-11-23 | End: 2021-11-23 | Stop reason: HOSPADM

## 2021-11-23 RX ADMIN — LABETALOL HYDROCHLORIDE 5 MG: 5 INJECTION INTRAVENOUS at 16:55

## 2021-11-23 RX ADMIN — ROCURONIUM BROMIDE 20 MG: 50 INJECTION, SOLUTION INTRAVENOUS at 15:50

## 2021-11-23 RX ADMIN — PROPOFOL 30 MG: 10 INJECTION, EMULSION INTRAVENOUS at 08:34

## 2021-11-23 RX ADMIN — LIDOCAINE HYDROCHLORIDE 100 MG: 20 INJECTION, SOLUTION INFILTRATION; PERINEURAL at 14:44

## 2021-11-23 RX ADMIN — PHENYLEPHRINE HYDROCHLORIDE 0.2 MCG/KG/MIN: 10 INJECTION INTRAVENOUS at 15:10

## 2021-11-23 RX ADMIN — DEXMEDETOMIDINE 0.7 MCG/KG/HR: 100 INJECTION, SOLUTION, CONCENTRATE INTRAVENOUS at 09:08

## 2021-11-23 RX ADMIN — PROPOFOL 20 MG: 10 INJECTION, EMULSION INTRAVENOUS at 08:38

## 2021-11-23 RX ADMIN — ONDANSETRON 4 MG: 2 INJECTION INTRAMUSCULAR; INTRAVENOUS at 16:00

## 2021-11-23 RX ADMIN — ROCURONIUM BROMIDE 50 MG: 50 INJECTION, SOLUTION INTRAVENOUS at 14:45

## 2021-11-23 RX ADMIN — PROPOFOL 50 MCG/KG/MIN: 10 INJECTION, EMULSION INTRAVENOUS at 14:48

## 2021-11-23 RX ADMIN — PROPOFOL 20 MG: 10 INJECTION, EMULSION INTRAVENOUS at 15:49

## 2021-11-23 RX ADMIN — SIMVASTATIN 40 MG: 40 TABLET, FILM COATED ORAL at 21:12

## 2021-11-23 RX ADMIN — Medication 3 G: at 09:45

## 2021-11-23 RX ADMIN — PROPOFOL 40 MG: 10 INJECTION, EMULSION INTRAVENOUS at 08:22

## 2021-11-23 RX ADMIN — ALLOPURINOL 300 MG: 300 TABLET ORAL at 20:26

## 2021-11-23 RX ADMIN — ACETAMINOPHEN 975 MG: 325 TABLET, FILM COATED ORAL at 06:53

## 2021-11-23 RX ADMIN — SODIUM CHLORIDE: 9 INJECTION, SOLUTION INTRAVENOUS at 18:10

## 2021-11-23 RX ADMIN — SUGAMMADEX 400 MG: 100 INJECTION, SOLUTION INTRAVENOUS at 16:40

## 2021-11-23 RX ADMIN — LABETALOL HYDROCHLORIDE 2.5 MG: 5 INJECTION INTRAVENOUS at 13:04

## 2021-11-23 RX ADMIN — SODIUM CHLORIDE, POTASSIUM CHLORIDE, SODIUM LACTATE AND CALCIUM CHLORIDE: 600; 310; 30; 20 INJECTION, SOLUTION INTRAVENOUS at 16:50

## 2021-11-23 RX ADMIN — PROPOFOL 200 MG: 10 INJECTION, EMULSION INTRAVENOUS at 14:44

## 2021-11-23 RX ADMIN — SODIUM CHLORIDE, POTASSIUM CHLORIDE, SODIUM LACTATE AND CALCIUM CHLORIDE: 600; 310; 30; 20 INJECTION, SOLUTION INTRAVENOUS at 09:00

## 2021-11-23 RX ADMIN — LABETALOL HYDROCHLORIDE 5 MG: 5 INJECTION INTRAVENOUS at 11:17

## 2021-11-23 RX ADMIN — FENTANYL CITRATE 50 MCG: 50 INJECTION, SOLUTION INTRAMUSCULAR; INTRAVENOUS at 14:44

## 2021-11-23 RX ADMIN — LABETALOL HYDROCHLORIDE 5 MG: 5 INJECTION INTRAVENOUS at 13:15

## 2021-11-23 RX ADMIN — ESMOLOL HYDROCHLORIDE 20 MG: 10 INJECTION, SOLUTION INTRAVENOUS at 13:18

## 2021-11-23 RX ADMIN — FENTANYL CITRATE 50 MCG: 50 INJECTION, SOLUTION INTRAMUSCULAR; INTRAVENOUS at 15:49

## 2021-11-23 RX ADMIN — CARBIDOPA AND LEVODOPA 1 TABLET: 50; 200 TABLET, EXTENDED RELEASE ORAL at 20:26

## 2021-11-23 RX ADMIN — CEFAZOLIN 1 G: 1 INJECTION, POWDER, FOR SOLUTION INTRAMUSCULAR; INTRAVENOUS at 21:11

## 2021-11-23 RX ADMIN — SODIUM CHLORIDE, POTASSIUM CHLORIDE, SODIUM LACTATE AND CALCIUM CHLORIDE: 600; 310; 30; 20 INJECTION, SOLUTION INTRAVENOUS at 08:08

## 2021-11-23 RX ADMIN — LABETALOL HYDROCHLORIDE 2.5 MG: 5 INJECTION INTRAVENOUS at 13:12

## 2021-11-23 RX ADMIN — ACETAMINOPHEN 975 MG: 325 TABLET, FILM COATED ORAL at 20:26

## 2021-11-23 RX ADMIN — Medication 3 G: at 13:45

## 2021-11-23 RX ADMIN — Medication 400 MG: at 21:12

## 2021-11-23 ASSESSMENT — ACTIVITIES OF DAILY LIVING (ADL)
ADLS_ACUITY_SCORE: 9
ADLS_ACUITY_SCORE: 8
ADLS_ACUITY_SCORE: 9
ADLS_ACUITY_SCORE: 8
ADLS_ACUITY_SCORE: 9
ADLS_ACUITY_SCORE: 9
ADLS_ACUITY_SCORE: 6
ADLS_ACUITY_SCORE: 14
ADLS_ACUITY_SCORE: 9
ADLS_ACUITY_SCORE: 8
ADLS_ACUITY_SCORE: 9
ADLS_ACUITY_SCORE: 8
ADLS_ACUITY_SCORE: 9
ADLS_ACUITY_SCORE: 8
ADLS_ACUITY_SCORE: 9
ADLS_ACUITY_SCORE: 9

## 2021-11-23 ASSESSMENT — MIFFLIN-ST. JEOR: SCORE: 2129.75

## 2021-11-23 ASSESSMENT — VISUAL ACUITY: OU: GLASSES

## 2021-11-23 NOTE — PROGRESS NOTES
NEUROSURGERY    Patient is scheduled for Right side deep brain stimulator placement, phase I, placement of right side deep brain stimulator electrode, target right subthalamic nucleus, with microelectrode recording and placement of extension and externalization wires for [place] research protocol.    Surgical procedure, including research protocol was explained again.  Associated risk discussed again.  All questions were answered.  Patient and wife expressed understanding.    Electronic consent signed by my resident, Dr. Jena Jorgensen.  I obtained consent with the patient verbally as well but written is already obtained.

## 2021-11-23 NOTE — ANESTHESIA PREPROCEDURE EVALUATION
Anesthesia Pre-Procedure Evaluation    Patient: Stefan Odonnell   MRN: [id number] : 1950        Preoperative Diagnosis: Parkinson disease (H) [G20]  Type 2 diabetes mellitus with complication, with long-term current use of insulin (H) [E11.8, Z79.4]    Procedure : Procedure(s):  Right stealth assisted deep brain stimulator placement, phase I, placement of right side deep brain stimulator electrode, target right subthalamic nucleus, with microelectrode recording and placement of extension and externalization wires for [place] research protocol          Past Medical History:   Diagnosis Date     Anosmia      Bleeding ulcer      Carpal tunnel syndrome, bilateral      CKD (chronic kidney disease) stage 3, GFR 30-59 ml/min (H)      Diabetes mellitus (H)      Gout      Hx of skin cancer, basal cell     removed from face     Hyperlipidemia      Hypertension      Insomnia      Macular degeneration      Neuropathy      Obese      RENÉE (obstructive sleep apnea)      Osteoarthritis of knees, bilateral      Periodic limb movements of sleep      Restless legs syndrome (RLS)       Past Surgical History:   Procedure Laterality Date     ABDOMEN SURGERY  1983    ulcers     COLONOSCOPY       HEMORRHOID SURGERY       IMPLANT DEEP BRAIN STIMULATION GENERATOR / BATTERY Left 2018    Procedure: IMPLANT DEEP BRAIN STIMULATION GENERATOR / BATTERY;  Deep Brain Stimulator Placement, Phase II, Placement Of Deep Brain Stimulator Generator/Battery Over The Left Chest Wall;  Surgeon: Wayne Marshall MD;  Location: [facility] OR     Open Stomach Ulcer Repair       OPTICAL TRACKING SYSTEM INSERTION DEEP BRAIN STIMULATION Left 2018    Procedure: OPTICAL TRACKING SYSTEM INSERTION DEEP BRAIN STIMULATION;  Stealth Assisted Left Deep Brain Stimulator Placement, Phase I, Placement Of Left Deep Brain Stimulator Electrode, Target Left Subthalamic Nucleus With Microelectrode Recording;  Surgeon: Wayne Marshall MD;  Location:  [facility] OR     TONSILLECTOMY        Allergies   Allergen Reactions     Atorvastatin Muscle Pain (Myalgia)     Clindamycin GI Disturbance     Gabapentin Swelling      Social History     Tobacco Use     Smoking status: Never Smoker     Smokeless tobacco: Never Used   Substance Use Topics     Alcohol use: No     Comment: 2 - 3 x a year      Wt Readings from Last 1 Encounters:   11/23/21 131 kg (288 lb 12.8 oz)        Anesthesia Evaluation   Pt has had prior anesthetic.     No history of anesthetic complications       ROS/MED HX  ENT/Pulmonary:     (+) sleep apnea,     Neurologic:     (+) peripheral neuropathy, Parkinson's disease,     Cardiovascular:     (+) Dyslipidemia hypertension-----    METS/Exercise Tolerance:     Hematologic:       Musculoskeletal:       GI/Hepatic:       Renal/Genitourinary:     (+) renal disease, type: CRI,     Endo:     (+) type II DM, Obesity,     Psychiatric/Substance Use:       Infectious Disease:       Malignancy:       Other:            Physical Exam    Airway        Mallampati: II   TM distance: > 3 FB   Neck ROM: full   Mouth opening: > 3 cm    Respiratory Devices and Support         Dental  no notable dental history         Cardiovascular             Pulmonary                   OUTSIDE LABS:  CBC:   Lab Results   Component Value Date    WBC 8.0 05/30/2018    WBC 7.2 05/15/2018    HGB 14.2 11/23/2021    HGB 12.7 (L) 05/30/2018    HCT 37.6 (L) 05/30/2018    HCT 41.0 05/15/2018     05/30/2018     05/15/2018     BMP:   Lab Results   Component Value Date     05/30/2018     05/15/2018    POTASSIUM 4.0 11/23/2021    POTASSIUM 3.5 05/30/2018    CHLORIDE 110 (H) 05/30/2018    CHLORIDE 112 (H) 05/15/2018    CO2 22 05/30/2018    CO2 22 05/15/2018    BUN 16 05/30/2018    BUN 13 05/15/2018    CR 1.25 11/23/2021    CR 1.25 06/21/2019     (H) 11/23/2021     (H) 05/30/2018     COAGS:   Lab Results   Component Value Date    INR 1.0 11/13/2021     POC:   Lab Results    Component Value Date     (H) 06/08/2018     HEPATIC: No results found for: ALBUMIN, PROTTOTAL, ALT, AST, GGT, ALKPHOS, BILITOTAL, BILIDIRECT, BAO  OTHER:   Lab Results   Component Value Date    A1C 7.3 (H) 05/15/2018    JOY 8.3 (L) 05/30/2018       Anesthesia Plan    ASA Status:  3   NPO Status:  NPO Appropriate    Anesthesia Type: MAC (Will likely convert to GA for wire tunneling ).     - Reason for MAC: straight local not clinically adequate              Consents    Anesthesia Plan(s) and associated risks, benefits, and realistic alternatives discussed. Questions answered and patient/representative(s) expressed understanding.    - Discussed:     - Discussed with:  Patient      - Extended Intubation/Ventilatory Support Discussed: No.      - Patient is DNR/DNI Status: No         Postoperative Care    Pain management: IV analgesics, Oral pain medications, Multi-modal analgesia.   PONV prophylaxis: Ondansetron (or other 5HT-3)     Comments:                Rubin Mckinley MD

## 2021-11-23 NOTE — BRIEF OP NOTE
[facility]    Brief Operative Note    Pre-operative diagnosis: Parkinson disease (H) [G20]  Type 2 diabetes mellitus with complication, with long-term current use of insulin (H) [E11.8, Z79.4]  Post-operative diagnosis Same as pre-operative diagnosis    Procedure: Procedure(s):  Right stealth assisted deep brain stimulator placement, phase I, placement of right side deep brain stimulator electrode, target right subthalamic nucleus, with microelectrode recording and placement of extension and externalization wires for [place] research protocol  Surgeon: Surgeon(s) and Role:     * Wayne Marshall MD - Primary     * Jena Cantu MD - Resident - Assisting  Anesthesia: Other   Estimated Blood Loss: 20 mL    Drains: None  Specimens: * No specimens in log *  Findings:   Impedances within normal limits. Final electrode in [facility] position.  Complications: None.  Implants:   Implant Name Type Inv. Item Serial No.  Lot No. LRB No. Used Action   IMP BUR HOLE COVER GUARDIAN 6010ANS - [id number] Metallic Hardware/[place] IMP BUR HOLE COVER GUARDIAN 6010ANS  [employer] [id number] Right 1 Implanted   KIT DBS LEAD DBS 8CH 40CM 1-3,3-1 SPACE 0.5 BLACK 6172ANS - [id number] Leads KIT DBS LEAD DBS 8CH 40CM 1-3,3-1 SPACE 0.5 BLACK 6172ANS [id number] [employer]  Right 1 Implanted   LEAD EXTENSION W/EXTEND TECHNOLOGY 50CM 6371ANS - [id number] Leads LEAD EXTENSION W/EXTEND TECHNOLOGY 50CM 6371ANS [id number] [employer]  Right 1 Implanted   Extension, 30cm. tr: [place], 30cm    [employer] [id number] Right 1 Implanted       Jena Jorgensen MD  Neurosurgery PGY2    Please contact neurosurgery resident on call with questions.    Dial * * *[phone], enter [phone] when prompted.

## 2021-11-23 NOTE — ANESTHESIA CARE TRANSFER NOTE
Patient: Stefan Odonnell    Procedure: Procedure(s):  Right stealth assisted deep brain stimulator placement, phase I, placement of right side deep brain stimulator electrode, target right subthalamic nucleus, with microelectrode recording and placement of extension and externalization wires for [place] research protocol       Diagnosis: Parkinson disease (H) [G20]  Type 2 diabetes mellitus with complication, with long-term current use of insulin (H) [E11.8, Z79.4]  Diagnosis Additional Information: No value filed.    Anesthesia Type:   MAC     Note:    Oropharynx: oral airway in place and spontaneously breathing  Level of Consciousness: drowsy  Oxygen Supplementation: face mask  Level of Supplemental Oxygen (L/min / FiO2): 6  Independent Airway: airway patency satisfactory and stable  Dentition: dentition unchanged  Vital Signs Stable: post-procedure vital signs reviewed and stable  Report to RN Given: handoff report given  Patient transferred to: PACU    Handoff Report: Identifed the Patient, Identified the Reponsible Provider, Reviewed the pertinent medical history, Discussed the surgical course, Reviewed Intra-OP anesthesia mangement and issues during anesthesia, Set expectations for post-procedure period and Allowed opportunity for questions and acknowledgement of understanding      Vitals:  Vitals Value Taken Time   /72 11/23/21 1702   Temp 36.0 C    Pulse 62 11/23/21 1707   Resp 16    SpO2 98 % 11/23/21 1707   Vitals shown include unvalidated device data.    Electronically Signed By: SAMARA Chakraborty CRNA  November 23, 2021  5:08 PM

## 2021-11-23 NOTE — ANESTHESIA POSTPROCEDURE EVALUATION
Patient: Stefan Odonnell    Procedure: Procedure(s):  Right stealth assisted deep brain stimulator placement, phase I, placement of right side deep brain stimulator electrode, target right subthalamic nucleus, with microelectrode recording and placement of extension and externalization wires for [place] research protocol       Diagnosis:Parkinson disease (H) [G20]  Type 2 diabetes mellitus with complication, with long-term current use of insulin (H) [E11.8, Z79.4]  Diagnosis Additional Information: No value filed.    Anesthesia Type:  MAC    Note:  Disposition: Inpatient   Postop Pain Control: Uneventful            Sign Out: Well controlled pain   PONV: No   Neuro/Psych: Uneventful            Sign Out: Acceptable/Baseline neuro status   Airway/Respiratory: Uneventful            Sign Out: Acceptable/Baseline resp. status   CV/Hemodynamics: Uneventful            Sign Out: Acceptable CV status; No obvious hypovolemia; No obvious fluid overload   Other NRE: NONE   DID A NON-ROUTINE EVENT OCCUR? No    Event details/Postop Comments:  HDS, no N/V, minor to no pain. Was cold, applied warming devices.            Last vitals:  Vitals Value Taken Time   /68 11/23/21 1730   Temp 36.4  C (97.6  F) 11/23/21 1702   Pulse 62 11/23/21 1742   Resp 16 11/23/21 1730   SpO2 100 % 11/23/21 1742   Vitals shown include unvalidated device data.    Electronically Signed By: Dudley Bates MD  November 23, 2021  5:43 PM

## 2021-11-23 NOTE — TELEPHONE ENCOUNTER
Pt's spouse left message saying they were unable to find a pre-op scrub in the [place] area. Writer reached out to Madeline Perez RN and asked for recommendations. I called pt's spouse back and spoke with her. I recommended that she ask Stefan's care team of nurses on [address] if they can supply it from their supply room, and if not, the hospital pharmacy should stock it. If there are more questions, they can ask the hospital pharmacist for a recommendation, for example [employer] or [employer].    Spouse appreciated call and had no further questions.     Gela Ny, RN, MPH  Research Nurse, Movement Disorders

## 2021-11-23 NOTE — ANESTHESIA PROCEDURE NOTES
Airway       Patient location during procedure: OR       Procedure Start/Stop Times: 11/23/2021 2:48 PM  Staff -        CRNA: Ian Pradhan APRN CRNA       Performed By: CRNA  Consent for Airway        Urgency: elective  Indications and Patient Condition       Indications for airway management: susan-procedural         Mask difficulty assessment: 2 - vent by mask + OA or adjuvant +/- NMBA    Final Airway Details       Final airway type: endotracheal airway       Successful airway: ETT - single  Endotracheal Airway Details        ETT size (mm): 8.0       Cuffed: yes       Cuff volume (mL): 7       Successful intubation technique: direct laryngoscopy       DL Blade Type: MAC 4       Grade View of Cords: 1       Adjucts: stylet       Position: Left       Measured from: gums/teeth       Secured at (cm): 24       Bite block used: None    Post intubation assessment        Placement verified by: capnometry, equal breath sounds and chest rise        Number of attempts at approach: 1       Secured with: pink tape       Ease of procedure: easy       Dentition: Intact and Unchanged

## 2021-11-24 VITALS
SYSTOLIC BLOOD PRESSURE: 135 MMHG | WEIGHT: 288.8 LBS | OXYGEN SATURATION: 96 % | HEIGHT: 74 IN | DIASTOLIC BLOOD PRESSURE: 66 MMHG | BODY MASS INDEX: 37.06 KG/M2 | TEMPERATURE: 98 F | HEART RATE: 73 BPM | RESPIRATION RATE: 18 BRPM

## 2021-11-24 LAB
ANION GAP SERPL CALCULATED.3IONS-SCNC: 6 MMOL/L (ref 3–14)
BUN SERPL-MCNC: 13 MG/DL (ref 7–30)
CALCIUM SERPL-MCNC: 8.6 MG/DL (ref 8.5–10.1)
CHLORIDE BLD-SCNC: 109 MMOL/L (ref 94–109)
CO2 SERPL-SCNC: 27 MMOL/L (ref 20–32)
CREAT SERPL-MCNC: 1.21 MG/DL (ref 0.66–1.25)
ERYTHROCYTE [DISTWIDTH] IN BLOOD BY AUTOMATED COUNT: 13.8 % (ref 10–15)
GFR SERPL CREATININE-BSD FRML MDRD: 59 ML/MIN/1.73M2
GLUCOSE BLD-MCNC: 154 MG/DL (ref 70–99)
GLUCOSE BLDC GLUCOMTR-MCNC: 149 MG/DL (ref 70–99)
GLUCOSE BLDC GLUCOMTR-MCNC: 152 MG/DL (ref 70–99)
HCT VFR BLD AUTO: 37.6 % (ref 40–53)
HGB BLD-MCNC: 12.4 G/DL (ref 13.3–17.7)
MAGNESIUM SERPL-MCNC: 2.1 MG/DL (ref 1.6–2.3)
MCH RBC QN AUTO: 30.2 PG (ref 26.5–33)
MCHC RBC AUTO-ENTMCNC: 33 G/DL (ref 31.5–36.5)
MCV RBC AUTO: 92 FL (ref 78–100)
PHOSPHATE SERPL-MCNC: 2.8 MG/DL (ref 2.5–4.5)
PLATELET # BLD AUTO: 165 10E3/UL (ref 150–450)
POTASSIUM BLD-SCNC: 4 MMOL/L (ref 3.4–5.3)
RBC # BLD AUTO: 4.1 10E6/UL (ref 4.4–5.9)
SODIUM SERPL-SCNC: 142 MMOL/L (ref 133–144)
WBC # BLD AUTO: 7.7 10E3/UL (ref 4–11)

## 2021-11-24 PROCEDURE — 83735 ASSAY OF MAGNESIUM: CPT | Performed by: STUDENT IN AN ORGANIZED HEALTH CARE EDUCATION/TRAINING PROGRAM

## 2021-11-24 PROCEDURE — 250N000013 HC RX MED GY IP 250 OP 250 PS 637: Performed by: STUDENT IN AN ORGANIZED HEALTH CARE EDUCATION/TRAINING PROGRAM

## 2021-11-24 PROCEDURE — 84100 ASSAY OF PHOSPHORUS: CPT | Performed by: STUDENT IN AN ORGANIZED HEALTH CARE EDUCATION/TRAINING PROGRAM

## 2021-11-24 PROCEDURE — 85027 COMPLETE CBC AUTOMATED: CPT | Performed by: STUDENT IN AN ORGANIZED HEALTH CARE EDUCATION/TRAINING PROGRAM

## 2021-11-24 PROCEDURE — 80048 BASIC METABOLIC PNL TOTAL CA: CPT | Performed by: STUDENT IN AN ORGANIZED HEALTH CARE EDUCATION/TRAINING PROGRAM

## 2021-11-24 PROCEDURE — 250N000012 HC RX MED GY IP 250 OP 636 PS 637: Performed by: STUDENT IN AN ORGANIZED HEALTH CARE EDUCATION/TRAINING PROGRAM

## 2021-11-24 PROCEDURE — 250N000013 HC RX MED GY IP 250 OP 250 PS 637: Performed by: NEUROLOGICAL SURGERY

## 2021-11-24 PROCEDURE — 258N000003 HC RX IP 258 OP 636: Performed by: STUDENT IN AN ORGANIZED HEALTH CARE EDUCATION/TRAINING PROGRAM

## 2021-11-24 PROCEDURE — 36415 COLL VENOUS BLD VENIPUNCTURE: CPT | Performed by: STUDENT IN AN ORGANIZED HEALTH CARE EDUCATION/TRAINING PROGRAM

## 2021-11-24 PROCEDURE — 250N000011 HC RX IP 250 OP 636: Performed by: STUDENT IN AN ORGANIZED HEALTH CARE EDUCATION/TRAINING PROGRAM

## 2021-11-24 RX ORDER — AMOXICILLIN 250 MG
1 CAPSULE ORAL 2 TIMES DAILY
Qty: 60 TABLET | Refills: 0 | Status: SHIPPED | OUTPATIENT
Start: 2021-11-24 | End: 2023-03-27

## 2021-11-24 RX ORDER — ASPIRIN 81 MG/1
81 TABLET, CHEWABLE ORAL DAILY
Status: ON HOLD | COMMUNITY
Start: 2021-11-24 | End: 2021-12-03

## 2021-11-24 RX ORDER — CEPHALEXIN 500 MG/1
500 CAPSULE ORAL 2 TIMES DAILY
Qty: 28 CAPSULE | Refills: 0 | Status: SHIPPED | OUTPATIENT
Start: 2021-11-24 | End: 2022-08-19

## 2021-11-24 RX ORDER — OXYCODONE HYDROCHLORIDE 5 MG/1
5 TABLET ORAL EVERY 4 HOURS PRN
Qty: 10 TABLET | Refills: 0 | Status: SHIPPED | OUTPATIENT
Start: 2021-11-24 | End: 2022-08-19

## 2021-11-24 RX ORDER — POLYETHYLENE GLYCOL 3350 17 G/17G
17 POWDER, FOR SOLUTION ORAL DAILY PRN
Qty: 510 G | Refills: 0 | Status: SHIPPED | OUTPATIENT
Start: 2021-11-24 | End: 2022-08-19

## 2021-11-24 RX ORDER — ACETAMINOPHEN 325 MG/1
650 TABLET ORAL EVERY 4 HOURS PRN
Qty: 60 TABLET | Refills: 0 | Status: SHIPPED | OUTPATIENT
Start: 2021-11-26 | End: 2023-03-27

## 2021-11-24 RX ADMIN — ACETAMINOPHEN 975 MG: 325 TABLET, FILM COATED ORAL at 05:01

## 2021-11-24 RX ADMIN — SODIUM CHLORIDE: 9 INJECTION, SOLUTION INTRAVENOUS at 03:02

## 2021-11-24 RX ADMIN — INSULIN ASPART 1 UNITS: 100 INJECTION, SOLUTION INTRAVENOUS; SUBCUTANEOUS at 08:36

## 2021-11-24 RX ADMIN — CEFAZOLIN 1 G: 1 INJECTION, POWDER, FOR SOLUTION INTRAMUSCULAR; INTRAVENOUS at 05:01

## 2021-11-24 RX ADMIN — CEFAZOLIN 1 G: 1 INJECTION, POWDER, FOR SOLUTION INTRAMUSCULAR; INTRAVENOUS at 09:32

## 2021-11-24 RX ADMIN — FINASTERIDE 5 MG: 5 TABLET, FILM COATED ORAL at 08:35

## 2021-11-24 RX ADMIN — AMLODIPINE BESYLATE 5 MG: 5 TABLET ORAL at 08:36

## 2021-11-24 RX ADMIN — Medication 400 MG: at 08:35

## 2021-11-24 ASSESSMENT — ACTIVITIES OF DAILY LIVING (ADL)
ADLS_ACUITY_SCORE: 8

## 2021-11-24 NOTE — PLAN OF CARE
Status: s/p right sided DBS this AM  Vitals: VSS   Neuros: AOx4. BUE tremors, strengths 5/5 throughout. Denies N/T. L eye drooping.  IV: PIV removed prior to discharge without difficulty.  Resp/trach: LS clear, denies SOB.  Diet: Regular diet  Bowel status: LBM 11/23 per pt, BS+  : voiding spontaneously   Skin: Surgical dressings in place and no drainage noted   Pain: denies  Activity: A1 GB  Social: wife here to give a ride home.  Plan: Pt taken to lobby via wheelchair with RN. Meds pickled up in pharmacy, pt and wife report understanding of follow up, Pt reports having all belongings and stimulator controller.

## 2021-11-24 NOTE — PLAN OF CARE
Arrived from: PACU  Belongings/meds: Remain with patient    2 RN Skin Assessment Completed by: Sherri DUDLEY and Kary HERNANDEZ   Non-intact findings documented (yes/no/NA): R head incisions NIKHIL. R chest incision covered with liquid bandage. Abdominal incision covered, CDI. Blanchable redness behind both ears, back, buttocks, and heels.    Status: s/p right sided DBS this AM  Vitals: VSS on 1L via NC. Capno in place with continuous pulse ox.  Neuros: AOx4. BUE tremors, strengths 5/5 throughout. Denies N/T. L eye drooping.  IV: PIV infusing NS at 125ml/hr  Labs/Electrolytes: Mag 2.0 replaced this shift, K+ 4.1 and phos 3.3  Resp/trach: LS clear, denies SOB.  Diet: Regular diet, had water and ice cream so far  Bowel status: LBM 11/23 per pt, BS+  : voiding spontaneously   Skin: see above  Pain: denies  Activity: A1 GB  Social: wife at bedside this evening, supportive.  Plan: IV abx, discharge home tomorrow.

## 2021-11-24 NOTE — PLAN OF CARE
VSS on RA.  Denied pain; took scheduled Tylenol to avoid pain.  A&O x 4.  Neuros intact except L eye drooping and BUE tremors (LUE worse than RUE).  R top of head and R lateral head incisions intact with sutures, no drainage, NIKHIL.  Pin sites x 2 covered; L [place] c/d/i, R [place] marked on different shift with no further extension.  MIVF's running 125ml/hr via R PIV btwn abx.  On a regular diet; no nausea.  Voiding spontaneously.  Last BM yesterday prior to surgery.  Up with SBA and GB; ambulated halls x 2.  Plan is for discharge home today with wife.  Continue with POC.

## 2021-11-24 NOTE — DISCHARGE SUMMARY
[facility] Discharge Summary and Instructions    Stefan Odonnell MRN# [id number]   Age: 71 year old YOB: 1950     Date of Admission:  11/23/2021  Date of Discharge::  11/24/2021 10:51 AM  Admitting Physician:  Wayne Marshall MD  Discharge Physician:  Wayne Marshall MD          Admission Diagnoses:   Parkinson disease (H) [G20]  Type 2 diabetes mellitus with complication, with long-term current use of insulin (H) [E11.8, Z79.4]          Discharge Diagnosis:     Same as above  .         Procedures:   Right stealth assisted deep brain stimulator placement, phase I, placement of right side deep brain stimulator electrode, target right subthalamic nucleus, with microelectrode recording and placement of extension and externalization wires for [place] research protocol           Brief History of Illness:   Mr. Stefan Odonnell is a 70 year old right handed male who is seen in video consultation/clinic visit for consideration of DBS implantation for his second side, right side implantation for control of his left body symptoms.  He is s/p left side deep brain stimulator placement, phase I, placement of left side deep brain stimulator electrode, target left subthalamic nucleus, with microelectrode recording on 5/29/2018 and s/p deep brain stimulator placement, phase II, placement of deep brain stimulator generator/battery over the left chest wall on 6/8/2018.  He tolerated both of the procedures well and there were no complications.  He recovered well from the surgeries.  His incisions all healed well with no issues.     He has been followed by Dr. Sloan and neurology/DBS clinical core.  It appears that he has good symptom control on his right side with the implanted left sided DBS.  As for his left side, patient has noticed that the tremor is worsening.  He feels that the left hand is not doing the things he wanted it to do.  Therefore, he is being considered for the second side, right  side, implantation for control of his left side symptoms.     On the left side, he has an Abbott system implanted, with Infinity 5 generator/battery implanted at the left chest.  According to Dr. 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  He was worked up with x-ray of the head, neck and chest and there was no obvious disconnect or cause.  Per Dr. Sloan, this short circuit is not clinically consequential in terms of his stimulation parameters.     Patient is familiar with the DBS implantation surgeries.  However, we did discuss it again.  We discussed both phase I and II of DBS surgery.  We discussed that during phase I, we would place the DBS electrode on the right side under MAC and microelectrode recording.  He would then return one week later for the phase II with placement of the DBS generator/battery over the right chest wall under general anesthesia.     Patient has elected to undergo above-mentioned procedure.           Hospital Course:   Patient underwent above-mentioned procedure on 11/23/2021. The operation was uncomplicated and he/she was admitted to the surgical ICU for routine post operative cares. On post operative day 1, he/she was ambulating, voiding without a avina, eating a regular diet, pain was well controlled and therefore he/she was discharged    Exam upon discharge:  No acute deficits          Discharge Medications:     Current Discharge Medication List      START taking these medications    Details   acetaminophen (TYLENOL) 325 MG tablet Take 2 tablets (650 mg) by mouth every 4 hours as needed for other (For optimal non-opioid multimodal pain management to improve pain control.)  Qty: 60 tablet, Refills: 0    Associated Diagnoses: S/P deep brain stimulator placement      cephALEXin (KEFLEX) 500 MG capsule Take 1 capsule (500 mg) by mouth 2 times daily  Qty: 28 capsule, Refills: 0    Associated Diagnoses: S/P deep brain stimulator placement      oxyCODONE (ROXICODONE)  5 MG tablet Take 1 tablet (5 mg) by mouth every 4 hours as needed  Qty: 10 tablet, Refills: 0    Associated Diagnoses: S/P deep brain stimulator placement      polyethylene glycol (MIRALAX) 17 GM/Dose powder Take 17 g by mouth daily as needed Hold for loose stools.  Qty: 510 g, Refills: 0    Associated Diagnoses: S/P deep brain stimulator placement      senna-docusate (SENOKOT-S/PERICOLACE) 8.6-50 MG tablet Take 1 tablet by mouth 2 times daily Take to prevent constipation. Hold for loose stools  Qty: 60 tablet, Refills: 0    Associated Diagnoses: S/P deep brain stimulator placement         CONTINUE these medications which have CHANGED    Details   aspirin (ASA) 81 MG chewable tablet Take 1 tablet (81 mg) by mouth daily    Comments: Hold through upcoming surgery         CONTINUE these medications which have NOT CHANGED    Details   allopurinol (ZYLOPRIM) 300 MG tablet TAKE 1 TABLET BY MOUTH DAILY IN THE EVENING      amLODIPine (NORVASC) 5 MG tablet Take 5 mg by mouth every morning       augmented betamethasone dipropionate (DIPROLENE-AF) 0.05 % external ointment Apply topically 2 times daily      blood glucose monitoring ([employer] CONTOUR NEXT MONITOR) meter device kit       blood glucose monitoring ([employer] CONTOUR NEXT) test strip USE TO TEST BLOOD SUGARS 3 TIMES DAILY      blood glucose monitoring (VITA MICROLET) lancets Test Blood Sugar 3 Times Daily.  Dx-E11.8      carbidopa-levodopa (SINEMET)  MG tablet Take 2 tablets twice daily at 4am and 11am  Qty: 360 tablet, Refills: 3    Associated Diagnoses: Parkinsonian features      cholecalciferol 50 MCG (2000 UT) CAPS Take 4,000 Units by mouth      diphenoxylate-atropine (LOMOTIL) 2.5-0.025 MG per tablet Take 2 tablets by mouth 4 times daily as needed       doxepin (SINEQUAN) 10 MG capsule Take 10 mg by mouth At Bedtime       finasteride (PROSCAR) 5 MG tablet Take 5 mg by mouth every morning      furosemide (LASIX) 20 MG tablet Take 20 mg by mouth as needed        glipiZIDE (GLUCOTROL) 10 MG tablet Take 10 mg by mouth 2 times daily      insulin glargine (LANTUS) 100 UNIT/ML injection Inject Subcutaneous 2 times daily Inject 26 units qpm, 31 units qam      !! insulin pen needle (EASY TOUCH PEN NEEDLES) 32G X 5 MM USE WITH INSULIN THREE TIMES DAILY      !! insulin pen needle 32G X 4 MM 1 each      liraglutide (VICTOZA PEN) 18 MG/3ML soln INJECT 1.8 MG UNDER THE SKIN ONE TIME A DAY      Misc Natural Products (TART CHERRY ADVANCED) CAPS Take 1 capsule by mouth 2 times daily       pregabalin (LYRICA) 50 MG capsule Take 1 capsule (50 mg) by mouth daily as needed (for burning foot pain)  Qty: 90 capsule, Refills: 3    Associated Diagnoses: Neuropathic pain      simvastatin (ZOCOR) 40 MG tablet Take 40 mg by mouth At Bedtime       carbidopa-levodopa (SINEMET CR)  MG CR tablet Take half a tab at 3pm.  Increase to 1 tab after 1 week.  Qty: 90 tablet, Refills: 3    Associated Diagnoses: Parkinson disease (H)       !! - Potential duplicate medications found. Please discuss with provider.                  Discharge Instructions and Follow-Up:     Discharge diet: Regular   Discharge activity: You may advance activity as tolerated. No strenuous exercise or heay lifting greater than 10 lbs for 4 weeks or until seen and cleared in clinic.   Discharge follow-up:     Follow-up Dr. Wayne Marshall MD for surgery on 12/3, all additional follow-up visits to be determined by Dr. Wayne Marshall MD       Wound care: Ok to shower,however no scrubbing of the wound and no soaking of the wound, meaning no bathtubs or swimming pools. Pat dry only. Leave wound open to air.  Patient to have wound checked 2 weeks following surgery.    Wound location: head, abdomen  Closure technique: abdominal wound covered with ioban; cranial wounds with exofin  Dressing needs: keep dressing on abdominal incision  Post-op care at follow-up: Keep dry and clean     Please call if you have:  1. increased  pain, redness, drainage, swelling at your incision  2. fevers > 101.5 F degrees  3. with any questions or concerns.  You may reach the Neurosurgery clinic at [phone] during regular work hours. ER at [phone].    and ask for the Neurosurgery Resident on call at [phone], during off hours or weekends.         Discharge Disposition:     Discharged to home        Chinyere Altamirano PA-C  11/24/2021 9:12 AM   Neurosurgery  [phone]

## 2021-11-29 NOTE — OP NOTE
PATIENT NAME: FIDE CABRERA  YOB: 1950  MRN:   [id number]  ACCOUNT:  [id number]      DATE OF PROCEDURE:  11/23/2021    PREOPERATIVE DIAGNOSIS:    1.  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  Essential tremor  3.  Parkinsonism  4.  Status post left side deep brain stimulator placement, phase I, placement of left side deep brain stimulator electrode, target left subthalamic nucleus, with microelectrode recording, on 5/29/2018  5.  Status post deep brain stimulator placement, phase II, placement of deep brain stimulator generator/battery over the left chest wall on 6/8/2018    POSTOPERATIVE DIAGNOSIS:    1.  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  Essential tremor  3.  Parkinsonism  4.  Status post left side deep brain stimulator placement, phase I, placement of left side deep brain stimulator electrode, target left subthalamic nucleus, with microelectrode recording, on 5/29/2018  5.  Status post deep brain stimulator placement, phase II, placement of deep brain stimulator generator/battery over the left chest wall on 6/8/2018    PROCEDURES PERFORMED:   1.  Placement of CRW stereotactic headframe  2.  Stereotactic neuronavigation planning and CT of head  3.  Stereotactic neuronavigation using the [employer] system for surgical planning, targeting and approach.  The target is right subthalamic nucleus (STN)  4.  Right side deep brain stimulator placement, phase I, placement of right side deep brain stimulator electrode, target is right subthalamic nucleus (STN)  5.  Use of intraoperative microelectrode recording  6.  Use of intraoperative fluoroscopy  7.  Placement of one extension wire and connection of one extension wire to the right side deep brain stimulator electrode, tunneled to the right chest wall.  8.  Placement of one externalization wire and connection to the right side extension wire, exiting at the right side abdomen.    ATTENDING SURGEON:  Wayne ESPINO  MD Joanna, PhD     RESIDENT SURGEON:  Jena Jorgensen MD    ANESTHESIA:    1.  Monitored anesthesia care and local anesthetic for the right side electrode placement.  2.  General anesthesia for the placement of extension wire and externalization wire.    ESTIMATED BLOOD LOSS:    1.  10 mL.   2.  10 mL.    COMPLICATIONS:  None.    FINDINGS:  Final electrode location, center Felix Gun position, 2.0 mm below target.    IMPLANTS:    1.  Abbott DBS electrode - REF 6172, S/N [id number]  2.  Abbott Guardian - REF 6010, Lot# [id number]  3.  Abbott extension wire - for the right side, connected to the intracranial electrode - REF 6371, S/N [id number]  5.  Abbott extension wire - externalized, for the right side - REF 3383, Lot# [id number]    INDICATIONS FOR PROCEDURE:  Mr. Stefan Odonnell is a 71 year old right handed male who presents for DBS implantation for his second side, right side implantation, for control of his left body symptoms.  He is s/p left side deep brain stimulator placement, phase I, placement of left side deep brain stimulator electrode, target left subthalamic nucleus, with microelectrode recording on 5/29/2018 and s/p deep brain stimulator placement, phase II, placement of deep brain stimulator generator/battery over the left chest wall on 6/8/2018.  He tolerated both of the procedures well and there were no complications.  He recovered well from the surgeries.  His incisions all healed well with no issues.  He has been followed by Dr. Sloan and neurology/DBS clinical core.  It appears that he has good symptom control on his right side with the implanted left sided DBS.  As for his left side, patient has noticed that the tremor is worsening.  He feels that the left hand is not doing the things he wanted it to do.  Therefore, he is being considered for the second side, right side, implantation for control of his left side symptoms.  On the left side, he has an Abbott system implanted, with Infinity 5  generator/battery implanted at the left chest.  According to Dr. 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  He was worked up with x-ray of the head, neck and chest and there was no obvious disconnect or cause.  Per Dr. Sloan, this short circuit is not clinically consequential in terms of his stimulation parameters.  Patient is familiar with the DBS implantation surgeries.  However, we did discuss it again.  We discussed both phase I and II of DBS surgery.  We discussed that during phase I, we would place the DBS electrode on the right side under MAC and microelectrode recording.  He would then return one week later for the phase II with placement of the DBS generator/battery over the right chest wall under general anesthesia.  We also discussed the options for the device but since he has an Abbott system for his left side, the most reasonable option would be Abbott system for his right side.  Risks, benefits, alternative therapies were discussed with the patient, including but not limited to infection and bleeding (intracranial), injury to the brain, stroke, death, hardware failure and possible need for more surgeries. Surgical procedure was discussed in detail.  All questions were answered, and he expressed understanding.  His case was discussed at the Movement Disorder Consensus Group Meeting and he was considered to be a good candidate for the second side implantation, target right subthalamic nucleus, with Abbott system.  We waited for his diabetes to be better controlled with his HgA1C falling below 8.0 before scheduling the case.  He also expressed his interested in DBS research, more specifically [place] externalization protocol.  He has also agreed to and gave consent to be part of the research protocol  We discussed both phase I and II of DBS surgery.  We discussed that during phase I, we would place the DBS electrode on the right side under MAC and microelectrode recording.  He is  also part of a clinical research with [facility] so he will undergo a modified phase I where extension wire will be placed to the right chest wall and additional extension wire will be placed exiting out of his right side abdomen for externalized recording.  He would then return one week later for the phase II with placement of the DBS generator/battery over the right chest wall.  Risks, benefits, alternative therapies were discussed with the patient, including but not limited to infection and bleeding.  Surgical procedure was discussed in detail.  All questions were answered, and he expressed understanding.    DESCRIPTION OF PROCEDURE:      CRW head frame placement and stereotactic neuronavigation.      Informed consent was obtained.  The patient was brought to the operating room and kept on the gurney.  He remained in a sitting position.  He was identified and a brief timeout was performed for the placement of the CRW head frame.  He was given sedation to make him comfortable.  The intended pin sites, two in the front and two in the back of the head, were cleaned and were injected with local anesthetic, 1% lidocaine with epinephrine.  A total of 26 mL were used during this portion of the surgical case.  The CRW stereotactic head frame was placed onto his head with 4 pins for fixation.  Care was taken so that the fiducial box wound fit over the head and the frame.  Once the head frame was on, the fiducial box was easily placed without interference from his forehead.  We also placed a avina while he was sedated.  The patient tolerated the procedure well and his sedation was lightened and he was awakened.      After the CRW stereotactic head frame was placed, he was taken directly to the CT scanner, at which time a CT scan of the head stereotactic protocol was obtained.  Subsequently, the patient was taken back up to the operating room where he was placed supine on the operative bed with the CRW head  frame affixed to the bed as well.  Patient was in a slight sitting up position with the neck in a neutral position and he was made comfortable.  The bed was positioned so that it would be a beach chair reclining position.  All pressure points were well padded.  Care was taken to make sure that the neck was in neutral position and that the Manley head real device had room for manipulation in case more flexion or extension is needed throughout the case.     At this time, attention was turned to the neuro navigation imaging that was obtained.  The Stealth neuronavigation device was loaded with the CT head that had just been obtained.  The device also had an MRI of the head, stereotactic protocol, that was obtained prior to his first surgery.  Another CT head stereotactic protocol which was recently obtained was also available.  CTs of the head was merged with the previously obtained MRI of the brain.  The merge demonstrated good coherence/registration.  Then, using this merged image, neuronavigation was used to stereotactically target the right subthalamic nucleus.  The technique used was the AC-PC line localization technique to target the STN using stereotactic coordinates.  We utilized the T1, T2 and SWI sequence of the MRI to identify the STN and target it.  We also had 7 Alisa MRI available as a reference to confirm our targeting and adjusted the target and trajectory using the neuronavigation.  The X, Y and Z as well as the ring and arc angles for the CRW head frame were obtained for the right side.  The entry into the skull, as well as the trajectory of the electrode were checked with a probe's eye view to avoid any sulci, ventricle or vascular structures.     The stereotactic coordinates for the right side was then transferred to the [facility] stereotactic targeting apparatus as well as the phantom base.  The coordinates were confirmed and double checked for accuracy.  Accuracy was also confirmed using phantom  base.  The coordinates were X = +13.7, Y = -7.0, Z =+9.7, ring angle = 76.9 degrees anterior, and arc angle = 14.2 degrees to the right.     At this time, attention was turned back to the patient.  Using a hair clipper, his hair over the right frontal area was clipped.  A semicircular C-shaped incision was planned on the right side and this was marked.  Then, the surgical area was prepped and draped in routine surgical fashion.  We also prepped the area posterior to this as well as area towards the right parietal area.  The patient was also made comfortable.     Timeout was then performed confirming the patient's identity, procedure to be performed, side and site of surgery identified, imaging corresponding with the patient and administration of preoperative prophylactic antibiotic.    Right-sided electrode placement with microelectrode recording: Target right STN.     The CRW targeting apparatus was attached to the head frame on top of the sterile drapes.  The entry point was marked on the scalp on the right side.  A C-shaped incision was made with a skin knife, after the area was injected with local anesthetic, 1% Lidocaine with epinephrine and 1/4% Marcaine plain, 50:50 mix.  The area posterior to the incision was also injected as a pocket would be created.  Area posterior lateral, towards the right parietal area was also injected as the electrode connector will be tunneled here later.  Total of 21 mL of the above mentioned local anesthetic was used.  The incision was then further carried down to the pericranium.  Hemostasis was obtained using monopolar and bipolar cautery.  Thin layer of pericranial tissue was left behind on the scalp and the skin flap was reflected posteriorly.  Then, using a blunt dissection technique, a pocket was created posteriorly as well as a track that was made towards the right parietal area for later use.    At this time, the targeting apparatus again positioned and the entry point to  the skull was marked.  Area of the intended luis m hole was cleaned and pericranial tissue removed.  Then using an acorn drill, bur hole was created over this entry point to expose the dura.  The area was vigorously irrigated and bone wax used to control the bone bleeding.  Dura was coagulated with bipolar cautery for hemostasis, and again no active bleeding was noted.     At this time, the electrode fixation cover was fixed to the skull using two screws.  Care was taken to overlap the pericranium over the edge of the cover to provide a smooth tissue transition.  Then, the electrode drive was attached to the targeting apparatus.  The entry into the dura was again checked.  It appeared that all positions of the Felix Gun electrode real were accessible without any interference from the bone edge.  Then using a Bovie cautery and a #4 Penfield instrument, opening was made into the dura, as well as a small corticectomy.  No active bleeding was noted.    At this time, his left side DBS system was placed in surgery mode.    Dr. Stanford Sloan, Dr. aClvin Lind and Ms. Irene Andujar of the Neurology Department participated in the recording and neurological testing.  During the microelectrode recording and testing, Gary Sloan and Diogo as well as Ms. Andujar took detailed notes of the electrophysiologic data and neurologic exam as well as any stimulation effects.  Patient also consented to be part of the [place] research project.  Therefore, intraoperative recording was performed.    At this time, the electrode guide tubes were inserted in the anterior, center and lateral Felix Gun positions and they were advanced slowly until they were fully inserted at which point the tips would be well above the target.  No abnormal resistance was noted.  Duraseal was used to seal the entry site to provide a seal and to minimize cerebrospinal fluid leak and air entry.  Then, recording microelectrodes were brought in and placed within the guide tubes  and they were connected for intraoperative recording.  The tips of the electrode were now 15 mm above target.  The patient was awakened.  Then, using a micro drive, the electrodes were slowly advanced towards the target, collecting microelectrode recording data for mapping the track.  The anterior track yielded approximately 2.72 mm of STN with somatosensory response activity in the elbow.  No side effects were noted with microstimulation.  The center track yielded approximately 2.64 mm of STN with somatosensory response activity in the wrist and tremor related activity.  No side effects were noted with microstimulation.  The lateral track yielded approximately 5.36 mm of STN with somatosensory response activity in the elbow.  No side effects were noted with microstimulation.      As per [facility] protocol, during the microelectrode recording, when the cells were isolated, patient's electrophysiological data was collected while the patient performed various tasks on the experimental apparatus.    Based on the mapping data, it was decided that the current anterior Felix Gun position may be the best placement.  The electrode drive was positioned to the depth of 1.6 mm below the target level.  Then, the micorelectrodes were pulled out of the guide tubes.  The permanent DBS electrode was brought into the field.  It was first tested in normal saline and all impedance values were noted to be within normal.  We then placed the permanent DBS electrode in the anterior guide tube with the tip being placed at 1.6 mm below the target depth.  The electrode demonstrated good impedance values.  The electrode was tested.  Test stimulation yielded minimal tremor reduction and paresthesias only at 190 Hz, 60 microseconds and 5 mA.  Based on this, it was thought that the center Felix Gun position may be better than the anterior Felix Gun position.    The permanent DBS electrode was pulled out of the anterior Felix Gun position.  The electrode  drive was positioned to the depth of 2.0 mm below the target level.  The permanent DBS electrode was placed in the center guide tube with the tip being placed at 2.0 mm below the target depth.  The electrode demonstrated good impedance values.  The electrode was tested.  Test stimulation yielded greater tremor reduction and transient paresthesias starting at 1.5 mA (1-3+).  Based on our electrophysiology data and the stimulation thresholds, we decided that the current position was satisfactory for placement of the stimulating electrode.    Per [place] protocol, electrophysiological data was further collected from the permanent DBS electrode.    With the satisfactory testing of the electrode position and the stimulation parameters, the electrode was secured at this location.  The patient was again made comfortable.  The guide tubes were pulled out of the brain.  Duraseal was again used to seal any opening.  The area was generously irrigated.  Hemostasis was also obtained.  Fluoroscopy was brought into the field to test that the electrode did not move with each manipulation.  The electrode tip was seen to be above the target, as expected.  The electrode clamp was applied to hold the electrode.  Then, the electrode stylet was removed. The electrode was then removed from the electrode real.  The cap cover was finally placed and secured in place.  Fluoroscopy confirmed that the tip of the electrode did not change in position with each manipulation.  The directional marker, on fluoroscopy, also demonstrated that the contact 2A is facing anteriorly.    The connecting portion of the electrode was covered with a protective covering/dead-end connector, and this apparatus was inserted into the subgaleal space/pocket towards the right parietal area that was created at the beginning of the case.  The excess wire was also buried posteriorly in the previously created pocket.  Fluoroscopy confirmed that the tip did not move.  The  wound was then generously irrigated.    We began closing the wound.  The galeal layer was reapproximated using 3-0 Vicryl sutures in an inverted interrupted fashion and the skin was reapproximated using 4-0 Monocryl suture in a running fashion.  The wound was cleaned and dried and prepped with ChoraPrep.  Then, Exofin skin glue was applied.    Removal of the head frame and end of procedure    First, the stereotactic targeting apparatus was removed.  Then, the sterile drapes were removed.  Subsequently, the patient was taken out of the CRW head frame.  The four pin sites were clean and dry and no active bleeding was noted.  Antibiotic ointment was applied to the pin sites.    Placement of extension wire and connection to the right side deep brain stimulator electrode, tunneled to the right chest wall.    At this time, per [place] protocol, we began our modified phase I for the placement of the extension wire.  Patient was positioned supine on the operating room table.  All pressure points were properly padded.  With the placement of endotracheal tube, general endotracheal anesthesia was induced.  His head was turned to the left side, thus exposing the right parietal area of the head.  The previously implanted connector portion of the stimulating electrode from the right side could be palpated on the right side of the head.  Small area of the hair was removed over this palpable connector using a surgical hair clipper.  Hair was also removed from his chest and his abdomen.  Then, the right side of the head, neck and the chest area, to include the lower chest and right abdomen area as well, was prepped and draped in the normal sterile fashion.  Local anesthetic was injected in the areas of intended incision at the right scalp and right chest wall as well as along the path of the intended tunneling.  Total of 12 mL of 1% Lidocaine with epinephrine mixed with 1/4% Marcaine plain, 50:50 mix, were used.  A timeout was  performed confirming the patient's identity, procedure to be performed, site and side of the surgery and the administration of preoperative prophylactic antibiotic.    Attention was first turned to the head.  A #10 blade was used to make a 2 cm incision at the distal end of the connector that was palpated in his scalp.  Hemostasis was obtained with bipolar cautery.  The incision was carried down to the pericranium and the layer of the implanted electrode connector.  The buried connector protective cover tip was visible.  The mosquito was used to hold the protective cover and we gently pulled out the connector.  This was found to be fully intact.  At that point, a pocket was made using a Gela clamp down toward behind the ear into the nuchal line.  This was in preparation for the tunneling of the extension wire.     At that point, attention was turned to the right chest area.  A #10 blade was used to make a 6 cm incision on the right chest wall.  The #10 blade scalpel was used to finish dissection down to the proper plane of less than 1 cm below the skin.  We found a nice easily dissecting plane superficial to the pectoralis muscle.  Then blunt dissection technique with fingers and mosquito was used to establish a subcutaneous pocket about 3 fingerbreadths wide and deep enough to encompass the full length of the fingers.  Hemostasis was obtained.  The pocket was generously irrigated and sponges were placed within the pocket.     At this point, a tunneler with a plastic sheath was brought onto the field.  A subcutaneous passage was tunneled from the head incision to the chest wall incision, careful not to be too superficial to the skin, but within the correct plane and taking a course over the clavicle.  The tunneler was removed, thus leaving behind a plastic sheath.  The right side extension wire was placed into the sheath from the head incision to the chest incision.  Then, the plastic sheath was pulled from the  chest wound, leaving behind the tunneled extension wire.      We then proceeded to make the connection between the intracranial lead and the extension wire.  The protective cover on the connector of the intracranial electrode was removed.  Then, the connector was inserted into the extension wire and secured using a fastening screws.  The extension wire was gently pulled down at the chest wall area in order to seat the extension wire connections correctly in the scalp pocket and not directly under the incision.  It was placed slightly superior to the incision.    Placement of one externalization wire and connection to the extension wire, exiting at the right abdominal wall.    The intended wire exit site was previously injected with the above named local anesthetic.  Small stab incision was also made with a #15 surgical blade in the right abdomen.  Then, the tunneler was again brought in and a subcutaneous passage was tunneled from the right chest pocket incision to the right abdomen area, exiting out of the skin at the stab incision.  The tunneler was removed, leaving behind a plastic sheath.  At this time extension wire for externalization was brought in and placed into the sheath from the chest incision to the abdomen exit site.  Then, the plastic sheath was pulled from the abdomen exit site, leaving behind the externalization wire.  We then proceeded to make the connection between the right side extension wire and the externalization wire.  The connector of the extension wire was inserted and secured to the externalization wire connector.  There was also a protective sheath which was clipped in and secured with the 3-0 Vicryl suture as a tie.  Then, the externalization wire was gently pulled at the right abdomen exit site until the connection was buried within the right chest pocket.    The externalization wire for the right side was then connected to a testing cable and tested.  All the impedance values were  noted to be within normal.  No problems were found.    The sponges were removed from the pocket and the pocket was inspected for hemostasis.  The excess wire was placed in the pocket.     We began closing the wound.  The scalp incision was closed in the following manner.  A thin layer of tissue was reapproximated using 3-0 Vicryl sutures in an inverted interrupted fashion to cover the hardware and to reapproximate the galeal layer.  The skin was reapproximated using 4-0 Monocryl suture in a running fashion.  The right chest wound was closed in the following manner.  The pocket capsule layer was reapproximated with 3-0 Vicryl sutures in an inverted interrupted fashion.  Subcutaneous tissue was reapproximated with 3-0 Vicryl sutures in an inverted interrupted fashion and the dermal layer was reapproximated with 3-0 Vicryl sutures in an inverted interrupted fashion.  The skin was reapproximated with 4-0 Monocryl suture in a subcuticular fashion.     Both incisions were cleaned with a wet and dry and reprepped with ChloraPrep.  Exofin skin glue was then placed on both incisions.     The right abdomen wire exit site was prepared.  The exit site was cleaned with wet and dry and reprepped with ChloraPrep.  Using a 4-0 Monocryl suture, the exit site was closed and the exiting wire anchored.  Then, Biopatch was placed.  Then, two Primapore dressings were placed over them to protect the wire, one being cut with a split Y cut.  Then, sponge was laid on top for cushion and the testing cable head was placed on top.  Telfa was also placed on top of the wire connector.  Then, a large Ioban dressing was placed on top for dressing to cover the exit site as well as generous area over the abdomen.    Patient was extubated and taken to the recovery room in a stable condition.  At the end of the case, all counts including needle, sponge and instrument counts were correct x 2.  There were no complications.    Wayne OTOOLE M.D.,  Ph.D., Neurosurgery Attending, was present and scrubbed for the entire case and performed the key and critical portions of the case.      MAITE LADD MD, PHD

## 2021-11-30 ENCOUNTER — TELEPHONE (OUTPATIENT)
Dept: NEUROLOGY | Facility: CLINIC | Age: 71
End: 2021-11-30
Payer: COMMERCIAL

## 2021-11-30 NOTE — TELEPHONE ENCOUNTER
"Called and spoke with patient's spouse, Blanca. Blanca said pt is \"doing amazing\". Patient has not needed any pain medication besides Tylenol. Pt was a little more tired than last surgery but has been able to rest and have periods where he is up and moving around.  The bandage on patient's abdomen has stayed completely sealed.     I reminded patient to follow instructions for withholding Sinemet tonight and that he will meet a member of the research team tomorrow at 7am at the [facility]. Patient's spouse plans to visit patient in the research unit.     Patient's spouse was given pre-surgical soap in the hospital before patient was discharged for Phase I last week.    Patient, spouse, and son will be leaving shortly to drive down to [facility] to stay the night prior to pt's stay in the Clinical Research Unit tomorrow.    No further questions.    Gela Ny, RN, MPH  Research Nurse, Movement Disorders    "

## 2021-12-01 ENCOUNTER — HOSPITAL ENCOUNTER (OUTPATIENT)
Dept: RESEARCH | Facility: CLINIC | Age: 71
End: 2021-12-01
Attending: ALLERGY & IMMUNOLOGY
Payer: COMMERCIAL

## 2021-12-01 DIAGNOSIS — Z11.59 ENCOUNTER FOR SCREENING FOR OTHER VIRAL DISEASES: ICD-10-CM

## 2021-12-01 PROCEDURE — 510N000016 HC RESEARCH MEALS, PER MEAL

## 2021-12-01 PROCEDURE — 510N000009 HC RESEARCH FACILITY, PER 15 MIN

## 2021-12-01 PROCEDURE — 510N000017 HC CRU PATIENT CARE, PER 15 MIN

## 2021-12-02 ENCOUNTER — ANESTHESIA EVENT (OUTPATIENT)
Dept: SURGERY | Facility: CLINIC | Age: 71
End: 2021-12-02
Payer: COMMERCIAL

## 2021-12-02 NOTE — ANESTHESIA PREPROCEDURE EVALUATION
Anesthesia Pre-Procedure Evaluation    Patient: Stefan Odonnell   MRN: [id number] : 1950        Preoperative Diagnosis: Parkinson disease (H) [G20]    Procedure : Procedure(s):  Deep brain stimulator placement, phase II, placement of deep brain stimulator generator/battery over the right chest wall  Latex Free          Past Medical History:   Diagnosis Date     Anosmia      Bleeding ulcer      Carpal tunnel syndrome, bilateral      CKD (chronic kidney disease) stage 3, GFR 30-59 ml/min (H)      Diabetes mellitus (H)      Gout      Hx of skin cancer, basal cell     removed from face     Hyperlipidemia      Hypertension      Insomnia      Macular degeneration      Neuropathy      Obese      RENÉE (obstructive sleep apnea)      Osteoarthritis of knees, bilateral      Periodic limb movements of sleep      Restless legs syndrome (RLS)       Past Surgical History:   Procedure Laterality Date     ABDOMEN SURGERY  1983    ulcers     COLONOSCOPY       HEMORRHOID SURGERY       IMPLANT DEEP BRAIN STIMULATION GENERATOR / BATTERY Left 2018    Procedure: IMPLANT DEEP BRAIN STIMULATION GENERATOR / BATTERY;  Deep Brain Stimulator Placement, Phase II, Placement Of Deep Brain Stimulator Generator/Battery Over The Left Chest Wall;  Surgeon: Wayne Marshall MD;  Location: [facility] OR     Open Stomach Ulcer Repair       OPTICAL TRACKING SYSTEM INSERTION DEEP BRAIN STIMULATION Left 2018    Procedure: OPTICAL TRACKING SYSTEM INSERTION DEEP BRAIN STIMULATION;  Stealth Assisted Left Deep Brain Stimulator Placement, Phase I, Placement Of Left Deep Brain Stimulator Electrode, Target Left Subthalamic Nucleus With Microelectrode Recording;  Surgeon: Wayne Marshall MD;  Location: [facility] OR     OPTICAL TRACKING SYSTEM INSERTION DEEP BRAIN STIMULATION Right 2021    Procedure: Right stealth assisted deep brain stimulator placement, phase I, placement of right side deep brain stimulator electrode, target right  subthalamic nucleus, with microelectrode recording and placement of extension and externalization wires for [facility] research protocol;  Surgeon: Wayne Marshall MD;  Location: [facility] OR     TONSILLECTOMY        Allergies   Allergen Reactions     Atorvastatin Muscle Pain (Myalgia)     Clindamycin GI Disturbance     Gabapentin Swelling      Social History     Tobacco Use     Smoking status: Never Smoker     Smokeless tobacco: Never Used   Substance Use Topics     Alcohol use: No     Comment: 2 - 3 x a year      Wt Readings from Last 1 Encounters:   11/23/21 131 kg (288 lb 12.8 oz)        Anesthesia Evaluation   Pt has had prior anesthetic.     No history of anesthetic complications       ROS/MED HX  ENT/Pulmonary:     (+) sleep apnea,     Neurologic: Comment: S/p left sided DBS placement in 2018    (+) peripheral neuropathy, - Peripheral. Parkinson's disease, features: Tremor,     Cardiovascular:     (+) Dyslipidemia hypertension-----Previous cardiac testing   Echo: Date: Results:    Stress Test: Date: Results:    ECG Reviewed: Date: 11/12/21 Results:  NSR  Cath: Date: Results:      METS/Exercise Tolerance:     Hematologic:  - neg hematologic  ROS     Musculoskeletal:   (+) arthritis,     GI/Hepatic:  - neg GI/hepatic ROS     Renal/Genitourinary:     (+) renal disease, type: CRI,     Endo:     (+) type II DM, Last HgA1c: 7.5, date: 7/24/21, Using insulin, Obesity,     Psychiatric/Substance Use:  - neg psychiatric ROS     Infectious Disease:  - neg infectious disease ROS     Malignancy:  - neg malignancy ROS     Other:  - neg other ROS          Physical Exam    Airway        Mallampati: III   TM distance: > 3 FB   Neck ROM: full   Mouth opening: > 3 cm    Respiratory Devices and Support         Dental  no notable dental history         Cardiovascular   cardiovascular exam normal       Rhythm and rate: regular and normal     Pulmonary   pulmonary exam normal        breath sounds clear to auscultation           OUTSIDE  LABS:  CBC:   Lab Results   Component Value Date    WBC 7.7 11/24/2021    WBC 8.0 05/30/2018    HGB 12.4 (L) 11/24/2021    HGB 14.2 11/23/2021    HCT 37.6 (L) 11/24/2021    HCT 37.6 (L) 05/30/2018     11/24/2021     05/30/2018     BMP:   Lab Results   Component Value Date     11/24/2021     05/30/2018    POTASSIUM 4.0 11/24/2021    POTASSIUM 4.1 11/23/2021    CHLORIDE 109 11/24/2021    CHLORIDE 110 (H) 05/30/2018    CO2 27 11/24/2021    CO2 22 05/30/2018    BUN 13 11/24/2021    BUN 16 05/30/2018    CR 1.21 11/24/2021    CR 1.25 11/23/2021     (H) 11/24/2021     (H) 11/24/2021     COAGS:   Lab Results   Component Value Date    INR 1.0 11/13/2021     POC:   Lab Results   Component Value Date     (H) 06/08/2018     HEPATIC: No results found for: ALBUMIN, PROTTOTAL, ALT, AST, GGT, ALKPHOS, BILITOTAL, BILIDIRECT, BAO  OTHER:   Lab Results   Component Value Date    A1C 7.3 (H) 05/15/2018    JOY 8.6 11/24/2021    PHOS 2.8 11/24/2021    MAG 2.1 11/24/2021       Anesthesia Plan    ASA Status:  3   NPO Status:  NPO Appropriate    Anesthesia Type: MAC.   Induction: Intravenous.   Maintenance: Inhalation.   Techniques and Equipment:     - Lines/Monitors: BIS     Consents    Anesthesia Plan(s) and associated risks, benefits, and realistic alternatives discussed. Questions answered and patient/representative(s) expressed understanding.    - Discussed:     - Discussed with:  Patient      - Extended Intubation/Ventilatory Support Discussed: No.      - Patient is DNR/DNI Status: No    Use of blood products discussed: No .     Postoperative Care    Pain management: IV analgesics, Oral pain medications, Multi-modal analgesia.   PONV prophylaxis: Ondansetron (or other 5HT-3)     Comments:    Other Comments: Discussed possible conversion to GA if not tolerating MAC or procedure            Wilma Rabago MD

## 2021-12-03 ENCOUNTER — HOSPITAL ENCOUNTER (OUTPATIENT)
Facility: CLINIC | Age: 71
Discharge: HOME | End: 2021-12-03
Attending: NEUROLOGICAL SURGERY | Admitting: NEUROLOGICAL SURGERY
Payer: COMMERCIAL

## 2021-12-03 ENCOUNTER — ANESTHESIA (OUTPATIENT)
Dept: SURGERY | Facility: CLINIC | Age: 71
End: 2021-12-03
Payer: COMMERCIAL

## 2021-12-03 VITALS
RESPIRATION RATE: 18 BRPM | BODY MASS INDEX: 35.31 KG/M2 | HEIGHT: 75 IN | SYSTOLIC BLOOD PRESSURE: 143 MMHG | HEART RATE: 63 BPM | TEMPERATURE: 98.1 F | DIASTOLIC BLOOD PRESSURE: 88 MMHG | OXYGEN SATURATION: 98 % | WEIGHT: 283.95 LBS

## 2021-12-03 DIAGNOSIS — G25.0 ESSENTIAL TREMOR: ICD-10-CM

## 2021-12-03 DIAGNOSIS — G20.A1 PARKINSON DISEASE (H): ICD-10-CM

## 2021-12-03 DIAGNOSIS — Z96.89 S/P DEEP BRAIN STIMULATOR PLACEMENT: Primary | ICD-10-CM

## 2021-12-03 LAB
CREAT SERPL-MCNC: 1.23 MG/DL (ref 0.66–1.25)
GFR SERPL CREATININE-BSD FRML MDRD: 58 ML/MIN/1.73M2
GLUCOSE BLDC GLUCOMTR-MCNC: 101 MG/DL (ref 70–99)
GLUCOSE BLDC GLUCOMTR-MCNC: 130 MG/DL (ref 70–99)
GLUCOSE BLDC GLUCOMTR-MCNC: 82 MG/DL (ref 70–99)
POTASSIUM BLD-SCNC: 3.8 MMOL/L (ref 3.4–5.3)

## 2021-12-03 PROCEDURE — 360N000076 HC SURGERY LEVEL 3, PER MIN: Performed by: NEUROLOGICAL SURGERY

## 2021-12-03 PROCEDURE — 82962 GLUCOSE BLOOD TEST: CPT

## 2021-12-03 PROCEDURE — 250N000011 HC RX IP 250 OP 636: Performed by: NEUROLOGICAL SURGERY

## 2021-12-03 PROCEDURE — 82565 ASSAY OF CREATININE: CPT | Performed by: ANESTHESIOLOGY

## 2021-12-03 PROCEDURE — 250N000011 HC RX IP 250 OP 636: Performed by: STUDENT IN AN ORGANIZED HEALTH CARE EDUCATION/TRAINING PROGRAM

## 2021-12-03 PROCEDURE — 61885 INSRT/REDO NEUROSTIM 1 ARRAY: CPT | Mod: 58 | Performed by: NEUROLOGICAL SURGERY

## 2021-12-03 PROCEDURE — 710N000012 HC RECOVERY PHASE 2, PER MINUTE: Performed by: NEUROLOGICAL SURGERY

## 2021-12-03 PROCEDURE — 999N000141 HC STATISTIC PRE-PROCEDURE NURSING ASSESSMENT: Performed by: NEUROLOGICAL SURGERY

## 2021-12-03 PROCEDURE — 370N000017 HC ANESTHESIA TECHNICAL FEE, PER MIN: Performed by: NEUROLOGICAL SURGERY

## 2021-12-03 PROCEDURE — 36415 COLL VENOUS BLD VENIPUNCTURE: CPT | Performed by: ANESTHESIOLOGY

## 2021-12-03 PROCEDURE — 250N000013 HC RX MED GY IP 250 OP 250 PS 637: Performed by: STUDENT IN AN ORGANIZED HEALTH CARE EDUCATION/TRAINING PROGRAM

## 2021-12-03 PROCEDURE — 250N000009 HC RX 250: Performed by: NEUROLOGICAL SURGERY

## 2021-12-03 PROCEDURE — C1767 GENERATOR, NEURO NON-RECHARG: HCPCS | Performed by: NEUROLOGICAL SURGERY

## 2021-12-03 PROCEDURE — 258N000003 HC RX IP 258 OP 636: Performed by: STUDENT IN AN ORGANIZED HEALTH CARE EDUCATION/TRAINING PROGRAM

## 2021-12-03 PROCEDURE — 272N000001 HC OR GENERAL SUPPLY STERILE: Performed by: NEUROLOGICAL SURGERY

## 2021-12-03 PROCEDURE — 84132 ASSAY OF SERUM POTASSIUM: CPT | Performed by: ANESTHESIOLOGY

## 2021-12-03 DEVICE — GENERATOR DBS SYSTEM INFINITY 5 IPG 6660ANS: Type: IMPLANTABLE DEVICE | Site: CHEST | Status: FUNCTIONAL

## 2021-12-03 RX ORDER — LIDOCAINE 40 MG/G
CREAM TOPICAL
Status: DISCONTINUED | OUTPATIENT
Start: 2021-12-03 | End: 2021-12-03 | Stop reason: HOSPADM

## 2021-12-03 RX ORDER — CEPHALEXIN 500 MG/1
500 CAPSULE ORAL 2 TIMES DAILY
Qty: 14 CAPSULE | Refills: 0 | Status: SHIPPED | OUTPATIENT
Start: 2021-12-03 | End: 2021-12-10

## 2021-12-03 RX ORDER — BACITRACIN ZINC 500 [USP'U]/G
OINTMENT TOPICAL PRN
Status: DISCONTINUED | OUTPATIENT
Start: 2021-12-03 | End: 2021-12-03 | Stop reason: HOSPADM

## 2021-12-03 RX ORDER — FENTANYL CITRATE 50 UG/ML
25 INJECTION, SOLUTION INTRAMUSCULAR; INTRAVENOUS EVERY 5 MIN PRN
Status: DISCONTINUED | OUTPATIENT
Start: 2021-12-03 | End: 2021-12-03 | Stop reason: HOSPADM

## 2021-12-03 RX ORDER — LABETALOL HYDROCHLORIDE 5 MG/ML
10 INJECTION, SOLUTION INTRAVENOUS
Status: DISCONTINUED | OUTPATIENT
Start: 2021-12-03 | End: 2021-12-03 | Stop reason: HOSPADM

## 2021-12-03 RX ORDER — PROPOFOL 10 MG/ML
INJECTION, EMULSION INTRAVENOUS CONTINUOUS PRN
Status: DISCONTINUED | OUTPATIENT
Start: 2021-12-03 | End: 2021-12-03

## 2021-12-03 RX ORDER — SODIUM CHLORIDE, SODIUM LACTATE, POTASSIUM CHLORIDE, CALCIUM CHLORIDE 600; 310; 30; 20 MG/100ML; MG/100ML; MG/100ML; MG/100ML
INJECTION, SOLUTION INTRAVENOUS CONTINUOUS
Status: DISCONTINUED | OUTPATIENT
Start: 2021-12-03 | End: 2021-12-03 | Stop reason: HOSPADM

## 2021-12-03 RX ORDER — HYDROMORPHONE HCL IN WATER/PF 6 MG/30 ML
0.2 PATIENT CONTROLLED ANALGESIA SYRINGE INTRAVENOUS EVERY 5 MIN PRN
Status: DISCONTINUED | OUTPATIENT
Start: 2021-12-03 | End: 2021-12-03 | Stop reason: HOSPADM

## 2021-12-03 RX ORDER — MEPERIDINE HYDROCHLORIDE 25 MG/ML
12.5 INJECTION INTRAMUSCULAR; INTRAVENOUS; SUBCUTANEOUS
Status: DISCONTINUED | OUTPATIENT
Start: 2021-12-03 | End: 2021-12-03 | Stop reason: HOSPADM

## 2021-12-03 RX ORDER — ONDANSETRON 2 MG/ML
INJECTION INTRAMUSCULAR; INTRAVENOUS PRN
Status: DISCONTINUED | OUTPATIENT
Start: 2021-12-03 | End: 2021-12-03

## 2021-12-03 RX ORDER — PROPOFOL 10 MG/ML
INJECTION, EMULSION INTRAVENOUS PRN
Status: DISCONTINUED | OUTPATIENT
Start: 2021-12-03 | End: 2021-12-03

## 2021-12-03 RX ORDER — ONDANSETRON 4 MG/1
4 TABLET, ORALLY DISINTEGRATING ORAL EVERY 30 MIN PRN
Status: DISCONTINUED | OUTPATIENT
Start: 2021-12-03 | End: 2021-12-03 | Stop reason: HOSPADM

## 2021-12-03 RX ORDER — FENTANYL CITRATE 50 UG/ML
25 INJECTION, SOLUTION INTRAMUSCULAR; INTRAVENOUS
Status: DISCONTINUED | OUTPATIENT
Start: 2021-12-03 | End: 2021-12-03 | Stop reason: HOSPADM

## 2021-12-03 RX ORDER — CEFAZOLIN SODIUM IN 0.9 % NACL 3 G/100 ML
3 INTRAVENOUS SOLUTION, PIGGYBACK (ML) INTRAVENOUS SEE ADMIN INSTRUCTIONS
Status: DISCONTINUED | OUTPATIENT
Start: 2021-12-03 | End: 2021-12-03 | Stop reason: HOSPADM

## 2021-12-03 RX ORDER — ACETAMINOPHEN 325 MG/1
975 TABLET ORAL ONCE
Status: COMPLETED | OUTPATIENT
Start: 2021-12-03 | End: 2021-12-03

## 2021-12-03 RX ORDER — OXYCODONE HYDROCHLORIDE 5 MG/1
5 TABLET ORAL EVERY 4 HOURS PRN
Status: DISCONTINUED | OUTPATIENT
Start: 2021-12-03 | End: 2021-12-03 | Stop reason: HOSPADM

## 2021-12-03 RX ORDER — ONDANSETRON 2 MG/ML
4 INJECTION INTRAMUSCULAR; INTRAVENOUS EVERY 30 MIN PRN
Status: DISCONTINUED | OUTPATIENT
Start: 2021-12-03 | End: 2021-12-03 | Stop reason: HOSPADM

## 2021-12-03 RX ORDER — CEFAZOLIN SODIUM IN 0.9 % NACL 3 G/100 ML
3 INTRAVENOUS SOLUTION, PIGGYBACK (ML) INTRAVENOUS
Status: DISCONTINUED | OUTPATIENT
Start: 2021-12-03 | End: 2021-12-03 | Stop reason: HOSPADM

## 2021-12-03 RX ORDER — ASPIRIN 81 MG/1
81 TABLET, CHEWABLE ORAL DAILY
Qty: 30 TABLET | Refills: 0 | Status: SHIPPED | OUTPATIENT
Start: 2021-12-03 | End: 2022-01-02

## 2021-12-03 RX ORDER — SODIUM CHLORIDE, SODIUM LACTATE, POTASSIUM CHLORIDE, CALCIUM CHLORIDE 600; 310; 30; 20 MG/100ML; MG/100ML; MG/100ML; MG/100ML
INJECTION, SOLUTION INTRAVENOUS CONTINUOUS PRN
Status: DISCONTINUED | OUTPATIENT
Start: 2021-12-03 | End: 2021-12-03

## 2021-12-03 RX ADMIN — PROPOFOL 40 MCG/KG/MIN: 10 INJECTION, EMULSION INTRAVENOUS at 07:52

## 2021-12-03 RX ADMIN — PROPOFOL 10 MG: 10 INJECTION, EMULSION INTRAVENOUS at 08:02

## 2021-12-03 RX ADMIN — ONDANSETRON 4 MG: 2 INJECTION INTRAMUSCULAR; INTRAVENOUS at 08:30

## 2021-12-03 RX ADMIN — SODIUM CHLORIDE, POTASSIUM CHLORIDE, SODIUM LACTATE AND CALCIUM CHLORIDE: 600; 310; 30; 20 INJECTION, SOLUTION INTRAVENOUS at 07:45

## 2021-12-03 RX ADMIN — ACETAMINOPHEN 975 MG: 325 TABLET, FILM COATED ORAL at 06:58

## 2021-12-03 RX ADMIN — Medication 3 G: at 07:52

## 2021-12-03 RX ADMIN — PROPOFOL 10 MG: 10 INJECTION, EMULSION INTRAVENOUS at 08:19

## 2021-12-03 RX ADMIN — PROPOFOL 10 MG: 10 INJECTION, EMULSION INTRAVENOUS at 08:22

## 2021-12-03 RX ADMIN — MIDAZOLAM 1 MG: 1 INJECTION INTRAMUSCULAR; INTRAVENOUS at 07:41

## 2021-12-03 RX ADMIN — PROPOFOL 10 MG: 10 INJECTION, EMULSION INTRAVENOUS at 07:54

## 2021-12-03 RX ADMIN — PROPOFOL 10 MG: 10 INJECTION, EMULSION INTRAVENOUS at 08:24

## 2021-12-03 ASSESSMENT — MIFFLIN-ST. JEOR: SCORE: 2115.69

## 2021-12-03 NOTE — ANESTHESIA CARE TRANSFER NOTE
Patient: Stefan Odonnell    Procedure: Procedure(s):  Deep brain stimulator placement, phase II, placement of deep brain stimulator generator/battery over the right chest wall  Latex Free       Diagnosis: Parkinson disease (H) [G20]  Diagnosis Additional Information: No value filed.    Anesthesia Type:   General     Note:    Oropharynx: oropharynx clear of all foreign objects and spontaneously breathing  Level of Consciousness: awake  Oxygen Supplementation: nasal cannula  Level of Supplemental Oxygen (L/min / FiO2): 3  Independent Airway: airway patency satisfactory and stable  Dentition: dentition unchanged  Vital Signs Stable: post-procedure vital signs reviewed and stable  Report to RN Given: handoff report given  Patient transferred to: Phase II    Handoff Report: Identifed the Patient, Identified the Reponsible Provider, Reviewed the pertinent medical history, Discussed the surgical course, Reviewed Intra-OP anesthesia mangement and issues during anesthesia, Set expectations for post-procedure period and Allowed opportunity for questions and acknowledgement of understanding      Vitals:  Vitals Value Taken Time   /88 12/03/21 1000   Temp 36.7  C (98.1  F) 12/03/21 1000   Pulse 63 12/03/21 1000   Resp 18 12/03/21 1000   SpO2 98 % 12/03/21 1000       Electronically Signed By: Wilma Rabago MD  December 3, 2021  10:13 AM

## 2021-12-03 NOTE — OR NURSING
Abdominal lead removed by Dr Marshall . Dr Marshall did not want gross or chain of custody perfommed. Device was disposed of per Dr Marshall

## 2021-12-03 NOTE — ANESTHESIA POSTPROCEDURE EVALUATION
Patient: Stefan Odonnell    Procedure: Procedure(s):  Deep brain stimulator placement, phase II, placement of deep brain stimulator generator/battery over the right chest wall  Latex Free       Diagnosis:Parkinson disease (H) [G20]  Diagnosis Additional Information: No value filed.    Anesthesia Type:  General    Note:  Disposition: Outpatient   Postop Pain Control: Uneventful            Sign Out: Well controlled pain   PONV: No   Neuro/Psych: Uneventful            Sign Out: Acceptable/Baseline neuro status   Airway/Respiratory: Uneventful            Sign Out: Acceptable/Baseline resp. status   CV/Hemodynamics: Uneventful            Sign Out: Acceptable CV status; No obvious hypovolemia; No obvious fluid overload   Other NRE: NONE   DID A NON-ROUTINE EVENT OCCUR? No           Last vitals:  Vitals Value Taken Time   /88 12/03/21 1000   Temp 36.7  C (98.1  F) 12/03/21 1000   Pulse 63 12/03/21 1000   Resp 18 12/03/21 1000   SpO2 98 % 12/03/21 1000       Electronically Signed By: Devin Dunn MD  December 3, 2021  10:39 AM

## 2021-12-03 NOTE — OR NURSING
Dr. Lawson with neurosurgery at bedside in phase II. Patient cleared for discharge to home. TAMMI Dunn verbalized sign out note. Instructions reviewed with Blanca (spouse) over the phone. Rx filled at pharmacy.

## 2021-12-03 NOTE — BRIEF OP NOTE
[facility]    Brief Operative Note    Pre-operative diagnosis: Parkinson disease (H) [G20]  Post-operative diagnosis Same as pre-operative diagnosis    Procedure: Procedure(s):  Deep brain stimulator placement, phase II, placement of deep brain stimulator generator/battery over the right chest wall  Latex Free  Surgeon: Surgeon(s) and Role:     * Wayne Marshall MD - Primary     * Francesca Lawson MD - Assisting  Anesthesia: General   Estimated Blood Loss: 1ml    Drains: None  Specimens: * No specimens in log *  Findings:   None.  Complications: None.  Implants:   Implant Name Type Inv. Item Serial No.  Lot No. LRB No. Used Action   LEAD EXTENSION W/EXTEND TECHNOLOG[employer] 50CM [id number] - [id number] Leads LEAD EXTENSION W/EXTEND [employer]CHNOLOGY 50CM [id number] [employer]  Right 1 Explanted   Implantable pulse generator Other  [id number]   Right 1 Implanted     Closed with monocryl

## 2021-12-03 NOTE — H&P
NEURSURGERY    HISTORY AND PHYSICAL EXAM UPDATE      Chief Complaint   Patient presents with     SURGERY       ONGOING DBS SURGERY, ESSENTIAL TREMOR AND PARKINSONIAN FEATURES       HISTORY OF PRESENT ILLNESS  Mr. Stefan Odonnell returns today for his ongoing DBS surgery.  He is s/p right side deep brain stimulator placement, phase I, placement of deep brain stimulator electrode, target right subthalamic nucleus, with microelectrode recording and placement of extension and externalization wires for UDALL research protocol on 11/23/2021.  Patient tolerated the procedure well and there were no complications.  His postoperative CT head was without any concerning findings and he was discharged to home on POD#1.  As he is part of the UDALL study, he returned to our research unit for the past couple of days for research protocol.  He presents to day for his phase II surgery.  The plan is to remove his externalized wire and place the generator/battery in the pocket that is already created.  Patient reports that he is doing well and there are no complaints.  He reports no pain.  He denies any fevers, chills, nausea, vomiting, dizziness, weakness, numbness or seizure like activity.  He reports that the incisions are looking good and there is no redness, no drainage and no fluid collection.  His dressing in the abdomen is also intact.  Overall, he is quite happy with the recent surgery.  Of note, some of the impedance values from his electrodes were noted to be elevated during the recording sessions but this morning's interrogation revealed that the impedance values are all within normal.    In summary,  Mr. Stefan Odonnell is a 71 year old right handed male who presents for DBS implantation for his second side, right side implantation, for control of his left body symptoms.  He is s/p left side deep brain stimulator placement, phase I, placement of left side deep brain stimulator electrode, target left subthalamic nucleus, with  microelectrode recording on 5/29/2018 and s/p deep brain stimulator placement, phase II, placement of deep brain stimulator generator/battery over the left chest wall on 6/8/2018.  He tolerated both of the procedures well and there were no complications.  He recovered well from the surgeries.  His incisions all healed well with no issues.  He has been followed by Dr. Sloan and neurology/DBS clinical core.  It appears that he has good symptom control on his right side with the implanted left sided DBS.  As for his left side, patient has noticed that the tremor is worsening.  He feels that the left hand is not doing the things he wanted it to do.  Therefore, he is being considered for the second side, right side, implantation for control of his left side symptoms.  On the left side, he has an Abbott system implanted, with Infinity 5 generator/battery implanted at the left chest.  According to Dr. 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  He was worked up with x-ray of the head, neck and chest and there was no obvious disconnect or cause.  Per Dr. Sloan, this short circuit is not clinically consequential in terms of his stimulation parameters.  His case was discussed at the Movement Disorder Consensus Group Meeting and he was considered to be a good candidate for the second side implantation, target right subthalamic nucleus, with Abbott system.  We waited for his diabetes to be better controlled with his HgA1C falling below 8.0 before scheduling the case.  He also expressed his interested in DBS research, more specifically [place] externalization protocol.  He has also agreed to and gave consent to be part of the research protocol.      Past Medical History:   Diagnosis Date     Anosmia      Bleeding ulcer 1983     Carpal tunnel syndrome, bilateral      CKD (chronic kidney disease) stage 3, GFR 30-59 ml/min (H)      Diabetes mellitus (H)      Gout      Hx of skin cancer, basal cell 2013     removed from face     Hyperlipidemia      Hypertension      Insomnia      Macular degeneration      Neuropathy      Obese      RENÉE (obstructive sleep apnea)      Osteoarthritis of knees, bilateral      Periodic limb movements of sleep      Restless legs syndrome (RLS)        Past Surgical History:   Procedure Laterality Date     ABDOMEN SURGERY  6/1983    ulcers     COLONOSCOPY       HEMORRHOID SURGERY       IMPLANT DEEP BRAIN STIMULATION GENERATOR / BATTERY Left 6/8/2018    Procedure: IMPLANT DEEP BRAIN STIMULATION GENERATOR / BATTERY;  Deep Brain Stimulator Placement, Phase II, Placement Of Deep Brain Stimulator Generator/Battery Over The Left Chest Wall;  Surgeon: Wayne Marshall MD;  Location: [facility] OR     Open Stomach Ulcer Repair       OPTICAL TRACKING SYSTEM INSERTION DEEP BRAIN STIMULATION Left 5/29/2018    Procedure: OPTICAL TRACKING SYSTEM INSERTION DEEP BRAIN STIMULATION;  Stealth Assisted Left Deep Brain Stimulator Placement, Phase I, Placement Of Left Deep Brain Stimulator Electrode, Target Left Subthalamic Nucleus With Microelectrode Recording;  Surgeon: Wayne Marshall MD;  Location: [facility] OR     OPTICAL TRACKING SYSTEM INSERTION DEEP BRAIN STIMULATION Right 11/23/2021    Procedure: Right stealth assisted deep brain stimulator placement, phase I, placement of right side deep brain stimulator electrode, target right subthalamic nucleus, with microelectrode recording and placement of extension and externalization wires for [place] research protocol;  Surgeon: Wayne Marshall MD;  Location: [facility] OR     TONSILLECTOMY         Social History     Socioeconomic History     Marital status:      Spouse name: Blanca Odonnell     Number of children: 1     Years of education: Not on file     Highest education level: Not on file   Occupational History     Occupation: Special [employer]     Comment: [employer]   Tobacco Use     Smoking status: Never Smoker     Smokeless tobacco: Never Used    Substance and Sexual Activity     Alcohol use: No     Comment: 2 - 3 x a year     Drug use: No     Sexual activity: Yes     Partners: Female   Other Topics Concern     Parent/sibling w/ CABG, MI or angioplasty before 65F 55M? No   Social History Narrative    On second marriage.  Has a son from first marriage.   for 27 years.  Works at [employer] as special [employer].  Never smoked.  He rarely drinks alcohol about 2 - 3 x per year.  Doesn't use illicit drugs.      Social Determinants of Health     Financial Resource Strain: Not on file   Food Insecurity: Not on file   Transportation Needs: Not on file   Physical Activity: Not on file   Stress: Not on file   Social Connections: Not on file   Intimate Partner Violence: Not on file   Housing Stability: Not on file       Family History   Problem Relation Age of Onset     Heart Disease Mother      Diabetes Mother      Arthritis Mother      Depression Mother      Anxiety Disorder Mother      Dementia Mother      Hypertension Mother      LUNG DISEASE Mother      Prostate Cancer Father      Tremor Father      Cancer Father         Prostate     Obesity Sister      Heart Disease Brother      Bone Cancer Brother      Heart Disease Brother      Tremor Son      Heart Disease Maternal Grandmother      Heart Disease Maternal Grandfather      Heart Disease Paternal Grandmother      Heart Disease Paternal Grandfather      Unknown/Adopted Sister        Current Facility-Administered Medications   Medication     ceFAZolin (ANCEF) intermittent infusion 3 g (pre-mix)     ceFAZolin (ANCEF) intermittent infusion 3 g (pre-mix)     lactated ringers infusion     lactated ringers infusion     lidocaine (LMX4) cream     lidocaine (LMX4) cream     lidocaine 1 % 0.1-1 mL     lidocaine 1 % 0.1-1 mL     sodium chloride (PF) 0.9% PF flush 3 mL     sodium chloride (PF) 0.9% PF flush 3 mL     sodium chloride (PF) 0.9% PF flush 3 mL     sodium chloride (PF) 0.9% PF flush 3 mL        Allergies   Allergen Reactions     Atorvastatin Muscle Pain (Myalgia)     Clindamycin GI Disturbance     Gabapentin Swelling         REVIEW OF SYSTEMS:  General: Negative for chills/sweats/fever. difficulty sleeping, headache, recent fatigue, or weight gain/loss.  Eyes: Negative for blurred vision, crossed eyes, double vision, recent eye infections, vision flashes, or vision halos.  Ears/Nose/Mouth/Throat: Negative for bleeding gums, difficulty swallowing, earache, ear discharge, hearing loss, hoarseness, nosebleeds, tinnitus, or sinus problems.  Respiratory: POSITIVE for obstructive sleep apnea.  Negative for chronic cough, coughing blood, night sweats, shortness of breath, Tuberculosis, or wheezing.  Cardiovascular: Negative for chest pain, dyspnea at night, heart murmur, palpitations, pacemaker, pacemaker, poor circulation, swollen legs/feet, or varicose veins.  Gastrointestinal: Negative for melena, hematochezia, chronic diarrhea, heartburn, Hepatitis A/B/C, increasing constipation, Liver Disease, nausea, or vomiting.   Genitourinary: Negative for Urinary retention, genital discharge, urinary incontinence, prostate problems, urgency, or UTI.   Neurological: POSITIVE for Parkinsonian symptoms.  Negative for syncope, headaches, numbness of arms/legs, tingling in hands/arms/legs, memory problems, or seizures.  Psychological: Negative for anxiety, depression, panic attacks, or restlessness.  Skin: Negative for chronic skin itching, color changes in hand/feet when cold, poor scarring, non-healing ulcers, skin rashes/hives, unusual moles.  Musculoskeletal: POSITIVE for muscle aches, swollen joints and joint stiffness.  Endocrine: POSITIVE for diabetes.  Negative for excessive thirst/hunger, intolerance for warm rooms, loss of libido, multiple broken bones, rapid weight gain/loss, galactorrhea, or thyroid issues.  Hematologic/Lymphatic: Negative for easy skin bruising, significant fatigue, prolonged bleeding,  "tender glands/lymph nodes.  Allergies: Negative for asthma or hay fever.      PHYSICAL EXAM  Vital signs:  Temp: 98  F (36.7  C) Temp src: Oral BP: 135/81 Pulse: 62   Resp: 16 SpO2: 99 % O2 Device: None (Room air)   Height: 189.2 cm (6' 2.5\") Weight: 128.8 kg (283 lb 15.2 oz)  Estimated body mass index is 35.97 kg/m  as calculated from the following:    Height as of this encounter: 1.892 m (6' 2.5\").    Weight as of this encounter: 128.8 kg (283 lb 15.2 oz).      General: Awake, alert, oriented. Well nourished, well developed, he is not in any acute distress.  HEENT: Head normocephalic, atraumatic. No carotid bruit. Neck supple. Good range of motion. No palpable thyroid mass.  Extremity: Warm with no clubbing or cyanosis. No lower extremity edema.    Incisions: Right chest wall incision is healing well.  No redness.  No drainage.  No fluid collection.  Abdominal dressing intact with no areas of compromise or opening.    Neurological  Awake, alert and oriented to date, time, place and person.  Speech is fluent.   Face symmetric.    Motor: full strength throughout.  Sensation: intact to light touch and pinpoint.      ASSESSMENT   71 year old right handed male   Postural/action tremor of his bilateral hand, right worse than left.  Essential Tremor and Parkinsonian Features.  S/p left side deep brain stimulator placement, phase I, placement of left side deep brain stimulator electrode, target left subthalamic nucleus, with microelectrode recording on 5/29/2018.  S/p deep brain stimulator placement, phase II, placement of deep brain stimulator generator/battery over the left chest wall on 6/8/2018.  S/p right side deep brain stimulator placement, phase I, placement of deep brain stimulator electrode, target right subthalamic nucleus, with microelectrode recording and placement of extension and externalization wires for UDALL research protocol on 11/23/2021.     Patient is recovering well from the recent DBS phase I UDALL " protocol surgery.  His incisions are healing well with no signs of infection.  His dressing is intact.  He is doing well overall.      He did well with the research protocol and there were no issues.  Repotedly, some impedance values were elevated or abnormal but it is within normal this morning.    Patient is familiar with the DBS implantation surgeries.  However, we did discuss it again.  We discussed the phase II with placement of the DBS generator/battery over the right chest wall and removal of the externalized wire.  Risks, benefits, alternative therapies were discussed with the patient, including but not limited to infection and bleeding.  Surgical procedure was discussed in detail.  All questions were answered, and he expressed understanding.       PLAN  1.  Deep brain stimulator placement, phase II, placement of deep brain stimulator generator/battery over the right chest wall and removal of the externalized wire.

## 2021-12-03 NOTE — DISCHARGE INSTRUCTIONS
[facility]  Same-Day Surgery   Adult Discharge Orders & Instructions   For 24 hours after surgery  1. Get plenty of rest.  A responsible adult must stay with you for at least 24 hours after you leave the hospital.   2. Do not drive or use heavy equipment.  If you have weakness or tingling, don't drive or use heavy equipment until this feeling goes away.  3. Do not drink alcohol.  4. Avoid strenuous or risky activities.  Ask for help when climbing stairs.   5. You may feel lightheaded.  IF so, sit for a few minutes before standing.  Have someone help you get up.   6. If you have nausea (feel sick to your stomach): Drink only clear liquids such as apple juice, ginger ale, broth or 7-Up.  Rest may also help.  Be sure to drink enough fluids.  Move to a regular diet as you feel able.  7. You may have a slight fever. Call the doctor if your fever is over 100 F (37.7 C) (taken under the tongue) or lasts longer than 24 hours.  8. You may have a dry mouth, a sore throat, muscle aches or trouble sleeping.  These should go away after 24 hours.  9. Do not make important or legal decisions.   Call your doctor for any of the followin.  Signs of infection (fever, growing tenderness at the surgery site, a large amount of drainage or bleeding, severe pain, foul-smelling drainage, redness, swelling).    2. It has been over 8 to 10 hours since surgery and you are still not able to urinate (pass water).    3.  Headache for over 24 hours.    To contact a doctor, call Dr. Marshall at [phone] at the Neurosurgery Clinic from 8 am till 5 pm or:      [phone] and ask for the resident on call for Neurosurgery (answered 24 hours a day)      Emergency Department: [facility]: [phone]

## 2021-12-06 ENCOUNTER — PATIENT OUTREACH (OUTPATIENT)
Dept: NEUROSURGERY | Facility: CLINIC | Age: 71
End: 2021-12-06
Payer: COMMERCIAL

## 2021-12-06 NOTE — PROGRESS NOTES
[facility]: Post-Discharge Note  SITUATION                                                      Admission:    Admission Date: 12/03/21   Reason for Admission: Deep brain stimulator placement, phase II, placement of deep brain stimulator generator/battery over the right chest wall  Discharge:   Discharge Date: 12/03/21  Discharge Diagnosis: Parkinson's disease    BACKGROUND                                                      Neurosurgery Discharge Coordination Note     Attending physician: Dr. Marshall  Discharge to: Home     Current status: Spoke with spouse while patient present in the room. Pt doing well since surgery. Taking Tylenol as needed for pain. Tenderness at chest incision and abdomen incision, but manageable. Denies redness, swelling, increased tenderness, drainage, incision opening or elevated temp. Reports Incision CDI without signs of infection.  Denies current bowel or bladder issues.    Discharge instructions and medications reviewed with patient.  Follow up appointments/imaging/tests needed: 2 week post op: Will change appt to Dr. Marshall's clinic on 12/20 at 10:45.  has been notified.    RN triage/on call number given: [phone]/ [phone]        ASSESSMENT           Discharge Assessment  How are your symptoms? (Red Flag symptoms escalate to triage hotline per guidelines): Improved  Do you feel your condition is stable enough to be safe at home until your provider visit?: Yes  Does the patient have their discharge instructions? : Yes  Does the patient have questions regarding their discharge instructions? : No  Do you have questions regarding any of your medications? : Yes (see comment)  Discharge follow-up appointment scheduled within 14 calendar days? : Yes  Discharge Follow Up Appointment Date: 12/20/21  Discharge Follow Up Appointment Scheduled with?: Specialty Care Provider         Post-op (Clinicians Only)  Did the patient have surgery or a procedure: Yes  Incision:  closed;healing  Drainage: No  Bleeding: none  Fever: No  Chills: No  Redness: No  Warmth: No  Swelling: No  Incision site pain: Yes  Closure: suture;dissolving  Eating & Drinking: eating and drinking without complaints/concerns  PO Intake: regular diet  Bowel Function: normal  Urinary Status: voiding without complaint/concerns        PLAN                                                      Outpatient Plan:  Routine postop follow-up      Future Appointments   Date Time Provider Department Center   12/16/2021  7:30 AM Naty Hart APRN CNP [facility]   1/7/2022  9:00 AM Stanford Sloan MD [facility]   1/7/2022 11:20 AM [id number]         For any urgent concerns, please contact our 24 hour nurse triage line: [phone] ([phone])         THANH GOLD RN

## 2021-12-09 NOTE — OP NOTE
PATIENT NAME: FIDE CABRERA  YOB: 1950  MRN:   [id number]  ACCOUNT:  [id number]      DATE OF PROCEDURE:  12/03/2021    PREOPERATIVE DIAGNOSIS:    1.  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  Essential tremor  3.  Parkinsonism  4.  Status post left side deep brain stimulator placement, phase I, placement of left side deep brain stimulator electrode, target left subthalamic nucleus, with microelectrode recording, on 5/29/2018  5.  Status post deep brain stimulator placement, phase II, placement of deep brain stimulator generator/battery over the left chest wall on 6/8/2018  6.  Status post right side deep brain stimulator placement, phase I, placement of deep brain stimulator electrode, target right subthalamic nucleus, with microelectrode recording and placement of extension and externalization wires for UDALL research protocol on 11/23/2021    POSTOPERATIVE DIAGNOSIS:    1.  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  Essential tremor  3.  Parkinsonism  4.  Status post left side deep brain stimulator placement, phase I, placement of left side deep brain stimulator electrode, target left subthalamic nucleus, with microelectrode recording, on 5/29/2018  5.  Status post deep brain stimulator placement, phase II, placement of deep brain stimulator generator/battery over the left chest wall on 6/8/2018  6.  Status post right side deep brain stimulator placement, phase I, placement of deep brain stimulator electrode, target right subthalamic nucleus, with microelectrode recording and placement of extension and externalization wires for UDALL research protocol on 11/23/2021    PROCEDURES PERFORMED:   1.  Deep brain stimulator placement, phase II, placement of new deep brain stimulator generator/battery, model Infinity 5, over right chest wall  2.  Connection of the right side deep brain stimulator electrode, one electrode array, to the new generator/battery  3.   Electrical interrogation and analysis of deep brain stimulator system  4.  Removal of the externalization wire    ATTENDING SURGEON:  Wayne Marshall MD, PhD    RESIDENT SURGEON:  Francesca Lawson MD    ANESTHESIA:  Monitored anesthesia care    ESTIMATED BLOOD LOSS:  1 mL.    COMPLICATIONS:  None.     EXPLANTS:  [employer] 30 cm percutaneous extension wire, REF [id number], Lot# [id number].    IMPLANTS:  [employer] DBS generator/battery, Infinity 5, REF [id number], S/N [id number].     FINDINGS:  Normal impedance values from testing of the new device implanted on the right side.  No problems found.    INDICATIONS FOR PROCEDURE:  Mr. Stefan Odonnell returns today for his ongoing DBS surgery.  He is s/p right side deep brain stimulator placement, phase I, placement of deep brain stimulator electrode, target right subthalamic nucleus, with microelectrode recording and placement of extension and externalization wires for UDALL research protocol on 11/23/2021.  Patient tolerated the procedure well and there were no complications.  His postoperative CT head was without any concerning findings and he was discharged to home on POD#1.  As he is part of the UDALL study, he returned to our research unit for the past couple of days for research protocol.  He presents today for his phase II surgery.  The plan is to remove his externalized wire and place the generator/battery in the pocket that is already created.  Patient reports that he is doing well and there are no complaints.  He reports no pain.  He denies any fevers, chills, nausea, vomiting, dizziness, weakness, numbness or seizure like activity.  He reports that the incisions are looking good and there is no redness, no drainage and no fluid collection.  His dressing in the abdomen is also intact.  Overall, he is quite happy with the recent surgery.  Of note, some of the impedance values from his electrodes were noted to be elevated during the recording sessions but this morning's interrogation  revealed that the impedance values are all within normal.  In summary,  Mr. Stefan Odonnell is a 71 year old right handed male who presents for DBS implantation for his second side, right side implantation, for control of his left body symptoms.  He is s/p left side deep brain stimulator placement, phase I, placement of left side deep brain stimulator electrode, target left subthalamic nucleus, with microelectrode recording on 5/29/2018 and s/p deep brain stimulator placement, phase II, placement of deep brain stimulator generator/battery over the left chest wall on 6/8/2018.  He tolerated both of the procedures well and there were no complications.  He recovered well from the surgeries.  His incisions all healed well with no issues.  He has been followed by Dr. Sloan and neurology/DBS clinical core.  It appears that he has good symptom control on his right side with the implanted left sided DBS.  As for his left side, patient has noticed that the tremor is worsening.  He feels that the left hand is not doing the things he wanted it to do.  Therefore, he is being considered for the second side, right side, implantation for control of his left side symptoms.  On the left side, he has an [employer] system implanted, with Infinity 5 generator/battery implanted at the left chest.  According to Dr. 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  He was worked up with x-ray of the head, neck and chest and there was no obvious disconnect or cause.  Per Dr. Sloan, this short circuit is not clinically consequential in terms of his stimulation parameters.  His case was discussed at the Movement Disorder Consensus Group Meeting and he was considered to be a good candidate for the second side implantation, target right subthalamic nucleus, with [employer] system.  We waited for his diabetes to be better controlled with his HgA1C falling below 8.0 before scheduling the case.  He also expressed his interested in  DBS research, more specifically [place] externalization protocol.  He has also agreed to and gave consent to be part of the research protocol.  Patient is familiar with the DBS implantation surgeries.  However, we did discuss it again.  We discussed the phase II with placement of the DBS generator/battery over the right chest wall and removal of the externalized wire.  Risks, benefits, alternative therapies were discussed with the patient, including but not limited to infection and bleeding.  Surgical procedure was discussed in detail.  All questions were answered, and he expressed understanding.     DESCRIPTION OF PROCEDURE:  The patient was taken to the operating theater and positioned supine on the operating room table.  All pressure points were appropriately padded.  Monitored anesthesia care was induced and maintained throughout the entire case.  He remained supine on the operative bed.  Attention was first turned to his right abdomen and the dressing for the externalization wire.  The dressing was removed, thus revealing the externalization wire exiting out of the skin.  The 4-0 Monocryl suture anchoring the wire was cut and removed.  The externalization wire was tagged with a hemostat.  We cleaned and prepped his right chest wall, his previous incision.  Exofin skin glue was removed and cleaned.  Then, his right chest wall area, location of his previous incision, was cleaned and prepped and draped in routine surgical fashion.  The right abdomen area where the wire is exiting out was also prepped but was draped out with the hemostat under the drapes.  The circulating nurse was notified of the location of the hemostat and was instructed to pull it when given the go ahead.  Time out was then performed confirming the patient's identity, procedure to be performed, site and side of the surgery, imaging corresponding with the patient and the administration of preoperative prophylactic antibiotic.     The areas of  intended incision, right chest wall incision, was then injected with local anesthetic. Total of 5 mL of 1% Lidocaine with epinephrine, 1:100,000, mixed with 1/2% Marcaine plain, 50:50 mix, was used for the case.  We also turned his left side DBS system to a surgery safe mode.    Attention was first turned to the patient's right chest wall incision.  Using a Metzenbaum scissors and blunt dissection technique, the previous incision was opened by cutting and removing the sutures.  The wound was opened up further.  The layer protecting the hardware or wires was also opened carefully to expose the hardware.  Some serous fluid was seen.  The area did not appear to be infected.  No signs of infection was noted.  The area was generously irrigated and the wires were carefully uncoiled out of the wound.    The one connector for the externalization wire was identified.  This was pulled out slightly from the deep pocket.  Then, under direct vision, the connector was cut at the distal end towards the exiting portion of the wire.  Then, the circulating nurse was instructed to pull the wire and the hemostat that was tagging the wire from below the drapes.  The wire was pulled without any difficulties.  The cut wire was visualized to confirm that all of the wire was removed.  Then the remaining connector was  from the extension wire, as we removed the cover and loosened the securing screw.  The cut connector was removed.  Therefore, the externalization wire was removed entirely.  Only the extension wire connected to the right intracranial electrode remained.  The pocket was again generously irrigated and meticulous hemostasis was obtained.    At this time, a new [employer] Infinity 5 generator/battery was brought onto the field.  The extension wires were then cleaned at the contacts and inserted into the generator/battery portal.  The extension wire for the right sided implant was placed into the back connector portal.  The  front connector portal was plugged with a dead end plug.  These were secured with a screwdriver.  The pocket was inspected to be clean and no active bleeding was noted.  Then, the new generator/battery with the excess wire being placed posterior to the generator/battery was placed within the right chest wall pocket.  The entire apparatus fit into the pocket comfortably.  The generator/battery was anchored to the pocket using two 2-0 Ethibond sutures.  Therefore, one electrode array was connected to the generator/battery.    With proper placement of the connection of the deep brain stimulator system, wireless interrogation and analysis was performed.  All the impedance values were noted to be within normal for the right side electrode.  No problems were found.      With the satisfactory placement of the new system, we began closing the wound.  The right chest incision was closed in the following manner.  The deep pocket was reapproximated using 3-0 Vicryl sutures in an inverted interrupted fashion.  The subcutaneous fat layer was reapproximated using 3-0 Vicryl sutures in an inverted interrupted fashion.  The dermal layer was reapproximated using 3-0 Vicryl sutures in an inverted interrupted fashion.  The skin was reapproximated using 4-0 Monocryl suture in a subcuticular fashion.  The wound was cleaned and dried.  ChoraPrep was used to clean the incision again.  Then, Exofin skin glue was applied.    The drapes were taken down.  The right abdomen wire exit site was cleaned and dried.  Antibiotic ointment was applied.  Dry sterile dressing was placed.    We again performed wireless interrogation and analysis of the right deep brain stimulator system.  All impedance values were noted to be within normal and no problems were found.  His left side DBS system was turned back on to the therapy mode.    At the end of the case, all counts including needle, sponge and instrument counts were correct x 2.  There were no  complications.  Patient was awakened and was taken to the recovery room in a stable condition.    IMaite M.D., Ph.D., Neurosurgery Attending, was present and scrubbed for the entire case and performed the key and critical portions of the case.      MAITE LADD MD, PHD

## 2021-12-20 ENCOUNTER — OFFICE VISIT (OUTPATIENT)
Dept: NEUROSURGERY | Facility: CLINIC | Age: 71
End: 2021-12-20
Payer: COMMERCIAL

## 2021-12-20 VITALS
HEART RATE: 74 BPM | DIASTOLIC BLOOD PRESSURE: 81 MMHG | BODY MASS INDEX: 36.99 KG/M2 | WEIGHT: 292 LBS | SYSTOLIC BLOOD PRESSURE: 144 MMHG | OXYGEN SATURATION: 95 %

## 2021-12-20 DIAGNOSIS — Z96.89 STATUS POST DEEP BRAIN STIMULATOR PLACEMENT: ICD-10-CM

## 2021-12-20 DIAGNOSIS — N18.31 STAGE 3A CHRONIC KIDNEY DISEASE (H): ICD-10-CM

## 2021-12-20 DIAGNOSIS — E11.8 TYPE 2 DIABETES MELLITUS WITH COMPLICATION, WITH LONG-TERM CURRENT USE OF INSULIN (H): ICD-10-CM

## 2021-12-20 DIAGNOSIS — E66.01 MORBID OBESITY (H): ICD-10-CM

## 2021-12-20 DIAGNOSIS — Z79.4 TYPE 2 DIABETES MELLITUS WITH COMPLICATION, WITH LONG-TERM CURRENT USE OF INSULIN (H): ICD-10-CM

## 2021-12-20 DIAGNOSIS — G20.A1 PARKINSON DISEASE (H): Primary | ICD-10-CM

## 2021-12-20 DIAGNOSIS — G25.0 ESSENTIAL TREMOR: ICD-10-CM

## 2021-12-20 PROCEDURE — 99024 POSTOP FOLLOW-UP VISIT: CPT | Performed by: NEUROLOGICAL SURGERY

## 2021-12-20 NOTE — PROGRESS NOTES
NEUROSURGERY    HISTORY AND PHYSICAL EXAM    Chief Complaint   Patient presents with     RECHECK     P RETURN, WOUND CHECK, S/P RIGHT SIDE DBS PLACEMENT, [place] PROTOCOL       HISTORY OF PRESENT ILLNESS  Mr. Stefan Odonnell returns today for his follow-up for his right side DBS placement surgeries.  He is s/p right side deep brain stimulator placement, phase I, placement of deep brain stimulator electrode, target right subthalamic nucleus, with microelectrode recording and placement of extension and externalization wires for UDALL research protocol on 11/23/2021 and he is s/p deep brain stimulator placement, phase II, placement of deep brain stimulator generator/battery over the right chest wall on 12/3/2021.  Patient tolerated all ofthe procedure well and there were no complications.  His postoperative CT head was without any concerning findings and he was discharged to home on POD#1 after his phase I.  As he is part of the UDALL study, he returned to our research unit for a couple of days for research protocol.  He presented on 12/3/2021 for his phase II surgery.  We removed his externalized wire and placed the generator/battery in the pocket that was already created.  Patient reports that he is doing well and there are no complaints.  He reports no pain.  He denies any fevers, chills, nausea, vomiting, dizziness, weakness, numbness or seizure like activity.  He reports that the incisions are looking good and there is no redness, no drainage and no fluid collection.  Overall, he is quite happy with the recent surgery.  He is scheduled to have his newly placed right side DBS system programmed on 1/7/2021.       In summary,  Mr. Stefan Odonnell is a 71 year old right handed male who presented for DBS implantation for his second side, right side implantation, for control of his left body symptoms.  He is s/p left side deep brain stimulator placement, phase I, placement of left side deep brain stimulator electrode,  target left subthalamic nucleus, with microelectrode recording on 5/29/2018 and s/p deep brain stimulator placement, phase II, placement of deep brain stimulator generator/battery over the left chest wall on 6/8/2018.  He tolerated both of the procedures well and there were no complications.  He recovered well from the surgeries.  His incisions all healed well with no issues.  He has been followed by Dr. Sloan and neurology/DBS clinical core.  It appears that he has good symptom control on his right side with the implanted left sided DBS.  As for his left side, patient has noticed that the tremor is worsening.  He feels that the left hand is not doing the things he wanted it to do.  Therefore, he is being considered for the second side, right side, implantation for control of his left side symptoms.  On the left side, he has an Abbott system implanted, with Infinity 5 generator/battery implanted at the left chest.  According to Dr. 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  He was worked up with x-ray of the head, neck and chest and there was no obvious disconnect or cause.  Per Dr. Sloan, this short circuit is not clinically consequential in terms of his stimulation parameters.  His case was discussed at the Movement Disorder Consensus Group Meeting and he was considered to be a good candidate for the second side implantation, target right subthalamic nucleus, with Abbott system.  We waited for his diabetes to be better controlled with his HgA1C falling below 8.0 before scheduling the case.  He also expressed his interested in DBS research, more specifically Rescue externalization protocol.  He has also agreed to and gave consent to be part of the research protocol.      Past Medical History:   Diagnosis Date     Anosmia      Bleeding ulcer 1983     Carpal tunnel syndrome, bilateral      CKD (chronic kidney disease) stage 3, GFR 30-59 ml/min (H)      Diabetes mellitus (H)      Gout      Hx  of skin cancer, basal cell 2013    removed from face     Hyperlipidemia      Hypertension      Insomnia      Macular degeneration      Neuropathy      Obese      RENÉE (obstructive sleep apnea)      Osteoarthritis of knees, bilateral      Periodic limb movements of sleep      Restless legs syndrome (RLS)        Past Surgical History:   Procedure Laterality Date     ABDOMEN SURGERY  6/1983    ulcers     COLONOSCOPY       HEMORRHOID SURGERY       IMPLANT DEEP BRAIN STIMULATION GENERATOR / BATTERY Left 6/8/2018    Procedure: IMPLANT DEEP BRAIN STIMULATION GENERATOR / BATTERY;  Deep Brain Stimulator Placement, Phase II, Placement Of Deep Brain Stimulator Generator/Battery Over The Left Chest Wall;  Surgeon: Wayne Marshall MD;  Location: [facility] OR     IMPLANT DEEP BRAIN STIMULATION GENERATOR / BATTERY Right 12/3/2021    Procedure: Deep brain stimulator placement, phase II, placement of deep brain stimulator generator/battery over the right chest wall  Latex Free;  Surgeon: Wayne Marshall MD;  Location: [facility] OR     Open Stomach Ulcer Repair       OPTICAL TRACKING SYSTEM INSERTION DEEP BRAIN STIMULATION Left 5/29/2018    Procedure: OPTICAL TRACKING SYSTEM INSERTION DEEP BRAIN STIMULATION;  Stealth Assisted Left Deep Brain Stimulator Placement, Phase I, Placement Of Left Deep Brain Stimulator Electrode, Target Left Subthalamic Nucleus With Microelectrode Recording;  Surgeon: Wayne Marshall MD;  Location: [facility] OR     OPTICAL TRACKING SYSTEM INSERTION DEEP BRAIN STIMULATION Right 11/23/2021    Procedure: Right stealth assisted deep brain stimulator placement, phase I, placement of right side deep brain stimulator electrode, target right subthalamic nucleus, with microelectrode recording and placement of extension and externalization wires for UDALL research protocol;  Surgeon: Wayne Marshall MD;  Location: [facility] OR     TONSILLECTOMY         Social History     Socioeconomic History     Marital status:       Spouse name: Blanca Odonnell     Number of children: 1     Years of education: Not on file     Highest education level: Not on file   Occupational History     Occupation: Special [employer]     Comment: [employer]   Tobacco Use     Smoking status: Never Smoker     Smokeless tobacco: Never Used   Substance and Sexual Activity     Alcohol use: No     Comment: 2 - 3 x a year     Drug use: No     Sexual activity: Yes     Partners: Female   Other Topics Concern     Parent/sibling w/ CABG, MI or angioplasty before 65F 55M? No   Social History Narrative    On second marriage.  Has a son from first marriage.   for 27 years.  Works at [employer] as special [employer].  Never smoked.  He rarely drinks alcohol about 2 - 3 x per year.  Doesn't use illicit drugs.      Social Determinants of Health     Financial Resource Strain: Not on file   Food Insecurity: Not on file   Transportation Needs: Not on file   Physical Activity: Not on file   Stress: Not on file   Social Connections: Not on file   Intimate Partner Violence: Not on file   Housing Stability: Not on file       Family History   Problem Relation Age of Onset     Heart Disease Mother      Diabetes Mother      Arthritis Mother      Depression Mother      Anxiety Disorder Mother      Dementia Mother      Hypertension Mother      LUNG DISEASE Mother      Prostate Cancer Father      Tremor Father      Cancer Father         Prostate     Obesity Sister      Heart Disease Brother      Bone Cancer Brother      Heart Disease Brother      Tremor Son      Heart Disease Maternal Grandmother      Heart Disease Maternal Grandfather      Heart Disease Paternal Grandmother      Heart Disease Paternal Grandfather      Unknown/Adopted Sister        Current Outpatient Medications   Medication     acetaminophen (TYLENOL) 325 MG tablet     allopurinol (ZYLOPRIM) 300 MG tablet     amLODIPine (NORVASC) 5 MG tablet     aspirin (ASA) 81 MG chewable tablet      augmented betamethasone dipropionate (DIPROLENE-AF) 0.05 % external ointment     blood glucose monitoring (VITA CONTOUR NEXT MONITOR) meter device kit     blood glucose monitoring (VITA CONTOUR NEXT) test strip     blood glucose monitoring (VITA MICROLET) lancets     carbidopa-levodopa (SINEMET CR)  MG CR tablet     carbidopa-levodopa (SINEMET)  MG tablet     cephALEXin (KEFLEX) 500 MG capsule     cholecalciferol 50 MCG (2000 UT) CAPS     diphenoxylate-atropine (LOMOTIL) 2.5-0.025 MG per tablet     doxepin (SINEQUAN) 10 MG capsule     finasteride (PROSCAR) 5 MG tablet     furosemide (LASIX) 20 MG tablet     glipiZIDE (GLUCOTROL) 10 MG tablet     insulin glargine (LANTUS) 100 UNIT/ML injection     insulin pen needle (EASY TOUCH PEN NEEDLES) 32G X 5 MM     insulin pen needle 32G X 4 MM     liraglutide (VICTOZA PEN) 18 MG/3ML soln     Misc Natural Products (TART CHERRY ADVANCED) CAPS     oxyCODONE (ROXICODONE) 5 MG tablet     polyethylene glycol (MIRALAX) 17 GM/Dose powder     pregabalin (LYRICA) 50 MG capsule     senna-docusate (SENOKOT-S/PERICOLACE) 8.6-50 MG tablet     simvastatin (ZOCOR) 40 MG tablet     No current facility-administered medications for this visit.       Allergies   Allergen Reactions     Atorvastatin Muscle Pain (Myalgia)     Clindamycin GI Disturbance     Gabapentin Swelling       REVIEW OF SYSTEMS:  General: Negative for chills/sweats/fever. difficulty sleeping, headache, recent fatigue, or weight gain/loss.  Eyes: Negative for blurred vision, crossed eyes, double vision, recent eye infections, vision flashes, or vision halos.  Ears/Nose/Mouth/Throat: Negative for bleeding gums, difficulty swallowing, earache, ear discharge, hearing loss, hoarseness, nosebleeds, tinnitus, or sinus problems.  Respiratory: POSITIVE for obstructive sleep apnea.  Negative for chronic cough, coughing blood, night sweats, shortness of breath, Tuberculosis, or wheezing.  Cardiovascular: Negative for chest  pain, dyspnea at night, heart murmur, palpitations, pacemaker, pacemaker, poor circulation, swollen legs/feet, or varicose veins.  Gastrointestinal: Negative for melena, hematochezia, chronic diarrhea, heartburn, Hepatitis A/B/C, increasing constipation, Liver Disease, nausea, or vomiting.   Genitourinary: Negative for Urinary retention, genital discharge, urinary incontinence, prostate problems, urgency, or UTI.   Neurological: POSITIVE for Parkinsonian symptoms.  Negative for syncope, headaches, numbness of arms/legs, tingling in hands/arms/legs, memory problems, or seizures.  Psychological: Negative for anxiety, depression, panic attacks, or restlessness.  Skin: Negative for chronic skin itching, color changes in hand/feet when cold, poor scarring, non-healing ulcers, skin rashes/hives, unusual moles.  Musculoskeletal: POSITIVE for muscle aches, swollen joints and joint stiffness.  Endocrine: POSITIVE for diabetes.  Negative for excessive thirst/hunger, intolerance for warm rooms, loss of libido, multiple broken bones, rapid weight gain/loss, galactorrhea, or thyroid issues.  Hematologic/Lymphatic: Negative for easy skin bruising, significant fatigue, prolonged bleeding, tender glands/lymph nodes.  Allergies: Negative for asthma or hay fever.      PHYSICAL EXAM  BP (!) 144/81   Pulse 74   Wt 132.5 kg (292 lb)   SpO2 95%   BMI 36.99 kg/m      General: Awake, alert, oriented. Well nourished, well developed, he is not in any acute distress.  HEENT: Head normocephalic, atraumatic. No carotid bruit. Neck supple. Good range of motion. No palpable thyroid mass.  Extremity: Warm with no clubbing or cyanosis. No lower extremity edema.    Incisions: Right chest wall incision is healing well.  Right frontal incision is healing well.  Right parietal incision is healing well.  Right abdominal wall incision is healing well.  No redness.  No drainage.  No fluid collection.  Remaining skin glue dressing and Monocryl sutures  were removed without difficulty.    Neurological  Awake, alert and oriented to date, time, place and person.  Speech is fluent.   Face symmetric.    Motor: full strength throughout.  Sensation: intact to light touch and pinpoint.      ASSESSMENT   71 year old right handed male   Postural/action tremor of his bilateral hand, right worse than left.  Essential Tremor and Parkinsonian Features.  S/p left side deep brain stimulator placement, phase I, placement of left side deep brain stimulator electrode, target left subthalamic nucleus, with microelectrode recording on 5/29/2018.  S/p deep brain stimulator placement, phase II, placement of deep brain stimulator generator/battery over the left chest wall on 6/8/2018.  S/p right side deep brain stimulator placement, phase I, placement of deep brain stimulator electrode, target right subthalamic nucleus, with microelectrode recording and placement of extension and externalization wires for [place] research protocol on 11/23/2021.   S/p deep brain stimulator placement, phase II, placement of deep brain stimulator generator/battery over the right chest wall on 12/3/2021.    Patient is recovering well from the recent DBS surgeries.  His incisions are healing well with no signs of infection.  He is doing well overall.      I removed the remaining skin glue and Monocryl sutures without difficulty.  The abdominal incision area was covered with Band-aid.    Patient is scheduled for his right side DBS programming on 1/7/2022.  He would like to return to work sooner than February and I have cleared him to return to work without any restrictions on 1/10/2022, after his first programming session.  I provided him the letter for his work.      PLAN  1.  Follow up with neurosurgery as needed.      15 minutes were spent face to face with the patient of which more than 50% of the time was spent counseling and discussing the above issues regarding diagnosis, differential, treatment options,  and steps for further evaluation, as well as removal of the dressing and sutures.  5 minutes were spent reviewing patient's chart, imaging in PACS.  10 minutes were spent on documentation for this encounter.  30 minutes total were spent on this encounter.

## 2021-12-20 NOTE — LETTER
12/20/2021       RE: Stefan Odonnell  [address] [facility] [address]     Dear Colleague,    Thank you for referring your patient, Stefan Odonnell, to the [facility] NEUROSURGERY CLINIC [place] at [facility]. Please see a copy of my visit note below.    NEUROSURGERY    HISTORY AND PHYSICAL EXAM    Chief Complaint   Patient presents with     RECHECK     UMP RETURN, WOUND CHECK, S/P RIGHT SIDE DBS PLACEMENT, UDALL PROTOCOL       HISTORY OF PRESENT ILLNESS  Mr. Stefan Odonnell returns today for his follow-up for his right side DBS placement surgeries.  He is s/p right side deep brain stimulator placement, phase I, placement of deep brain stimulator electrode, target right subthalamic nucleus, with microelectrode recording and placement of extension and externalization wires for UDALL research protocol on 11/23/2021 and he is s/p deep brain stimulator placement, phase II, placement of deep brain stimulator generator/battery over the right chest wall on 12/3/2021.  Patient tolerated all ofthe procedure well and there were no complications.  His postoperative CT head was without any concerning findings and he was discharged to home on POD#1 after his phase I.  As he is part of the UDALL study, he returned to our research unit for a couple of days for research protocol.  He presented on 12/3/2021 for his phase II surgery.  We removed his externalized wire and placed the generator/battery in the pocket that was already created.  Patient reports that he is doing well and there are no complaints.  He reports no pain.  He denies any fevers, chills, nausea, vomiting, dizziness, weakness, numbness or seizure like activity.  He reports that the incisions are looking good and there is no redness, no drainage and no fluid collection.  Overall, he is quite happy with the recent surgery.  He is scheduled to have his newly placed right side DBS system programmed on 1/7/2021.        In summary,  Mr. Stefan Odonnell is a 71 year old right handed male who presented for DBS implantation for his second side, right side implantation, for control of his left body symptoms.  He is s/p left side deep brain stimulator placement, phase I, placement of left side deep brain stimulator electrode, target left subthalamic nucleus, with microelectrode recording on 5/29/2018 and s/p deep brain stimulator placement, phase II, placement of deep brain stimulator generator/battery over the left chest wall on 6/8/2018.  He tolerated both of the procedures well and there were no complications.  He recovered well from the surgeries.  His incisions all healed well with no issues.  He has been followed by Dr. Sloan and neurology/DBS clinical core.  It appears that he has good symptom control on his right side with the implanted left sided DBS.  As for his left side, patient has noticed that the tremor is worsening.  He feels that the left hand is not doing the things he wanted it to do.  Therefore, he is being considered for the second side, right side, implantation for control of his left side symptoms.  On the left side, he has an Abbott system implanted, with Infinity 5 generator/battery implanted at the left chest.  According to Dr. 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  He was worked up with x-ray of the head, neck and chest and there was no obvious disconnect or cause.  Per Dr. Sloan, this short circuit is not clinically consequential in terms of his stimulation parameters.  His case was discussed at the Movement Disorder Consensus Group Meeting and he was considered to be a good candidate for the second side implantation, target right subthalamic nucleus, with Abbott system.  We waited for his diabetes to be better controlled with his HgA1C falling below 8.0 before scheduling the case.  He also expressed his interested in DBS research, more specifically [place] externalization  protocol.  He has also agreed to and gave consent to be part of the research protocol.      Past Medical History:   Diagnosis Date     Anosmia      Bleeding ulcer 1983     Carpal tunnel syndrome, bilateral      CKD (chronic kidney disease) stage 3, GFR 30-59 ml/min (H)      Diabetes mellitus (H)      Gout      Hx of skin cancer, basal cell 2013    removed from face     Hyperlipidemia      Hypertension      Insomnia      Macular degeneration      Neuropathy      Obese      RENÉE (obstructive sleep apnea)      Osteoarthritis of knees, bilateral      Periodic limb movements of sleep      Restless legs syndrome (RLS)        Past Surgical History:   Procedure Laterality Date     ABDOMEN SURGERY  6/1983    ulcers     COLONOSCOPY       HEMORRHOID SURGERY       IMPLANT DEEP BRAIN STIMULATION GENERATOR / BATTERY Left 6/8/2018    Procedure: IMPLANT DEEP BRAIN STIMULATION GENERATOR / BATTERY;  Deep Brain Stimulator Placement, Phase II, Placement Of Deep Brain Stimulator Generator/Battery Over The Left Chest Wall;  Surgeon: Wayne Marshall MD;  Location: [facility] OR     IMPLANT DEEP BRAIN STIMULATION GENERATOR / BATTERY Right 12/3/2021    Procedure: Deep brain stimulator placement, phase II, placement of deep brain stimulator generator/battery over the right chest wall  Latex Free;  Surgeon: Wayne Marshall MD;  Location: [facility] OR     Open Stomach Ulcer Repair       OPTICAL TRACKING SYSTEM INSERTION DEEP BRAIN STIMULATION Left 5/29/2018    Procedure: OPTICAL TRACKING SYSTEM INSERTION DEEP BRAIN STIMULATION;  Stealth Assisted Left Deep Brain Stimulator Placement, Phase I, Placement Of Left Deep Brain Stimulator Electrode, Target Left Subthalamic Nucleus With Microelectrode Recording;  Surgeon: Wayne Marshall MD;  Location: [facility] OR     OPTICAL TRACKING SYSTEM INSERTION DEEP BRAIN STIMULATION Right 11/23/2021    Procedure: Right stealth assisted deep brain stimulator placement, phase I, placement of right side deep  brain stimulator electrode, target right subthalamic nucleus, with microelectrode recording and placement of extension and externalization wires for [place] research protocol;  Surgeon: Wayne Marshall MD;  Location: [facility] OR     TONSILLECTOMY         Social History     Socioeconomic History     Marital status:      Spouse name: Blanca Odonnell     Number of children: 1     Years of education: Not on file     Highest education level: Not on file   Occupational History     Occupation: [employer]     Comment: [employer]   Tobacco Use     Smoking status: Never Smoker     Smokeless tobacco: Never Used   Substance and Sexual Activity     Alcohol use: No     Comment: 2 - 3 x a year     Drug use: No     Sexual activity: Yes     Partners: Female   Other Topics Concern     Parent/sibling w/ CABG, MI or angioplasty before 65F 55M? No   Social History Narrative    On second marriage.  Has a son from first marriage.   for 27 years.  Works at [employer] as special [employer].  Never smoked.  He rarely drinks alcohol about 2 - 3 x per year.  Doesn't use illicit drugs.      Social Determinants of Health     Financial Resource Strain: Not on file   Food Insecurity: Not on file   Transportation Needs: Not on file   Physical Activity: Not on file   Stress: Not on file   Social Connections: Not on file   Intimate Partner Violence: Not on file   Housing Stability: Not on file       Family History   Problem Relation Age of Onset     Heart Disease Mother      Diabetes Mother      Arthritis Mother      Depression Mother      Anxiety Disorder Mother      Dementia Mother      Hypertension Mother      LUNG DISEASE Mother      Prostate Cancer Father      Tremor Father      Cancer Father         Prostate     Obesity Sister      Heart Disease Brother      Bone Cancer Brother      Heart Disease Brother      Tremor Son      Heart Disease Maternal Grandmother      Heart Disease Maternal Grandfather      Heart  Disease Paternal Grandmother      Heart Disease Paternal Grandfather      Unknown/Adopted Sister        Current Outpatient Medications   Medication     acetaminophen (TYLENOL) 325 MG tablet     allopurinol (ZYLOPRIM) 300 MG tablet     amLODIPine (NORVASC) 5 MG tablet     aspirin (ASA) 81 MG chewable tablet     augmented betamethasone dipropionate (DIPROLENE-AF) 0.05 % external ointment     blood glucose monitoring (VITA CONTOUR NEXT MONITOR) meter device kit     blood glucose monitoring (VITA CONTOUR NEXT) test strip     blood glucose monitoring (VITA MICROLET) lancets     carbidopa-levodopa (SINEMET CR)  MG CR tablet     carbidopa-levodopa (SINEMET)  MG tablet     cephALEXin (KEFLEX) 500 MG capsule     cholecalciferol 50 MCG (2000 UT) CAPS     diphenoxylate-atropine (LOMOTIL) 2.5-0.025 MG per tablet     doxepin (SINEQUAN) 10 MG capsule     finasteride (PROSCAR) 5 MG tablet     furosemide (LASIX) 20 MG tablet     glipiZIDE (GLUCOTROL) 10 MG tablet     insulin glargine (LANTUS) 100 UNIT/ML injection     insulin pen needle (EASY TOUCH PEN NEEDLES) 32G X 5 MM     insulin pen needle 32G X 4 MM     liraglutide (VICTOZA PEN) 18 MG/3ML soln     Misc Natural Products (TART CHERRY ADVANCED) CAPS     oxyCODONE (ROXICODONE) 5 MG tablet     polyethylene glycol (MIRALAX) 17 GM/Dose powder     pregabalin (LYRICA) 50 MG capsule     senna-docusate (SENOKOT-S/PERICOLACE) 8.6-50 MG tablet     simvastatin (ZOCOR) 40 MG tablet     No current facility-administered medications for this visit.       Allergies   Allergen Reactions     Atorvastatin Muscle Pain (Myalgia)     Clindamycin GI Disturbance     Gabapentin Swelling       REVIEW OF SYSTEMS:  General: Negative for chills/sweats/fever. difficulty sleeping, headache, recent fatigue, or weight gain/loss.  Eyes: Negative for blurred vision, crossed eyes, double vision, recent eye infections, vision flashes, or vision halos.  Ears/Nose/Mouth/Throat: Negative for bleeding  gums, difficulty swallowing, earache, ear discharge, hearing loss, hoarseness, nosebleeds, tinnitus, or sinus problems.  Respiratory: POSITIVE for obstructive sleep apnea.  Negative for chronic cough, coughing blood, night sweats, shortness of breath, Tuberculosis, or wheezing.  Cardiovascular: Negative for chest pain, dyspnea at night, heart murmur, palpitations, pacemaker, pacemaker, poor circulation, swollen legs/feet, or varicose veins.  Gastrointestinal: Negative for melena, hematochezia, chronic diarrhea, heartburn, Hepatitis A/B/C, increasing constipation, Liver Disease, nausea, or vomiting.   Genitourinary: Negative for Urinary retention, genital discharge, urinary incontinence, prostate problems, urgency, or UTI.   Neurological: POSITIVE for Parkinsonian symptoms.  Negative for syncope, headaches, numbness of arms/legs, tingling in hands/arms/legs, memory problems, or seizures.  Psychological: Negative for anxiety, depression, panic attacks, or restlessness.  Skin: Negative for chronic skin itching, color changes in hand/feet when cold, poor scarring, non-healing ulcers, skin rashes/hives, unusual moles.  Musculoskeletal: POSITIVE for muscle aches, swollen joints and joint stiffness.  Endocrine: POSITIVE for diabetes.  Negative for excessive thirst/hunger, intolerance for warm rooms, loss of libido, multiple broken bones, rapid weight gain/loss, galactorrhea, or thyroid issues.  Hematologic/Lymphatic: Negative for easy skin bruising, significant fatigue, prolonged bleeding, tender glands/lymph nodes.  Allergies: Negative for asthma or hay fever.      PHYSICAL EXAM  BP (!) 144/81   Pulse 74   Wt 132.5 kg (292 lb)   SpO2 95%   BMI 36.99 kg/m      General: Awake, alert, oriented. Well nourished, well developed, he is not in any acute distress.  HEENT: Head normocephalic, atraumatic. No carotid bruit. Neck supple. Good range of motion. No palpable thyroid mass.  Extremity: Warm with no clubbing or cyanosis.  No lower extremity edema.    Incisions: Right chest wall incision is healing well.  Right frontal incision is healing well.  Right parietal incision is healing well.  Right abdominal wall incision is healing well.  No redness.  No drainage.  No fluid collection.  Remaining skin glue dressing and Monocryl sutures were removed without difficulty.    Neurological  Awake, alert and oriented to date, time, place and person.  Speech is fluent.   Face symmetric.    Motor: full strength throughout.  Sensation: intact to light touch and pinpoint.      ASSESSMENT   71 year old right handed male   Postural/action tremor of his bilateral hand, right worse than left.  Essential Tremor and Parkinsonian Features.  S/p left side deep brain stimulator placement, phase I, placement of left side deep brain stimulator electrode, target left subthalamic nucleus, with microelectrode recording on 5/29/2018.  S/p deep brain stimulator placement, phase II, placement of deep brain stimulator generator/battery over the left chest wall on 6/8/2018.  S/p right side deep brain stimulator placement, phase I, placement of deep brain stimulator electrode, target right subthalamic nucleus, with microelectrode recording and placement of extension and externalization wires for [place] research protocol on 11/23/2021.   S/p deep brain stimulator placement, phase II, placement of deep brain stimulator generator/battery over the right chest wall on 12/3/2021.    Patient is recovering well from the recent DBS surgeries.  His incisions are healing well with no signs of infection.  He is doing well overall.      I removed the remaining skin glue and Monocryl sutures without difficulty.  The abdominal incision area was covered with Band-aid.    Patient is scheduled for his right side DBS programming on 1/7/2022.  He would like to return to work sooner than February and I have cleared him to return to work without any restrictions on 1/10/2022, after his first  programming session.  I provided him the letter for his work.      PLAN  1.  Follow up with neurosurgery as needed.      15 minutes were spent face to face with the patient of which more than 50% of the time was spent counseling and discussing the above issues regarding diagnosis, differential, treatment options, and steps for further evaluation, as well as removal of the dressing and sutures.  5 minutes were spent reviewing patient's chart, imaging in PACS.  10 minutes were spent on documentation for this encounter.  30 minutes total were spent on this encounter.      Wayne Marshall MD

## 2022-01-06 ENCOUNTER — TELEPHONE (OUTPATIENT)
Dept: NEUROLOGY | Facility: CLINIC | Age: 72
End: 2022-01-06
Payer: COMMERCIAL

## 2022-01-06 NOTE — TELEPHONE ENCOUNTER
"Spoke to patient and gave reminders about tomorrow's initial programming visit with Dr. Sloan at 9am and CT scan at 11:20 am for 2nd side DBS. Medication instructions reviewed, he will hold carbidopa-levodopa after 9pm tonight. He will bring his medications and patient  tomorrow.    We discussed how he's been doing since the surgery. Overall he is doing well. He has noticed that his left side (new DBS side with stim off), tremors more when he is in the cold.  He describes having two episodes in the past 2 weeks where he feels off balance (more of a balance issue than dizziness). These episodes last 2-3 seconds and he has to stop everything until it passes. He states this happens when he is walking or changing position, last time it happened yesterday when he was shoveling snow. He has not fallen. He does not feel dizzy upon standing. His blood sugars have been stable and he has been eating and drinking OK. I let patient know that I will update Dr. Sloan and they can discuss at tomorrow's visit.     Follow up for participation in [place] research:    1)  \"Have you experienced any issues following surgery, other than your usual symptoms?\"  See above     2)  \"Have you seen a health care provider, for any problem that is new or worse since your surgery?\"  None    3)  \"Have you started any new treatment or changed any old one (including medication) since your surgery?\"  None    Patient had no further questions. I expressed our teams appreciation for his participation in research.     Gela Ny, RN, MPH  Research Nurse, Movement Disorders          "

## 2022-01-07 ENCOUNTER — ANCILLARY PROCEDURE (OUTPATIENT)
Dept: CT IMAGING | Facility: CLINIC | Age: 72
End: 2022-01-07
Attending: PSYCHIATRY & NEUROLOGY
Payer: COMMERCIAL

## 2022-01-07 ENCOUNTER — OFFICE VISIT (OUTPATIENT)
Dept: NEUROLOGY | Facility: CLINIC | Age: 72
End: 2022-01-07
Payer: COMMERCIAL

## 2022-01-07 VITALS
OXYGEN SATURATION: 94 % | BODY MASS INDEX: 31.54 KG/M2 | HEART RATE: 68 BPM | SYSTOLIC BLOOD PRESSURE: 147 MMHG | DIASTOLIC BLOOD PRESSURE: 80 MMHG | WEIGHT: 249 LBS

## 2022-01-07 DIAGNOSIS — G20.A1 PARKINSON DISEASE (H): Primary | ICD-10-CM

## 2022-01-07 DIAGNOSIS — Z96.89 STATUS POST DEEP BRAIN STIMULATOR PLACEMENT: ICD-10-CM

## 2022-01-07 DIAGNOSIS — G20.A1 PARKINSON DISEASE (H): ICD-10-CM

## 2022-01-07 PROCEDURE — 99215 OFFICE O/P EST HI 40 MIN: CPT | Mod: 25 | Performed by: PSYCHIATRY & NEUROLOGY

## 2022-01-07 PROCEDURE — 70450 CT HEAD/BRAIN W/O DYE: CPT | Mod: GC | Performed by: RADIOLOGY

## 2022-01-07 PROCEDURE — 95984 ALYS BRN NPGT PRGRMG ADDL 15: CPT | Performed by: PSYCHIATRY & NEUROLOGY

## 2022-01-07 PROCEDURE — 95983 ALYS BRN NPGT PRGRMG 15 MIN: CPT | Performed by: PSYCHIATRY & NEUROLOGY

## 2022-01-07 ASSESSMENT — UNIFIED PARKINSONS DISEASE RATING SCALE (UPDRS)
TOTAL_SCORE: 3
PRONATION_SUPINATION_RIGHT: NORMAL
HANDMOVEMENTS_LEFT: MILD: ANY OF THE FOLLOWING: A) 3 TO 5 INTERRUPTIONS DURING TAPPING B) MILD SLOWING C) THE AMPLITUDE DECREMENTS MIDWAY IN THE 10-MOVEMENT SEQUENCE
MOVEMENTS_INTERFERE_WITH_RATINGS: NO
CONSTANCY_TREMOR_ATREST: NORMAL: NO TREMOR.
TOTAL_SCORE: 10
TOTAL_SCORE_LEFT: 6
TOTAL_SCORE: 3
POSTURAL_STABILITY: NORMAL:  RECOVERS WITH ONE OR TWO STEPS.
AMPLITUDE_LIP_JAW: NORMAL: NO TREMOR.
AMPLITUDE_LLE: NORMAL: NO TREMOR.
RIGIDITY_RLE: NORMAL
AMPLITUDE_RUE: NORMAL: NO TREMOR.
AMPLITUDE_LLE: NORMAL: NO TREMOR.
DYSKINESIAS_PRESENT: NO
LEG_AGILITY_RIGHT: NORMAL
ARISING_CHAIR: SLIGHT: ARISING IS SLOWER THAN NORMAL, OR MAY NEED MORE THAN ONE ATTEMPT, OR MAY NEED TO MOVE FORWARD IN THE CHAIR TO ARISE.  NO NEED TO USE THE ARMS OF THE CHAIR.
LEG_AGILITY_RIGHT: NORMAL
AMPLITUDE_RUE: NORMAL: NO TREMOR.
FACIAL_EXPRESSION: MILD: IN ADDITION TO DECREASED EYE-BLINK FREQUENCY, MASKED FACIES PRESENT IN THE LOWER FACE AS WELL, NAMELY FEWER MOVEMENTS AROUND THE MOUTH, SUCH AS LESS SPONTANEOUS SMILING, BUT LIPS NOT PARTED.
LEG_AGILITY_LEFT: SLIGHT: ANY OF THE FOLLOWING: A) THE REGULAR RHYTHM IS BROKEN WITH ONE WITH ONE OR TWO INTERRUPTIONS OR HESITATIONS OF THE MOVEMENT B) SLIGHT SLOWING C) THE AMPLITUDE DECREMENTS NEAR THE END OF THE 10 MOVEMENTS.
RIGIDITY_RUE: SLIGHT: RIGIDITY ONLY DETECTED WITH ACTIVATION MANEUVER.
TOETAPPING_RIGHT: NORMAL
SPONTANEITY_OF_MOVEMENT: 2: MILD: MILD GLOBAL SLOWNESS AND POVERTY OF SPONTANEOUS MOVEMENTS.
PARKINSONS_MEDS: OFF
FREEZING_GAIT: NORMAL
SPONTANEITY_OF_MOVEMENT: 2: MILD: MILD GLOBAL SLOWNESS AND POVERTY OF SPONTANEOUS MOVEMENTS.
POSTURE: 0 NORMAL, NO PROBLEMS
TOTAL_SCORE: 26
FACIAL_EXPRESSION: MODERATE: MASKED FACIES WITH LIPS PARTED SOME OF THE TIME WHEN THE MOUTH IS AT REST.
RIGIDITY_RLE: SLIGHT: RIGIDITY ONLY DETECTED WITH ACTIVATION MANEUVER.
AMPLITUDE_LUE: NORMAL: NO TREMOR.
MOVEMENTS_INTERFERE_WITH_RATINGS: NO
FACIAL_EXPRESSION: MILD: IN ADDITION TO DECREASED EYE-BLINK FREQUENCY, MASKED FACIES PRESENT IN THE LOWER FACE AS WELL, NAMELY FEWER MOVEMENTS AROUND THE MOUTH, SUCH AS LESS SPONTANEOUS SMILING, BUT LIPS NOT PARTED.
FINGER_TAPPING_LEFT: NORMAL
GAIT: NORMAL
FREEZING_GAIT: NORMAL
AMPLITUDE_RLE: NORMAL: NO TREMOR.
FINGER_TAPPING_RIGHT: SLIGHT: ANY OF THE FOLLOWING: A) THE REGULAR RHYTHM IS BROKEN WITH ONE WITH ONE OR TWO INTERRUPTIONS OR HESITATIONS OF THE MOVEMENT B) SLIGHT SLOWING C) THE AMPLITUDE DECREMENTS NEAR THE END OF THE 10 MOVEMENTS.
AMPLITUDE_RUE: NORMAL: NO TREMOR.
TOETAPPING_LEFT: SLIGHT: ANY OF THE FOLLOWING: A) THE REGULAR RHYTHM IS BROKEN WITH ONE WITH ONE OR TWO INTERRUPTIONS OR HESITATIONS OF THE MOVEMENT B) SLIGHT SLOWING C) THE AMPLITUDE DECREMENTS NEAR THE END OF THE 10 MOVEMENTS.
RIGIDITY_LUE: SLIGHT: RIGIDITY ONLY DETECTED WITH ACTIVATION MANEUVER.
RIGIDITY_NECK: MODERATE: RIGIDITY DETECTED WITHOUT THE ACTIVATION MANEUVER. FULL RANGE OF MOTION IS ACHIEVED WITH EFFORT.
RIGIDITY_NECK: MODERATE: RIGIDITY DETECTED WITHOUT THE ACTIVATION MANEUVER. FULL RANGE OF MOTION IS ACHIEVED WITH EFFORT.
RIGIDITY_LUE: MILD: RIGIDITY DETECTED WITHOUT THE ACTIVATION MANEUVER.  FULL RANGE OF MOTION IS EASILY ACHIEVED.
LEG_AGILITY_LEFT: NORMAL
PARKINSONS_MEDS: OFF
RIGIDITY_LLE: NORMAL
RIGIDITY_LUE: NORMAL
HANDMOVEMENTS_RIGHT: SLIGHT: ANY OF THE FOLLOWING: A) THE REGULAR RHYTHM IS BROKEN WITH ONE WITH ONE OR TWO INTERRUPTIONS OR HESITATIONS OF THE MOVEMENT B) SLIGHT SLOWING C) THE AMPLITUDE DECREMENTS NEAR THE END OF THE 10 MOVEMENTS.
PRONATION_SUPINATION_LEFT: SLIGHT: ANY OF THE FOLLOWING: A) THE REGULAR RHYTHM IS BROKEN WITH ONE WITH ONE OR TWO INTERRUPTIONS OR HESITATIONS OF THE MOVEMENT B) SLIGHT SLOWING C) THE AMPLITUDE DECREMENTS NEAR THE END OF THE 10 MOVEMENTS.
MOVEMENTS_INTERFERE_WITH_RATINGS: NO
GAIT: NORMAL
TOTAL_SCORE: 19
RIGIDITY_RUE: SLIGHT: RIGIDITY ONLY DETECTED WITH ACTIVATION MANEUVER.
FREEZING_GAIT: NORMAL
AMPLITUDE_LUE: SLIGHT: < 1 CM IN MAXIMAL AMPLITUDE.
SPEECH: NORMAL
DYSKINESIAS_PRESENT: NO
RIGIDITY_RLE: NORMAL
TOETAPPING_LEFT: NORMAL
DYSKINESIAS_PRESENT: NO
AMPLITUDE_LLE: NORMAL: NO TREMOR.
TOETAPPING_RIGHT: NORMAL
POSTURE: 0 NORMAL, NO PROBLEMS
SPONTANEITY_OF_MOVEMENT: 1: SLIGHT: SLIGHT GLOBAL SLOWNESS AND POVERTY OF SPONTANEOUS MOVEMENTS.
AXIAL_SCORE: 9
PRONATION_SUPINATION_LEFT: NORMAL
FINGER_TAPPING_RIGHT: SLIGHT: ANY OF THE FOLLOWING: A) THE REGULAR RHYTHM IS BROKEN WITH ONE WITH ONE OR TWO INTERRUPTIONS OR HESITATIONS OF THE MOVEMENT B) SLIGHT SLOWING C) THE AMPLITUDE DECREMENTS NEAR THE END OF THE 10 MOVEMENTS.
PRONATION_SUPINATION_RIGHT: NORMAL
AMPLITUDE_RLE: NORMAL: NO TREMOR.
AMPLITUDE_RLE: NORMAL: NO TREMOR.
ARISING_CHAIR: SLIGHT: ARISING IS SLOWER THAN NORMAL, OR MAY NEED MORE THAN ONE ATTEMPT, OR MAY NEED TO MOVE FORWARD IN THE CHAIR TO ARISE.  NO NEED TO USE THE ARMS OF THE CHAIR.
AMPLITUDE_LIP_JAW: NORMAL: NO TREMOR.
HANDMOVEMENTS_LEFT: SLIGHT: ANY OF THE FOLLOWING: A) THE REGULAR RHYTHM IS BROKEN WITH ONE WITH ONE OR TWO INTERRUPTIONS OR HESITATIONS OF THE MOVEMENT B) SLIGHT SLOWING C) THE AMPLITUDE DECREMENTS NEAR THE END OF THE 10 MOVEMENTS.
TOETAPPING_LEFT: NORMAL
RIGIDITY_NECK: SLIGHT: RIGIDITY ONLY DETECTED WITH ACTIVATION MANEUVER.
GAIT: NORMAL
HANDMOVEMENTS_LEFT: SLIGHT: ANY OF THE FOLLOWING: A) THE REGULAR RHYTHM IS BROKEN WITH ONE WITH ONE OR TWO INTERRUPTIONS OR HESITATIONS OF THE MOVEMENT B) SLIGHT SLOWING C) THE AMPLITUDE DECREMENTS NEAR THE END OF THE 10 MOVEMENTS.
CONSTANCY_TREMOR_ATREST: NORMAL: NO TREMOR.
SPEECH: NORMAL
AMPLITUDE_LIP_JAW: NORMAL: NO TREMOR.
TOTAL_SCORE_LEFT: 2
POSTURE: 0 NORMAL, NO PROBLEMS
LEG_AGILITY_RIGHT: NORMAL
FINGER_TAPPING_RIGHT: MILD: ANY OF THE FOLLOWING: A) 3 TO 5 INTERRUPTIONS DURING TAPPING B) MILD SLOWING C) THE AMPLITUDE DECREMENTS MIDWAY IN THE 10-MOVEMENT SEQUENCE
AXIAL_SCORE: 8
AXIAL_SCORE: 5
POSTURAL_STABILITY: NORMAL:  RECOVERS WITH ONE OR TWO STEPS.
POSTURAL_STABILITY: NORMAL:  RECOVERS WITH ONE OR TWO STEPS.
LEG_AGILITY_LEFT: NORMAL
TOTAL_SCORE: 5
PRONATION_SUPINATION_LEFT: SLIGHT: ANY OF THE FOLLOWING: A) THE REGULAR RHYTHM IS BROKEN WITH ONE WITH ONE OR TWO INTERRUPTIONS OR HESITATIONS OF THE MOVEMENT B) SLIGHT SLOWING C) THE AMPLITUDE DECREMENTS NEAR THE END OF THE 10 MOVEMENTS.
TOETAPPING_RIGHT: NORMAL
AMPLITUDE_LUE: NORMAL: NO TREMOR.
FINGER_TAPPING_LEFT: MILD: ANY OF THE FOLLOWING: A) 3 TO 5 INTERRUPTIONS DURING TAPPING B) MILD SLOWING C) THE AMPLITUDE DECREMENTS MIDWAY IN THE 10-MOVEMENT SEQUENCE
PARKINSONS_MEDS: ON
ARISING_CHAIR: SLIGHT: ARISING IS SLOWER THAN NORMAL, OR MAY NEED MORE THAN ONE ATTEMPT, OR MAY NEED TO MOVE FORWARD IN THE CHAIR TO ARISE.  NO NEED TO USE THE ARMS OF THE CHAIR.
FINGER_TAPPING_LEFT: SLIGHT: ANY OF THE FOLLOWING: A) THE REGULAR RHYTHM IS BROKEN WITH ONE WITH ONE OR TWO INTERRUPTIONS OR HESITATIONS OF THE MOVEMENT B) SLIGHT SLOWING C) THE AMPLITUDE DECREMENTS NEAR THE END OF THE 10 MOVEMENTS.
RIGIDITY_RUE: SLIGHT: RIGIDITY ONLY DETECTED WITH ACTIVATION MANEUVER.
TOTAL_SCORE_LEFT: 13
RIGIDITY_LLE: NORMAL
PRONATION_SUPINATION_RIGHT: NORMAL
RIGIDITY_LLE: NORMAL
HANDMOVEMENTS_RIGHT: SLIGHT: ANY OF THE FOLLOWING: A) THE REGULAR RHYTHM IS BROKEN WITH ONE WITH ONE OR TWO INTERRUPTIONS OR HESITATIONS OF THE MOVEMENT B) SLIGHT SLOWING C) THE AMPLITUDE DECREMENTS NEAR THE END OF THE 10 MOVEMENTS.
HANDMOVEMENTS_RIGHT: SLIGHT: ANY OF THE FOLLOWING: A) THE REGULAR RHYTHM IS BROKEN WITH ONE WITH ONE OR TWO INTERRUPTIONS OR HESITATIONS OF THE MOVEMENT B) SLIGHT SLOWING C) THE AMPLITUDE DECREMENTS NEAR THE END OF THE 10 MOVEMENTS.
CONSTANCY_TREMOR_ATREST: SLIGHT: TREMOR AT REST IS PRESENT  25% OF THE ENTIRE EXAMINATION PERIOD.
SPEECH: NORMAL

## 2022-01-07 NOTE — PROGRESS NOTES
"Department of Neurology  Movement Disorders Division   DBS Follow-up Note    Patient: Stefan Odonnell  MRN: [id number]   : 1950   Date of Visit: 1/15/2021    Diagnosis: Parkinson's disease  DBS Target(s): Left STN  Date(s) of DBS Lead Placement: 2018  Date(s) of IPG Placement: Left chest 2018  Device: Abbott    Chief Complaint:  Mr. Odonnell is a 71 year old right handed man with tremulous Parkinson disease and possible superimposed essential tremor s/p left STN DBS who returns for a clinical core 24 month videotaping session. The patient's last visit was on 1/15/2021 at which time carbidopa-levodopa  mg CR was added to the afternoon.    Interval History:  He continues to have left hand tremors, worse in the cold. No falls since surgery. He has been less active with activities since the cold but it's not the parkinson's symptoms that are preventing him    He has been having problems with balance. Feels like he's \"pass out\". It happens when he's up and walking. Sometimes occurs with standing but can also occur after standing/walking. No room spinning or rocking. No falls from this. It occurs maybe 2-3 times per week, but not every day and does not occur multiple times per day.     No other concerns or medical changes since his last visit.    UPDRS I  Part I     1.1 Cognitive impairment:  0: Normal: No cognitive impairment.    1.2 Hallucinations and psychosis:  0: Normal: No hallucinations or psychotic behaviour.     1.3 Depressed mood:  0: Normal: No depressed mood.    1.4 Anxious mood:  1: Slight: Anxious feelings present but not sustained for more than one day at a time. No interference with patient's ability to carry out normal activities and social interactions.     1.5 Apathy:  0: Normal: No apathy.     1.6 Features of DDS: 0: Normal: No problems present.     1.7 Sleep problems:  2: Mild: Sleep problems usually cause some difficulties getting a full night of sleep.    1.8 Daytime sleepiness: "  0: Normal: No daytime sleepiness.     1.9 Pain and other sensations:  0: Normal: No uncomfortable feelings.     1.10 Urinary problems:  0: Normal: No urine control problems.     1.11 Constipation problems:  0: Normal: No constipation.     1.12 Light headedness on standin: Slight: Dizzy or foggy feelings occur. However, they do not cause me troubles doing things.    1.13 Fatigue:  0: Normal: No fatigue.     Total: , previous 13 on 9/3/2021        UPDRS II  UPDRS Questionnaire 2021   Over the past week, have you had problems with your speech? 0   Over the past week, have you usually had too much saliva during when you are awake or when you sleep? 0   Over the past week, have you usually had problems swallowing pills or eating meals?  Do you need your pills cut or crushed or your meals to be made soft, chopped or blended to avoid choking? 0   Over the past week, have you usually had troubles handling your food and using eating utensils?  For example, do you have trouble handling finger foods or using forks, knifes, spoons, chopsticks? 2   Over the past week, have you usually had problems dressing?  For example, are you slow or do you need help with buttoning, using zippers, putting on or taking off your clothes or jewelry? 0   Over the past week, have you usually been slow or do you need help with washing, bathing, shaving, brushing teeth, combing your hair or with other personal hygiene? 0   Over the past week, have people usually had trouble reading your handwriting? 2   Over the past week, have you usually had trouble doing your hobbies or other things that you like to do? 2   Over the past week, do you usually have trouble turning over in bed? 0   Over the past week, have you usually had shaking or tremor? 3   Over the past week, have you usually had trouble getting out of bed, a car seat, or a deep chair? 0   Over the past week, have you usually had problems with balance and walking? 1   Over the  past week, on your usual day when walking, do you suddenly stop or freeze as if your feet are stuck to the floor. 0   MDS-UPDRS II Total Score 10     Previous 6 on 9/3/2021    Review of Systems:  Other than that noted at the end of this note, the remainder of 12 systems reviewed were negative.    Medications:  Current Outpatient Medications   Medication Sig Dispense Refill     allopurinol (ZYLOPRIM) 300 MG tablet TAKE 1 TABLET BY MOUTH DAILY IN THE EVENING       amLODIPine (NORVASC) 5 MG tablet Take 5 mg by mouth every morning        augmented betamethasone dipropionate (DIPROLENE-AF) 0.05 % external ointment Apply topically 2 times daily       blood glucose monitoring ([employer] CONTOUR NEXT MONITOR) meter device kit        blood glucose monitoring ([employer] CONTOUR NEXT) test strip USE TO TEST BLOOD SUGARS 3 TIMES DAILY       blood glucose monitoring (VITA MICROLET) lancets Test Blood Sugar 3 Times Daily.  Dx-E11.8       carbidopa-levodopa (SINEMET CR)  MG CR tablet Take half a tab at 3pm.  Increase to 1 tab after 1 week. 90 tablet 3     carbidopa-levodopa (SINEMET)  MG tablet Take 2 tablets twice daily at 4am and 11am (Patient taking differently: 2 tablets 4 times daily ) 360 tablet 3     cephALEXin (KEFLEX) 500 MG capsule Take 1 capsule (500 mg) by mouth 2 times daily 28 capsule 0     cholecalciferol 50 MCG (2000 UT) CAPS Take 4,000 Units by mouth       diphenoxylate-atropine (LOMOTIL) 2.5-0.025 MG per tablet Take 2 tablets by mouth 4 times daily as needed        doxepin (SINEQUAN) 10 MG capsule Take 10 mg by mouth At Bedtime        finasteride (PROSCAR) 5 MG tablet Take 5 mg by mouth every morning       furosemide (LASIX) 20 MG tablet Take 20 mg by mouth as needed        glipiZIDE (GLUCOTROL) 10 MG tablet Take 10 mg by mouth 2 times daily       insulin glargine (LANTUS) 100 UNIT/ML injection Inject Subcutaneous 2 times daily Inject 26 units qpm, 31 units qam       insulin pen needle (EASY TOUCH PEN  NEEDLES) 32G X 5 MM USE WITH INSULIN THREE TIMES DAILY       insulin pen needle 32G X 4 MM 1 each       liraglutide (VICTOZA PEN) 18 MG/3ML soln INJECT 1.8 MG UNDER THE SKIN ONE TIME A DAY       Misc Natural Products (TART CHERRY ADVANCED) CAPS Take 1 capsule by mouth 2 times daily        pregabalin (LYRICA) 50 MG capsule Take 1 capsule (50 mg) by mouth daily as needed (for burning foot pain) 90 capsule 3     senna-docusate (SENOKOT-S/PERICOLACE) 8.6-50 MG tablet Take 1 tablet by mouth 2 times daily Take to prevent constipation. Hold for loose stools 60 tablet 0     simvastatin (ZOCOR) 40 MG tablet Take 40 mg by mouth At Bedtime        acetaminophen (TYLENOL) 325 MG tablet Take 2 tablets (650 mg) by mouth every 4 hours as needed for other (For optimal non-opioid multimodal pain management to improve pain control.) 60 tablet 0     oxyCODONE (ROXICODONE) 5 MG tablet Take 1 tablet (5 mg) by mouth every 4 hours as needed 10 tablet 0     polyethylene glycol (MIRALAX) 17 GM/Dose powder Take 17 g by mouth daily as needed Hold for loose stools. 510 g 0        PD Medications 0330 [id number]   CD/LD 25/100mg 2 2 2 2   CD/LD 50/200 mg CR    1     He last took carbidopa-levodopa at 3:30 pm yesterday (both 25/100 mg 2 tabs and 50/200 mg 1 tab)    Allergies: is allergic to atorvastatin, clindamycin, and gabapentin.    Past Medical History:  Past Medical History:   Diagnosis Date     Anosmia      Bleeding ulcer 1983     Carpal tunnel syndrome, bilateral      CKD (chronic kidney disease) stage 3, GFR 30-59 ml/min (H)      Diabetes mellitus (H)      Gout      Hx of skin cancer, basal cell 2013    removed from face     Hyperlipidemia      Hypertension      Insomnia      Macular degeneration      Neuropathy      Obese      RENÉE (obstructive sleep apnea)      Osteoarthritis of knees, bilateral      Periodic limb movements of sleep      Restless legs syndrome (RLS)      Past Surgical History:  Past Surgical History:   Procedure  Laterality Date     ABDOMEN SURGERY  6/1983    ulcers     COLONOSCOPY       HEMORRHOID SURGERY       IMPLANT DEEP BRAIN STIMULATION GENERATOR / BATTERY Left 6/8/2018    Procedure: IMPLANT DEEP BRAIN STIMULATION GENERATOR / BATTERY;  Deep Brain Stimulator Placement, Phase II, Placement Of Deep Brain Stimulator Generator/Battery Over The Left Chest Wall;  Surgeon: Wayne Marshall MD;  Location: [facility] OR     IMPLANT DEEP BRAIN STIMULATION GENERATOR / BATTERY Right 12/3/2021    Procedure: Deep brain stimulator placement, phase II, placement of deep brain stimulator generator/battery over the right chest wall  Latex Free;  Surgeon: Wayne Marshall MD;  Location: [facility] OR     Open Stomach Ulcer Repair       OPTICAL TRACKING SYSTEM INSERTION DEEP BRAIN STIMULATION Left 5/29/2018    Procedure: OPTICAL TRACKING SYSTEM INSERTION DEEP BRAIN STIMULATION;  Stealth Assisted Left Deep Brain Stimulator Placement, Phase I, Placement Of Left Deep Brain Stimulator Electrode, Target Left Subthalamic Nucleus With Microelectrode Recording;  Surgeon: Wayne Marshall MD;  Location: [facility] OR     OPTICAL TRACKING SYSTEM INSERTION DEEP BRAIN STIMULATION Right 11/23/2021    Procedure: Right stealth assisted deep brain stimulator placement, phase I, placement of right side deep brain stimulator electrode, target right subthalamic nucleus, with microelectrode recording and placement of extension and externalization wires for [place] research protocol;  Surgeon: Wayne Marshall MD;  Location: [facility] OR     TONSILLECTOMY         Social History:  Social History     Socioeconomic History     Marital status:      Spouse name: Blanca Odonnell     Number of children: 1     Years of education: Not on file     Highest education level: Not on file   Occupational History     Occupation: Special [employer]     Comment: [employer]   Social Needs     Financial resource strain: Not on file     Food insecurity     Worry: Not on file      Inability: Not on file     Transportation needs     Medical: Not on file     Non-medical: Not on file   Tobacco Use     Smoking status: Never Smoker     Smokeless tobacco: Never Used   Substance and Sexual Activity     Alcohol use: No     Comment: 2 - 3 x a year     Drug use: No     Sexual activity: Yes     Partners: Female   Lifestyle     Physical activity     Days per week: Not on file     Minutes per session: Not on file     Stress: Not on file   Relationships     Social connections     Talks on phone: Not on file     Gets together: Not on file     Attends [employer] service: Not on file     Active member of club or organization: Not on file     Attends meetings of clubs or organizations: Not on file     Relationship status: Not on file     Intimate partner violence     Fear of current or ex partner: Not on file     Emotionally abused: Not on file     Physically abused: Not on file     Forced sexual activity: Not on file   Other Topics Concern     Parent/sibling w/ CABG, MI or angioplasty before 65F 55M? No   Social History Narrative    On second marriage.  Has a son from first marriage.   for 27 years.  Works at [employer] as special [employer].  Never smoked.  He rarely drinks alcohol about 2 - 3 x per year.  Doesn't use illicit drugs.        Family History:  Family History   Problem Relation Age of Onset     Heart Disease Mother      Diabetes Mother      Arthritis Mother      Depression Mother      Anxiety Disorder Mother      Dementia Mother      Hypertension Mother      LUNG DISEASE Mother      Prostate Cancer Father      Tremor Father      Cancer Father         Prostate     Obesity Sister      Heart Disease Brother      Bone Cancer Brother      Heart Disease Brother      Tremor Son      Heart Disease Maternal Grandmother      Heart Disease Maternal Grandfather      Heart Disease Paternal Grandmother      Heart Disease Paternal Grandfather      Unknown/Adopted Sister        Physical  Exam:  The patient's  weight is 112.9 kg (249 lb). His blood pressure is 147/80 (abnormal) and his pulse is 68. His oxygen saturation is 94%.       Neurological Examination:   Last medication dose: yesterday at 3:30 pm took carbidopa-levodopa  mg 2 tabs +  mg CR 1 tab  R brain DBS turned on at 10:10 AM  Carbidopa-levodopa  mg 2 tabs taken at 10:22 AM    EXAM 1: Initial/baseline--OFF meds/ON 1st side stim/OFF new side stim   26  EXAM 2: after programming settings chosen (estimate the time when settings were chosen if possible)--OFF meds/ON bilateral stim 19  EXAM 3: after medication kicks in, per patient--ON meds/ON bilateral stim  10    UPDRS Values 1/7/2022 1/7/2022 1/7/2022   Time: 9:01 AM 10:15 AM 12:11 PM   Medication Off Off On   R Brain DBS: Off On On   L Brain DBS: On On On   Dyskinesia (LID) No No No   Did LID interfere No No No   Speech 0 0 0   Facial Expression 3 2 2   Rigidity Neck 3 3 1   Rigidity RUE 1 1 1   Rigidity LUE 2 1 0   Rigidity RLE 0 1 0   Rigidity LLE 0 0 0   Finger Taps R 1 2 1   Finger Taps L 2 1 0   Hand Mvt R 1 1 1   Hand Mvt L 2 1 1   Pron-/Supinate R 0 0 0   Pron-/Supinate L 1 1 0   Toe Tap R 0 0 0   Toe Tap L 1 0 0   Leg Agility R 0 0 0   Leg Agility L 1 0 0   Arise From Chair 1 1 1   Gait 0 0 0   Gait Freezing 0 0 0   Postural Stability 0 0 0   Posture 0 0 0   Global Spont Mvt 2 2 1   Postural Tremor RUE 0 0 0   Postural Tremor LUE 1 1 0   Kinetic Tremor RUE 0 0 0   Kinetic Tremor LUE 2 1 1   Rest Tremor RUE 0 0 0   Rest Tremor LUE 1 0 0   Rest Tremor RLE 0 0 0   Rest Tremor LLE 0 0 0   Rest Tremor Lip/Jaw 0 0 0   Rest Tremor Constancy 1 0 0   Total Right 3 5 3   Total Left 13 6 2   Axial Total 9 8 5   Total 26 19 10     Gait with decreased arm swing on left, but normal stride length and retropulsion test was intact      Procedure: DBS Interrogation & Programming    Lead(s):    Left Right   DBS Target STN STN   DBS Lead Type Infinity 0.5 6172, 0.5 mm spacing   Lead  "Implant Date 5/29/2018 11/23/2021     IPG(s):   1 2   IPG Infinity 5 Infinity 5   IPG Implant Date 6/8/2018 12/3/2021   Location Left chest Right chest   Battery (V) 2.89V (2.91V) >3.0V     Impedance Check: See scanned report for impedance details.  Left STN: Therapy 487 Ohms  Left STN: marginally low impedences identified at 2b, 3b and 4     Left STN Right STN      Initial & Final Initial Final   Amplitude (mA) 3.25 [0-4.5 by 0.25] None 2.0 [0-4.0 by 0.1]   Pulse width ( s)  60  30   Freq (Hz)  130  130   Contacts:   Cpos, 3_omni neg  Cpos, 12neg     Clinical Core Study Programming Form    Generator Serial No. Lead Hemisphere   Lead Region  Initials   [id number] Right STN KG, SC       Enter value only with  change No blank cells No blank cells   Contacts  Cpos, 2bcneg Ampl  0.5 Ampl Unit  V or mA Pulse Width (ms)  60   Rate (Hz)  130 Effect on Symptoms   NA not assessed  NC no change from setting above  NN none noted Side Effects  NA  NC  NN     Cpos, 9neg 1.0 mA 60 130 Reduced LUE tone to 1, tremor stable, finger tapping 2 NN    1.5 mA    NN    2.0 mA   LUE tone 1.5, finger tapping 1.5 Small tremor feeling in foot. Face symmetric.     2.5 mA   Reduced LUE tone to 1. Finger tapping 1. Reduced tremor to trace Left side sensation    3.0 mA   Tremor back to 1. Finger tapping 1. Left side sensation. Gait largely unchanged and stable with the exception of increased L hand tremor    3.5 mA   L hand tremor stable, L finger tapping 2. LUE tone 1.5 L side sensation, still transient but lasting longer    4.0 mA   LUE persistent, maybe slight increase. Finger tapping a bit worse L side sensation   Cpos, 10neg 1.0 mA 60 130 LUE tone reduced compared to baseline, 1. Tremor trace, 0.5. Finger tapping 1.5     1.5 mA   NN NN    2.0 mA   LUE tone 1. Tremor 1. Finger tapping 1. Transient L foot paresthesias    2.5     L foot getting stiff, \"feeling swollen\"    3.0    LUE tremor 1, finger tapping 0.5-1 (slight improvement). " "Tone dramatically reduced, still 1. L side paresthesia. Walking stable, decreased LUE tremor    3.5    LUE no tremor, finger tapping 0.5 (better). L foot tapping improved compared to baseline Persistent feeling of left foot swelling    4.0    LUE tone 0.5. L foot tapping stable.  Walking stable, no tremor in left hand.  Left side feels swelling.     4.5    LUE trace tremor. Finger tapping good maybe more effortful. L foot tapping with end decrement Face is symmetric.    5.0    LUE trace tremor, finger tapping stable. Toe tap with interruptions Face symmetric. L side sensations still. Persistent paresthesia on left side. Walking stable.   Cpos, 11neg 1.0    LUE tone reduced compared to baseline, 0.5. Trace hand tremor. Finger tapping 1.5 NN      1.5     NN    2.0    LUE not tremor. Finger tapping 0.5.  NN    2.5     NN    3.0    No LUE tremor. Finger tapping 0.5. Hand open closing 0.  L leg & foot tlinging, persistent. Walking stable, minimal LUE tremor with walking, continues to have decreased arm swing on left compared to right    3.5     Lightheaded for a couple seconds    4.0    No LUE tremor. Finger tapping 0.  L foot paresthesia. Walking stable    4.5     Face symmetric.     5.0    Finger tapping 1. LUE tone 0.5. L toe tap  Walking a bit unsteady, feeling nauseated. Feels better after turning off stim   Cpos 12neg 1.0 mA 60 130 Trace LUE tremor. Finger tapping 1.5     1.5     L foot paresthesia    2.0    LUE tremor 0.5. Finger tapping 0. Improved L arm swing with walking. NN    2.5     NN    3.0    LUE no tremor. Finger tapping 0. Toe tap improved, not yet perfect L foot \"feels swollen & pulsing\"    4.0    LUE no tremor, finger tapping 0  L foot paresthesia. Face symmetric    4.5     L side tingling   1 5.0    LUE trace tremor. Finger tapping 0.  L side tingling. Rocks with standing. \"Feels terrible with walking\", dizzy, decreased L arm swing and increased work with lifting left leg    3.0 mA 30 130 Improved " tremor NN    2.0     NN     Assessment/plan:    Mr. Odonnell is a 71 year old right handed man with tremulous Parkinson disease and possible superimposed essential tremor s/p left STN DBS who returns for initial programming of R brain DBS. Monopolar review was completed and he was discharged from clinic on settings as above. No changes were made to right brain/left body DBS today. No medications changes today. In the future, he would be a good candidate for virtual DBS programming as he has an extended travel time and does use sick days for these travels.    - DBS programming as above  - no medication changes. In the future, may consider trying to decrease  - he is interested in being contacted to further learn about research study with Dr. Sloan's team    RTC in 3 months in person for DBS programming (1 hour). He would prefer to wait until April to consolidate his trip with other plans, which is fine.    Patient and plan was examined & discussed with attending physician, Dr. Sloan.    Freya Mosquera MD  Movement Disorder Fellow       Patient seen and examined with Dr. Mosquera and I agree with the assessment and plan as outlined.  We'll keep an eye on L impedances, there's not does not clear evidence of any hardware fault.  DBS programming 1h.  Time this date with patient, reviewing recoreds, & documenting, 1h.    Stanford Sloan PhD, MD

## 2022-01-07 NOTE — PATIENT INSTRUCTIONS
- We have turned on your right brain/left body DBS. The amplitude is starting at 2.0 mA. You can slowly increase up to 3.0 mA by 0.1 mA (1 click) every 3 days. Stop at the lowest effective amplitude. Once you reach 3.0 mA, contact our clinic because we may consider further increasing the amplitude. If you have side effects, decrease to prior stimulation and contact our clinic.  - No changes were made to your left brain/right body DBS  - Continue your medications as is. We may consider trying to decrease these in the future depending on how the DBS programming progresses.

## 2022-01-30 NOTE — PROGRESS NOTES
"R STN    X:+13.00,Y:-2.93,Z:-5.70;AP:69.9,MSP18.0    P1T1 anterior hole  Unremarkable thalamus, STNfrom +3.71, with proprioceptive field (elbow) at that level. Bottom, probably -1.142, with proable SNr below, becoming deinite by -2.00  Microstim at -3.65 and at -4.10 gave no effect up to 90      P1T2 center hole  Unremarkable thalamus, Zi possibly around +6.27, STN from +2.64.  Bottom probably -2.25 with SNr below.  Microstim at -3.65 and at -4.10 gave no effect up to 90    P1T3, lateral hole  Quiet thalamus, STN probably from +4.71.  Bottom at -0.652, with SNr below.  Microstim at -3.65 and at -4.10 gave no effect up to 90    Abbott Infinity 0.5 in anterior hole, at a depth of -1.6  1neg,3abcpos, 60 usec, 130 hz gave slight improvement in bradykinesia and tremor, and no paresthesias, up to 5.0 mA, and at 190 Hz, gave paresthesias celestine at 5.0 mA    Moved to center hole, at a depth of -2.0    Threshold for transient paresthesias now 1.5 mA, tolerated up to 4.5 mA, with clearer imprvement in tremor and bradykinesia.  Aso tested 1psos 3abcne, with no side effects up to 4.0 and a \"whole body\" sensation at 5.0 mA    Microelectrode mappin.5h  "

## 2022-02-15 ENCOUNTER — VIRTUAL VISIT (OUTPATIENT)
Dept: NEUROLOGY | Facility: CLINIC | Age: 72
End: 2022-02-15
Payer: COMMERCIAL

## 2022-02-15 DIAGNOSIS — G20.A1 PARKINSON DISEASE (H): Primary | ICD-10-CM

## 2022-02-15 DIAGNOSIS — Z96.89 STATUS POST DEEP BRAIN STIMULATOR PLACEMENT: ICD-10-CM

## 2022-02-15 PROCEDURE — 99212 OFFICE O/P EST SF 10 MIN: CPT | Mod: 25 | Performed by: PSYCHIATRY & NEUROLOGY

## 2022-02-15 PROCEDURE — 95983 ALYS BRN NPGT PRGRMG 15 MIN: CPT | Performed by: PSYCHIATRY & NEUROLOGY

## 2022-02-15 PROCEDURE — 95984 ALYS BRN NPGT PRGRMG ADDL 15: CPT | Performed by: PSYCHIATRY & NEUROLOGY

## 2022-02-15 NOTE — PATIENT INSTRUCTIONS
"Today, I made no changes to your Left Brain (for right body) stimulator.  I did make a change to your Right Brain (for left body) stimulator.  Previously, you had only one program available:  \"Program 1\"  I created a new program \"Program 2\"    \"Program 2 is your currently active program on that side (Right Brain, left body).  The difference with this program is that the Pulse Width is slightly lower.  In Program 1, it was 30, and in Program 2 it is 20.    Try increasing your amplitude, cautiously, with Program 2.  My hope is that you can get the amplitude a little higher, with Program 2 than with Program 1, before running into side effects.    I don't think you'll need to, but, if you absolutely must, you can go back to your old Program 1, using your patient- device.  To do this, connect as usual to your Right Brain (for left body) battery.  Then choose the \"Program\" menu option.  Then use the left and right arrows (little triangles at the left and right edge of the screen) to go from the Program 2 screen to the Program 1 screen.  Once you're in the Program 1 screen, you would hit the \"Select this program\" button.  This is all probably clearer if you look at the manual for your patient-, which has pictures.    "

## 2022-02-15 NOTE — PROGRESS NOTES
Stefan is a 71 year old who is being evaluated via a billable video visit.      How would you like to obtain your AVS? Mychart  If the video visit is dropped, the invitation should be resent by: [phone]        Video-Visit Details    Type of service:  Video Visit        Originating Location (pt. Location): Home    Distant Location (provider location):  [facility] NEUROLOGY [facility]     Platform used for Video Visit: Toi [employer]onaphere

## 2022-02-15 NOTE — PROGRESS NOTES
Connected via Teal Orbit, then switched to Roller.  Accessed Left Brain (for right body) IPG, created new Program 2, same as Program 1 but with PW reduced from 30 to 20.  Instructed to try with Program 2, as he has been with Program 1, increasing amplitude, and see if side-effect ceiling is any higher with the lower PW.    Lead(s):     Left Right   DBS Target STN STN   DBS Lead Type Infinity 0.5 6172, 0.5 mm spacing   Lead Implant Date 5/29/2018 11/23/2021      IPG(s):    1 2   IPG Infinity 5 Infinity 5   IPG Implant Date 6/8/2018 12/3/2021   Location Left chest Right chest   Battery (V) .. (2.89V) .. (>3.0V)        (IPG #1 not interrogated, settings from last visit)  IPG # 1   Side Left STN   Initial/Final Initial & Final   Active/Inactive Active   Amplitude (mA) 3.25 [0-4.5 by 0.25]   Pulse width ( s)  60   Freq (Hz)  130   Contacts:   Cpos, 3_omni neg          IPG # 2    Side Right STN    Program Program 1    Initial/Final Initial Final   Active/Inactive Active Inactive   Amplitude (mA) 2.2 [0-4.0 by 0.1] 2.2 [0-4.0 by 0.1]   Pulse width ( s) 30 30   Freq (Hz) 130 130   Contacts:  Cpos, 4neg Cpos, 4neg      IPG # 2    Side Right STN    Program Program 2    Initial/Final None Final   Active/Inactive  Active   Amplitude (mA)  2.2 [0-4.0 by 0.1]   Pulse width ( s)  20   Freq (Hz)  130   Contacts:   Cpos, 4neg     Programming time: 0.5h.  Total time 0.75h.    Stanford Sloan PhD, MD

## 2022-02-16 ENCOUNTER — MYC MEDICAL ADVICE (OUTPATIENT)
Dept: NEUROLOGY | Facility: CLINIC | Age: 72
End: 2022-02-16
Payer: COMMERCIAL

## 2022-02-17 NOTE — PROGRESS NOTES
Department of Neurology  Movement Disorders Division   DBS Follow-up Note    Patient: Stefan Odonnell  MRN: [id number]   : 1950   Date of Visit: 2021    Diagnosis: Parkinson's disease  DBS Target(s): Left STN  Date(s) of DBS Lead Placement: 2018  Date(s) of IPG Placement: Left chest 2018  Device: Abbott    Chief Complaint:  Mr. Odonnell is a 71 year old right handed man with tremulous Parkinson disease and possible superimposed essential tremor s/p left STN DBS who returns for PD management & DBS programming. The patient's last visit was via telemedicine on 2/15/2022 at which time right STN DBS program was created with decreased pulse width of 20 microsec.    Interval History:  Yesterday, he switched back to program 1 because the numbness/tingling and swelling in his left foot worsened. Tingling is described as electrical sensation in whole left body that shoots down to foot. This is always there but has waves of worsening and these are associated with left foot stiffening. He describes as feeling like a gout attack but no swollen toes. Neither he nor wife have noticed actual swelling in the feet. He was on program 1 for a half day, but did not notice a difference so he switched back to program 2. He tried to decrease stimulation down to 1.6 mA but again did not notice a difference. Since the surgery, he has noticed more cramping in left thigh as well, mostly at night.    No falls, but not as stable on his feet. No passing out feeling.      No surgery on right hand since last seen. Since last month, he has stopped using his glucose monitor because it kept going off, including the middle of the night. He would then check his blood sugar and it would not correlate with his monitor. He will follow-up with his PCP on the plan for this - he states DM is managed by PCP.     No other concerns or medical changes since his last visit.    UPDRS  Part I   Previously 4 on 22    UPDRS II  UPDRS  Questionnaire 2/16/2022   Over the past week, have you had problems with your speech? 0   Over the past week, have you usually had too much saliva during when you are awake or when you sleep? 0   Over the past week, have you usually had problems swallowing pills or eating meals?  Do you need your pills cut or crushed or your meals to be made soft, chopped or blended to avoid choking? 0   Over the past week, have you usually had troubles handling your food and using eating utensils?  For example, do you have trouble handling finger foods or using forks, knifes, spoons, chopsticks? 2   Over the past week, have you usually had problems dressing?  For example, are you slow or do you need help with buttoning, using zippers, putting on or taking off your clothes or jewelry? 0   Over the past week, have you usually been slow or do you need help with washing, bathing, shaving, brushing teeth, combing your hair or with other personal hygiene? 0   Over the past week, have people usually had trouble reading your handwriting? 3   Over the past week, have you usually had trouble doing your hobbies or other things that you like to do? 0   Over the past week, do you usually have trouble turning over in bed? 0   Over the past week, have you usually had shaking or tremor? 1   Over the past week, have you usually had trouble getting out of bed, a car seat, or a deep chair? 2   Over the past week, have you usually had problems with balance and walking? 0   Over the past week, on your usual day when walking, do you suddenly stop or freeze as if your feet are stuck to the floor. 0   MDS-UPDRS II Total Score 8     Previous 10 on 12/31/2021    Review of Systems:  Other than that noted at the end of this note, the remainder of 12 systems reviewed were negative.    Medications:  Current Outpatient Medications   Medication Sig Dispense Refill     acetaminophen (TYLENOL) 325 MG tablet Take 2 tablets (650 mg) by mouth every 4 hours as needed  for other (For optimal non-opioid multimodal pain management to improve pain control.) 60 tablet 0     allopurinol (ZYLOPRIM) 300 MG tablet TAKE 1 TABLET BY MOUTH DAILY IN THE EVENING       amLODIPine (NORVASC) 5 MG tablet Take 5 mg by mouth every morning        augmented betamethasone dipropionate (DIPROLENE-AF) 0.05 % external ointment Apply topically 2 times daily       blood glucose monitoring (VITA CONTOUR NEXT MONITOR) meter device kit        blood glucose monitoring (VITA CONTOUR NEXT) test strip USE TO TEST BLOOD SUGARS 3 TIMES DAILY       blood glucose monitoring (VITA MICROLET) lancets Test Blood Sugar 3 Times Daily.  Dx-E11.8       carbidopa-levodopa (SINEMET CR)  MG CR tablet Take half a tab at 3pm.  Increase to 1 tab after 1 week. 90 tablet 3     carbidopa-levodopa (SINEMET)  MG tablet Take 2 tablets twice daily at 4am and 11am (Patient taking differently: 2 tablets 4 times daily ) 360 tablet 3     cephALEXin (KEFLEX) 500 MG capsule Take 1 capsule (500 mg) by mouth 2 times daily 28 capsule 0     cholecalciferol 50 MCG (2000 UT) CAPS Take 4,000 Units by mouth       diphenoxylate-atropine (LOMOTIL) 2.5-0.025 MG per tablet Take 2 tablets by mouth 4 times daily as needed        doxepin (SINEQUAN) 10 MG capsule Take 10 mg by mouth At Bedtime        finasteride (PROSCAR) 5 MG tablet Take 5 mg by mouth every morning       furosemide (LASIX) 20 MG tablet Take 20 mg by mouth as needed        glipiZIDE (GLUCOTROL) 10 MG tablet Take 10 mg by mouth 2 times daily       insulin glargine (LANTUS) 100 UNIT/ML injection Inject Subcutaneous 2 times daily Inject 26 units qpm, 31 units qam       insulin pen needle (EASY TOUCH PEN NEEDLES) 32G X 5 MM USE WITH INSULIN THREE TIMES DAILY       insulin pen needle 32G X 4 MM 1 each       liraglutide (VICTOZA PEN) 18 MG/3ML soln INJECT 1.8 MG UNDER THE SKIN ONE TIME A DAY       Misc Natural Products (TART CHERRY ADVANCED) CAPS Take 1 capsule by mouth 2 times daily         oxyCODONE (ROXICODONE) 5 MG tablet Take 1 tablet (5 mg) by mouth every 4 hours as needed 10 tablet 0     polyethylene glycol (MIRALAX) 17 GM/Dose powder Take 17 g by mouth daily as needed Hold for loose stools. 510 g 0     pregabalin (LYRICA) 50 MG capsule Take 1 capsule (50 mg) by mouth daily as needed (for burning foot pain) 90 capsule 3     senna-docusate (SENOKOT-S/PERICOLACE) 8.6-50 MG tablet Take 1 tablet by mouth 2 times daily Take to prevent constipation. Hold for loose stools 60 tablet 0     simvastatin (ZOCOR) 40 MG tablet Take 40 mg by mouth At Bedtime           PD Medications  0330 0700 1100 [id number]   CD/LD 25/100mg 2 2 2 2   CD/LD 50/200 mg CR    1     He last took carbidopa-levodopa (immediate release & CR) yesterday at 3:30 PM    Allergies: is allergic to atorvastatin, clindamycin, and gabapentin.    Past Medical History:  Past Medical History:   Diagnosis Date     Anosmia      Bleeding ulcer 1983     Carpal tunnel syndrome, bilateral      CKD (chronic kidney disease) stage 3, GFR 30-59 ml/min (H)      Diabetes mellitus (H)      Gout      Hx of skin cancer, basal cell 2013    removed from face     Hyperlipidemia      Hypertension      Insomnia      Macular degeneration      Neuropathy      Obese      RENÉE (obstructive sleep apnea)      Osteoarthritis of knees, bilateral      Periodic limb movements of sleep      Restless legs syndrome (RLS)      Past Surgical History:  Past Surgical History:   Procedure Laterality Date     ABDOMEN SURGERY  6/1983    ulcers     COLONOSCOPY       HEMORRHOID SURGERY       IMPLANT DEEP BRAIN STIMULATION GENERATOR / BATTERY Left 6/8/2018    Procedure: IMPLANT DEEP BRAIN STIMULATION GENERATOR / BATTERY;  Deep Brain Stimulator Placement, Phase II, Placement Of Deep Brain Stimulator Generator/Battery Over The Left Chest Wall;  Surgeon: Wayne Marshall MD;  Location: [facility] OR     IMPLANT DEEP BRAIN STIMULATION GENERATOR / BATTERY Right 12/3/2021    Procedure: Deep  brain stimulator placement, phase II, placement of deep brain stimulator generator/battery over the right chest wall  Latex Free;  Surgeon: Wayne Marshall MD;  Location: [facility] OR     Open Stomach Ulcer Repair       OPTICAL TRACKING SYSTEM INSERTION DEEP BRAIN STIMULATION Left 5/29/2018    Procedure: OPTICAL TRACKING SYSTEM INSERTION DEEP BRAIN STIMULATION;  Stealth Assisted Left Deep Brain Stimulator Placement, Phase I, Placement Of Left Deep Brain Stimulator Electrode, Target Left Subthalamic Nucleus With Microelectrode Recording;  Surgeon: Wayne Marshall MD;  Location: [facility] OR     OPTICAL TRACKING SYSTEM INSERTION DEEP BRAIN STIMULATION Right 11/23/2021    Procedure: Right stealth assisted deep brain stimulator placement, phase I, placement of right side deep brain stimulator electrode, target right subthalamic nucleus, with microelectrode recording and placement of extension and externalization wires for UDALL research protocol;  Surgeon: Wayne Marshall MD;  Location: [facility] OR     TONSILLECTOMY         Social History:  Social History     Socioeconomic History     Marital status:      Spouse name: Blanca Odonnell     Number of children: 1     Years of education: Not on file     Highest education level: Not on file   Occupational History     Occupation: [employer]     Comment: [employer]   Social Needs     Financial resource strain: Not on file     Food insecurity     Worry: Not on file     Inability: Not on file     Transportation needs     Medical: Not on file     Non-medical: Not on file   Tobacco Use     Smoking status: Never Smoker     Smokeless tobacco: Never Used   Substance and Sexual Activity     Alcohol use: No     Comment: 2 - 3 x a year     Drug use: No     Sexual activity: Yes     Partners: Female   Lifestyle     Physical activity     Days per week: Not on file     Minutes per session: Not on file     Stress: Not on file   Relationships     Social connections      Talks on phone: Not on file     Gets together: Not on file     Attends Shinto service: Not on file     Active member of club or organization: Not on file     Attends meetings of clubs or organizations: Not on file     Relationship status: Not on file     Intimate partner violence     Fear of current or ex partner: Not on file     Emotionally abused: Not on file     Physically abused: Not on file     Forced sexual activity: Not on file   Other Topics Concern     Parent/sibling w/ CABG, MI or angioplasty before 65F 55M? No   Social History Narrative    On second marriage.  Has a son from first marriage.   for 27 years.  Works at [employer] as special [employer].  Never smoked.  He rarely drinks alcohol about 2 - 3 x per year.  Doesn't use illicit drugs.        Family History:  Family History   Problem Relation Age of Onset     Heart Disease Mother      Diabetes Mother      Arthritis Mother      Depression Mother      Anxiety Disorder Mother      Dementia Mother      Hypertension Mother      LUNG DISEASE Mother      Prostate Cancer Father      Tremor Father      Cancer Father         Prostate     Obesity Sister      Heart Disease Brother      Bone Cancer Brother      Heart Disease Brother      Tremor Son      Heart Disease Maternal Grandmother      Heart Disease Maternal Grandfather      Heart Disease Paternal Grandmother      Heart Disease Paternal Grandfather      Unknown/Adopted Sister        Physical Exam:  The patient's  weight is 112.9 kg (249 lb). His blood pressure is 151/87 (abnormal) and his pulse is 69. His oxygen saturation is 94%.       Neurological Examination:   Last medication dose: yesterday at 3:30 PM      UPDRS Values 2/18/2022   Time: 7:27 AM   Medication Off   R Brain DBS: On   L Brain DBS: On   Dyskinesia (LID) No   Did LID interfere No   Speech 0   Facial Expression 3   Rigidity Neck 1   Rigidity RUE 0   Rigidity LUE 1   Rigidity RLE 1   Rigidity LLE 1   Finger Taps R 1   Finger  Taps L 1   Hand Mvt R 2   Hand Mvt L 2   Pron-/Supinate R 0   Pron-/Supinate L 1   Toe Tap R 0   Toe Tap L 0   Leg Agility R 1   Leg Agility L 1   Arise From Chair 1   Gait 0   Gait Freezing 0   Postural Stability 0   Posture 1   Global Spont Mvt 1   Postural Tremor RUE 0   Postural Tremor LUE 0   Kinetic Tremor RUE 0   Kinetic Tremor LUE 1   Rest Tremor RUE 0   Rest Tremor LUE 0   Rest Tremor RLE 0   Rest Tremor LLE 0   Rest Tremor Lip/Jaw 0   Rest Tremor Constancy 0   Total Right 5   Total Left 8   Axial Total 7   Total 20       Gait with decreased arm swing on left, but normal stride length and retropulsion test was intact    Procedure: DBS Interrogation & Programming    Lead(s):   Lead recon [email or web address]    Side Left Right   Target STN STN   Lead  Abbott Abbott   Lead Model Infinity 0.5 6172, 0.5 mm spacing   Lead Implant Date 5/29/2018 11/23/2021   IPG # 1 2     IPG(s):  IPG # 1 2   IPG  Abbott Abbott   IPG Model Infinity 5 Infinity 5   IPG Implant Date 6/8/2018 12/3/2021   Location Left chest Right chest   Battery (V) 2.88V (2.89V) 2.89V (>3.0V)     Impedance Check: See scanned report for impedance details. Impedances are fine on Right STN  Left STN: Therapy 425 Ohms  Left STN: marginally low impedences identified at 2b, 3b and 4   Right STN: Therapy 1175 Ohms      IPG #  1   Side Left STN   Initial/Final Initial & Final   Active/Inactive Active   Amplitude (mA) 3.25 [0-4.5 by 0.25]   Pulse width ( s)  60   Freq (Hz)  130   Contacts:   Cpos, 3_omni neg        IPG #  2     Side Right STN      Program Program 1  Program 2    Initial/Final Initial & Final Initial Final   Active/Inactive  Inctive Active Inactive   Amplitude (mA)  2.2 [0-4.0 by 0.1] 1.6 [0-4.0 by 0.1] 1.6 [0-4.0 by 0.1]   Pulse width ( s)  30 20 20   Freq (Hz)  130 130 130   Contacts  Cpos, 12neg Cpos, 12neg Cpos, 12 neg     IPG # 2        Side Right STN       "  Program Program 3  Program 4  Program 5    Initial/Final Initial Final Initial Final  Initial Final   Active/Inactive None Active None Inactive None Inactive   Amplitude (mA)  2.0 [0-4.0 by 0.1]  1.5 [0-3.0 by 0.1]  2.0 [0-4.0 by 0.1]   Pulse width ( s)  20  20  20   Freq (Hz)  130  130  130   Contacts  Cpos, 11_C neg  Cpos, 10_C neg  11_C pos, 10_C neg       Clinical Core Study Programming Form  Please complete upon initial monopolar review and any subsequent \"mini-monopolar reviews\".    Please avoid any extra spaces at the end of cells.    Generator Serial No.  ex: [id number] Lead Hemisphere  Ex: Right Lead Region  Ex: STN  Initials  Ex: LS, KD   [id number] Right STN SC, KG         Enter value only with  change No blank cells No blank cells   Contacts  Cpos, 2bcneg Ampl  0.5 Ampl Unit  V or mA Pulse Width (ms)  60   Rate (Hz)  130 Effect on Symptoms   NA not assessed  NC no change from setting above  NN none noted Side Effects  NA  NC  NN     Cpos, 11_A neg 1.0 mA 20 130 NN - continues to have outstretched tremor, maybe slightly less NN    2.0     Worsening paresthesias    1.5     Improving paresthesias    1.0     Paresthesias continuing to come and go    0     Continues to have sensations            Cpos 11_B neg 0 mA 20 130  Foot is more numb than it was before    1.0     Left foot \"feels like a club foot\"    0     Foot improved    1.5     NN    2.0     Foot starting to stiffen up    2.5     \"electrical shock\" in L foot    0     No change            Cpos 11_C neg 0 mA 20 130  Leg is more stiff    0.5    Proximal tremor is improved, now more distal Brief feeling of electricity (like turning system on)    1.0    Tremor a bit improved NN    1.5    Tremor improved NN, stiffness unchanged    2.0    Tremor continues to improve NN    2.5    NC NN    3.0    Hand opening/closing improved, 1 NN    3.5    Tremor unchanged, FTN 1 (similar to initial exam) NN    4.0    NC. Gait stable, improved L hand tremor. Transient " tingling in L arm    4.5     Sudden stiffening of left foot    0     Stiffening unchanged, started relaxing after ~30 seconds   C pos, 10_C neg 0 mA 20 130  Stiffness in left foot    0.5     NN    1.0    Tremor improved compared to baseline, still distal Stiffness improving    1.5     NN    2.0    NC NN    2.0  30   Transient L side paresthesia    2.0  20   NN    2.5     NN    3.0    Tremor is more proximal. Gait is stable, improved L hand tremor. Transient L side paresthesia, L foot stiffness (persistent)    0     NN            11_C pos, 10_C neg 0 mA 20 130  Stiffness is mediocre    0.5     Transient paresthesias    1.0    Tremor improved compared to baseline and the last setting NN    1.5     NN    2.0    Tremor slightly improved NN    2.5     NN    3.0    NC NN    3.5     NN    4.0    NC. Gait is stable, L hand tremor a bit worse, but improved L arm swing L foot starting to stiffen, persistent    0     Stiffness unchanged       - Notably, some variation in response to DBS. Stiffness did not necessarily correlate with going up or down on stimulation. On Cpos, 11_bneg there does appear to be a threshold at 2.0.  - Tremor noted in left hand while holding outstretched, initially with more proximal component (? Capsular effect), but with additional programming was more distal (more consistent with parkinsonian, re-emergent)    Assessment/plan:    Mr. Odonnell is a 71 year old right handed man with tremulous Parkinson disease and possible superimposed essential tremor s/p left STN DBS who returns for PD follow-up and DBS programming. He has been noticing worsening paresthesias & stiffness in left hand & leg. Today we completed DBS programming as above and he was discharged from clinic with 3 new program. Plan to discharge on program 3 and walk up amplitude. He will report back to us if any concerns, could then try either program 4 or 5. No changes were made to left STN.    - DBS programming as above. Sent home on  program 3.  - no medication changes    RTC appointment already scheduled on 4/22  Due in July for 48M ON/OFF testing     Patient and plan was examined & discussed with attending physician, Dr. Sloan.    Freya Mosquera MD  Movement Disorder Fellow       Patient seen and examined with Dr. Mosquera and I agree with the assessment and plan as outlined.  1.5h DBS programming, plus an additional 0.75h this date with patient, reviewing records, and documenting.    Stanford Sloan PhD, MD

## 2022-02-18 ENCOUNTER — OFFICE VISIT (OUTPATIENT)
Dept: NEUROLOGY | Facility: CLINIC | Age: 72
End: 2022-02-18
Payer: COMMERCIAL

## 2022-02-18 ENCOUNTER — ANCILLARY PROCEDURE (OUTPATIENT)
Dept: CT IMAGING | Facility: CLINIC | Age: 72
End: 2022-02-18
Attending: PSYCHIATRY & NEUROLOGY
Payer: COMMERCIAL

## 2022-02-18 VITALS
SYSTOLIC BLOOD PRESSURE: 151 MMHG | OXYGEN SATURATION: 94 % | DIASTOLIC BLOOD PRESSURE: 87 MMHG | BODY MASS INDEX: 31.54 KG/M2 | WEIGHT: 249 LBS | HEART RATE: 69 BPM

## 2022-02-18 DIAGNOSIS — Z96.89 STATUS POST DEEP BRAIN STIMULATOR PLACEMENT: ICD-10-CM

## 2022-02-18 DIAGNOSIS — G20.A1 PARKINSON DISEASE (H): Primary | ICD-10-CM

## 2022-02-18 DIAGNOSIS — G20.A1 PARKINSON DISEASE (H): ICD-10-CM

## 2022-02-18 PROCEDURE — 95984 ALYS BRN NPGT PRGRMG ADDL 15: CPT | Performed by: PSYCHIATRY & NEUROLOGY

## 2022-02-18 PROCEDURE — 70450 CT HEAD/BRAIN W/O DYE: CPT | Mod: GC | Performed by: RADIOLOGY

## 2022-02-18 PROCEDURE — 95983 ALYS BRN NPGT PRGRMG 15 MIN: CPT | Performed by: PSYCHIATRY & NEUROLOGY

## 2022-02-18 PROCEDURE — 99215 OFFICE O/P EST HI 40 MIN: CPT | Mod: 25 | Performed by: PSYCHIATRY & NEUROLOGY

## 2022-02-18 ASSESSMENT — UNIFIED PARKINSONS DISEASE RATING SCALE (UPDRS)
DYSKINESIAS_PRESENT: NO
RIGIDITY_NECK: SLIGHT: RIGIDITY ONLY DETECTED WITH ACTIVATION MANEUVER.
PRONATION_SUPINATION_LEFT: SLIGHT: ANY OF THE FOLLOWING: A) THE REGULAR RHYTHM IS BROKEN WITH ONE WITH ONE OR TWO INTERRUPTIONS OR HESITATIONS OF THE MOVEMENT B) SLIGHT SLOWING C) THE AMPLITUDE DECREMENTS NEAR THE END OF THE 10 MOVEMENTS.
AMPLITUDE_LLE: NORMAL: NO TREMOR.
AMPLITUDE_RUE: NORMAL: NO TREMOR.
LEG_AGILITY_LEFT: SLIGHT: ANY OF THE FOLLOWING: A) THE REGULAR RHYTHM IS BROKEN WITH ONE WITH ONE OR TWO INTERRUPTIONS OR HESITATIONS OF THE MOVEMENT B) SLIGHT SLOWING C) THE AMPLITUDE DECREMENTS NEAR THE END OF THE 10 MOVEMENTS.
POSTURE: 1 SLIGHT.  NOT QUITE ERECT BUT COULD BE NORMAL FOR OLDER PERSONS.
TOTAL_SCORE: 20
PARKINSONS_MEDS: OFF
MOVEMENTS_INTERFERE_WITH_RATINGS: NO
AMPLITUDE_RLE: NORMAL: NO TREMOR.
GAIT: NORMAL
SPONTANEITY_OF_MOVEMENT: 1: SLIGHT: SLIGHT GLOBAL SLOWNESS AND POVERTY OF SPONTANEOUS MOVEMENTS.
LEG_AGILITY_RIGHT: SLIGHT: ANY OF THE FOLLOWING: A) THE REGULAR RHYTHM IS BROKEN WITH ONE WITH ONE OR TWO INTERRUPTIONS OR HESITATIONS OF THE MOVEMENT B) SLIGHT SLOWING C) THE AMPLITUDE DECREMENTS NEAR THE END OF THE 10 MOVEMENTS.
RIGIDITY_RUE: NORMAL
ARISING_CHAIR: SLIGHT: ARISING IS SLOWER THAN NORMAL, OR MAY NEED MORE THAN ONE ATTEMPT, OR MAY NEED TO MOVE FORWARD IN THE CHAIR TO ARISE.  NO NEED TO USE THE ARMS OF THE CHAIR.
AMPLITUDE_LUE: NORMAL: NO TREMOR.
HANDMOVEMENTS_LEFT: MILD: ANY OF THE FOLLOWING: A) 3 TO 5 INTERRUPTIONS DURING TAPPING B) MILD SLOWING C) THE AMPLITUDE DECREMENTS MIDWAY IN THE 10-MOVEMENT SEQUENCE
RIGIDITY_LLE: SLIGHT: RIGIDITY ONLY DETECTED WITH ACTIVATION MANEUVER.
SPEECH: NORMAL
FACIAL_EXPRESSION: MODERATE: MASKED FACIES WITH LIPS PARTED SOME OF THE TIME WHEN THE MOUTH IS AT REST.
HANDMOVEMENTS_RIGHT: MILD: ANY OF THE FOLLOWING: A) 3 TO 5 INTERRUPTIONS DURING TAPPING B) MILD SLOWING C) THE AMPLITUDE DECREMENTS MIDWAY IN THE 10-MOVEMENT SEQUENCE
PRONATION_SUPINATION_RIGHT: NORMAL
FINGER_TAPPING_LEFT: SLIGHT: ANY OF THE FOLLOWING: A) THE REGULAR RHYTHM IS BROKEN WITH ONE WITH ONE OR TWO INTERRUPTIONS OR HESITATIONS OF THE MOVEMENT B) SLIGHT SLOWING C) THE AMPLITUDE DECREMENTS NEAR THE END OF THE 10 MOVEMENTS.
TOTAL_SCORE_LEFT: 8
CONSTANCY_TREMOR_ATREST: NORMAL: NO TREMOR.
FINGER_TAPPING_RIGHT: SLIGHT: ANY OF THE FOLLOWING: A) THE REGULAR RHYTHM IS BROKEN WITH ONE WITH ONE OR TWO INTERRUPTIONS OR HESITATIONS OF THE MOVEMENT B) SLIGHT SLOWING C) THE AMPLITUDE DECREMENTS NEAR THE END OF THE 10 MOVEMENTS.
AMPLITUDE_LIP_JAW: NORMAL: NO TREMOR.
FREEZING_GAIT: NORMAL
TOETAPPING_LEFT: NORMAL
AXIAL_SCORE: 7
RIGIDITY_LUE: SLIGHT: RIGIDITY ONLY DETECTED WITH ACTIVATION MANEUVER.
TOTAL_SCORE: 5
RIGIDITY_RLE: SLIGHT: RIGIDITY ONLY DETECTED WITH ACTIVATION MANEUVER.
TOETAPPING_RIGHT: NORMAL
POSTURAL_STABILITY: NORMAL:  RECOVERS WITH ONE OR TWO STEPS.

## 2022-02-18 NOTE — PATIENT INSTRUCTIONS
- You are leaving today on program 3. Continue to slowly increase up as you have been doing until you find a good setting or starting to have side effects. Please keep us updated with your results on this program.  - You also have 2 other new programs (4 & 5) which may be options in the future if program 3 does not work for you.  - Please notify us if you want to switch programs.  - No changes to your medication today.

## 2022-02-18 NOTE — Clinical Note
2022       RE: Stefan Odonnell  [address] [facility] [address]     Dear Colleague,    Thank you for referring your patient, Stefan Odonnell, to the [facility] [place] [facility]. Please see a copy of my visit note below.    Department of Neurology  Movement Disorders Division   DBS Follow-up Note    Patient: Stefan Odonnell  MRN: [id number]   : 1950   Date of Visit: 2021    Diagnosis: Parkinson's disease  DBS Target(s): Left STN  Date(s) of DBS Lead Placement: 2018  Date(s) of IPG Placement: Left chest 2018  Device: Abbott    Chief Complaint:  Mr. Odonnell is a 71 year old right handed man with tremulous Parkinson disease and possible superimposed essential tremor s/p left STN DBS who returns for PD management & DBS programming. The patient's last visit was via telemedicine on 2/15/2022 at which time right STN DBS program was created with decreased pulse width of 20 microsec.    Interval History:  Yesterday, he switched back to program 1 because the numbness/tingling and swelling in his left foot worsened. Tingling is described as electrical sensation in whole left body that shoots down to foot. This is always there but has waves of worsening and these are associated with left foot stiffening. He describes as feeling like a gout attack but no swollen toes. Neither he nor wife have noticed actual swelling in the feet. He was on program 1 for a half day, but did not notice a difference so he switched back to program 2. He tried to decrease stimulation down to 1.6 mA but again did not notice a difference. Since the surgery, he has noticed more cramping in left thigh as well, mostly at night.    No falls, but not as stable on his feet. No passing out feeling.      No surgery on right hand since last seen. Since last month, he has stopped using his glucose monitor because it kept going off, including the middle of the  night. He would then check his blood sugar and it would not correlate with his monitor. He will follow-up with his PCP on the plan for this - he states DM is managed by PCP.     No other concerns or medical changes since his last visit.    UPDRS  Part I   Previously 4 on 1/7/22    UPDRS II  UPDRS Questionnaire 2/16/2022   Over the past week, have you had problems with your speech? 0   Over the past week, have you usually had too much saliva during when you are awake or when you sleep? 0   Over the past week, have you usually had problems swallowing pills or eating meals?  Do you need your pills cut or crushed or your meals to be made soft, chopped or blended to avoid choking? 0   Over the past week, have you usually had troubles handling your food and using eating utensils?  For example, do you have trouble handling finger foods or using forks, knifes, spoons, chopsticks? 2   Over the past week, have you usually had problems dressing?  For example, are you slow or do you need help with buttoning, using zippers, putting on or taking off your clothes or jewelry? 0   Over the past week, have you usually been slow or do you need help with washing, bathing, shaving, brushing teeth, combing your hair or with other personal hygiene? 0   Over the past week, have people usually had trouble reading your handwriting? 3   Over the past week, have you usually had trouble doing your hobbies or other things that you like to do? 0   Over the past week, do you usually have trouble turning over in bed? 0   Over the past week, have you usually had shaking or tremor? 1   Over the past week, have you usually had trouble getting out of bed, a car seat, or a deep chair? 2   Over the past week, have you usually had problems with balance and walking? 0   Over the past week, on your usual day when walking, do you suddenly stop or freeze as if your feet are stuck to the floor. 0   MDS-UPDRS II Total Score 8     Previous 10 on  12/31/2021    Review of Systems:  Other than that noted at the end of this note, the remainder of 12 systems reviewed were negative.    Medications:  Current Outpatient Medications   Medication Sig Dispense Refill     acetaminophen (TYLENOL) 325 MG tablet Take 2 tablets (650 mg) by mouth every 4 hours as needed for other (For optimal non-opioid multimodal pain management to improve pain control.) 60 tablet 0     allopurinol (ZYLOPRIM) 300 MG tablet TAKE 1 TABLET BY MOUTH DAILY IN THE EVENING       amLODIPine (NORVASC) 5 MG tablet Take 5 mg by mouth every morning        augmented betamethasone dipropionate (DIPROLENE-AF) 0.05 % external ointment Apply topically 2 times daily       blood glucose monitoring ([employer] CONTOUR NEXT MONITOR) meter device kit        blood glucose monitoring ([employer] [address] [employer] CONTOUR NEXT) test strip USE TO TEST BLOOD SUGARS 3 TIMES DAILY       blood glucose monitoring (VITA MICROLET) lancets Test Blood Sugar 3 Times Daily.  Dx-E11.8       carbidopa-levodopa (SINEMET CR)  MG CR tablet Take half a tab at 3pm.  Increase to 1 tab after 1 week. 90 tablet 3     carbidopa-levodopa (SINEMET)  MG tablet Take 2 tablets twice daily at 4am and 11am (Patient taking differently: 2 tablets 4 times daily ) 360 tablet 3     cephALEXin (KEFLEX) 500 MG capsule Take 1 capsule (500 mg) by mouth 2 times daily 28 capsule 0     cholecalciferol 50 MCG (2000 UT) CAPS Take 4,000 Units by mouth       diphenoxylate-atropine (LOMOTIL) 2.5-0.025 MG per tablet Take 2 tablets by mouth 4 times daily as needed        doxepin (SINEQUAN) 10 MG capsule Take 10 mg by mouth At Bedtime        finasteride (PROSCAR) 5 MG tablet Take 5 mg by mouth every morning       furosemide (LASIX) 20 MG tablet Take 20 mg by mouth as needed        glipiZIDE (GLUCOTROL) 10 MG tablet Take 10 mg by mouth 2 times daily       insulin glargine (LANTUS) 100 UNIT/ML injection Inject Subcutaneous 2 times daily Inject 26 units qpm, 31 units qam        insulin pen needle (EASY TOUCH PEN NEEDLES) 32G X 5 MM USE WITH INSULIN THREE TIMES DAILY       insulin pen needle 32G X 4 MM 1 each       liraglutide (VICTOZA PEN) 18 MG/3ML soln INJECT 1.8 MG UNDER THE SKIN ONE TIME A DAY       Misc Natural Products (TART CHERRY ADVANCED) CAPS Take 1 capsule by mouth 2 times daily        oxyCODONE (ROXICODONE) 5 MG tablet Take 1 tablet (5 mg) by mouth every 4 hours as needed 10 tablet 0     polyethylene glycol (MIRALAX) 17 GM/Dose powder Take 17 g by mouth daily as needed Hold for loose stools. 510 g 0     pregabalin (LYRICA) 50 MG capsule Take 1 capsule (50 mg) by mouth daily as needed (for burning foot pain) 90 capsule 3     senna-docusate (SENOKOT-S/PERICOLACE) 8.6-50 MG tablet Take 1 tablet by mouth 2 times daily Take to prevent constipation. Hold for loose stools 60 tablet 0     simvastatin (ZOCOR) 40 MG tablet Take 40 mg by mouth At Bedtime           PD Medications  0330 [id number] [id number]   CD/LD 25/100mg 2 2 2 2   CD/LD 50/200 mg CR    1     He last took carbidopa-levodopa (immediate release & CR) yesterday at 3:30 PM    Allergies: is allergic to atorvastatin, clindamycin, and gabapentin.    Past Medical History:  Past Medical History:   Diagnosis Date     Anosmia      Bleeding ulcer 1983     Carpal tunnel syndrome, bilateral      CKD (chronic kidney disease) stage 3, GFR 30-59 ml/min (H)      Diabetes mellitus (H)      Gout      Hx of skin cancer, basal cell 2013    removed from face     Hyperlipidemia      Hypertension      Insomnia      Macular degeneration      Neuropathy      Obese      RENÉE (obstructive sleep apnea)      Osteoarthritis of knees, bilateral      Periodic limb movements of sleep      Restless legs syndrome (RLS)      Past Surgical History:  Past Surgical History:   Procedure Laterality Date     ABDOMEN SURGERY  6/1983    ulcers     COLONOSCOPY       HEMORRHOID SURGERY       IMPLANT DEEP BRAIN STIMULATION GENERATOR / BATTERY Left 6/8/2018    Procedure:  IMPLANT DEEP BRAIN STIMULATION GENERATOR / BATTERY;  Deep Brain Stimulator Placement, Phase II, Placement Of Deep Brain Stimulator Generator/Battery Over The Left Chest Wall;  Surgeon: Wayne Marshall MD;  Location: [facility] OR     IMPLANT DEEP BRAIN STIMULATION GENERATOR / BATTERY Right 12/3/2021    Procedure: Deep brain stimulator placement, phase II, placement of deep brain stimulator generator/battery over the right chest wall  Latex Free;  Surgeon: Wayne Marshall MD;  Location: [facility] OR     Open Stomach Ulcer Repair       OPTICAL TRACKING SYSTEM INSERTION DEEP BRAIN STIMULATION Left 5/29/2018    Procedure: OPTICAL TRACKING SYSTEM INSERTION DEEP BRAIN STIMULATION;  Stealth Assisted Left Deep Brain Stimulator Placement, Phase I, Placement Of Left Deep Brain Stimulator Electrode, Target Left Subthalamic Nucleus With Microelectrode Recording;  Surgeon: Wayne Marshall MD;  Location: [facility] OR     OPTICAL TRACKING SYSTEM INSERTION DEEP BRAIN STIMULATION Right 11/23/2021    Procedure: Right stealth assisted deep brain stimulator placement, phase I, placement of right side deep brain stimulator electrode, target right subthalamic nucleus, with microelectrode recording and placement of extension and externalization wires for [employer] research protocol;  Surgeon: Wayne Marshall MD;  Location: [facility] OR     TONSILLECTOMY         Social History:  Social History     Socioeconomic History     Marital status:      Spouse name: Blanca Odonnell     Number of children: 1     Years of education: Not on file     Highest education level: Not on file   Occupational History     Occupation: [employer]     Comment: [employer]   Social Needs     Financial resource strain: Not on file     Food insecurity     Worry: Not on file     Inability: Not on file     Transportation needs     Medical: Not on file     Non-medical: Not on file   Tobacco Use     Smoking status: Never Smoker     Smokeless tobacco:  Never Used   Substance and Sexual Activity     Alcohol use: No     Comment: 2 - 3 x a year     Drug use: No     Sexual activity: Yes     Partners: Female   Lifestyle     Physical activity     Days per week: Not on file     Minutes per session: Not on file     Stress: Not on file   Relationships     Social connections     Talks on phone: Not on file     Gets together: Not on file     Attends Holiness service: Not on file     Active member of club or organization: Not on file     Attends meetings of clubs or organizations: Not on file     Relationship status: Not on file     Intimate partner violence     Fear of current or ex partner: Not on file     Emotionally abused: Not on file     Physically abused: Not on file     Forced sexual activity: Not on file   Other Topics Concern     Parent/sibling w/ CABG, MI or angioplasty before 65F 55M? No   Social History Narrative    On second marriage.  Has a son from first marriage.   for 27 years.  Works at [employer] as special [employer].  Never smoked.  He rarely drinks alcohol about 2 - 3 x per year.  Doesn't use illicit drugs.        Family History:  Family History   Problem Relation Age of Onset     Heart Disease Mother      Diabetes Mother      Arthritis Mother      Depression Mother      Anxiety Disorder Mother      Dementia Mother      Hypertension Mother      LUNG DISEASE Mother      Prostate Cancer Father      Tremor Father      Cancer Father         Prostate     Obesity Sister      Heart Disease Brother      Bone Cancer Brother      Heart Disease Brother      Tremor Son      Heart Disease Maternal Grandmother      Heart Disease Maternal Grandfather      Heart Disease Paternal Grandmother      Heart Disease Paternal Grandfather      Unknown/Adopted Sister        Physical Exam:  The patient's  weight is 112.9 kg (249 lb). His blood pressure is 151/87 (abnormal) and his pulse is 69. His oxygen saturation is 94%.       Neurological Examination:   Last  medication dose: yesterday at 3:30 PM      UPDRS Values 2/18/2022   Time: 7:27 AM   Medication Off   R Brain DBS: On   L Brain DBS: On   Dyskinesia (LID) No   Did LID interfere No   Speech 0   Facial Expression 3   Rigidity Neck 1   Rigidity RUE 0   Rigidity LUE 1   Rigidity RLE 1   Rigidity LLE 1   Finger Taps R 1   Finger Taps L 1   Hand Mvt R 2   Hand Mvt L 2   Pron-/Supinate R 0   Pron-/Supinate L 1   Toe Tap R 0   Toe Tap L 0   Leg Agility R 1   Leg Agility L 1   Arise From Chair 1   Gait 0   Gait Freezing 0   Postural Stability 0   Posture 1   Global Spont Mvt 1   Postural Tremor RUE 0   Postural Tremor LUE 0   Kinetic Tremor RUE 0   Kinetic Tremor LUE 1   Rest Tremor RUE 0   Rest Tremor LUE 0   Rest Tremor RLE 0   Rest Tremor LLE 0   Rest Tremor Lip/Jaw 0   Rest Tremor Constancy 0   Total Right 5   Total Left 8   Axial Total 7   Total 20       Gait with decreased arm swing on left, but normal stride length and retropulsion test was intact    Procedure: DBS Interrogation & Programming    Lead(s):    Left Right   DBS Target STN STN   DBS Lead Type Infinity 0.5 6172, 0.5 mm spacing   Lead Implant Date 5/29/2018 11/23/2021     IPG(s):   1 2   IPG Infinity 5 Infinity 5   IPG Implant Date 6/8/2018 12/3/2021   Location Left chest Right chest   Battery (V) 2.88V (2.89V) 2.89V (>3.0V)     Impedance Check: See scanned report for impedance details. Impedances are fine on Right STN  Left STN: Therapy 425 Ohms  Left STN: marginally low impedences identified at 2b, 3b and 4   Right STN: Therapy 1175 Ohms      IPG #  1   Side Left STN   Initial/Final Initial & Final   Active/Inactive Active   Amplitude (mA) 3.25 [0-4.5 by 0.25]   Pulse width ( s)  60   Freq (Hz)  130   Contacts:   Cpos, 3_omni neg        IPG #  2     Side Right STN      Program Program 1  Program 2    Initial/Final Initial & Final Initial Final   Active/Inactive  Inctive Active Inactive   Amplitude (mA)  2.2 [0-4.0 by 0.1] 1.6 [0-4.0 by 0.1] 1.6 [0-4.0 by  "0.1]   Pulse width ( s)  30 20 20   Freq (Hz)  130 130 130   Contacts  Cpos, 12neg Cpos, 12neg Cpos, 12 neg     IPG # 2        Side Right STN        Program Program 3  Program 4  Program 5    Initial/Final Initial Final Initial Final  Initial Final   Active/Inactive None Active None Inactive None Inactive   Amplitude (mA)  2.0 [0-4.0 by 0.1]  1.5 [0-3.0 by 0.1]  2.0 [0-4.0 by 0.1]   Pulse width ( s)  20  20  20   Freq (Hz)  130  130  130   Contacts  Cpos, 11_C neg  Cpos, 10_C neg  11_C pos, 10_C neg       Clinical Core Study Programming Form  Please complete upon initial monopolar review and any subsequent \"mini-monopolar reviews\".    Please avoid any extra spaces at the end of cells.    Generator Serial No.  ex: [id number] Lead Hemisphere  Ex: Right Lead Region  Ex: STN  Initials  Ex: LS, KD   [id number] Right STN SC, KG         Enter value only with  change No blank cells No blank cells   Contacts  Cpos, 2bcneg Ampl  0.5 Ampl Unit  V or mA Pulse Width (ms)  60   Rate (Hz)  130 Effect on Symptoms   NA not assessed  NC no change from setting above  NN none noted Side Effects  NA  NC  NN     Cpos, 11_A neg 1.0 mA 20 130 NN - continues to have outstretched tremor, maybe slightly less NN    2.0     Worsening paresthesias    1.5     Improving paresthesias    1.0     Paresthesias continuing to come and go    0     Continues to have sensations            Cpos 11_B neg 0 mA 20 130  Foot is more numb than it was before    1.0     Left foot \"feels like a club foot\"    0     Foot improved    1.5     NN    2.0     Foot starting to stiffen up    2.5     \"electrical shock\" in L foot    0     No change            Cpos 11_C neg 0 mA 20 130  Leg is more stiff    0.5    Proximal tremor is improved, now more distal Brief feeling of electricity (like turning system on)    1.0    Tremor a bit improved NN    1.5    Tremor improved NN, stiffness unchanged    2.0    Tremor continues to improve NN    2.5    NC NN    3.0    Hand " opening/closing improved, 1 NN    3.5    Tremor unchanged, FTN 1 (similar to initial exam) NN    4.0    NC. Gait stable, improved L hand tremor. Transient tingling in L arm    4.5     Sudden stiffening of left foot    0     Stiffening unchanged, started relaxing after ~30 seconds   C pos, 10_C neg 0 mA 20 130  Stiffness in left foot    0.5     NN    1.0    Tremor improved compared to baseline, still distal Stiffness improving    1.5     NN    2.0    NC NN    2.0  30   Transient L side paresthesia    2.0  20   NN    2.5     NN    3.0    Tremor is more proximal. Gait is stable, improved L hand tremor. Transient L side paresthesia, L foot stiffness (persistent)    0     NN            11_C pos, 10_C neg 0 mA 20 130  Stiffness is mediocre    0.5     Transient paresthesias    1.0    Tremor improved compared to baseline and the last setting NN    1.5     NN    2.0    Tremor slightly improved NN    2.5     NN    3.0    NC NN    3.5     NN    4.0    NC. Gait is stable, L hand tremor a bit worse, but improved L arm swing L foot starting to stiffen, persistent    0     Stiffness unchanged       - Notably, some variation in response to DBS. Stiffness did not necessarily correlate with going up or down on stimulation. On Cpos, 11_bneg there does appear to be a threshold at 2.0.  - Tremor noted in left hand while holding outstretched, initially with more proximal component (? Capsular effect), but with additional programming was more distal (more consistent with parkinsonian, re-emergent)    Assessment/plan:    Mr. Odonnell is a 71 year old right handed man with tremulous Parkinson disease and possible superimposed essential tremor s/p left STN DBS who returns for PD follow-up and DBS programming. He has been noticing worsening paresthesias & stiffness in left hand & leg. Today we completed DBS programming as above and he was discharged from clinic with 3 new program. Plan to discharge on program 3 and walk up amplitude. He will  report back to us if any concerns, could then try either program 4 or 5. No changes were made to left STN.    - DBS programming as above. Sent home on program 3.  - no medication changes    RTC appointment already scheduled on 4/22  Due in July for 48M ON/OFF testing     Patient and plan was examined & discussed with attending physician, Dr. Sloan.    Freya Mosquera MD  Movement Disorder Fellow               Again, thank you for allowing me to participate in the care of your patient.      Sincerely,    Stanford Sloan MD

## 2022-03-01 ENCOUNTER — TELEPHONE (OUTPATIENT)
Dept: NEUROLOGY | Facility: CLINIC | Age: 72
End: 2022-03-01
Payer: COMMERCIAL

## 2022-03-01 NOTE — TELEPHONE ENCOUNTER
Blanca, patient's wife, was called and informed the patient's 4/22 appointment with Dr. Sloan needs to be rescheduled. Blanca put the patient on the phone. The patient was informed of the same information. The patient was rescheduled to 4/29/22 at 7 AM.

## 2022-04-25 DIAGNOSIS — M79.2 NEUROPATHIC PAIN: ICD-10-CM

## 2022-04-25 RX ORDER — PREGABALIN 50 MG/1
50 CAPSULE ORAL DAILY PRN
Qty: 90 CAPSULE | Refills: 1 | Status: SHIPPED | OUTPATIENT
Start: 2022-04-25 | End: 2022-09-26

## 2022-04-25 NOTE — TELEPHONE ENCOUNTER
Rx Authorization:  Requested Medication/ Dose PREGABALIN 50 MG CAPS 50 Capsule  Date last refill ordered: 10/19/21  Quantity ordered: 90 caps  # refills: 3  Date of last clinic visit with ordering provider: 2/18/22  Date of next clinic visit with ordering provider:   All pertinent protocol data (lab date/result):   Include pertinent information from patients message:

## 2022-04-25 NOTE — TELEPHONE ENCOUNTER
Called pharmacy--this prescription does have refills left but this medication expires after six months. New order needed. Will send new 6 month prescription to Dr. Sloan for signing.     Gela Ny RN, MPH  Research Nurse, Movement Disorders

## 2022-05-21 ENCOUNTER — HEALTH MAINTENANCE LETTER (OUTPATIENT)
Age: 72
End: 2022-05-21

## 2022-08-17 NOTE — PROGRESS NOTES
"71 yo RHM PD or ET-PD, onset with RUE tremor around 2012. L STN DBS.  Gout, DM, nephrolithiasis.  [employer] for [employer], lives in [facility] [place] 3h away.  Has had remote-programming in the past.  Use \"red bharat\"    Two visits ago, we (remotely) created a new program with PW 20.  At last visit (in-person) Feb18 he had just switched back to the original Program 1 for worsening of numbness/tingling/swelling-sensation in L body radiating to foot, with associated foot stiffening.  Then back to Program 2 again, without any difference.    On the right side, his contact, with both Program 1 and Program 2 was 12 (Abbott level 4 monopolar). We created three new programs (3,4,5) with lower contacts:  Program 3 11 (Abbott level 3 monopolar), Program 4 (Abbott level 2 monopolar), and bipolar [id number].  He went home on Program 3    Since last visit:    'aside for when I get nervous there are no problems'  'I am comfortable with this setting so I will leave it as is.'    Appointment rescheduled due to nephrolithiasis.    Today:  Here with wife  Trigger finger: stable without further surgery or OT  Nephrolithiasis:  for reimaging in September, to determine wether surgical itervention is indicated.  He had a CCY for cholecystitis, and the pathology showed stage 1 adenbocarcinoma.  He's to start capecitabine this weekend, and they've prescribed prohylactic Compazine, despite his PD.  Will substitute Zofran.    about 6 weeks ago, a cabinet fell off it's wall-regulo, striking him in the L chest over the nL IPG.  Soreness has resolved. To visual inspection: no break in the skin, no scab, no erythema.  To palpation, no bogginess no collection.    He's concerned about being able to continue working, due to what sound, from his description, like ambiguous indications from his employer    Tremor remains about as well controlled as at his last report, i.e. when he's upset (e.g. a recent case where he was displeased by something a " "coworker did.  He inderstood himself to be at RSTN Program 3, 3.0 mA but on interrogation today he was at 2.0 mA.  We did ON-stim exams at 2.0 mA on that side, but he went home with plan for escalation back up to 3.0.      Fatigue and slowness at work.  A \"hitch\" in his gait.    Connecting to his R STN stimulator, was difficult, although we finally succeeded, with a different clinician-, clearing all bluetooth pairings, and then re-pairing with the magnet, and had no further difficulty from that point.  Seemed to be a Bluetooth issue.    System impedances are unchanged (L chest, L brain, for R body: contacts 2b, 3b, and 4;  R chests, R brain, for L body: all normal).    MDS-UPDRS 1A  1.1 cog 1  1.2 french 0  1.3 dep 0  1.4 anx 1  1.5 apa 0  1.6 dds 0  MDS-UPDRS 1B  1.07 nocturnal sleep 1  1.08 daytime sleepiness 2  1.09 pain 1  1.10 urinary 2  1.11 constipation 1  1.12 orthostasis ?  1.13 fatigue 2      UPDRS Questionnaire 8/16/2022   Over the past week, have you had problems with your speech? 0   Over the past week, have you usually had too much saliva during when you are awake or when you sleep? 1   Over the past week, have you usually had problems swallowing pills or eating meals?  Do you need your pills cut or crushed or your meals to be made soft, chopped or blended to avoid choking? 0   Over the past week, have you usually had troubles handling your food and using eating utensils?  For example, do you have trouble handling finger foods or using forks, knifes, spoons, chopsticks? 1   Over the past week, have you usually had problems dressing?  For example, are you slow or do you need help with buttoning, using zippers, putting on or taking off your clothes or jewelry? 1   Over the past week, have you usually been slow or do you need help with washing, bathing, shaving, brushing teeth, combing your hair or with other personal hygiene? 0   Over the past week, have people usually had trouble reading your " "handwriting? 2   Over the past week, have you usually had trouble doing your hobbies or other things that you like to do? 0   Over the past week, do you usually have trouble turning over in bed? 0   Over the past week, have you usually had shaking or tremor? 1   Over the past week, have you usually had trouble getting out of bed, a car seat, or a deep chair? 1   Over the past week, have you usually had problems with balance and walking? 1   Over the past week, on your usual day when walking, do you suddenly stop or freeze as if your feet are stuck to the floor. 0   MDS-UPDRS II Total Score 8 (was: 8)           PD Medications  0330 0700 [id number]    CD/LD 25/100mg 2 2 2 2    CD/LD 50/200 mg CRn       1    Trazodone 50 mg    1    Lyrica 50 mg 1          \"The one [dose] I really notice is the 7am\" if late with that one \"the hand starts shaking\"    UPDRS Values 8/19/2022   Time: 11:33 AM   Medication On   R Brain DBS: On   L Brain DBS: On   Dyskinesia (LID) No   Did LID interfere No   Speech 1   Facial Expression 2   Rigidity Neck 1   Rigidity RUE 0   Rigidity LUE 0   Rigidity RLE 1   Rigidity LLE 0   Finger Taps R 1   Finger Taps L 0   Hand Mvt R 1   Hand Mvt L 1   Pron-/Supinate R 0   Pron-/Supinate L 0   Toe Tap R 1   Toe Tap L 0   Leg Agility R 0   Leg Agility L 0   Arise From Chair 1   Gait 1   Gait Freezing 0   Postural Stability 1   Posture 0   Global Spont Mvt 2   Postural Tremor RUE 0   Postural Tremor LUE 0   Kinetic Tremor RUE 0   Kinetic Tremor LUE 2   Rest Tremor RUE 0   Rest Tremor LUE 0   Rest Tremor RLE 0   Rest Tremor LLE 0   Rest Tremor Lip/Jaw 0   Rest Tremor Constancy 0   Total Right 4   Total Left 3   Axial Total 9   Total 16       Last dose 3:30p y'day    Lead(s):   Recon [email or web address]     Side Left Right   Target STN STN   Lead  Abbott Abbott   Lead Model Infinity 0.5 Infinity 6172, 0.5 mm spacing   Lead Implant Date " 2021   IPG # 1 2      IPG(s):  IPG # 1 2   IPG  Abbott Abbott   IPG Model Infinity 5 Infinity 5   IPG Implant Date 2018 12/3/2021   Location Left chest Right chest   Battery (V) 2.82 (2.88V) (2.89V) 2.97      IPG #  1   Program Program 1   Side Left STN   Initial/Final Initial & final   Active/Inactive Active   Amplitude (mA) 3.25   Pt range (mA) 0-4.5 by 0.25   Pulse width ( s) 60   Freq (Hz) 130   Contacts:  Cpos, 3_omni neg         IPG #  2     Side Right STN     Program Program 1  Program 2   Initial/Final Initial & final Initial & final   Active/Inactive Inactive Inactive   Amplitude (mA)  2.2 1.6   Pt range (mA) 0-4.0 by 0.1 0-4.0 by 0.1   Pulse width ( s)  30 20   Freq (Hz)  130 130   Contacts  Cpos, 12neg Cpos, 12 neg          IPG # 2       Side Right STN        Program Program 3 Program 4 Program 5   Initial/Final Initial & final Initial & final Initial & final   Active/Inactive Active Inactive Inactive   Amplitude (mA) 2.0 1.5 2.0   Pt range (mA) 0-4.0 by 0.1 0-3.0 by 0.1 0-4.0 by 0.1   Pulse width ( s) 20 20 20   Freq (Hz) 130 130 130   Contacts Cpos, 11_C neg Cpos, 10_C neg 11_C pos  10_C neg       Plan:  See AVS    RTC 2022.    Time this date with patient, reviewing records, and documentinh.    Stanford Sloan PhD, MD

## 2022-08-19 ENCOUNTER — OFFICE VISIT (OUTPATIENT)
Dept: NEUROLOGY | Facility: CLINIC | Age: 72
End: 2022-08-19
Payer: COMMERCIAL

## 2022-08-19 DIAGNOSIS — G20.A1 PARKINSON DISEASE (H): Primary | ICD-10-CM

## 2022-08-19 DIAGNOSIS — Z96.89 STATUS POST DEEP BRAIN STIMULATOR PLACEMENT: ICD-10-CM

## 2022-08-19 PROCEDURE — 99215 OFFICE O/P EST HI 40 MIN: CPT | Mod: 25 | Performed by: PSYCHIATRY & NEUROLOGY

## 2022-08-19 PROCEDURE — 95970 ALYS NPGT W/O PRGRMG: CPT | Performed by: PSYCHIATRY & NEUROLOGY

## 2022-08-19 RX ORDER — TRAZODONE HYDROCHLORIDE 50 MG/1
50 TABLET, FILM COATED ORAL AT BEDTIME
COMMUNITY
Start: 2022-07-25 | End: 2024-07-19

## 2022-08-19 RX ORDER — CAPECITABINE 500 MG/1
TABLET, FILM COATED ORAL
COMMUNITY
Start: 2022-08-16 | End: 2023-03-27

## 2022-08-19 ASSESSMENT — UNIFIED PARKINSONS DISEASE RATING SCALE (UPDRS)
RIGIDITY_NECK: MODERATE: RIGIDITY DETECTED WITHOUT THE ACTIVATION MANEUVER. FULL RANGE OF MOTION IS ACHIEVED WITH EFFORT.
PARKINSONS_MEDS: OFF
GAIT: SLIGHT: INDEPENDENT WALKING WITH MINOR GAIT IMPAIRMENT.
FINGER_TAPPING_LEFT: SLIGHT: ANY OF THE FOLLOWING: A) THE REGULAR RHYTHM IS BROKEN WITH ONE WITH ONE OR TWO INTERRUPTIONS OR HESITATIONS OF THE MOVEMENT B) SLIGHT SLOWING C) THE AMPLITUDE DECREMENTS NEAR THE END OF THE 10 MOVEMENTS.
SPONTANEITY_OF_MOVEMENT: 2: MILD: MILD GLOBAL SLOWNESS AND POVERTY OF SPONTANEOUS MOVEMENTS.
RIGIDITY_LLE: NORMAL
SPONTANEITY_OF_MOVEMENT: 2: MILD: MILD GLOBAL SLOWNESS AND POVERTY OF SPONTANEOUS MOVEMENTS.
TOETAPPING_RIGHT: NORMAL
AMPLITUDE_RLE: NORMAL: NO TREMOR.
TOETAPPING_RIGHT: SLIGHT: ANY OF THE FOLLOWING: A) THE REGULAR RHYTHM IS BROKEN WITH ONE WITH ONE OR TWO INTERRUPTIONS OR HESITATIONS OF THE MOVEMENT B) SLIGHT SLOWING C) THE AMPLITUDE DECREMENTS NEAR THE END OF THE 10 MOVEMENTS.
DYSKINESIAS_PRESENT: NO
TOETAPPING_RIGHT: SLIGHT: ANY OF THE FOLLOWING: A) THE REGULAR RHYTHM IS BROKEN WITH ONE WITH ONE OR TWO INTERRUPTIONS OR HESITATIONS OF THE MOVEMENT B) SLIGHT SLOWING C) THE AMPLITUDE DECREMENTS NEAR THE END OF THE 10 MOVEMENTS.
MOVEMENTS_INTERFERE_WITH_RATINGS: NO
TOTAL_SCORE: 16
POSTURAL_STABILITY: SLIGHT: 3-5 STEPS, BUT RECOVERS UNAIDED.
PRONATION_SUPINATION_LEFT: NORMAL
TOTAL_SCORE: 4
AMPLITUDE_LIP_JAW: NORMAL: NO TREMOR.
CONSTANCY_TREMOR_ATREST: NORMAL: NO TREMOR.
TOTAL_SCORE_LEFT: 3
SPEECH: SLIGHT: LOSS OF MODULATION, DICTION OR VOLUME, BUT STILL ALL WORDS EASY TO UNDERSTAND.
RIGIDITY_LLE: NORMAL
MOVEMENTS_INTERFERE_WITH_RATINGS: NO
GAIT: SLIGHT: INDEPENDENT WALKING WITH MINOR GAIT IMPAIRMENT.
AMPLITUDE_RLE: NORMAL: NO TREMOR.
RIGIDITY_LUE: NORMAL
DYSKINESIAS_PRESENT: NO
FREEZING_GAIT: NORMAL
AMPLITUDE_RLE: NORMAL: NO TREMOR.
RIGIDITY_RUE: NORMAL
FINGER_TAPPING_LEFT: SLIGHT: ANY OF THE FOLLOWING: A) THE REGULAR RHYTHM IS BROKEN WITH ONE WITH ONE OR TWO INTERRUPTIONS OR HESITATIONS OF THE MOVEMENT B) SLIGHT SLOWING C) THE AMPLITUDE DECREMENTS NEAR THE END OF THE 10 MOVEMENTS.
POSTURE: 1 SLIGHT.  NOT QUITE ERECT BUT COULD BE NORMAL FOR OLDER PERSONS.
RIGIDITY_RLE: SLIGHT: RIGIDITY ONLY DETECTED WITH ACTIVATION MANEUVER.
CONSTANCY_TREMOR_ATREST: NORMAL: NO TREMOR.
RIGIDITY_RLE: SLIGHT: RIGIDITY ONLY DETECTED WITH ACTIVATION MANEUVER.
FREEZING_GAIT: NORMAL
HANDMOVEMENTS_LEFT: SLIGHT: ANY OF THE FOLLOWING: A) THE REGULAR RHYTHM IS BROKEN WITH ONE WITH ONE OR TWO INTERRUPTIONS OR HESITATIONS OF THE MOVEMENT B) SLIGHT SLOWING C) THE AMPLITUDE DECREMENTS NEAR THE END OF THE 10 MOVEMENTS.
RIGIDITY_LUE: SLIGHT: RIGIDITY ONLY DETECTED WITH ACTIVATION MANEUVER.
AMPLITUDE_LLE: NORMAL: NO TREMOR.
LEG_AGILITY_LEFT: NORMAL
GAIT: NORMAL
SPONTANEITY_OF_MOVEMENT: 1: SLIGHT: SLIGHT GLOBAL SLOWNESS AND POVERTY OF SPONTANEOUS MOVEMENTS.
TOETAPPING_LEFT: NORMAL
RIGIDITY_RLE: NORMAL
RIGIDITY_NECK: MILD: RIGIDITY DETECTED WITHOUT THE ACTIVATION MANEUVER.  FULL RANGE OF MOTION IS EASILY ACHIEVED.
LEG_AGILITY_RIGHT: NORMAL
RIGIDITY_NECK: SLIGHT: RIGIDITY ONLY DETECTED WITH ACTIVATION MANEUVER.
AMPLITUDE_RUE: NORMAL: NO TREMOR.
FINGER_TAPPING_RIGHT: SLIGHT: ANY OF THE FOLLOWING: A) THE REGULAR RHYTHM IS BROKEN WITH ONE WITH ONE OR TWO INTERRUPTIONS OR HESITATIONS OF THE MOVEMENT B) SLIGHT SLOWING C) THE AMPLITUDE DECREMENTS NEAR THE END OF THE 10 MOVEMENTS.
TOTAL_SCORE_LEFT: 4
DYSKINESIAS_PRESENT: NO
PARKINSONS_MEDS: OFF
AMPLITUDE_RUE: NORMAL: NO TREMOR.
HANDMOVEMENTS_RIGHT: MILD: ANY OF THE FOLLOWING: A) 3 TO 5 INTERRUPTIONS DURING TAPPING B) MILD SLOWING C) THE AMPLITUDE DECREMENTS MIDWAY IN THE 10-MOVEMENT SEQUENCE
FACIAL_EXPRESSION: MILD: IN ADDITION TO DECREASED EYE-BLINK FREQUENCY, MASKED FACIES PRESENT IN THE LOWER FACE AS WELL, NAMELY FEWER MOVEMENTS AROUND THE MOUTH, SUCH AS LESS SPONTANEOUS SMILING, BUT LIPS NOT PARTED.
HANDMOVEMENTS_LEFT: MILD: ANY OF THE FOLLOWING: A) 3 TO 5 INTERRUPTIONS DURING TAPPING B) MILD SLOWING C) THE AMPLITUDE DECREMENTS MIDWAY IN THE 10-MOVEMENT SEQUENCE
ARISING_CHAIR: SLIGHT: ARISING IS SLOWER THAN NORMAL, OR MAY NEED MORE THAN ONE ATTEMPT, OR MAY NEED TO MOVE FORWARD IN THE CHAIR TO ARISE.  NO NEED TO USE THE ARMS OF THE CHAIR.
HANDMOVEMENTS_LEFT: SLIGHT: ANY OF THE FOLLOWING: A) THE REGULAR RHYTHM IS BROKEN WITH ONE WITH ONE OR TWO INTERRUPTIONS OR HESITATIONS OF THE MOVEMENT B) SLIGHT SLOWING C) THE AMPLITUDE DECREMENTS NEAR THE END OF THE 10 MOVEMENTS.
TOETAPPING_LEFT: NORMAL
PARKINSONS_MEDS: ON
POSTURE: 0 NORMAL, NO PROBLEMS
FACIAL_EXPRESSION: MILD: IN ADDITION TO DECREASED EYE-BLINK FREQUENCY, MASKED FACIES PRESENT IN THE LOWER FACE AS WELL, NAMELY FEWER MOVEMENTS AROUND THE MOUTH, SUCH AS LESS SPONTANEOUS SMILING, BUT LIPS NOT PARTED.
PRONATION_SUPINATION_RIGHT: NORMAL
TOTAL_SCORE: 14
TOETAPPING_LEFT: NORMAL
AXIAL_SCORE: 11
RIGIDITY_LLE: NORMAL
HANDMOVEMENTS_RIGHT: SLIGHT: ANY OF THE FOLLOWING: A) THE REGULAR RHYTHM IS BROKEN WITH ONE WITH ONE OR TWO INTERRUPTIONS OR HESITATIONS OF THE MOVEMENT B) SLIGHT SLOWING C) THE AMPLITUDE DECREMENTS NEAR THE END OF THE 10 MOVEMENTS.
LEG_AGILITY_RIGHT: NORMAL
SPEECH: SLIGHT: LOSS OF MODULATION, DICTION OR VOLUME, BUT STILL ALL WORDS EASY TO UNDERSTAND.
ARISING_CHAIR: SLIGHT: ARISING IS SLOWER THAN NORMAL, OR MAY NEED MORE THAN ONE ATTEMPT, OR MAY NEED TO MOVE FORWARD IN THE CHAIR TO ARISE.  NO NEED TO USE THE ARMS OF THE CHAIR.
POSTURAL_STABILITY: NORMAL:  RECOVERS WITH ONE OR TWO STEPS.
AMPLITUDE_LUE: NORMAL: NO TREMOR.
RIGIDITY_RUE: NORMAL
AMPLITUDE_LIP_JAW: NORMAL: NO TREMOR.
MOVEMENTS_INTERFERE_WITH_RATINGS: NO
FINGER_TAPPING_LEFT: NORMAL
RIGIDITY_LUE: NORMAL
LEG_AGILITY_LEFT: NORMAL
PRONATION_SUPINATION_LEFT: NORMAL
AMPLITUDE_LLE: NORMAL: NO TREMOR.
FINGER_TAPPING_RIGHT: MILD: ANY OF THE FOLLOWING: A) 3 TO 5 INTERRUPTIONS DURING TAPPING B) MILD SLOWING C) THE AMPLITUDE DECREMENTS MIDWAY IN THE 10-MOVEMENT SEQUENCE
RIGIDITY_RUE: MILD: RIGIDITY DETECTED WITHOUT THE ACTIVATION MANEUVER.  FULL RANGE OF MOTION IS EASILY ACHIEVED.
HANDMOVEMENTS_RIGHT: SLIGHT: ANY OF THE FOLLOWING: A) THE REGULAR RHYTHM IS BROKEN WITH ONE WITH ONE OR TWO INTERRUPTIONS OR HESITATIONS OF THE MOVEMENT B) SLIGHT SLOWING C) THE AMPLITUDE DECREMENTS NEAR THE END OF THE 10 MOVEMENTS.
TOTAL_SCORE: 27
FREEZING_GAIT: NORMAL
FACIAL_EXPRESSION: MILD: IN ADDITION TO DECREASED EYE-BLINK FREQUENCY, MASKED FACIES PRESENT IN THE LOWER FACE AS WELL, NAMELY FEWER MOVEMENTS AROUND THE MOUTH, SUCH AS LESS SPONTANEOUS SMILING, BUT LIPS NOT PARTED.
CONSTANCY_TREMOR_ATREST: NORMAL: NO TREMOR.
AXIAL_SCORE: 8
AMPLITUDE_LUE: NORMAL: NO TREMOR.
AMPLITUDE_LIP_JAW: NORMAL: NO TREMOR.
LEG_AGILITY_LEFT: NORMAL
SPEECH: SLIGHT: LOSS OF MODULATION, DICTION OR VOLUME, BUT STILL ALL WORDS EASY TO UNDERSTAND.
AMPLITUDE_RUE: NORMAL: NO TREMOR.
TOTAL_SCORE: 10
AXIAL_SCORE: 9
LEG_AGILITY_RIGHT: NORMAL
POSTURAL_STABILITY: NORMAL:  RECOVERS WITH ONE OR TWO STEPS.
TOTAL_SCORE: 2
FINGER_TAPPING_RIGHT: SLIGHT: ANY OF THE FOLLOWING: A) THE REGULAR RHYTHM IS BROKEN WITH ONE WITH ONE OR TWO INTERRUPTIONS OR HESITATIONS OF THE MOVEMENT B) SLIGHT SLOWING C) THE AMPLITUDE DECREMENTS NEAR THE END OF THE 10 MOVEMENTS.
POSTURE: 1 SLIGHT.  NOT QUITE ERECT BUT COULD BE NORMAL FOR OLDER PERSONS.
TOTAL_SCORE_LEFT: 6
ARISING_CHAIR: SLIGHT: ARISING IS SLOWER THAN NORMAL, OR MAY NEED MORE THAN ONE ATTEMPT, OR MAY NEED TO MOVE FORWARD IN THE CHAIR TO ARISE.  NO NEED TO USE THE ARMS OF THE CHAIR.
PRONATION_SUPINATION_RIGHT: NORMAL
AMPLITUDE_LUE: NORMAL: NO TREMOR.
PRONATION_SUPINATION_LEFT: NORMAL
PRONATION_SUPINATION_RIGHT: SLIGHT: ANY OF THE FOLLOWING: A) THE REGULAR RHYTHM IS BROKEN WITH ONE WITH ONE OR TWO INTERRUPTIONS OR HESITATIONS OF THE MOVEMENT B) SLIGHT SLOWING C) THE AMPLITUDE DECREMENTS NEAR THE END OF THE 10 MOVEMENTS.
AMPLITUDE_LLE: NORMAL: NO TREMOR.

## 2022-08-19 ASSESSMENT — PAIN SCALES - GENERAL: PAINLEVEL: NO PAIN (0)

## 2022-08-19 NOTE — PATIENT INSTRUCTIONS
right brain (for left body) Program 2 amplitude is currently 2.0 mA  Starting Monday increase right brain (for left body) Program 2 amplitude by 0.1 every day until you reach 3.0.    Contact us if you notice any changes.  Left brain (for right body) amplitude should stay the same (3.25 mA)

## 2022-08-25 VITALS
SYSTOLIC BLOOD PRESSURE: 124 MMHG | OXYGEN SATURATION: 95 % | TEMPERATURE: 98.5 F | HEIGHT: 75 IN | BODY MASS INDEX: 35.09 KG/M2 | HEART RATE: 71 BPM | DIASTOLIC BLOOD PRESSURE: 75 MMHG | WEIGHT: 282.2 LBS | RESPIRATION RATE: 16 BRPM

## 2022-09-01 ENCOUNTER — TELEPHONE (OUTPATIENT)
Dept: NEUROLOGY | Facility: CLINIC | Age: 72
End: 2022-09-01

## 2022-09-01 NOTE — TELEPHONE ENCOUNTER
"Called patient for update following his appt with Dr. Sloan 8/19/22:    -Patient is currently at 2.9 mA for R STN/left body DBS (he was given instructions to gradually increase from 2.0 to 3.0 last visit). He feels his left hand has been shaking less, and it's easier to use his longer drill bit lately. (He already uses his short drill bit as much as possible, but there are a few tasks which require the long drill bit.) He plans to move up to 3.0 mA tomorrow and leave it there for a week, and will send us an update.    -Pt will complete his first round of chemo on Saturday and then he is off for a week. He thinks he has two more rounds to go but pt needs to clarify that with his oncologist. Pt reports the chemo medication is making him feel exhausted. He is still working full time. He has this upcoming long-weekend off to rest.     -Pt states his feet have been bothering him ever since he went off his gout medication (recommended by his primary or oncologist when he started chemo). He thinks they are feeling more numb and his left foot is giving him trouble with walking, \"getting tangled up\". He doesn't feel like it's \"freezing of gait\" or feeling like his foot is stuck to the floor. Pt is going to reach out to his oncologist and primary care provider, as he was told there may be an alternative medication he can use.     -Regarding UPDRS I from 8/19/22, patient denies any lightheadedness upon standing (0, normal), and would rate his fatigue as mild (2). Pt adds that since starting chemo on 9/21/22 , he rates his fatigue as moderate.    Patient had no further questions.    Gela Ny, RN, MPH  Research Nurse, Movement Disorders    "

## 2022-09-17 ENCOUNTER — HEALTH MAINTENANCE LETTER (OUTPATIENT)
Age: 72
End: 2022-09-17

## 2022-09-26 DIAGNOSIS — M79.2 NEUROPATHIC PAIN: ICD-10-CM

## 2022-09-26 RX ORDER — PREGABALIN 50 MG/1
CAPSULE ORAL
Qty: 90 CAPSULE | Refills: 1 | Status: SHIPPED | OUTPATIENT
Start: 2022-09-26 | End: 2023-01-06

## 2022-09-26 NOTE — TELEPHONE ENCOUNTER
Prescription pended and routed to Dr. Sloan for signing.    Gela Ny, RN, MPH  Research Nurse, Movement Disorders

## 2022-09-26 NOTE — TELEPHONE ENCOUNTER
Rx Authorization:  Requested Medication/ Dose pregabalin (LYRICA) 50 MG capsule  Date last refill ordered: 4/25/22  Quantity ordered: 90  # refills: 1  Date of last clinic visit with ordering provider: 8/19/22  Date of next clinic visit with ordering provider: 1/6/23  All pertinent protocol data (lab date/result):   Include pertinent information from patients message:

## 2023-01-02 ASSESSMENT — MOVEMENT DISORDERS SOCIETY - UNIFIED PARKINSONS DISEASE RATING SCALE (MDS-UPDRS)
HYGIENE: NORMAL: NOT AT ALL (NO PROBLEMS).
DRESSING: NORMAL: NOT AT ALL (NO PROBLEMS).
TREMOR: SLIGHT: SHAKING OR TREMOR OCCURS BUT DOES NOT CAUSE PROBLEMS WITH ANY ACTIVITIES.
FREEZING: NORMAL: NOT AT ALL (NO PROBLEMS).
HOBBIES_AND_OTHER_ACTIVITIES: NORMAL:  NOT AT ALL (NO PROBLEMS).
SALIVA_AND_DROOLING: NORMAL: NOT AT ALL (NO PROBLEMS).
TOTAL_SCORE: 3
CHEWING_AND_SWALLOWING: NORMAL: NO PROBLEMS.
HANDWRITING: MILD: SOME WORDS ARE UNCLEAR AND DIFFICULT TO READ.
GETTING_OUT_OF_BED_CAR_DEEP_CHAIR: NORMAL: NOT AT ALL (NO PROBLEMS).
TURNING_IN_BED: NORMAL: NOT AT ALL (NO PROBLEMS).
EATING_TASKS: NORMAL: NOT AT ALL (NO PROBLEMS).
SPEECH: NORMAL: NOT AT ALL (NO PROBLEMS).
WALKING_AND_BALANCE: NORMAL: NOT AT ALL (NO PROBLEMS).

## 2023-01-05 NOTE — PROGRESS NOTES
"  71 yo RHM PD or ET-PD, onset with RUE tremor around . L STN DBS.  Gout, DM, nephrolithiasis.  [employer] for [employer], lives in [place]. 3h away.  Has had remote-programming in the past.  Use \"red bharat\"    Three visits ago, we (remotely) created a new program with PW 20.  At first, seemed to result in worsening of numbness/tingling/swelling-sensation in L body radiating to foot, with associated foot stiffening. But when he made a second attempt with that program, he noticed no change (neither good nor bad).    Two visits ago, we created three new programs (3,4,5) with lower contacts:  Program 3 11 (Abbott level 3 monopolar), Program 4 (Abbott level 2 monopolar), and bipolar [id number].  He went home on Program 3    At last visit 'aside for when I get nervous there are no problems'  'I am comfortable with this setting so I will leave it as is.'  If late with the 7a dose, \"the hand starts shaking.\"  No other dose-related fluctuations.    \"pretty good\"  Just finished 6 sessions chemoRx  In mid December.  Had worse-than-usual winter finger skin cracking  And foot swelling    He discovered that when turnign off stim and back on, it reverts to clinican's settings, 2.0  But his optimal setting on that side is 3.0 (L body R brain.    Off meds this morning, last dose 2:30pm    UPDRS  Part Ib     1.7 Sleep problems:  1: Slight: Sleep problems are present but usually do not cause trouble getting a full night of sleep.    1.8 Daytime sleepiness:  0: Normal: No daytime sleepiness.     1.9 Pain and other sensations:  0: Normal: No uncomfortable feelings.     1.10 Urinary problems:  0: Normal: No urine control problems.     1.11 Constipation problems:  1: Slight: I have been constipated. I use extra effort to move my bowels. However, this problem does not disturb my activities or my being comfortable.     1.12 Light headedness on standin: Slight: Dizzy or foggy feelings occur. However, they do not cause me " "troubles doing things.    1.13 Fatigue:  2: Mild: Fatigue causes me some troubles doing things or being with people.     No falling out of bed since installing bed rail.     Part II  2.1 Speech (P) 0   2.2 Saliva and drooling (P) 0   2.3 Chewing and swallowing (P) 0   2.4 Eating tasks (P) 0   2.5 Dressing (P) 0   2.6 Hygiene (P) 0   2.7 Handwriting (P) 2   2.8 Doing hobbies and other activities (P) 0   2.9 Turning in bed (P) 0   2.10 Tremor (P) 1   2.11 Getting out of bed  (P) 0   2.12 Walking and balance  (P) 0   2.13 Freezing (P) 0   Total (P) 3     Able to work OK, \"but I get tired a lot -- they told me that would last till Feb\"  Employer's OK with him resting.     PD Medications 0330 0700 1100 [id number] 7:30-8P   CD/LD 25/100mg 2 2 2 2    CD/LD 50/200 mg CR       1    Trazodone 50 mg        1   Lyrica 50 mg No longer taking            \"Lyrica:  Vision started getting blurry, and it resolved\"    UPDRS Motor Scale 1/6/2023   Time:  7:33 AM   Medication Off   R Brain DBS: On   L Brain DBS: On   Dyskinesia (LID) No   Did LID interfere No   Speech 1   Facial Expression 1   Rigidity Neck 1   Rigidity RUE 1   Rigidity LUE 1   Rigidity RLE 0   Rigidity LLE 0   Finger Taps R 1   Finger Taps L 0   Hand Mvt R 1   Hand Mvt L 0   Pron-/Supinate R 0   Pron-/Supinate L 0   Toe Tap R 0   Toe Tap L 0   Leg Agility R 0   Leg Agility L 0   Arise From Chair 1   Gait 1   Gait Freezing 0   Postural Stability 0   Posture 0   Global Spont Mvt 2   Postural Tremor RUE 0   Postural Tremor LUE 1   Kinetic Tremor RUE 0   Kinetic Tremor LUE 2   Rest Tremor RUE 0   Rest Tremor LUE 0   Rest Tremor RLE 0   Rest Tremor LLE 0   Rest Tremor Lip/Jaw 0   Rest Tremor Constancy 0   Total Right 3   Total Left 4   Axial Total 7   Total 14     Also: L<R palp fiss, ?able L loser facial synkinesis    Lead(s):  Recon [email or web address] " "[email or web address]    Side Left Right   Target STN STN   Lead  Abbott Abbott   Lead Model Infinity 0.5 Infinity 6172, 0.5 mm spacing   Lead Implant Date 2021   IPG location Left chest Right chest      IPG(s):  IPG # 1 2   IPG  Abbott Abbott   IPG Model Infinity 5 Infinity 5   IPG Implant Date 2018 12/3/2021   Location Left chest Right chest   Battery (V) 2.82 (2.88V) (2.89V) 2.97      L Chest: System impedences  Continued low impedances at 2B,3B, and 4, now also at 3A, all in the 100-200 range. Program impedance is  Ohm.  R Chest: System impedences: normal.  Program impedence is OK 1350 Ohm      Program(s):     IPG location Left chest   Program Program 1   Side Left STN   Initial/Final Initial & final   Active/Inactive Active   Amplitude (mA) 3.25   Pt range (mA) 0-4.5 by 0.25   Pulse width ( s) 60   Freq (Hz) 130   Contacts:  Cpos, 3_omni neg            IPG location Right chest     Side Right STN     Program Program 1  Program 2   Initial/Final Initial & final Initial & final   Active/Inactive Inactive Inactive   Amplitude (mA)  2.2 1.6   Pt range (mA) 0-4.0 by 0.1 0-4.0 by 0.1   Pulse width ( s)  30 20   Freq (Hz)  130 130   Contacts  Cpos, 12neg Cpos, 12 neg            IPG # Right chest       Side Right STN        Program Program 3 Program 4 Program 5   Initial/Final Initial Initial & final Initial & final   Active/Inactive Active Inactive Inactive   Amplitude (mA) 3.2 1.5 2.0   Pt range (mA) 0-4.0 by 0.1 0-3.0 by 0.1 0-4.0 by 0.1   Pulse width ( s) 20 20 20   Freq (Hz) 130 130 130   Contacts Cpos, 11_C neg Cpos, 10_C neg 11_C pos  10_C neg          Right Chest IPG changed to \"Patients Last Used Amplitude\" from \"Physician's Prescribed Amplitude\"    Time this date with patient, reviewing records, & documentinh;  In addition DBS programming 0.25h.    Stanford Sloan PhD, MD              "

## 2023-01-06 ENCOUNTER — OFFICE VISIT (OUTPATIENT)
Dept: NEUROLOGY | Facility: CLINIC | Age: 73
End: 2023-01-06
Payer: COMMERCIAL

## 2023-01-06 ENCOUNTER — OFFICE VISIT (OUTPATIENT)
Dept: NEUROPSYCHOLOGY | Facility: CLINIC | Age: 73
End: 2023-01-06
Payer: COMMERCIAL

## 2023-01-06 VITALS — SYSTOLIC BLOOD PRESSURE: 133 MMHG | OXYGEN SATURATION: 96 % | HEART RATE: 70 BPM | DIASTOLIC BLOOD PRESSURE: 86 MMHG

## 2023-01-06 DIAGNOSIS — G20.A1 PARKINSON'S DISEASE (H): Primary | ICD-10-CM

## 2023-01-06 DIAGNOSIS — Z96.89 STATUS POST DEEP BRAIN STIMULATOR PLACEMENT: ICD-10-CM

## 2023-01-06 DIAGNOSIS — F09 MENTAL DISORDER DUE TO GENERAL MEDICAL CONDITION: ICD-10-CM

## 2023-01-06 DIAGNOSIS — G20.A1 PARKINSON DISEASE (H): Primary | ICD-10-CM

## 2023-01-06 PROCEDURE — 96138 PSYCL/NRPSYC TECH 1ST: CPT

## 2023-01-06 PROCEDURE — 96139 PSYCL/NRPSYC TST TECH EA: CPT

## 2023-01-06 PROCEDURE — 96132 NRPSYC TST EVAL PHYS/QHP 1ST: CPT

## 2023-01-06 PROCEDURE — 95983 ALYS BRN NPGT PRGRMG 15 MIN: CPT | Performed by: PSYCHIATRY & NEUROLOGY

## 2023-01-06 PROCEDURE — 90791 PSYCH DIAGNOSTIC EVALUATION: CPT

## 2023-01-06 PROCEDURE — 96133 NRPSYC TST EVAL PHYS/QHP EA: CPT

## 2023-01-06 PROCEDURE — 99215 OFFICE O/P EST HI 40 MIN: CPT | Mod: 25 | Performed by: PSYCHIATRY & NEUROLOGY

## 2023-01-06 RX ORDER — ALLOPURINOL 300 MG/1
1 TABLET ORAL DAILY
COMMUNITY
Start: 2022-12-29

## 2023-01-06 RX ORDER — AMOXICILLIN 500 MG/1
CAPSULE ORAL
COMMUNITY
Start: 2022-10-31

## 2023-01-06 RX ORDER — LANCETS 33 GAUGE
EACH MISCELLANEOUS
COMMUNITY
Start: 2022-11-21

## 2023-01-06 ASSESSMENT — UNIFIED PARKINSONS DISEASE RATING SCALE (UPDRS)
RIGIDITY_NECK: SLIGHT: RIGIDITY ONLY DETECTED WITH ACTIVATION MANEUVER.
TOETAPPING_RIGHT: NORMAL
PRONATION_SUPINATION_LEFT: NORMAL
DYSKINESIAS_PRESENT: NO
POSTURAL_STABILITY: NORMAL:  RECOVERS WITH ONE OR TWO STEPS.
ARISING_CHAIR: SLIGHT: ARISING IS SLOWER THAN NORMAL, OR MAY NEED MORE THAN ONE ATTEMPT, OR MAY NEED TO MOVE FORWARD IN THE CHAIR TO ARISE.  NO NEED TO USE THE ARMS OF THE CHAIR.
SPEECH: SLIGHT: LOSS OF MODULATION, DICTION OR VOLUME, BUT STILL ALL WORDS EASY TO UNDERSTAND.
CONSTANCY_TREMOR_ATREST: NORMAL: NO TREMOR.
RIGIDITY_RUE: SLIGHT: RIGIDITY ONLY DETECTED WITH ACTIVATION MANEUVER.
TOETAPPING_LEFT: NORMAL
TOTAL_SCORE: 14
MOVEMENTS_INTERFERE_WITH_RATINGS: NO
AMPLITUDE_LUE: NORMAL: NO TREMOR.
FREEZING_GAIT: NORMAL
FINGER_TAPPING_RIGHT: SLIGHT: ANY OF THE FOLLOWING: A) THE REGULAR RHYTHM IS BROKEN WITH ONE WITH ONE OR TWO INTERRUPTIONS OR HESITATIONS OF THE MOVEMENT B) SLIGHT SLOWING C) THE AMPLITUDE DECREMENTS NEAR THE END OF THE 10 MOVEMENTS.
RIGIDITY_LLE: NORMAL
FINGER_TAPPING_LEFT: NORMAL
LEG_AGILITY_LEFT: NORMAL
RIGIDITY_LUE: SLIGHT: RIGIDITY ONLY DETECTED WITH ACTIVATION MANEUVER.
GAIT: SLIGHT: INDEPENDENT WALKING WITH MINOR GAIT IMPAIRMENT.
FACIAL_EXPRESSION: SLIGHT: MINIMAL MASKED FACIES MANIFESTED ONLY BY DECREASED FREQUENCY OF BLINKING.
AXIAL_SCORE: 7
RIGIDITY_RLE: NORMAL
TOTAL_SCORE_LEFT: 4
PRONATION_SUPINATION_RIGHT: NORMAL
TOTAL_SCORE: 3
SPONTANEITY_OF_MOVEMENT: 2: MILD: MILD GLOBAL SLOWNESS AND POVERTY OF SPONTANEOUS MOVEMENTS.
AMPLITUDE_LIP_JAW: NORMAL: NO TREMOR.
PARKINSONS_MEDS: OFF
HANDMOVEMENTS_LEFT: NORMAL
AMPLITUDE_RUE: NORMAL: NO TREMOR.
POSTURE: 0 NORMAL, NO PROBLEMS
HANDMOVEMENTS_RIGHT: SLIGHT: ANY OF THE FOLLOWING: A) THE REGULAR RHYTHM IS BROKEN WITH ONE WITH ONE OR TWO INTERRUPTIONS OR HESITATIONS OF THE MOVEMENT B) SLIGHT SLOWING C) THE AMPLITUDE DECREMENTS NEAR THE END OF THE 10 MOVEMENTS.
LEG_AGILITY_RIGHT: NORMAL
AMPLITUDE_RLE: NORMAL: NO TREMOR.
AMPLITUDE_LLE: NORMAL: NO TREMOR.

## 2023-01-06 ASSESSMENT — PAIN SCALES - GENERAL: PAINLEVEL: NO PAIN (0)

## 2023-01-06 NOTE — LETTER
Date:January 24, 2023      Provider requested that no letter be sent. Do not send.       [facility]

## 2023-01-06 NOTE — PROGRESS NOTES
NEUROPSYCHOLOGICAL EVALUATION    RELEVANT HISTORY AND REASON FOR REFERRAL    Stefan Odonnell is a 72 year old, right handed allen with 12 years of formal education. Information was obtained via interview with the patient and his wife, and review of his medical records, including prior neuropsychological evaluations.  Records indicate a history of Parkinson's disease and possible essential tremor.  He is s/p left STN DBS lead implantation on 5/29/2018, and right STN DBS lead implantation on 11/23/2021.  He has since been treated for gallbladder cancer with chemotherapy which he completed in mid December.  He was referred for clinical neuropsychological evaluation for further characterization of any cognitive difficulties and to evaluate mood, in 1 year follow-up to his second side DBS surgery.  He also participates in the [facility] and this evaluation served as a 48-month follow-up visit for that as well.    He has undergone prior neuropsychological evaluations.  The first was on January 23, 2018 under the direction of my colleague, Chong Cazares, PhD.  Results indicated findings that were largely normal, with relative weaknesses in complex attention/executive control.  Some of his cognitive profile was thought perhaps attributable to neurodevelopmental factors such as possible ADHD-like syndrome, along with normal aging.  He underwent neuropsychological evaluation under my direction on June 28, 2019.  Results indicated mild executive dysfunction, variable learning and memory, and slowed verbal fluency, with performance otherwise falling within normal limits.  His next clinical evaluation was on September 2, 2021.  Results of that evaluation indicated mild executive dysfunction, a relative weakness in learning, variable memory, and slowed fluency.  Compared to his most recent evaluation he had experienced improvements in basic and complex attention and and semantic fluency, while other measures of fluency  remained stable.  He had declines in memory, although some memory tasks remained within normal limits.  There were also very subtle declines in executive functioning.    CLINICAL INTERVIEW FINDINGS    Upon interview, Mr. Odonnell and his wife stated that his second surgery went well.  He continues to get good benefit from the DBS on both sides of his body.  He and his wife have not been noticing any changes in his cognition.  He denied changes in memory, word finding, attention or concentration, or decision-making.  He lives with his wife, who continues to manage their finances, which is not a change for them.  He manages his own medications, driving, and cooking, apparently without difficulty.    Mr. Odonnell has had some medical issues recently.  He had back pain and was found to have kidney stones, and in the course of treatment for that he was found to have cancer in his gallbladder.  He successfully underwent chemotherapy, which he just finished in December.  He is still tired and worn out but is feeling better every day.  He has continued to work throughout his treatment, as he enjoys what he does.  His mood has been good.  He has not felt depressed, hopeless, helpless, or guilty.  His wife described him as calmer as he has gone through his treatment.  He sometimes worries about work but otherwise denied anxiety.  Sleep has generally been good.  He goes to sleep around 8:00, and usually wakes up around 1:30 a.m., about 45 minutes earlier than he needs to.  He generally feels rested in the morning but feels tired by 2:00 p.m.  After he comes home from work he will nap, wake up to eat supper, and then nap until bedtime.  His appetite has been somewhat lower.  He has lost about 90 pounds, from 350 to 260 pounds 2 years ago.  His energy level is lower.  His interest level is very good.  He enjoys his work and likes to have a purpose in life.  He denied visual or auditory hallucinations.  He has not had suicidal  "ideation or any history of suicide attempts.  He denied use of alcohol, tobacco, or illicit drugs.    Mr. Odonnell stated that his balance has not been great.  If he stands up too fast he feels \"tipsy.\"  He has not had unilateral weakness or numbness.  He is not bothered by headaches.  He has neuropathy in his feet.  He never had COVID.    PAST MEDICAL HISTORY: Medical records indicate a history of obesity, Parkinson's disease, periodic limb movements of sleep, osteoarthritis, obstructive sleep apnea, hypertension, gout, type 2 diabetes mellitus, chronic kidney disease stage III, carpal tunnel syndrome, essential tremor, hyperlipidemia.    CURRENT MEDICATIONS:      Current Outpatient Medications   Medication     acetaminophen (TYLENOL) 325 MG tablet     allopurinol (ZYLOPRIM) 300 MG tablet     amLODIPine (NORVASC) 5 MG tablet     amoxicillin (AMOXIL) 500 MG capsule     augmented betamethasone dipropionate (DIPROLENE-AF) 0.05 % external ointment     blood glucose (NO BRAND SPECIFIED) test strip     blood glucose monitoring (VITA CONTOUR NEXT MONITOR) meter device kit     blood glucose monitoring (VITA MICROLET) lancets     capecitabine (XELODA) 500 MG tablet     carbidopa-levodopa (SINEMET CR)  MG CR tablet     carbidopa-levodopa (SINEMET)  MG tablet     cholecalciferol 50 MCG (2000 UT) CAPS     COMFORT EZ PEN NEEDLES 32G X 6 MM miscellaneous     diphenoxylate-atropine (LOMOTIL) 2.5-0.025 MG per tablet     finasteride (PROSCAR) 5 MG tablet     glipiZIDE (GLUCOTROL) 10 MG tablet     insulin glargine (LANTUS) 100 UNIT/ML injection     insulin pen needle 32G X 4 MM     insulin pen needle 32G X 5 MM     liraglutide (VICTOZA) 18 MG/3ML solution     Misc Natural Products (TART CHERRY ADVANCED) CAPS     senna-docusate (SENOKOT-S/PERICOLACE) 8.6-50 MG tablet     simvastatin (ZOCOR) 40 MG tablet     traZODone (DESYREL) 50 MG tablet     No current facility-administered medications for this visit.     FAMILY " MEDICAL HISTORY:  Significant for tremor in his father and son.    BEHAVIORAL OBSERVATIONS    During the evaluation, Mr. Odonnell was pleasant, cooperative, and seemed understand the instructions.  He was alert and oriented to person, place, and time.  He struggled to get out of the chair in the lobby, and was slow to stand and had to try to get up twice before being successful.  He had an easier time getting up out of the chair in the exam room which had arms.  Slight tremor was observed clinically in his right hand at rest.  Speech was fluent, with normal articulation, volume, and rate.  He presented his thoughts in a clear, logical manner.  He tended to be hesitant to guess on tasks.  Internal performance validity measures fell within normal limits.  The results are believed to accurately reflect his current level of functioning.    MEASURES ADMINISTERED    The following measures were administered by a trained psychometrist, under the direct supervision of a licensed psychologist.    Subtests of the Wechsler Adult Intelligence Scale-4; subtests of the Wechsler Memory Scale-4; Gomez Verbal Learning Test-Revised, Form 1; Brief Visual Memory Test - Revised, Form 2; Refugio Naming Test; D-KEFS Verbal Fluency, Alternate Form; Trail Making Test; Stroop; Wisconsin Card Sorting Test - 64; Montiel Judgement of Line Orientation Form H; Raji-Osterrieth Complex Figure; Clock Drawing; Dementia Rating Scale - 2 (DRS-2) Alternate Form;  MoCA v. 7.1; Rodney Depression Inventory-2 (BDI-2), HAM-D, HAM-A, Apathy Scale, RBDSQ; QUIP, PDQ-39, ESS.     RESULTS AND INTERPRETATION    Overall intellectual functioning was estimated to fall in the average range, marginally above premorbid estimates of below average based on single word reading abilities administered at a prior evaluation.  Performance on a screening measure of dementia was average (DRS-2 Total Score = 136/144). Performance on the MoCA fell below expected (21/30). Specifically,  he had reduced fluency and errors in clock drawing, repetition, abstraction, and memory.    Confrontation naming was high average for his age.  Ability to comprehend and articulate responses to complex social situations was average.  Switching fluency was low average.  Letter fluency was below average, and generative naming to category was exceptionally low.    Attention span was average for his age.  Divided attention was high average on a timed, visuomotor sequencing task, and was low average on an untimed, auditory sequencing task.  Performance on a measure of cognitive flexibility and speed was low average.    Basic visual perception, including matching lines and angles, was above average for his age.  Construction of a clock was impaired, and was notable for difficulty with conceptualization.  He correctly placed the numbers 12, 1, 3, 6, and 9, but interspersed with the numbers 5, 20, 25, 35, 40, 50, and 55.  Copy of a clock fell largely within normal limits.  Construction of a complex design was low average.  Nonverbal deductive reasoning was average.    Novel problem-solving, including the ability to generate strategies and solutions, fell within normal limits for his age and level of education.    Immediate recall of verbal narrative material was average, with average recall following a 30-minute delay.  On a multiple trial list learning task, immediate recall was low average, with below average recall following a 25-minute delay.  Recognition memory on this task was also below average, but included no false positive identifications of words.  Immediate recall of visual material was average, with low average retention (57%) of the learned material following a 25-minute delay.  Recognition memory on this task included no errors.    On the BDI-2, a self-report questionnaire, he endorsed minimal depressive symptomatology.  He also endorsed minimal apathy on a questionnaire. He endorsed excessive daytime  sleepiness on a questionnaire, but did not endorse dream enactment behaviors or compulsive behaviors on questionnaires.    IMPRESSIONS AND RECOMMENDATIONS    Stefan Odonnell is a 72-year-old man with a history of Parkinson's disease and possible sultana mitten essential tremor.  He is s/p left STN DBS lead implantation on 5/29/2018, and right STN DBS lead implantation on 11/23/2021. This evaluation was undertaken in 1 year clinical follow-up to his second side surgery.  His most recent neuropsychological evaluation on 9/2/2021 demonstrated mild executive dysfunction, with learning reflecting a relative weakness in memory being variable.  Fluency was slowed.  Otherwise, performance fell within normal limits.    Results of today's evaluation indicate slowed fluency and subtle executive dysfunction including difficulty with conceptualization.  Learning and memory are variable but largely fell within normal limits.  Language, visual processing, and complex attentional processing all fall within normal limits.  He endorses excessive daytime sleepiness, but does not endorse significant depressive symptomatology, apathy, anxiety, or compulsive behaviors.    Compared to his most recent evaluation in September 2021, memory remains variable, with improvements in some aspects of memory and slight declines in others.  He has had declines in category fluency but other aspects of fluency have remained stable.  He has had improvement in some aspects of executive functioning.  Performance otherwise remains stable across cognitive domains.    This pattern of performance continues to be suggestive of frontal and subcortical system involvement, without evidence of significant progression over time.  The pattern continues to be consistent with his history of Parkinson's disease, although it is noted that he also has a possible history of ADHD which may contribute.  The findings do not reflect dementia at this time and are only subtly  abnormal.    In terms of daily functioning, Mr. Odonnell is not experiencing cognitive difficulties that might interfere with his ability to actively participate in treatment or to manage his instrumental activities of daily living independently.  He may find that he benefits from structure and routine.  If he has difficulty managing large, complex tasks, others may assist by breaking down such tasks into smaller, more manageable parts.  He may find that it takes him longer to complete tasks, so he may find it helpful to provide himself with ample time when working on a project so that he does not become overwhelmed and work less efficiently.    Results may serve as an updated baseline of his neurocognitive functioning.  The evaluation may be repeated in the future for comparison should a change in mental status occur.    Edwina Goldstein, Ph.D., [facility]  Licensed Psychologist, [employer] [id number]  Board Certified in Clinical Neuropsychology      Time spent: One unit of professional time, including interview (CPT 00043). 60 minutes (1 unit) neuropsychological testing evaluation by licensed and board-certified neuropsychologist, including integration of patient data, interpretation of standardized test results and clinical data, clinical decision-making, treatment planning, report, and interactive feedback to the patient, first hour (CPT 68993). 101 minutes (2 units) of neuropsychological testing evaluation by licensed and board-certified neuropsychologist, including integration of patient data, interpretation of standardized test results and clinical data, clinical decision-making, treatment planning, report, and interactive feedback to the patient, subsequent hours (CPT 40383). 30 minutes of neuropsychological test administration and scoring by technician, first 30 minutes (CPT 89925). 219 additional minutes (7 units) neuropsychological test administration and scoring by technician, subsequent 30 minutes (CPT 61146). ICD-10  Diagnoses: G20; F06.8.

## 2023-01-06 NOTE — LETTER
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 L STN DBS.  Gout, DM, nephrolithiasis.  [employer], lives in [place] ca. 3h away.  Has had remote-programming in the past.  Use \"red bharat\"    Three visits ago, we (remotely) created a new program with PW 20.  At first, seemed to result in worsening of numbness/tingling/swelling-sensation in L body radiating to foot, with associated foot stiffening. But when he made a second attempt with that program, he noticed no change (neither good nor bad).    Two visits ago, we created three new programs (3,4,5) with lower contacts:  Program 3 11 (Abbott level 3 monopolar), Program 4 (Abbott level 2 monopolar), and bipolar [id number].  He went home on Program 3    At last visit 'aside for when I get nervous there are no problems'  'I am comfortable with this setting so I will leave it as is.'  If late with the 7a dose, \"the hand starts shaking.\"  No other dose-related fluctuations.    \"pretty good\"  Just finished 6 sessions chemoRx  In mid December.  Had worse-than-usual winter finger skin cracking  And foot swelling    He discovered that when turnign off stim and back on, it reverts to clinican's settings, 2.0  But his optimal setting on that side is 3.0 (L body R brain.    Off meds this morning, last dose 2:30pm    UPDRS  Part Ib     1.7 Sleep problems:  1: Slight: Sleep problems are present but usually do not cause trouble getting a full night of sleep.    1.8 Daytime sleepiness:  0: Normal: No daytime sleepiness.     1.9 Pain and other sensations:  0: Normal: No uncomfortable feelings.     1.10 Urinary problems:  0: Normal: No urine control problems.     1.11 Constipation problems:  1: Slight: I have been constipated. I use extra effort to move my bowels. However, this problem does not disturb my activities or my being comfortable.     1.12 Light headedness on " "standin: Slight: Dizzy or foggy feelings occur. However, they do not cause me troubles doing things.    1.13 Fatigue:  2: Mild: Fatigue causes me some troubles doing things or being with people.     No falling out of bed since installing bed rail.     Part II  2.1 Speech (P) 0   2.2 Saliva and drooling (P) 0   2.3 Chewing and swallowing (P) 0   2.4 Eating tasks (P) 0   2.5 Dressing (P) 0   2.6 Hygiene (P) 0   2.7 Handwriting (P) 2   2.8 Doing hobbies and other activities (P) 0   2.9 Turning in bed (P) 0   2.10 Tremor (P) 1   2.11 Getting out of bed  (P) 0   2.12 Walking and balance  (P) 0   2.13 Freezing (P) 0   Total (P) 3     Able to work OK, \"but I get tired a lot -- they told me that would last till Feb\"  [id number] with him resting.     PD Medications *** 0330 [id number] 7:30-8P   CD/LD 25/100mg 2 2 2 2    CD/LD 50/200 mg CR       1    Trazodone 50 mg        1   Lyrica 50 mg No longer taking            \"Lyrica:  Vision started getting blurry, and it resolved\"    Lead(s):  Recon [email or web address]    Side Left Right   Target STN STN   Lead  Abbott Abbott   Lead Model Infinity 0.5 Infinity 6172, 0.5 mm spacing   Lead Implant Date 2021   IPG location Left chest Right chest      IPG(s):  IPG # 1 2   IPG  Abbott Abbott   IPG Model Infinity 5 Infinity 5   IPG Implant Date 2018 12/3/2021   Location Left chest Right chest   Battery (V)*** 2.82 (2.88V) (2.89V) 2.97      L Chest: System impedences  Continued low impedances at 2B,3B, and 4, now also at 3A, all in the 100-200 range. Program impedance is  Ohm.  R Chest: System impedences: normal.  Program impedence is OK 1350 Ohm      Program(s):     IPG location Left chest   Program Program 1   Side Left STN   Initial/Final Initial & final   Active/Inactive Active " "  Amplitude (mA) 3.25   Pt range (mA) 0-4.5 by 0.25   Pulse width ( s) 60   Freq (Hz) 130   Contacts:  Cpos, 3_omni neg            IPG location Right chest     Side Right STN     Program Program 1  Program 2   Initial/Final Initial & final Initial & final   Active/Inactive Inactive Inactive   Amplitude (mA)  2.2 1.6   Pt range (mA) 0-4.0 by 0.1 0-4.0 by 0.1   Pulse width ( s)  30 20   Freq (Hz)  130 130   Contacts  Cpos, 12neg Cpos, 12 neg            IPG # Right chest       Side Right STN        Program Program 3 Program 4 Program 5   Initial/Final Initial Initial & final Initial & final   Active/Inactive Active Inactive Inactive   Amplitude (mA) 3.2 1.5 2.0   Pt range (mA) 0-4.0 by 0.1 0-3.0 by 0.1 0-4.0 by 0.1   Pulse width ( s) 20 20 20   Freq (Hz) 130 130 130   Contacts Cpos, 11_C neg Cpos, 10_C neg 11_C pos  10_C neg          Right Chest IPG changed to \"Patients Last Used Amplitude\" from \"Physician's Prescribed Amplitude\"  \  L<R palp fiss, ?able L loser facial synkinesis                  Stanford Sloan MD    "

## 2023-01-06 NOTE — NURSING NOTE
The patient was seen for neuropsychological evaluation at the request of Stanford Sloan MD for the purposes of diagnostic clarification and treatment planning. 249 minutes of test administration and scoring were provided by this writer. Please see Dr. Edwina Goldstein's report for a full interpretation of the findings.    Sandra Allen  Psychometrist

## 2023-01-06 NOTE — LETTER
"1/6/2023       RE: Stefan Odonnell  [address]  [facility] [address]     Dear Colleague,    Thank you for referring your patient, Stefan Odonnell, to the [facility] [place] at [facility]. Please see a copy of my visit note below.      71 yo RHM PD or ET-PD, onset with RUE tremor around 2012. L STN DBS.  Gout, DM, nephrolithiasis.  [employer], lives in [facility] ca. 3h away.  Has had remote-programming in the past.  Use \"red bharat\"    Three visits ago, we (remotely) created a new program with PW 20.  At first, seemed to result in worsening of numbness/tingling/swelling-sensation in L body radiating to foot, with associated foot stiffening. But when he made a second attempt with that program, he noticed no change (neither good nor bad).    Two visits ago, we created three new programs (3,4,5) with lower contacts:  Program 3 11 (Abbott level 3 monopolar), Program 4 (Abbott level 2 monopolar), and bipolar [id number].  He went home on Program 3    At last visit 'aside for when I get nervous there are no problems'  'I am comfortable with this setting so I will leave it as is.'  If late with the 7a dose, \"the hand starts shaking.\"  No other dose-related fluctuations.    \"pretty good\"  Just finished 6 sessions chemoRx  In mid December.  Had worse-than-usual winter finger skin cracking  And foot swelling    He discovered that when turnign off stim and back on, it reverts to clinican's settings, 2.0  But his optimal setting on that side is 3.0 (L body R brain.    Off meds this morning, last dose 2:30pm    UPDRS  Part Ib     1.7 Sleep problems:  1: Slight: Sleep problems are present but usually do not cause trouble getting a full night of sleep.    1.8 Daytime sleepiness:  0: Normal: No daytime sleepiness.     1.9 Pain and other sensations:  0: Normal: No uncomfortable feelings.     1.10 Urinary problems:  0: Normal: No urine " "control problems.     1.11 Constipation problems:  1: Slight: I have been constipated. I use extra effort to move my bowels. However, this problem does not disturb my activities or my being comfortable.     1.12 Light headedness on standin: Slight: Dizzy or foggy feelings occur. However, they do not cause me troubles doing things.    1.13 Fatigue:  2: Mild: Fatigue causes me some troubles doing things or being with people.     No falling out of bed since installing bed rail.     Part II  2.1 Speech (P) 0   2.2 Saliva and drooling (P) 0   2.3 Chewing and swallowing (P) 0   2.4 Eating tasks (P) 0   2.5 Dressing (P) 0   2.6 Hygiene (P) 0   2.7 Handwriting (P) 2   2.8 Doing hobbies and other activities (P) 0   2.9 Turning in bed (P) 0   2.10 Tremor (P) 1   2.11 Getting out of bed  (P) 0   2.12 Walking and balance  (P) 0   2.13 Freezing (P) 0   Total (P) 3     Able to work OK, \"but I get tired a lot -- they told me that would last till Feb\"  [id number] with him resting.     PD Medications *** 0330 [id number] [id number] 7:30-8P   CD/LD 25/100mg 2 2 2 2    CD/LD 50/200 mg CR       1    Trazodone 50 mg        1   Lyrica 50 mg No longer taking            \"Lyrica:  Vision started getting blurry, and it resolved\"    Lead(s):  Recon [email or web address]    Side Left Right   Target STN STN   Lead  Abbott Abbott   Lead Model Infinity 0.5 Infinity 6172, 0.5 mm spacing   Lead Implant Date 2021   IPG location Left chest Right chest      IPG(s):  IPG # 1 2   IPG  Abbott Abbott   IPG Model Infinity 5 Infinity 5   IPG Implant Date 2018 12/3/2021   Location Left chest Right chest   Battery (V)*** 2.82 (2.88V) (2.89V) 2.97      L Chest: System impedences  Continued low impedances at 2B,3B, and 4, now also at 3A, all in the 100-200 range. Program " "impedance is  Ohm.  R Chest: System impedences: normal.  Program impedence is OK 1350 Ohm      Program(s):     IPG location Left chest   Program Program 1   Side Left STN   Initial/Final Initial & final   Active/Inactive Active   Amplitude (mA) 3.25   Pt range (mA) 0-4.5 by 0.25   Pulse width ( s) 60   Freq (Hz) 130   Contacts:  Cpos, 3_omni neg            IPG location Right chest     Side Right STN     Program Program 1  Program 2   Initial/Final Initial & final Initial & final   Active/Inactive Inactive Inactive   Amplitude (mA)  2.2 1.6   Pt range (mA) 0-4.0 by 0.1 0-4.0 by 0.1   Pulse width ( s)  30 20   Freq (Hz)  130 130   Contacts  Cpos, 12neg Cpos, 12 neg            IPG # Right chest       Side Right STN        Program Program 3 Program 4 Program 5   Initial/Final Initial Initial & final Initial & final   Active/Inactive Active Inactive Inactive   Amplitude (mA) 3.2 1.5 2.0   Pt range (mA) 0-4.0 by 0.1 0-3.0 by 0.1 0-4.0 by 0.1   Pulse width ( s) 20 20 20   Freq (Hz) 130 130 130   Contacts Cpos, 11_C neg Cpos, 10_C neg 11_C pos  10_C neg          Right Chest IPG changed to \"Patients Last Used Amplitude\" from \"Physician's Prescribed Amplitude\"  \  L<R palp fiss, ?able L loser facial synkinesis          Sincerely,    Stanford Sloan MD  "

## 2023-01-06 NOTE — LETTER
1/6/2023      RE: Stefan Odonnell  [address] [place] [id number]       NEUROPSYCHOLOGICAL EVALUATION    RELEVANT HISTORY AND REASON FOR REFERRAL    Stefan Odonnell is a 72 year old, right handed allen with 12 years of formal education. Information was obtained via interview with the patient and his wife, and review of his medical records, including prior neuropsychological evaluations.  Records indicate a history of Parkinson's disease and possible essential tremor.  He is s/p left STN DBS lead implantation on 5/29/2018, and right STN DBS lead implantation on 11/23/2021.  He has since been treated for gallbladder cancer with chemotherapy which he completed in mid December.  He was referred for clinical neuropsychological evaluation for further characterization of any cognitive difficulties and to evaluate mood, in 1 year follow-up to his second side DBS surgery.  He also participates in the [facility] and this evaluation served as a 48-month follow-up visit for that as well.    He has undergone prior neuropsychological evaluations.  The first was on January 23, 2018 under the direction of my colleague, Chong Cazares, PhD.  Results indicated findings that were largely normal, with relative weaknesses in complex attention/executive control.  Some of his cognitive profile was thought perhaps attributable to neurodevelopmental factors such as possible ADHD-like syndrome, along with normal aging.  He underwent neuropsychological evaluation under my direction on June 28, 2019.  Results indicated mild executive dysfunction, variable learning and memory, and slowed verbal fluency, with performance otherwise falling within normal limits.  His next clinical evaluation was on September 2, 2021.  Results of that evaluation indicated mild executive dysfunction, a relative weakness in learning, variable memory, and slowed fluency.  Compared to his most recent evaluation he had experienced improvements in basic and  complex attention and and semantic fluency, while other measures of fluency remained stable.  He had declines in memory, although some memory tasks remained within normal limits.  There were also very subtle declines in executive functioning.    CLINICAL INTERVIEW FINDINGS    Upon interview, Mr. Odonnell and his wife stated that his second surgery went well.  He continues to get good benefit from the DBS on both sides of his body.  He and his wife have not been noticing any changes in his cognition.  He denied changes in memory, word finding, attention or concentration, or decision-making.  He lives with his wife, who continues to manage their finances, which is not a change for them.  He manages his own medications, driving, and cooking, apparently without difficulty.    Mr. Odonnell has had some medical issues recently.  He had back pain and was found to have kidney stones, and in the course of treatment for that he was found to have cancer in his gallbladder.  He successfully underwent chemotherapy, which he just finished in December.  He is still tired and worn out but is feeling better every day.  He has continued to work throughout his treatment, as he enjoys what he does.  His mood has been good.  He has not felt depressed, hopeless, helpless, or guilty.  His wife described him as calmer as he has gone through his treatment.  He sometimes worries about work but otherwise denied anxiety.  Sleep has generally been good.  He goes to sleep around 8:00, and usually wakes up around 1:30 a.m., about 45 minutes earlier than he needs to.  He generally feels rested in the morning but feels tired by 2:00 p.m.  After he comes home from work he will nap, wake up to eat supper, and then nap until bedtime.  His appetite has been somewhat lower.  He has lost about 90 pounds, from 350 to 260 pounds 2 years ago.  His energy level is lower.  His interest level is very good.  He enjoys his work and likes to have a purpose in life.  " He denied visual or auditory hallucinations.  He has not had suicidal ideation or any history of suicide attempts.  He denied use of alcohol, tobacco, or illicit drugs.    Mr. Odonnell stated that his balance has not been great.  If he stands up too fast he feels \"tipsy.\"  He has not had unilateral weakness or numbness.  He is not bothered by headaches.  He has neuropathy in his feet.  He never had COVID.    PAST MEDICAL HISTORY: Medical records indicate a history of obesity, Parkinson's disease, periodic limb movements of sleep, osteoarthritis, obstructive sleep apnea, hypertension, gout, type 2 diabetes mellitus, chronic kidney disease stage III, carpal tunnel syndrome, essential tremor, hyperlipidemia.    CURRENT MEDICATIONS:      Current Outpatient Medications   Medication     acetaminophen (TYLENOL) 325 MG tablet     allopurinol (ZYLOPRIM) 300 MG tablet     amLODIPine (NORVASC) 5 MG tablet     amoxicillin (AMOXIL) 500 MG capsule     augmented betamethasone dipropionate (DIPROLENE-AF) 0.05 % external ointment     blood glucose (NO BRAND SPECIFIED) test strip     blood glucose monitoring (VITA CONTOUR NEXT MONITOR) meter device kit     blood glucose monitoring (VITA MICROLET) lancets     capecitabine (XELODA) 500 MG tablet     carbidopa-levodopa (SINEMET CR)  MG CR tablet     carbidopa-levodopa (SINEMET)  MG tablet     cholecalciferol 50 MCG (2000 UT) CAPS     COMFORT EZ PEN NEEDLES 32G X 6 MM miscellaneous     diphenoxylate-atropine (LOMOTIL) 2.5-0.025 MG per tablet     finasteride (PROSCAR) 5 MG tablet     glipiZIDE (GLUCOTROL) 10 MG tablet     insulin glargine (LANTUS) 100 UNIT/ML injection     insulin pen needle 32G X 4 MM     insulin pen needle 32G X 5 MM     liraglutide (VICTOZA) 18 MG/3ML solution     Misc Natural Products (TART CHERRY ADVANCED) CAPS     senna-docusate (SENOKOT-S/PERICOLACE) 8.6-50 MG tablet     simvastatin (ZOCOR) 40 MG tablet     traZODone (DESYREL) 50 MG tablet     No " current facility-administered medications for this visit.     FAMILY MEDICAL HISTORY:  Significant for tremor in his father and son.    BEHAVIORAL OBSERVATIONS    During the evaluation, Mr. Odonnell was pleasant, cooperative, and seemed understand the instructions.  He was alert and oriented to person, place, and time.  He struggled to get out of the chair in the lobby, and was slow to stand and had to try to get up twice before being successful.  He had an easier time getting up out of the chair in the exam room which had arms.  Slight tremor was observed clinically in his right hand at rest.  Speech was fluent, with normal articulation, volume, and rate.  He presented his thoughts in a clear, logical manner.  He tended to be hesitant to guess on tasks.  Internal performance validity measures fell within normal limits.  The results are believed to accurately reflect his current level of functioning.    MEASURES ADMINISTERED    The following measures were administered by a trained psychometrist, under the direct supervision of a licensed psychologist.    Subtests of the Wechsler Adult Intelligence Scale-4; subtests of the Wechsler Memory Scale-4; Gomez Verbal Learning Test-Revised, Form 1; Brief Visual Memory Test - Revised, Form 2; [place]lewood Naming Test; D-KEFS Verbal Fluency, Alternate Form; Trail Making Test; Stroop; Wisconsin Card Sorting Test - 64; Montiel Judgement of Line Orientation Form H; Raji-Osterrieth Complex Figure; Clock Drawing; Dementia Rating Scale - 2 (DRS-2) Alternate Form;  MoCA v. 7.1; Rodney Depression Inventory-2 (BDI-2), HAM-D, HAM-A, Apathy Scale, RBDSQ; QUIP, PDQ-39, ESS.     RESULTS AND INTERPRETATION    Overall intellectual functioning was estimated to fall in the average range, marginally above premorbid estimates of below average based on single word reading abilities administered at a prior evaluation.  Performance on a screening measure of dementia was average (DRS-2 Total Score =  136/144). Performance on the MoCA fell below expected (21/30). Specifically, he had reduced fluency and errors in clock drawing, repetition, abstraction, and memory.    Confrontation naming was high average for his age.  Ability to comprehend and articulate responses to complex social situations was average.  Switching fluency was low average.  Letter fluency was below average, and generative naming to category was exceptionally low.    Attention span was average for his age.  Divided attention was high average on a timed, visuomotor sequencing task, and was low average on an untimed, auditory sequencing task.  Performance on a measure of cognitive flexibility and speed was low average.    Basic visual perception, including matching lines and angles, was above average for his age.  Construction of a clock was impaired, and was notable for difficulty with conceptualization.  He correctly placed the numbers 12, 1, 3, 6, and 9, but interspersed with the numbers 5, 20, 25, 35, 40, 50, and 55.  Copy of a clock fell largely within normal limits.  Construction of a complex design was low average.  Nonverbal deductive reasoning was average.    Novel problem-solving, including the ability to generate strategies and solutions, fell within normal limits for his age and level of education.    Immediate recall of verbal narrative material was average, with average recall following a 30-minute delay.  On a multiple trial list learning task, immediate recall was low average, with below average recall following a 25-minute delay.  Recognition memory on this task was also below average, but included no false positive identifications of words.  Immediate recall of visual material was average, with low average retention (57%) of the learned material following a 25-minute delay.  Recognition memory on this task included no errors.    On the BDI-2, a self-report questionnaire, he endorsed minimal depressive symptomatology.  He also  endorsed minimal apathy on a questionnaire. He endorsed excessive daytime sleepiness on a questionnaire, but did not endorse dream enactment behaviors or compulsive behaviors on questionnaires.    IMPRESSIONS AND RECOMMENDATIONS    Stefan Odonnell is a 72-year-old man with a history of Parkinson's disease and possible sultana mitten essential tremor.  He is s/p left STN DBS lead implantation on 5/29/2018, and right STN DBS lead implantation on 11/23/2021. This evaluation was undertaken in 1 year clinical follow-up to his second side surgery.  His most recent neuropsychological evaluation on 9/2/2021 demonstrated mild executive dysfunction, with learning reflecting a relative weakness in memory being variable.  Fluency was slowed.  Otherwise, performance fell within normal limits.    Results of today's evaluation indicate slowed fluency and subtle executive dysfunction including difficulty with conceptualization.  Learning and memory are variable but largely fell within normal limits.  Language, visual processing, and complex attentional processing all fall within normal limits.  He endorses excessive daytime sleepiness, but does not endorse significant depressive symptomatology, apathy, anxiety, or compulsive behaviors.    Compared to his most recent evaluation in September 2021, memory remains variable, with improvements in some aspects of memory and slight declines in others.  He has had declines in category fluency but other aspects of fluency have remained stable.  He has had improvement in some aspects of executive functioning.  Performance otherwise remains stable across cognitive domains.    This pattern of performance continues to be suggestive of frontal and subcortical system involvement, without evidence of significant progression over time.  The pattern continues to be consistent with his history of Parkinson's disease, although it is noted that he also has a possible history of ADHD which may contribute.   The findings do not reflect dementia at this time and are only subtly abnormal.    In terms of daily functioning, Mr. Odonnell is not experiencing cognitive difficulties that might interfere with his ability to actively participate in treatment or to manage his instrumental activities of daily living independently.  He may find that he benefits from structure and routine.  If he has difficulty managing large, complex tasks, others may assist by breaking down such tasks into smaller, more manageable parts.  He may find that it takes him longer to complete tasks, so he may find it helpful to provide himself with ample time when working on a project so that he does not become overwhelmed and work less efficiently.    Results may serve as an updated baseline of his neurocognitive functioning.  The evaluation may be repeated in the future for comparison should a change in mental status occur.    Edwina Goldstein, Ph.D., [facility]  Licensed Psychologist, LP [id number]  Board Certified in Clinical Neuropsychology      Time spent: One unit of professional time, including interview (CPT 95176). 60 minutes (1 unit) neuropsychological testing evaluation by licensed and board-certified neuropsychologist, including integration of patient data, interpretation of standardized test results and clinical data, clinical decision-making, treatment planning, report, and interactive feedback to the patient, first hour (CPT 55367). 101 minutes (2 units) of neuropsychological testing evaluation by licensed and board-certified neuropsychologist, including integration of patient data, interpretation of standardized test results and clinical data, clinical decision-making, treatment planning, report, and interactive feedback to the patient, subsequent hours (CPT 38434). 30 minutes of neuropsychological test administration and scoring by technician, first 30 minutes (CPT 80928). 219 additional minutes (7 units) neuropsychological test administration and  scoring by technician, subsequent 30 minutes (CPT 13649). ICD-10 Diagnoses: G20; F06.8.            Edwina Goldstein, PhD LP

## 2023-01-13 NOTE — CONFIDENTIAL NOTE
Patient Stefan Odonnell  ABAR[address] [birth year]    MRN [id number]  Provider      48   Tech AJ    Sex Male   Occupation     Education 12   Language     Preferred Hand Right   Station OP    Age 72                 DEMENTIA RATING SCALE - 2   TEST FORM Alternate     Raw MAS       Attention 35 10       Init/Psv 37 12       Construct 6 10       Concept 36 10       Memory 22 8       Total / 144 136 10                WAIS-IV          Raw SS       Comprehension 23 10       Letter Num Seq 14 7       Digit Span 22 9       Matrix Reasoning 8 8                WECHSLER MEMORY SCALE - IV         Raw SS/%ile       Info/Orientation 14        Logical Memory I 30 10       Logical Memory II 16 9       LM Recognition  16 17-25                BOSTON NAMING TEST         Score (Correct+Stim Cue)  57 MAS 13     # Correct Stimulus Cue  0 T 60     # Correct Phonemic Cue  1                D-KEFS VERBAL FLUENCY    TEST FORM Alternate     Raw SS       Letter Fluency 17 5       Category Fluency 19 2       Switching Fluency             Total Correct 9 6       Switching Accuracy 8 9                CLOCK DRAWING         Command 1 /3 Rating       Copy 2 /3 Rating      TRAIL MAKING TEST          Seconds Errors MAS z     Trails A 33 0 11 0.55     Trails B 77 1 12 0.48              STROOP          Raw T MAS      Word 71 32 7      Color  45 29 7      Color/Word 20 39 6               WISCON[place]IN CARD SORTING TEST - 1 deck       # Categories 3 %ile >16      # Persev Error 9 T 56      Concept. Lev Resp. 36 T 45      FMS 0                 MONTIEL JUDGMENT OF LINE ORIENTATION  TEST FORM H    Raw Score 27 Raw Correction 30     MAS 14 Montiel %ile 86              HVLT-R    TEST FORM 1     Raw T       Trial 1 4        Trial 2 6        Trial 3 7        Total 1-3 17 38       Learning 3        Delay 4 34       Percent Retained 57% 36       True Positives 7        Discrimination Index 7 32       False Positives 0                 BRIEF VISUAL MEMORY TEST - REVISED  TEST FORM  2     Raw T       Trial 1 4        Trial 2 5        Trial 3 7        Total 1-3 16 42       Learning 3 46       Delay 4 33       Percent Retained 57% 6-10 %ile      Recognition Hits 6        Discrimination Index 6 >16 %ile      False Alarms 0                 RICK-O COMPLEX FIGURE TEST          Raw %ile       Copy  28 6-10       Time to Copy 310 >16                NANCY COGNITVE ASSESSMENT (MOCA)  TEST FORM 7.1    Score 21 /30                BDI         Score 3        Interpretation Minimal                 APATHY SCALE         Score 6                 QUESTIONNAIRES         ESS         RDBSQ         PDQ-39         QUIP

## 2023-03-16 ENCOUNTER — TELEPHONE (OUTPATIENT)
Dept: NEUROLOGY | Facility: CLINIC | Age: 73
End: 2023-03-16
Payer: COMMERCIAL

## 2023-03-16 NOTE — TELEPHONE ENCOUNTER
Blanca called back and the patient was scheduled for a neuropsychological evaluation on 7/13/23 at 12:30 PM with Dr. Goldstein.

## 2023-03-16 NOTE — TELEPHONE ENCOUNTER
"----- Message from Gela Ny RN sent at 3/15/2023  4:07 PM CDT -----  Regarding: schedule clinical core ON/OFF research appt  Hello,    Will you please schedule pt for:    Provider: Dr. Sloan  Visit Type: In person  Date: Fri July 14th  Time: 7am  Length: 120 minutes    Appt note to read \"ON/OFF testing clinical core 60M visit. ROOMING STAFF please collect full vitals (T, P, R, BP, along with height and weight)\"    Patient is already aware of appt time, no need to call.    Appointment can be linked to the [facility] research study.    Thanks,  Gela Ny RN, MPH  Research Nurse, Movement Disorders     "

## 2023-03-16 NOTE — TELEPHONE ENCOUNTER
Left a message for Blanca to call back to coordinate the neuropsychological evaluation as well as they may want to complete that with the ON/OFF testing the day before 7/14/23.

## 2023-03-19 ENCOUNTER — MYC MEDICAL ADVICE (OUTPATIENT)
Dept: NEUROLOGY | Facility: CLINIC | Age: 73
End: 2023-03-19
Payer: COMMERCIAL

## 2023-03-20 NOTE — TELEPHONE ENCOUNTER
"Writer received a phone call from patient in regards to low DBS battery. Patient verified that the IPG in his left chest, implanted in 2018 (left brain/right body) is due for replacement. When patient connects to this generator with his patient , he receives a warning message that it is due for replacement. When he looks at the \"Generator Battery\" field on his , there is a yellow caution symbol next to it.    Information forwarded to Madeline Perez to help coordinate battery replacement surgery.    Cely Kolb RN ([place]) on March 20, 2023 at 3:39 PM   "

## 2023-03-20 NOTE — TELEPHONE ENCOUNTER
Writer attempted to contact patient regarding DBS battery life, left voicemail requesting to call back or respond to LOOK[facility] message. Contact information provided.    Cely Kolb RN ([place]) on March 20, 2023 at 9:43 AM

## 2023-03-21 NOTE — TELEPHONE ENCOUNTER
RECORDS RECEIVED FROM:    REASON FOR VISIT: DBS    Date of Appt: 3/27/2023   NOTES (FOR ALL VISITS) STATUS DETAILS   OFFICE NOTE from referring provider Tracey Sloan-1/6/2023   OFFICE NOTE from other specialist     DISCHARGE SUMMARY from hospital     DISCHARGE REPORT from the ER     OPERATIVE REPORT     [facility] (MS ONLY)     EMG     EEG     MEDICATION LIST     IMAGING  (FOR ALL VISITS)     LUMBAR PUNCTURE     JASPER SCAN     ULTRASOUND (CAROTID BILAT) *VASCULAR*     MRI (HEAD, NECK, SPINE) PACS  MR Brain-7/12/2019   CT (HEAD, NECK, SPINE) PACS  CT Head-2/18/2022, 1/7/2022, 11/23/2021, 9/3/2021

## 2023-03-27 ENCOUNTER — VIRTUAL VISIT (OUTPATIENT)
Dept: NEUROSURGERY | Facility: CLINIC | Age: 73
End: 2023-03-27
Payer: COMMERCIAL

## 2023-03-27 ENCOUNTER — PRE VISIT (OUTPATIENT)
Dept: NEUROSURGERY | Facility: CLINIC | Age: 73
End: 2023-03-27

## 2023-03-27 DIAGNOSIS — N18.31 STAGE 3A CHRONIC KIDNEY DISEASE (H): ICD-10-CM

## 2023-03-27 DIAGNOSIS — H35.3211 EXUDATIVE AGE-RELATED MACULAR DEGENERATION OF RIGHT EYE WITH ACTIVE CHOROIDAL NEOVASCULARIZATION (H): ICD-10-CM

## 2023-03-27 DIAGNOSIS — Z45.42 END OF BATTERY LIFE OF DEEP BRAIN STIMULATOR: ICD-10-CM

## 2023-03-27 DIAGNOSIS — G25.0 ESSENTIAL TREMOR: ICD-10-CM

## 2023-03-27 DIAGNOSIS — G20.A1 PARKINSON DISEASE (H): ICD-10-CM

## 2023-03-27 DIAGNOSIS — E11.8 TYPE 2 DIABETES MELLITUS WITH COMPLICATION, WITH LONG-TERM CURRENT USE OF INSULIN (H): ICD-10-CM

## 2023-03-27 DIAGNOSIS — Z79.4 TYPE 2 DIABETES MELLITUS WITH COMPLICATION, WITH LONG-TERM CURRENT USE OF INSULIN (H): ICD-10-CM

## 2023-03-27 DIAGNOSIS — Z96.89 STATUS POST DEEP BRAIN STIMULATOR PLACEMENT: ICD-10-CM

## 2023-03-27 DIAGNOSIS — E66.01 MORBID OBESITY (H): ICD-10-CM

## 2023-03-27 DIAGNOSIS — D84.9 IMMUNOCOMPROMISED (H): ICD-10-CM

## 2023-03-27 DIAGNOSIS — C23 CANCER OF GALLBLADDER (H): Primary | ICD-10-CM

## 2023-03-27 PROCEDURE — 99214 OFFICE O/P EST MOD 30 MIN: CPT | Mod: VID | Performed by: NEUROLOGICAL SURGERY

## 2023-03-27 NOTE — NURSING NOTE
Patient declined individual allergy and medication review by support staff because I was unable to complete the medication list- Patient wasn't aware of the names of his meds and ask I tell him what they're used for but I am not a nurse, and didn't know what his current meds are used for so I was unable to review them. .

## 2023-03-27 NOTE — NURSING NOTE
Chief Complaint   Patient presents with     Video Visit     DBS Battery replacement      Is the patient currently in the state of [place]? YES    Visit mode:VIDEO    If the visit is dropped, the patient can be reconnected by: VIDEO VISIT: Text to cell phone: [phone]    Will anyone else be joining the visit? NO      How would you like to obtain your AVS? MyChart    Are changes needed to the allergy or medication list? NO    Reason for visit: DBS Battery replacement

## 2023-03-27 NOTE — PROGRESS NOTES
Virtual Visit Details    Type of service:  Video Visit   Video Start Time: 7:14 AM  Video End Time:7:32 AM    Originating Location (pt. Location): Home    Distant Location (provider location):  On-site  Platform used for Video Visit: Pola       Additional provider notes: insert own note template here      Mr. Stefan Odonnell complains of    Chief Complaint   Patient presents with     Video Visit     Needs DBS Battery replacement. DEPLETED DBS GENERATOR/BATTERY OVER THE LEFT CHEST WALL.         I have reviewed and updated the patient's Past Medical History, Social History, Family History and Medication List.    ALLERGIES       Allergies   Allergen Reactions     Atorvastatin Muscle Pain (Myalgia)     Clindamycin GI Disturbance     Gabapentin Swelling     Lyrica [Pregabalin]      Blurred vision, resolved when he discontinued.         ASSESSMENT  72 year old right handed male  1.  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  Essential tremor  3.  Parkinsonism  4.  Status post left side deep brain stimulator placement, phase I, placement of left side deep brain stimulator electrode, target left subthalamic nucleus, with microelectrode recording, on 5/29/2018  5.  Status post deep brain stimulator placement, phase II, placement of deep brain stimulator generator/battery over the left chest wall on 6/8/2018  6.  Status post right side deep brain stimulator placement, phase I, placement of deep brain stimulator electrode, target right subthalamic nucleus, with microelectrode recording and placement of extension and externalization wires for UDALL research protocol on 11/23/2021  7.  Status post deep brain stimulator placement, phase II, placement of deep brain stimulator generator/battery over the right chest wall on 12/3/2021  8.  Depleted deep brain stimulator generator/battery over the LEFT chest wall      I have reviewed the note as documented above.  This accurately captures the substance of my  conversation with the patient.  The following were discussed in detail.      Mr. Stefan Odonnell is a 72 year old right handed male who is well known to me and presents with depleted DBS generator/battery over the LEFT chest wall.  He is s/p left side deep brain stimulator placement, phase I, placement of left side deep brain stimulator electrode, target left subthalamic nucleus, with microelectrode recording on 5/29/2018, s/p deep brain stimulator placement, phase II, placement of deep brain stimulator generator/battery over the left chest wall on 6/8/2018, s/p right side deep brain stimulator placement, phase I, placement of deep brain stimulator electrode, target right subthalamic nucleus, with microelectrode recording and placement of extension and externalization wires for [place] research protocol on 11/23/2021, and s/p deep brain stimulator placement, phase II, placement of deep brain stimulator generator/battery over the right chest wall on 12/3/2021.  He is doing well and he has no complaints with respect to his DBS system.  He recently saw Dr. Stanford Sloan of neurology back in 1/6/2023.  He denies any issues with the location of his device, especially his left chest wall.  He denies any abnormal shocking sensation.  He denies any areas of wound breakdown or exposed hardware.  He does have a known continued low impedances at 2B,3B, and 4, now also at 3A, all in the 100-200 range.  However, the program impedance is OK at 450 Ohm.  Patient states that his DBS generator/battery over the LEFT chest wall that controls his right hand tremor is depleted.  He received a message to contact the provider recently.  Therefore, his left chest wall DBS generator/battery is in need of replacement.  This generator/battery has lasted him approximately 5 years.    Patient states that he is in his usual state of health.  He did stop taking baby aspirin.  He is not on any other antiplatelet or anticoagulation therapy.  He did say  that he was found to have abnormally thickened gallbladder and this was removed back in September of 2022.  There was a concern for cancer but the margins were negative.  Concern for cancer was found within the removed gallbladder and he was placed on chemotherapy which ended in 12/17/2022.    In summary,  Mr. Stefan Odonnell is a 72 year old right handed male who underwent DBS implantation for his tremors.  His case was discussed at the Movement Disorder Consensus Group Meeting and he was considered to be a good candidate.  He was a wait and see strategy and the left side was implanted in 2018 and the right side was implanted in 2021, as listed above.  He also participated in research with his second implantation.      We did discuss that he does have abnormal impedance values in his left side DBS system.  However, it is not impacting his programming at this time.  Therefore, I told him that we will not actively try to address this.  It is possible that after the contacts are cleaned and inserted into the new generator/battery, the impedance values may improve.  However, we will not try to explore and to repair his system during this surgery.  If we do discover that he does need to have the system revised, we would have to perform this at another time with patient under the general anesthesia.    We discussed the surgical procedure for replacing the DBS generator/battery over the left chest wall.  He currently has the Abbott Infinity 5 generator/battery, and this will be maintained during the upcoming procedure.  Risks, benefits, alternative therapies were discussed with the patient, including but not limited to infection and bleeding.  This surgical procedure will be performed under MAC with local anesthetic.  The thinned part of the wound/pocket may be corrected with mobilization of the tissue under the incision.  The pocket may need to be enlarged to minimize the stress on the closure.  Surgical procedure was  discussed in detail.  All questions were answered, and he expressed understanding.    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  Given that this is a DBS generator/battery replacement, it would be reasonable to take one week off after the replacement surgery to recover.  The only restriction would be heavy lifting and repetitive arm movement for 2 weeks.      PLAN  Replacement of deep brain stimulator generator/battery over the LEFT chest wall under MAC  Time off from work - for one week off after surgery  Patient will need PAC  Patient will need preop labs.      Video-Visit Details    Type of service:  Video Visit    Video Start Time: 7:14 AM  Video End Time: 7:32 AM  Video -Visit Time: 18 minutes    Originating Location (pt. Location): Home    Distant Location (provider location):  [facility] NEUROSURGERY     Platform used for Video Visit: Pola Marshall MD, PhD      18 minutes were spent on video with the patient of which more than 50% of the time was spent counseling and discussing the above issues regarding diagnosis, differential, treatment options, and steps for further evaluation.  4 minutes were spent reviewing patient's chart.  15 minutes were spent on documentation for this encounter.  37 minutes total were spent on this encounter today.

## 2023-03-27 NOTE — LETTER
3/27/2023       RE: Stefan Odonnell  [address] [facility] [address]     Dear Colleague,    Thank you for referring your patient, Stefan Odonnell, to the [facility]OS[facility] [place] at [facility]. Please see a copy of my visit note below.      Mr. Stefan Odonnell complains of    Chief Complaint   Patient presents with     Video Visit     Needs DBS Battery replacement. DEPLETED DBS GENERATOR/BATTERY OVER THE LEFT CHEST WALL.         I have reviewed and updated the patient's Past Medical History, Social History, Family History and Medication List.    ALLERGIES       Allergies   Allergen Reactions     Atorvastatin Muscle Pain (Myalgia)     Clindamycin GI Disturbance     Gabapentin Swelling     Lyrica [Pregabalin]      Blurred vision, resolved when he discontinued.         ASSESSMENT  72 year old right handed male  1.  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  Essential tremor  3.  Parkinsonism  4.  Status post left side deep brain stimulator placement, phase I, placement of left side deep brain stimulator electrode, target left subthalamic nucleus, with microelectrode recording, on 5/29/2018  5.  Status post deep brain stimulator placement, phase II, placement of deep brain stimulator generator/battery over the left chest wall on 6/8/2018  6.  Status post right side deep brain stimulator placement, phase I, placement of deep brain stimulator electrode, target right subthalamic nucleus, with microelectrode recording and placement of extension and externalization wires for UDALL research protocol on 11/23/2021  7.  Status post deep brain stimulator placement, phase II, placement of deep brain stimulator generator/battery over the right chest wall on 12/3/2021  8.  Depleted deep brain stimulator generator/battery over the LEFT chest wall      I have reviewed the note as documented above.  This accurately captures the substance of my  conversation with the patient.  The following were discussed in detail.      Mr. Stefan Odonnell is a 72 year old right handed male who is well known to me and presents with depleted DBS generator/battery over the LEFT chest wall.  He is s/p left side deep brain stimulator placement, phase I, placement of left side deep brain stimulator electrode, target left subthalamic nucleus, with microelectrode recording on 5/29/2018, s/p deep brain stimulator placement, phase II, placement of deep brain stimulator generator/battery over the left chest wall on 6/8/2018, s/p right side deep brain stimulator placement, phase I, placement of deep brain stimulator electrode, target right subthalamic nucleus, with microelectrode recording and placement of extension and externalization wires for [place] research protocol on 11/23/2021, and s/p deep brain stimulator placement, phase II, placement of deep brain stimulator generator/battery over the right chest wall on 12/3/2021.  He is doing well and he has no complaints with respect to his DBS system.  He recently saw Dr. Stanford Sloan of neurology back in 1/6/2023.  He denies any issues with the location of his device, especially his left chest wall.  He denies any abnormal shocking sensation.  He denies any areas of wound breakdown or exposed hardware.  He does have a known continued low impedances at 2B,3B, and 4, now also at 3A, all in the 100-200 range.  However, the program impedance is OK at 450 Ohm.  Patient states that his DBS generator/battery over the LEFT chest wall that controls his right hand tremor is depleted.  He received a message to contact the provider recently.  Therefore, his left chest wall DBS generator/battery is in need of replacement.  This generator/battery has lasted him approximately 5 years.    Patient states that he is in his usual state of health.  He did stop taking baby aspirin.  He is not on any other antiplatelet or anticoagulation therapy.  He did say  that he was found to have abnormally thickened gallbladder and this was removed back in September of 2022.  There was a concern for cancer but the margins were negative.  Concern for cancer was found within the removed gallbladder and he was placed on chemotherapy which ended in 12/17/2022.    In summary,  Mr. Stefan Odonnell is a 72 year old right handed male who underwent DBS implantation for his tremors.  His case was discussed at the Movement Disorder Consensus Group Meeting and he was considered to be a good candidate.  He was a wait and see strategy and the left side was implanted in 2018 and the right side was implanted in 2021, as listed above.  He also participated in research with his second implantation.      We did discuss that he does have abnormal impedance values in his left side DBS system.  However, it is not impacting his programming at this time.  Therefore, I told him that we will not actively try to address this.  It is possible that after the contacts are cleaned and inserted into the new generator/battery, the impedance values may improve.  However, we will not try to explore and to repair his system during this surgery.  If we do discover that he does need to have the system revised, we would have to perform this at another time with patient under the general anesthesia.    We discussed the surgical procedure for replacing the DBS generator/battery over the left chest wall.  He currently has the Abbott Infinity 5 generator/battery, and this will be maintained during the upcoming procedure.  Risks, benefits, alternative therapies were discussed with the patient, including but not limited to infection and bleeding.  This surgical procedure will be performed under MAC with local anesthetic.  The thinned part of the wound/pocket may be corrected with mobilization of the tissue under the incision.  The pocket may need to be enlarged to minimize the stress on the closure.  Surgical procedure was  discussed in detail.  All questions were answered, and he expressed understanding.    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  Given that this is a DBS generator/battery replacement, it would be reasonable to take one week off after the replacement surgery to recover.  The only restriction would be heavy lifting and repetitive arm movement for 2 weeks.      PLAN  Replacement of deep brain stimulator generator/battery over the LEFT chest wall under MAC  Time off from work - for one week off after surgery  Patient will need PAC  Patient will need preop labs.          Wayne Marshall MD, PhD      18 minutes were spent on video with the patient of which more than 50% of the time was spent counseling and discussing the above issues regarding diagnosis, differential, treatment options, and steps for further evaluation.  4 minutes were spent reviewing patient's chart.  15 minutes were spent on documentation for this encounter.  37 minutes total were spent on this encounter today.

## 2023-04-20 ENCOUNTER — TELEPHONE (OUTPATIENT)
Dept: NEUROSURGERY | Facility: CLINIC | Age: 73
End: 2023-04-20
Payer: COMMERCIAL

## 2023-04-20 NOTE — TELEPHONE ENCOUNTER
I called patient in regards to surgery with Dr. Marshall. I was unable to reach patient, but a voicemail and a call back number were left on patients voicemail.    Chaim Paz on 4/20/2023 at 11:43 AM

## 2023-04-21 ENCOUNTER — TELEPHONE (OUTPATIENT)
Dept: NEUROSURGERY | Facility: CLINIC | Age: 73
End: 2023-04-21
Payer: COMMERCIAL

## 2023-04-21 DIAGNOSIS — Z01.818 PREOPERATIVE EVALUATION TO RULE OUT SURGICAL CONTRAINDICATION: Primary | ICD-10-CM

## 2023-04-21 NOTE — TELEPHONE ENCOUNTER
I called the patient in regards to scheduling surgery with Dr. Marshall. Patient does not need a covid test unless requested by provider and pre op has been taken care of with PCP. Patient is aware that the nursing team will be reaching out within the next few days. I did tell patient that a nurse will reach out within 2-3 days prior to surgery with arrival time and instructions.    Surgeon: Dr. Marshall  Date of Surgery: 5/11/2023  Location of surgery: [facility] OR  Pre-Op H&P: TBD  Post-Op Appt Date: 5/22/2023   Imaging needed:  No  Discussed COVID-19 testing:  Yes  Pre-cert/Authorization completed:  Yes  Patient aware that pre-op RN will call 2-3 days prior to surgery with arrival time and instructions Yes       Chaim Paz on 4/21/2023 at 10:25 AM

## 2023-04-24 ENCOUNTER — TELEPHONE (OUTPATIENT)
Dept: NEUROSURGERY | Facility: CLINIC | Age: 73
End: 2023-04-24
Payer: COMMERCIAL

## 2023-04-24 ENCOUNTER — DOCUMENTATION ONLY (OUTPATIENT)
Dept: NEUROSURGERY | Facility: CLINIC | Age: 73
End: 2023-04-24
Payer: COMMERCIAL

## 2023-04-24 NOTE — PROGRESS NOTES
Faxed preop lab orders to [facility] at [phone]. Date of surgery, time frame for labs, return fax and my contact information included.

## 2023-04-24 NOTE — TELEPHONE ENCOUNTER
Left  for pt asking him where I should fax preop lab orders. Requested a call back at pt's earliest convenience.

## 2023-04-24 NOTE — TELEPHONE ENCOUNTER
Spoke with pt's spouse Blanca and let her know that I faxed lab orders to [facility]. When I called to get the fax number they gave me a central number and said the faxes get sorted and forwarded accordingly. Pt/spouse welcome to call with any questions. Nothing further at this time.

## 2023-05-02 ENCOUNTER — TRANSFERRED RECORDS (OUTPATIENT)
Dept: MULTI SPECIALTY CLINIC | Facility: CLINIC | Age: 73
End: 2023-05-02

## 2023-05-02 LAB
CREATININE (EXTERNAL): 1.25 MG/DL (ref 0.7–1.2)
GFR ESTIMATED (EXTERNAL): 61 ML/MIN/1.73M2
GLUCOSE (EXTERNAL): 134 MG/DL (ref 70–99)
POTASSIUM (EXTERNAL): 4.2 MEQ/L (ref 3.4–5.1)

## 2023-05-10 ENCOUNTER — ANESTHESIA EVENT (OUTPATIENT)
Dept: SURGERY | Facility: CLINIC | Age: 73
End: 2023-05-10
Payer: COMMERCIAL

## 2023-05-10 ENCOUNTER — TELEPHONE (OUTPATIENT)
Dept: NEUROSURGERY | Facility: CLINIC | Age: 73
End: 2023-05-10
Payer: COMMERCIAL

## 2023-05-10 NOTE — TELEPHONE ENCOUNTER
Returned call to pt's spouse Blanca. Pt started a new medication, Ozempic, and received his first injection on Sunday. Spouse said pt received a call saying Ozempic should be stopped for 7 days before surgery. I let Blanca know that I am trying to confirm the guidelines with a preop provider but I think he should be OK with moving forward with surgery. I will call if I learn anything to the contrary. Confirmed that pt should take his PD medications as usual.     No further questions at this time.

## 2023-05-11 ENCOUNTER — ANESTHESIA (OUTPATIENT)
Dept: SURGERY | Facility: CLINIC | Age: 73
End: 2023-05-11
Payer: COMMERCIAL

## 2023-05-11 ENCOUNTER — HOSPITAL ENCOUNTER (OUTPATIENT)
Facility: CLINIC | Age: 73
Discharge: HOME | End: 2023-05-11
Attending: NEUROLOGICAL SURGERY | Admitting: NEUROLOGICAL SURGERY
Payer: COMMERCIAL

## 2023-05-11 VITALS
DIASTOLIC BLOOD PRESSURE: 78 MMHG | BODY MASS INDEX: 34.72 KG/M2 | SYSTOLIC BLOOD PRESSURE: 135 MMHG | OXYGEN SATURATION: 95 % | WEIGHT: 270.5 LBS | RESPIRATION RATE: 16 BRPM | HEIGHT: 74 IN | HEART RATE: 71 BPM | TEMPERATURE: 98 F

## 2023-05-11 DIAGNOSIS — G20.A1 PARKINSON DISEASE (H): ICD-10-CM

## 2023-05-11 DIAGNOSIS — Z96.89 S/P DEEP BRAIN STIMULATOR PLACEMENT: Primary | ICD-10-CM

## 2023-05-11 DIAGNOSIS — Z45.42 END OF BATTERY LIFE OF DEEP BRAIN STIMULATOR: ICD-10-CM

## 2023-05-11 LAB — GLUCOSE BLDC GLUCOMTR-MCNC: 121 MG/DL (ref 70–99)

## 2023-05-11 PROCEDURE — 250N000009 HC RX 250: Performed by: NEUROLOGICAL SURGERY

## 2023-05-11 PROCEDURE — 360N000076 HC SURGERY LEVEL 3, PER MIN: Performed by: NEUROLOGICAL SURGERY

## 2023-05-11 PROCEDURE — 272N000001 HC OR GENERAL SUPPLY STERILE: Performed by: NEUROLOGICAL SURGERY

## 2023-05-11 PROCEDURE — 258N000003 HC RX IP 258 OP 636: Performed by: REGISTERED NURSE

## 2023-05-11 PROCEDURE — 82962 GLUCOSE BLOOD TEST: CPT

## 2023-05-11 PROCEDURE — 999N000141 HC STATISTIC PRE-PROCEDURE NURSING ASSESSMENT: Performed by: NEUROLOGICAL SURGERY

## 2023-05-11 PROCEDURE — 250N000011 HC RX IP 250 OP 636: Performed by: REGISTERED NURSE

## 2023-05-11 PROCEDURE — 250N000009 HC RX 250: Performed by: REGISTERED NURSE

## 2023-05-11 PROCEDURE — C1767 GENERATOR, NEURO NON-RECHARG: HCPCS | Performed by: NEUROLOGICAL SURGERY

## 2023-05-11 PROCEDURE — 710N000012 HC RECOVERY PHASE 2, PER MINUTE: Performed by: NEUROLOGICAL SURGERY

## 2023-05-11 PROCEDURE — 250N000011 HC RX IP 250 OP 636: Performed by: NEUROLOGICAL SURGERY

## 2023-05-11 PROCEDURE — 370N000017 HC ANESTHESIA TECHNICAL FEE, PER MIN: Performed by: NEUROLOGICAL SURGERY

## 2023-05-11 PROCEDURE — 61885 INSRT/REDO NEUROSTIM 1 ARRAY: CPT | Mod: LT | Performed by: NEUROLOGICAL SURGERY

## 2023-05-11 PROCEDURE — 250N000011 HC RX IP 250 OP 636

## 2023-05-11 DEVICE — GENERATOR DBS SYSTEM INFINITY 5 IPG 6660ANS: Type: IMPLANTABLE DEVICE | Site: CHEST | Status: FUNCTIONAL

## 2023-05-11 RX ORDER — CEFAZOLIN SODIUM/WATER 3 G/30 ML
3 SYRINGE (ML) INTRAVENOUS SEE ADMIN INSTRUCTIONS
Status: DISCONTINUED | OUTPATIENT
Start: 2023-05-11 | End: 2023-05-11

## 2023-05-11 RX ORDER — SODIUM CHLORIDE, SODIUM LACTATE, POTASSIUM CHLORIDE, CALCIUM CHLORIDE 600; 310; 30; 20 MG/100ML; MG/100ML; MG/100ML; MG/100ML
INJECTION, SOLUTION INTRAVENOUS CONTINUOUS PRN
Status: DISCONTINUED | OUTPATIENT
Start: 2023-05-11 | End: 2023-05-11

## 2023-05-11 RX ORDER — CEFAZOLIN SODIUM/WATER 3 G/30 ML
3 SYRINGE (ML) INTRAVENOUS
Status: COMPLETED | OUTPATIENT
Start: 2023-05-11 | End: 2023-05-11

## 2023-05-11 RX ORDER — CEFAZOLIN SODIUM/WATER 3 G/30 ML
3 SYRINGE (ML) INTRAVENOUS
Status: DISCONTINUED | OUTPATIENT
Start: 2023-05-11 | End: 2023-05-11

## 2023-05-11 RX ORDER — PROPOFOL 10 MG/ML
INJECTION, EMULSION INTRAVENOUS PRN
Status: DISCONTINUED | OUTPATIENT
Start: 2023-05-11 | End: 2023-05-11

## 2023-05-11 RX ORDER — ONDANSETRON 2 MG/ML
INJECTION INTRAMUSCULAR; INTRAVENOUS PRN
Status: DISCONTINUED | OUTPATIENT
Start: 2023-05-11 | End: 2023-05-11

## 2023-05-11 RX ORDER — PROPOFOL 10 MG/ML
INJECTION, EMULSION INTRAVENOUS CONTINUOUS PRN
Status: DISCONTINUED | OUTPATIENT
Start: 2023-05-11 | End: 2023-05-11

## 2023-05-11 RX ORDER — CEPHALEXIN 500 MG/1
500 CAPSULE ORAL 4 TIMES DAILY
Qty: 28 CAPSULE | Refills: 0 | Status: SHIPPED | OUTPATIENT
Start: 2023-05-11 | End: 2023-05-18

## 2023-05-11 RX ORDER — LIDOCAINE 40 MG/G
CREAM TOPICAL
Status: DISCONTINUED | OUTPATIENT
Start: 2023-05-11 | End: 2023-05-11 | Stop reason: HOSPADM

## 2023-05-11 RX ORDER — OXYCODONE HYDROCHLORIDE 5 MG/1
5 TABLET ORAL EVERY 6 HOURS PRN
Qty: 4 TABLET | Refills: 0 | Status: SHIPPED | OUTPATIENT
Start: 2023-05-11 | End: 2023-05-14

## 2023-05-11 RX ORDER — SODIUM CHLORIDE, SODIUM LACTATE, POTASSIUM CHLORIDE, CALCIUM CHLORIDE 600; 310; 30; 20 MG/100ML; MG/100ML; MG/100ML; MG/100ML
INJECTION, SOLUTION INTRAVENOUS CONTINUOUS
Status: DISCONTINUED | OUTPATIENT
Start: 2023-05-11 | End: 2023-05-11 | Stop reason: HOSPADM

## 2023-05-11 RX ORDER — LIDOCAINE HYDROCHLORIDE 20 MG/ML
INJECTION, SOLUTION INFILTRATION; PERINEURAL PRN
Status: DISCONTINUED | OUTPATIENT
Start: 2023-05-11 | End: 2023-05-11

## 2023-05-11 RX ORDER — CEFAZOLIN SODIUM/WATER 3 G/30 ML
3 SYRINGE (ML) INTRAVENOUS SEE ADMIN INSTRUCTIONS
Status: DISCONTINUED | OUTPATIENT
Start: 2023-05-11 | End: 2023-05-11 | Stop reason: HOSPADM

## 2023-05-11 RX ADMIN — SODIUM CHLORIDE, POTASSIUM CHLORIDE, SODIUM LACTATE AND CALCIUM CHLORIDE: 600; 310; 30; 20 INJECTION, SOLUTION INTRAVENOUS at 07:27

## 2023-05-11 RX ADMIN — LIDOCAINE HYDROCHLORIDE 60 MG: 20 INJECTION, SOLUTION INFILTRATION; PERINEURAL at 07:31

## 2023-05-11 RX ADMIN — PHENYLEPHRINE HYDROCHLORIDE 100 MCG: 10 INJECTION INTRAVENOUS at 08:16

## 2023-05-11 RX ADMIN — PHENYLEPHRINE HYDROCHLORIDE 100 MCG: 10 INJECTION INTRAVENOUS at 08:24

## 2023-05-11 RX ADMIN — PHENYLEPHRINE HYDROCHLORIDE 100 MCG: 10 INJECTION INTRAVENOUS at 08:05

## 2023-05-11 RX ADMIN — PROPOFOL 30 MG: 10 INJECTION, EMULSION INTRAVENOUS at 07:32

## 2023-05-11 RX ADMIN — PROPOFOL 150 MCG/KG/MIN: 10 INJECTION, EMULSION INTRAVENOUS at 07:31

## 2023-05-11 RX ADMIN — ONDANSETRON 4 MG: 2 INJECTION INTRAMUSCULAR; INTRAVENOUS at 07:36

## 2023-05-11 RX ADMIN — Medication 3 G: at 07:27

## 2023-05-11 ASSESSMENT — ACTIVITIES OF DAILY LIVING (ADL)
ADLS_ACUITY_SCORE: 37
ADLS_ACUITY_SCORE: 37

## 2023-05-11 NOTE — ANESTHESIA PREPROCEDURE EVALUATION
Anesthesia Pre-Procedure Evaluation    Patient: Stefan Odonnell   MRN: [id number] : 1950        Procedure : Procedure(s):  Replacement of deep brain stimulator generator/battery over left chest wall          Past Medical History:   Diagnosis Date     Anosmia      Bleeding ulcer      Carpal tunnel syndrome, bilateral      CKD (chronic kidney disease) stage 3, GFR 30-59 ml/min (H)      Diabetes mellitus (H)      Gout      Hx of skin cancer, basal cell 2013    removed from face     Hyperlipidemia      Hypertension      Insomnia      Macular degeneration      Neuropathy      Obese      Osteoarthritis of knees, bilateral      Periodic limb movements of sleep      Restless legs syndrome (RLS)       Past Surgical History:   Procedure Laterality Date     ABDOMEN SURGERY  1983    ulcers     COLONOSCOPY       HEMORRHOID SURGERY       IMPLANT DEEP BRAIN STIMULATION GENERATOR / BATTERY Left 2018    Procedure: IMPLANT DEEP BRAIN STIMULATION GENERATOR / BATTERY;  Deep Brain Stimulator Placement, Phase II, Placement Of Deep Brain Stimulator Generator/Battery Over The Left Chest Wall;  Surgeon: Wayne Marshall MD;  Location: [facility] OR     IMPLANT DEEP BRAIN STIMULATION GENERATOR / BATTERY Right 12/3/2021    Procedure: Deep brain stimulator placement, phase II, placement of deep brain stimulator generator/battery over the right chest wall  Latex Free;  Surgeon: Wayne Marshall MD;  Location: [facility] OR     Open Stomach Ulcer Repair       OPTICAL TRACKING SYSTEM INSERTION DEEP BRAIN STIMULATION Left 2018    Procedure: OPTICAL TRACKING SYSTEM INSERTION DEEP BRAIN STIMULATION;  Stealth Assisted Left Deep Brain Stimulator Placement, Phase I, Placement Of Left Deep Brain Stimulator Electrode, Target Left Subthalamic Nucleus With Microelectrode Recording;  Surgeon: Wayne Marshall MD;  Location: [facility] OR     OPTICAL TRACKING SYSTEM INSERTION DEEP BRAIN STIMULATION Right 2021    Procedure: Right  stealth assisted deep brain stimulator placement, phase I, placement of right side deep brain stimulator electrode, target right subthalamic nucleus, with microelectrode recording and placement of extension and externalization wires for [place] research protocol;  Surgeon: Wayne Marshall MD;  Location: [facility] OR     TONSILLECTOMY        Allergies   Allergen Reactions     Atorvastatin Muscle Pain (Myalgia)     Clindamycin GI Disturbance     Gabapentin Swelling     Lyrica [Pregabalin]      Blurred vision, resolved when he discontinued.      Social History     Tobacco Use     Smoking status: Never     Smokeless tobacco: Never   Vaping Use     Vaping status: Not on file   Substance Use Topics     Alcohol use: No     Comment: 2 - 3 x a year      Wt Readings from Last 1 Encounters:   05/11/23 122.7 kg (270 lb 8.1 oz)        Anesthesia Evaluation            ROS/MED HX  ENT/Pulmonary:       Neurologic: Comment: Restless Leg Syndrome    (+) peripheral neuropathy, Parkinson's disease,     Cardiovascular:     (+) hypertension-----    METS/Exercise Tolerance: 3 - Able to walk 1-2 blocks without stopping    Hematologic:       Musculoskeletal:   (+) arthritis,     GI/Hepatic:       Renal/Genitourinary:     (+) renal disease, type: CRI,     Endo:     (+) type II DM, Using insulin, Obesity,     Psychiatric/Substance Use:       Infectious Disease:       Malignancy:       Other:            Physical Exam    Airway        Mallampati: III   TM distance: > 3 FB    Mouth opening: > 3 cm    Respiratory Devices and Support         Dental       (+) Minor Abnormalities - some fillings, tiny chips      Cardiovascular          Rhythm and rate: regular     Pulmonary           breath sounds clear to auscultation           OUTSIDE LABS:  CBC:   Lab Results   Component Value Date    WBC 7.7 11/24/2021    WBC 8.0 05/30/2018    HGB 12.4 (L) 11/24/2021    HGB 14.2 11/23/2021    HCT 37.6 (L) 11/24/2021    HCT 37.6 (L) 05/30/2018      11/24/2021     05/30/2018     BMP:   Lab Results   Component Value Date     11/24/2021     05/30/2018    POTASSIUM 3.8 12/03/2021    POTASSIUM 4.0 11/24/2021    CHLORIDE 109 11/24/2021    CHLORIDE 110 (H) 05/30/2018    CO2 27 11/24/2021    CO2 22 05/30/2018    BUN 13 11/24/2021    BUN 16 05/30/2018    CR 1.23 12/03/2021    CR 1.21 11/24/2021     (H) 05/11/2023     (H) 12/03/2021     COAGS:   Lab Results   Component Value Date    INR 1.0 11/13/2021     POC:   Lab Results   Component Value Date     (H) 06/08/2018     HEPATIC: No results found for: ALBUMIN, PROTTOTAL, ALT, AST, GGT, ALKPHOS, BILITOTAL, BILIDIRECT, BAO  OTHER:   Lab Results   Component Value Date    A1C 7.3 (H) 05/15/2018    JOY 8.6 11/24/2021    PHOS 2.8 11/24/2021    MAG 2.1 11/24/2021       Anesthesia Plan    ASA Status:  3      Anesthesia Type: MAC.   Induction: Intravenous, Propofol.   Maintenance: TIVA.        Consents    Anesthesia Plan(s) and associated risks, benefits, and realistic alternatives discussed. Questions answered and patient/representative(s) expressed understanding.     - Discussed: Risks, Benefits and Alternatives for the PROCEDURE were discussed     - Discussed with:  Patient      - Extended Intubation/Ventilatory Support Discussed: No.      - Patient is DNR/DNI Status: No    Use of blood products discussed: No .     Postoperative Care    Pain management: IV analgesics.   PONV prophylaxis: Ondansetron (or other 5HT-3), Dexamethasone or Solumedrol     Comments:                Jordi Ramirez MD

## 2023-05-11 NOTE — ANESTHESIA POSTPROCEDURE EVALUATION
Patient: Stefan Odonnell    Procedure: Procedure(s):  Replacement of deep brain stimulator generator/battery over left chest wall       Anesthesia Type:  MAC    Note:  Disposition: Outpatient   Postop Pain Control: Uneventful            Sign Out: Well controlled pain   PONV: No   Neuro/Psych: Uneventful            Sign Out: Acceptable/Baseline neuro status   Airway/Respiratory: Uneventful            Sign Out: Acceptable/Baseline resp. status   CV/Hemodynamics: Uneventful            Sign Out: Acceptable CV status; No obvious hypovolemia; No obvious fluid overload   Other NRE: NONE   DID A NON-ROUTINE EVENT OCCUR? No           Last vitals:  Vitals Value Taken Time   /83 05/11/23 0915   Temp     Pulse 66 05/11/23 0915   Resp     SpO2 96 % 05/11/23 0929   Vitals shown include unvalidated device data.    Electronically Signed By: Jordi Ramirez MD  May 11, 2023  9:31 AM

## 2023-05-11 NOTE — DISCHARGE INSTRUCTIONS
"[facility]   Adult Discharge Orders & Instructions     For 24 hours after surgery    Get plenty of rest.  A responsible adult must stay with you for at least 24 hours after you leave the hospital.   Do not drive or use heavy equipment.  If you have weakness or tingling, don't drive or use heavy equipment until this feeling goes away.  Do not drink alcohol.  Avoid strenuous or risky activities.  Ask for help when climbing stairs.   You may feel lightheaded.  IF so, sit for a few minutes before standing.  Have someone help you get up.   If you have nausea (feel sick to your stomach): Drink only clear liquids such as apple juice, ginger ale, broth or 7-Up.  Rest may also help.  Be sure to drink enough fluids.  Move to a regular diet as you feel able.  You may have a slight fever. Call the doctor if your fever is over 100 F (37.7 C) (taken under the tongue) or lasts longer than 24 hours.  You may have a dry mouth, a sore throat, muscle aches or trouble sleeping.  These should go away after 24 hours.  Do not make important or legal decisions.   Call your doctor for any of the followin.  Signs of infection (fever, growing tenderness at the surgery site, a large amount of drainage or bleeding, severe pain, foul-smelling drainage, redness, swelling).    2. It has been over 8 to 10 hours since surgery and you are still not able to urinate (pass water).    3.  Headache for over 24 hours.    4.  Numbness, tingling or weakness the day after surgery (if you had spinal anesthesia).  To contact a doctor, call Dr Mesa at the clinic Monday to Friday 8:00 am to 4:30 pm and during weekends and evening call [phone] and ask for the neurosurgery resident \"on call\".  ________________________________________   "

## 2023-05-11 NOTE — BRIEF OP NOTE
[facility]    Brief Operative Note    Pre-operative diagnosis: End of battery life of deep brain stimulator [Z45.42]  Status post deep brain stimulator placement [Z96.89]  Parkinson disease (H) [G20]  Essential tremor [G25.0]  Post-operative diagnosis Same as pre-operative diagnosis    Procedure: Procedure(s):  Replacement of deep brain stimulator generator/battery over left chest wall  Surgeon: Surgeon(s) and Role:     * Wayne Marshall MD - Primary     * Gino Hernandez MD - Resident - Assisting  Anesthesia: MAC with Local   Estimated Blood Loss: 1 mL    Drains: None  Specimens: * No specimens in log *  Findings:  Old generator identified in left chest pocket, all hardware intact. No signs of infection. All impedance values within normal limits after connection of lead to new generator. Good hemostasis at end of case.  Complications: None.  Implants:   Implant Name Type Inv. Item Serial No.  Lot No. LRB No. Used Action   GENERATOR DBS SYSTEM INFINITY 5 IPG 6660ANS - [id number] Neurology device GENERATOR DBS SYSTEM INFINITY 5 IPG 6660ANS [id number] [employer]  Left 1 Implanted   Implantable Pulse Generator Neurology device GENERATOR DBS SYSTEM INFINITY 5 IPG 6660ANS [id number] [employer]  Left 1 Explanted         Closure: monocryl and dermabond    Gino Hernandez MD, PhD  PGY-2 Neurosurgery  Please page NSGY on call with questions

## 2023-05-11 NOTE — OR NURSING
At 0840 patient arrived to Phase II somnolent, but easily aroused.  VS are WNL.  Patient denies nausea or pain.  LEWIS and follows commands.  Company representative here to turn on stimulator; appears to be functioning properly.

## 2023-05-11 NOTE — ANESTHESIA CARE TRANSFER NOTE
Patient: Stefan Odonnell    Procedure: Procedure(s):  Replacement of deep brain stimulator generator/battery over left chest wall       Diagnosis: End of battery life of deep brain stimulator [Z45.42]  Status post deep brain stimulator placement [Z96.89]  Parkinson disease (H) [G20]  Essential tremor [G25.0]  Diagnosis Additional Information: No value filed.    Anesthesia Type:   No value filed.     Note:    Oropharynx: oropharynx clear of all foreign objects and spontaneously breathing  Level of Consciousness: drowsy  Oxygen Supplementation: room air    Independent Airway: airway patency satisfactory and stable  Dentition: dentition unchanged  Vital Signs Stable: post-procedure vital signs reviewed and stable  Report to RN Given: handoff report given  Patient transferred to: PACU    Handoff Report: Identifed the Patient, Identified the Reponsible Provider, Reviewed the pertinent medical history, Discussed the surgical course, Reviewed Intra-OP anesthesia mangement and issues during anesthesia, Set expectations for post-procedure period and Allowed opportunity for questions and acknowledgement of understanding      Vitals:  Vitals Value Taken Time   BP     Temp     Pulse     Resp     SpO2 92 % 05/11/23 0839   Vitals shown include unvalidated device data.    Electronically Signed By: SAMARA Mauro CRNA  May 11, 2023  8:41 AM

## 2023-05-12 ENCOUNTER — PATIENT OUTREACH (OUTPATIENT)
Dept: NEUROSURGERY | Facility: CLINIC | Age: 73
End: 2023-05-12
Payer: COMMERCIAL

## 2023-05-12 NOTE — PROGRESS NOTES
Pt is s/p Replacement of deep brain stimulator generator/battery over left chest wall by Dr. Marshall on 5/11/23. Left a VM checking on pt's postop status and requested a call back to discuss status and spouse's question about pt's postop appt. Will follow-up as needed.

## 2023-05-12 NOTE — PROGRESS NOTES
[facility]: Post-Discharge Note  SITUATION                                                      Admission:    Admission Date: 05/11/23   Reason for Admission: Replacement of deep brain stimulator generator/battery over left chest wall  Discharge:   Discharge Date: 05/11/23  Discharge Diagnosis: Parkinson's disease    BACKGROUND                                                      Per hospital discharge summary and inpatient provider notes:  Neurosurgery Discharge Coordination Note     Attending physician: Dr. Marshall  Discharge to: Home     Current status: Spouse says pt is doing well since surgery. Using Tylenol for postop pain with adequate relief. Denies redness, swelling, increased tenderness, drainage, incision opening or elevated temp. Reports Incision CDI without signs of infection.  Denies current bowel or bladder issues.    Discharge instructions and medications reviewed with patient.  Follow up appointments/imaging/tests needed: 2 week post op with NP Kristan Lam on 5/22/23. Pt's spouse is requesting a video visit since pt's son finds it difficult to take the time off work to drive 6+ hour round trip. Will follow-up with NP and get back to pt.     RN triage/on call number given: [phone]/ [phone]        ASSESSMENT           Discharge Assessment  How are your symptoms? (Red Flag symptoms escalate to triage hotline per guidelines): Improved  Do you feel your condition is stable enough to be safe at home until your provider visit?: Yes  Does the patient have their discharge instructions? : Yes  Does the patient have questions regarding their discharge instructions? : No  Were you started on any new medications or were there changes to any of your previous medications? : Yes  Does the patient have all of their medications?: Yes  Discharge follow-up appointment scheduled within 14 calendar days? : Yes  Discharge Follow Up Appointment Date: 05/22/23  Discharge Follow Up Appointment Scheduled  with?: Specialty Care Provider         Post-op (Clinicians Only)  Did the patient have surgery or a procedure: Yes  Incision: closed;healing  Drainage: No  Bleeding: none  Fever: No  Chills: No  Redness: No  Warmth: No  Swelling: No  Incision site pain: Yes  Closure: suture;dissolving  Eating & Drinking: eating and drinking without complaints/concerns  PO Intake: regular diet  Bowel Function: normal  Urinary Status: voiding without complaint/concerns        PLAN                                                      Outpatient Plan:  Routine postop follow-up      Future Appointments   Date Time Provider Department Center   5/22/2023 12:00 PM Kristan Lam NP [facility]   7/13/2023 12:30 PM Edwina Goldstein, PhD LP [facility]   7/14/2023  7:00 AM Stanford Sloan MD [facility]         For any urgent concerns, please contact our 24 hour nurse triage line: [phone] ([phone])         THANH GOLD RN

## 2023-05-22 ENCOUNTER — VIRTUAL VISIT (OUTPATIENT)
Dept: NEUROSURGERY | Facility: CLINIC | Age: 73
End: 2023-05-22
Payer: COMMERCIAL

## 2023-05-22 DIAGNOSIS — G25.0 ESSENTIAL TREMOR: ICD-10-CM

## 2023-05-22 DIAGNOSIS — G20.A1 PARKINSON DISEASE (H): Primary | ICD-10-CM

## 2023-05-22 DIAGNOSIS — Z96.89 S/P DEEP BRAIN STIMULATOR PLACEMENT: ICD-10-CM

## 2023-05-22 PROCEDURE — 99024 POSTOP FOLLOW-UP VISIT: CPT | Mod: VID | Performed by: NURSE PRACTITIONER

## 2023-05-22 NOTE — PROGRESS NOTES
NEUROSURGERY    HISTORY AND PHYSICAL EXAM    Chief Complaint   Patient presents with     RECHECK     73year old male with right hand essential tremor with Parkinsonian features s/p left DBS placement on 5/29/2018 and 6/8/2018 and right DBS placement on 11/23/2021 and 12/3/2021 and was found at his neurology department to have depleted LEFT chest wall battery (which controls his right hand tremor). He underwent replacement of deep brain stimulator generator/battery, model Infinity 5, over the left chest wall with connection to one electrode array and revision of the left chest wall generator/battery pocket on 5/11/2023 by Dr. Marshall. Stefan reports that since surgery he has overall been doing well. He does note that his tremors have improved, however sometimes he notes his balance has been 'off', no falls, but states he will sometimes lose balance when walking and has to be slightly more cognizant of his movements. He notes that he will have neuropsych and motor testing in mid July. He notes overall he has had no pain. He is eating well and not vomiting. He is sleeping well and is awake and active throughout the day. 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 He denies any headaches or vision changes. He denies any fevers, no redness, swelling or drainage of incision. He notes mild soreness and bruising over left chest wall but otherwise doing well and healing well.        Past Medical History:   Diagnosis Date     Anosmia      Bleeding ulcer 1983     Carpal tunnel syndrome, bilateral      CKD (chronic kidney disease) stage 3, GFR 30-59 ml/min (H)      Diabetes mellitus (H)      Gout      Hx of skin cancer, basal cell 2013    removed from face     Hyperlipidemia      Hypertension      Insomnia      Macular degeneration      Neuropathy      Obese      Osteoarthritis of knees, bilateral      Periodic limb movements of sleep      Restless legs syndrome (RLS)        Past Surgical History:    Procedure Laterality Date     ABDOMEN SURGERY  6/1983    ulcers     COLONOSCOPY       HEMORRHOID SURGERY       IMPLANT DEEP BRAIN STIMULATION GENERATOR / BATTERY Left 6/8/2018    Procedure: IMPLANT DEEP BRAIN STIMULATION GENERATOR / BATTERY;  Deep Brain Stimulator Placement, Phase II, Placement Of Deep Brain Stimulator Generator/Battery Over The Left Chest Wall;  Surgeon: Wayne Marshall MD;  Location: [facility] OR     IMPLANT DEEP BRAIN STIMULATION GENERATOR / BATTERY Right 12/3/2021    Procedure: Deep brain stimulator placement, phase II, placement of deep brain stimulator generator/battery over the right chest wall  Latex Free;  Surgeon: Wayne Marshall MD;  Location: [facility] OR     Open Stomach Ulcer Repair       OPTICAL TRACKING SYSTEM INSERTION DEEP BRAIN STIMULATION Left 5/29/2018    Procedure: OPTICAL TRACKING SYSTEM INSERTION DEEP BRAIN STIMULATION;  Stealth Assisted Left Deep Brain Stimulator Placement, Phase I, Placement Of Left Deep Brain Stimulator Electrode, Target Left Subthalamic Nucleus With Microelectrode Recording;  Surgeon: Wayne Marshall MD;  Location: [facility] OR     OPTICAL TRACKING SYSTEM INSERTION DEEP BRAIN STIMULATION Right 11/23/2021    Procedure: Right stealth assisted deep brain stimulator placement, phase I, placement of right side deep brain stimulator electrode, target right subthalamic nucleus, with microelectrode recording and placement of extension and externalization wires for [place] research protocol;  Surgeon: Wayne Marshall MD;  Location: [facility] OR     REPLACE DEEP BRAIN STIMULATION GENERATOR / BATTERY Left 5/11/2023    Procedure: Replacement of deep brain stimulator generator/battery over left chest wall;  Surgeon: Wayne Marshall MD;  Location: [facility] OR     TONSILLECTOMY         Social History     Socioeconomic History     Marital status:      Spouse name: Blanca Odonnell     Number of children: 1     Years of education: Not on file     Highest  education level: Not on file   Occupational History     Occupation: Special [employer]     Comment: [employer]   Tobacco Use     Smoking status: Never     Smokeless tobacco: Never   Vaping Use     Vaping status: Not on file   Substance and Sexual Activity     Alcohol use: No     Comment: 2 - 3 x a year     Drug use: No     Sexual activity: Yes     Partners: Female   Other Topics Concern     Parent/sibling w/ CABG, MI or angioplasty before 65F 55M? No   Social History Narrative    On second marriage.  Has a son from first marriage.   for 27 years.  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  Never smoked.  He rarely drinks alcohol about 2 - 3 x per year.  Doesn't use illicit drugs.      Social Determinants of Health     Financial Resource Strain: Not on file   Food Insecurity: Not on file   Transportation Needs: Not on file   Physical Activity: Not on file   Stress: Not on file   Social Connections: Not on file   Intimate Partner Violence: Not on file   Housing Stability: Not on file       Family History   Problem Relation Age of Onset     Heart Disease Mother      Diabetes Mother      Arthritis Mother      Depression Mother      Anxiety Disorder Mother      Dementia Mother      Hypertension Mother      LUNG DISEASE Mother      Prostate Cancer Father      Tremor Father      Cancer Father         Prostate     Obesity Sister      Heart Disease Brother      Bone Cancer Brother      Heart Disease Brother      Tremor Son      Heart Disease Maternal Grandmother      Heart Disease Maternal Grandfather      Heart Disease Paternal Grandmother      Heart Disease Paternal Grandfather      Unknown/Adopted Sister        Current Outpatient Medications   Medication     allopurinol (ZYLOPRIM) 300 MG tablet     amLODIPine (NORVASC) 5 MG tablet     amoxicillin (AMOXIL) 500 MG capsule     augmented betamethasone dipropionate (DIPROLENE-AF) 0.05 % external ointment     blood glucose (NO BRAND SPECIFIED) test strip      blood glucose monitoring ([place] CONTOUR NEXT MONITOR) meter device kit     blood glucose monitoring (VITA MICROLET) lancets     carbidopa-levodopa (SINEMET CR)  MG CR tablet     carbidopa-levodopa (SINEMET)  MG tablet     cholecalciferol 50 MCG (2000 UT) CAPS     COMFORT EZ PEN NEEDLES 32G X 6 MM miscellaneous     diphenoxylate-atropine (LOMOTIL) 2.5-0.025 MG per tablet     finasteride (PROSCAR) 5 MG tablet     glipiZIDE (GLUCOTROL) 10 MG tablet     insulin glargine (LANTUS) 100 UNIT/ML injection     insulin pen needle 32G X 4 MM     insulin pen needle 32G X 5 MM     liraglutide (VICTOZA) 18 MG/3ML solution     Misc Natural Products (TART CHERRY ADVANCED) CAPS     Semaglutide, 1 MG/DOSE, (OZEMPIC) 4 MG/3ML pen     simvastatin (ZOCOR) 40 MG tablet     traZODone (DESYREL) 50 MG tablet     No current facility-administered medications for this visit.       Allergies   Allergen Reactions     Atorvastatin Muscle Pain (Myalgia)     Clindamycin GI Disturbance     Gabapentin Swelling     Lyrica [Pregabalin]      Blurred vision, resolved when he discontinued.         REVIEW OF SYSTEMS:   ROS: 10 point ROS neg other than the symptoms noted above in the HPI.    PHYSICAL EXAM  There were no vitals taken for this visit.     Exam limited due to the nature of the appointment  General: well appearing male, no acute distress, answering questions appropriately  Neuro: PERRL, tracks and regards, EOMs appear full, MAEx4 appears symmetric  Skin: left chest wall incision appears to be healing well, pt peeled glue and underlying skin appears well approximated    ASSESSMENT       Patient is recovering well from the recent DBS generator/battery replacement surgery.  His incision is healing well with no signs of infection.  He is doing well overall.         PLAN:  1.  Follow up with neurosurgery as needed.      Virtual Visit Details    Type of service:  Video Visit   Video Start Time: 12:19 PM  Video End  Time:12:36    Originating Location (pt. Location): Home    Distant Location (provider location):  On-site  Platform used for Video Visit: Pola

## 2023-05-22 NOTE — NURSING NOTE
Is the patient currently in the state of [place]? YES    Visit mode:VIDEO    If the visit is dropped, the patient can be reconnected by: VIDEO VISIT: Text to cell phone: [phone]    Will anyone else be joining the visit? NO      How would you like to obtain your AVS? MyChart    Are changes needed to the allergy or medication list? NO  Pt states no changes to allergies or medications since last reviewed by staff 11 days ago. Pt also self-reviewed in Mychart eCheck-in.     Reason for visit: RECHECK

## 2023-05-22 NOTE — LETTER
5/22/2023       RE: Stefan Odonnell  [address] [facility] [address]       Dear Colleague,    Thank you for referring your patient, Stefan Odonnell, to the [facility]UROS[facility]GERY [facility] [place] [facility]. Please see a copy of my visit note below.    NEUROSURGERY    HISTORY AND PHYSICAL EXAM    Chief Complaint   Patient presents with    RECHECK     73year old male with right hand essential tremor with Parkinsonian features s/p left DBS placement on 5/29/2018 and 6/8/2018 and right DBS placement on 11/23/2021 and 12/3/2021 and was found at his neurology department to have depleted LEFT chest wall battery (which controls his right hand tremor). He underwent replacement of deep brain stimulator generator/battery, model Infinity 5, over the left chest wall with connection to one electrode array and revision of the left chest wall generator/battery pocket on 5/11/2023 by Dr. Marshall. Stefan reports that since surgery he has overall been doing well. He does note that his tremors have improved, however sometimes he notes his balance has been 'off', no falls, but states he will sometimes lose balance when walking and has to be slightly more cognizant of his movements. He notes that he will have neuropsych and motor testing in mid July. He notes overall he has had no pain. He is eating well and not vomiting. He is sleeping well and is awake and active throughout the day. 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 He denies any headaches or vision changes. He denies any fevers, no redness, swelling or drainage of incision. He notes mild soreness and bruising over left chest wall but otherwise doing well and healing well.        Past Medical History:   Diagnosis Date    Anosmia     Bleeding ulcer 1983    Carpal tunnel syndrome, bilateral     CKD (chronic kidney disease) stage 3, GFR 30-59 ml/min (H)     Diabetes mellitus (H)     Gout      Hx of skin cancer, basal cell 2013    removed from face    Hyperlipidemia     Hypertension     Insomnia     Macular degeneration     Neuropathy     Obese     Osteoarthritis of knees, bilateral     Periodic limb movements of sleep     Restless legs syndrome (RLS)        Past Surgical History:   Procedure Laterality Date    ABDOMEN SURGERY  6/1983    ulcers    COLONOSCOPY      HEMORRHOID SURGERY      IMPLANT DEEP BRAIN STIMULATION GENERATOR / BATTERY Left 6/8/2018    Procedure: IMPLANT DEEP BRAIN STIMULATION GENERATOR / BATTERY;  Deep Brain Stimulator Placement, Phase II, Placement Of Deep Brain Stimulator Generator/Battery Over The Left Chest Wall;  Surgeon: Wayne Marshall MD;  Location: [facility] OR    IMPLANT DEEP BRAIN STIMULATION GENERATOR / BATTERY Right 12/3/2021    Procedure: Deep brain stimulator placement, phase II, placement of deep brain stimulator generator/battery over the right chest wall  Latex Free;  Surgeon: Wayne Marshall MD;  Location: [facility] OR    Open Stomach Ulcer Repair      OPTICAL TRACKING SYSTEM INSERTION DEEP BRAIN STIMULATION Left 5/29/2018    Procedure: OPTICAL TRACKING SYSTEM INSERTION DEEP BRAIN STIMULATION;  Stealth Assisted Left Deep Brain Stimulator Placement, Phase I, Placement Of Left Deep Brain Stimulator Electrode, Target Left Subthalamic Nucleus With Microelectrode Recording;  Surgeon: Wayne Marshall MD;  Location: [facility] OR    OPTICAL TRACKING SYSTEM INSERTION DEEP BRAIN STIMULATION Right 11/23/2021    Procedure: Right stealth assisted deep brain stimulator placement, phase I, placement of right side deep brain stimulator electrode, target right subthalamic nucleus, with microelectrode recording and placement of extension and externalization wires for UDALL research protocol;  Surgeon: Wayne Marshall MD;  Location: [facility] OR    REPLACE DEEP BRAIN STIMULATION GENERATOR / BATTERY Left 5/11/2023    Procedure: Replacement of deep brain stimulator  generator/battery over left chest wall;  Surgeon: Wayne Marshall MD;  Location: [facility] OR    TONSILLECTOMY         Social History     Socioeconomic History    Marital status:      Spouse name: Blanca Odonnell    Number of children: 1    Years of education: Not on file    Highest education level: Not on file   Occupational History    Occupation: Special Allen     Comment: [employer]   Tobacco Use    Smoking status: Never    Smokeless tobacco: Never   Vaping Use    Vaping status: Not on file   Substance and Sexual Activity    Alcohol use: No     Comment: 2 - 3 x a year    Drug use: No    Sexual activity: Yes     Partners: Female   Other Topics Concern    Parent/sibling w/ CABG, MI or angioplasty before 65F 55M? No   Social History Narrative    On second marriage.  Has a son from first marriage.   for 27 years.  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  Never smoked.  He rarely drinks alcohol about 2 - 3 x per year.  Doesn't use illicit drugs.      Social Determinants of Health     Financial Resource Strain: Not on file   Food Insecurity: Not on file   Transportation Needs: Not on file   Physical Activity: Not on file   Stress: Not on file   Social Connections: Not on file   Intimate Partner Violence: Not on file   Housing Stability: Not on file       Family History   Problem Relation Age of Onset    Heart Disease Mother     Diabetes Mother     Arthritis Mother     Depression Mother     Anxiety Disorder Mother     Dementia Mother     Hypertension Mother     LUNG DISEASE Mother     Prostate Cancer Father     Tremor Father     Cancer Father         Prostate    Obesity Sister     Heart Disease Brother     Bone Cancer Brother     Heart Disease Brother     Tremor Son     Heart Disease Maternal Grandmother     Heart Disease Maternal Grandfather     Heart Disease Paternal Grandmother     Heart Disease Paternal Grandfather     Unknown/Adopted Sister        Current Outpatient Medications    Medication    allopurinol (ZYLOPRIM) 300 MG tablet    amLODIPine (NORVASC) 5 MG tablet    amoxicillin (AMOXIL) 500 MG capsule    augmented betamethasone dipropionate (DIPROLENE-AF) 0.05 % external ointment    blood glucose (NO BRAND SPECIFIED) test strip    blood glucose monitoring (VITA CONTOUR NEXT MONITOR) meter device kit    blood glucose monitoring (VITA MICROLET) lancets    carbidopa-levodopa (SINEMET CR)  MG CR tablet    carbidopa-levodopa (SINEMET)  MG tablet    cholecalciferol 50 MCG (2000 UT) CAPS    COMFORT EZ PEN NEEDLES 32G X 6 MM miscellaneous    diphenoxylate-atropine (LOMOTIL) 2.5-0.025 MG per tablet    finasteride (PROSCAR) 5 MG tablet    glipiZIDE (GLUCOTROL) 10 MG tablet    insulin glargine (LANTUS) 100 UNIT/ML injection    insulin pen needle 32G X 4 MM    insulin pen needle 32G X 5 MM    liraglutide (VICTOZA) 18 MG/3ML solution    Misc Natural Products (TART CHERRY ADVANCED) CAPS    Semaglutide, 1 MG/DOSE, (OZEMPIC) 4 MG/3ML pen    simvastatin (ZOCOR) 40 MG tablet    traZODone (DESYREL) 50 MG tablet     No current facility-administered medications for this visit.       Allergies   Allergen Reactions    Atorvastatin Muscle Pain (Myalgia)    Clindamycin GI Disturbance    Gabapentin Swelling    Lyrica [Pregabalin]      Blurred vision, resolved when he discontinued.         REVIEW OF SYSTEMS:   ROS: 10 point ROS neg other than the symptoms noted above in the HPI.    PHYSICAL EXAM  There were no vitals taken for this visit.     Exam limited due to the nature of the appointment  General: well appearing male, no acute distress, answering questions appropriately  Neuro: PERRL, tracks and regards, EOMs appear full, MAEx4 appears symmetric  Skin: left chest wall incision appears to be healing well, pt peeled glue and underlying skin appears well approximated    ASSESSMENT       Patient is recovering well from the recent DBS generator/battery replacement surgery.  His incision is healing  well with no signs of infection.  He is doing well overall.         PLAN:  1.  Follow up with neurosurgery as needed.          Again, thank you for allowing me to participate in the care of your patient.      Sincerely,    Kristan Lam NP

## 2023-05-24 NOTE — OP NOTE
PATIENT NAME: FIDE CABRERA  YOB: 1950  MRN:   [id number]  ACCOUNT:  [id number]      DATE OF PROCEDURE:  05/11/2023    PREOPERATIVE DIAGNOSIS:  1.  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  Essential tremor  3.  Parkinsonism  4.  Status post left side deep brain stimulator placement, phase I, placement of left side deep brain stimulator electrode, target left subthalamic nucleus, with microelectrode recording, on 5/29/2018  5.  Status post deep brain stimulator placement, phase II, placement of deep brain stimulator generator/battery over the left chest wall on 6/8/2018  6.  Status post right side deep brain stimulator placement, phase I, placement of deep brain stimulator electrode, target right subthalamic nucleus, with microelectrode recording and placement of extension and externalization wires for UDALL research protocol on 11/23/2021  7.  Status post deep brain stimulator placement, phase II, placement of deep brain stimulator generator/battery over the right chest wall on 12/3/2021  8.  Depleted deep brain stimulator generator/battery over the left chest wall    POSTOPERATIVE DIAGNOSIS:  1.  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  Essential tremor  3.  Parkinsonism  4.  Status post left side deep brain stimulator placement, phase I, placement of left side deep brain stimulator electrode, target left subthalamic nucleus, with microelectrode recording, on 5/29/2018  5.  Status post deep brain stimulator placement, phase II, placement of deep brain stimulator generator/battery over the left chest wall on 6/8/2018  6.  Status post right side deep brain stimulator placement, phase I, placement of deep brain stimulator electrode, target right subthalamic nucleus, with microelectrode recording and placement of extension and externalization wires for UDALL research protocol on 11/23/2021  7.  Status post deep brain stimulator placement, phase II, placement of  deep brain stimulator generator/battery over the right chest wall on 12/3/2021  8.  Depleted deep brain stimulator generator/battery over the left chest wall    PROCEDURES PERFORMED:  1.  Replacement of deep brain stimulator generator/battery, model Infinity 5, over the left chest wall with connection to one electrode array.  2.  Revision of the left chest wall generator/battery pocket.  3.  Electrical interrogation and analysis of the left side deep brain stimulator system.    ATTENDING SURGEON:  Wayne Marshall MD, PhD.    ASSISTANT SURGEON:  Gino Hernandez MD, PhD.    ANESTHESIA:  Monitored anesthesia care and local anesthetic.    ESTIMATED BLOOD LOSS:  1 mL    COMPLICATIONS:  None    FINDINGS:  No sign of infection.  No sign of hardware breakage or damage.  Preoperatively, patient was found to have abnormal impedance values on the left side which were low impedances at 2B,3B, 4, and 3A, all in the 100-200 range.  However, the program impedance is OK at 450 Ohm.  With new generator/battery connected, all the impedance values were within normal.  No problems were found.    EXPLANTS:  Old Abbott DBS generator/battery, Infinity 5, REF [id number], S/N [id number]    IMPLANTS:  New Abbott DBS generator/battery, Infinity 5, REF [id number], S/N [id number]    INDICATIONS FOR PROCEDURE:  Mr. Stefan Odonnell is a 73 year old right handed male who is well known to me and now presents with depleted DBS generator/battery over the left chest wall.  He is s/p left side deep brain stimulator placement, phase I, placement of left side deep brain stimulator electrode, target left subthalamic nucleus, with microelectrode recording on 5/29/2018, s/p deep brain stimulator placement, phase II, placement of deep brain stimulator generator/battery over the left chest wall on 6/8/2018, s/p right side deep brain stimulator placement, phase I, placement of deep brain stimulator electrode, target right subthalamic nucleus, with microelectrode recording and  placement of extension and externalization wires for UDALL research protocol on 11/23/2021, and s/p deep brain stimulator placement, phase II, placement of deep brain stimulator generator/battery over the right chest wall on 12/3/2021.  He is doing well and he has no complaints with respect to his DBS system.  He recently saw Dr. Stanford Sloan of neurology back in 1/6/2023.  He denies any issues with the location of his device, especially his left chest wall.  He denies any abnormal shocking sensation.  He denies any areas of wound breakdown or exposed hardware.  He does have a known continued low impedances at 2B,3B, and 4, now also at 3A, all in the 100-200 range.  However, the program impedance is OK at 450 Ohm.  Patient states that his DBS generator/battery over the left chest wall that controls his right hand tremor is depleted.  He received a message to contact the provider recently.  Therefore, his left chest wall DBS generator/battery is in need of replacement.  This generator/battery has lasted him approximately 5 years.  Patient states that he is in his usual state of health.  He did stop taking baby aspirin.  He is not on any other antiplatelet or anticoagulation therapy.  He did say that he was found to have abnormally thickened gallbladder and this was removed back in September of 2022.  There was a concern for cancer but the margins were negative.  Concern for cancer was found within the removed gallbladder and he was placed on chemotherapy which ended in 12/17/2022.  We did discuss that he does have abnormal impedance values in his left side DBS system.  However, it is not impacting his programming at this time.  Therefore, I told him that we will not actively try to address this.  It is possible that after the contacts are cleaned and inserted into the new generator/battery, the impedance values may improve.  However, we will not try to explore and to repair his system during this surgery.  If we do  discover that he does need to have the system revised, we would have to perform this at another time with patient under the general anesthesia.  We discussed the surgical procedure for replacing the DBS generator/battery over the left chest wall.  He currently has the Abbott Infinity 5 generator/battery, and this will be maintained during the upcoming procedure.  Risks, benefits, alternative therapies were discussed with the patient, including but not limited to infection and bleeding.  This surgical procedure will be performed under MAC with local anesthetic.  The thinned part of the wound/pocket may be corrected with mobilization of the tissue under the incision.  The pocket may need to be enlarged to minimize the stress on the closure.  Surgical procedure was discussed in detail.  All questions were answered, and he expressed understanding.    DESCRIPTION OF PROCEDURE:  Informed consent was obtained from the patient and his left chest was marked, specifically the previous incision.  He was brought to the operating theater and was laid in a supine position.  His left arm was tucked.  All pressure points were padded appropriately.  Monitored anesthesia care was induced and maintained throughout the entire case.  We clipped his hair over his left chest wall using a surgical clipper.  His scar and previous incision over the left chest wall was prepped and draped in the usual sterile fashion.  Time out was performed confirming the patient's identity, procedure to be performed, site and side of the surgery and the administration of prophylactic preoperative antibiotic.  Lidocaine 1% with 1:100,000 epinephrine mixed with Marcaine 1/4% plain, 50:50 mix, was injected in the subcutaneous space at the intended incision site, which was the previously well healed incision.  Total of 10 mL was used.  The DBS system for the left and right side was turned to the surgery safe mode.    We turned our attention to the left side.  We  used a #10 blade scalpel to make the skin incision, going over the previously well healed scar.  We carried our dissection through the dermal layer until we reached the subcutaneous fat and then the generator/battery capsule.  The monopolar cautery was used to dissect the subcutaneous tissue and #10 blade to open the capsule, taking great care not to damage the hardware.  We gently opened up the fibrous capsule surrounding the generator/battery to expose it.  Care was taken to open the capsule while lifting the capsule itself away from the generator/battery, taking great care not to come in contact with the metal casing or the wire.  The generator/battery was mobile and we were able to remove it easily, taking care to attend to the location of the wire.  The scar surrounding and anchoring the extension wire was carefully dissected and wire freed.  The extension wire was examined and was found to be without any damage or erosion or defect.  There were no obvious signs of infection.    We then performed blunt and sharp dissection and monopolar cautery within the pocket after the generator/battery had been removed in order to revise the chest wall pocket for the new generator/battery, so as to reduce the tension on the wound closure.  Therefore, the pocket was generously enlarged.  We also dissected free and undermined the superior aspect of the pocket so that the closure would have less tension after closure and more subcutaneous tissue would be available.  The entire cavity was copiously irrigated with normal saline and meticulous hemostasis was obtained and the pocket was dried.      The old generator/battery, Infinity 5, was disconnected from the extension wire that was inserted at the anterior portal.  We used a special wire real tool to do this.  The connector contacts were cleaned.  The new generator/battery, Abbott Infinity 5, was connected to the extension wire with the extension wire being inserted into the  anterior portal.  Posterior portal was plugged with a new dead end plug.  Therefore, one electrode array was connected to generator/battery and secured.    And then the new generator/battery was placed back into the pocket along with the excess extension wire being placed posteriorly and around the periphery of the generator/battery.  The generator/battery was secured to the new position within the pocket using two 2-0 Ethibond sutures.    The left side DBS system was tested and interrogated electrically and was found to be working well and the impedance values were noted to be within normal.  This was a significant improvement from his preoperative values.  No problems were found.      With the satisfactory placement of the new system, we began closing the wound.  The deep pocket or capsule was reapproximated using 3-0 Vicryl sutures in an simple interrupted fashion.  The subcutaneous fat layer was then reapproximated using 3-0 Vicryl sutures in an inverted interrupted fashion to provide padding to the skin for the closure.  The dermal layer was reapproximated using 3-0 Vicryl sutures in an inverted interrupted fashion.  The skin was reapproximated using 4-0 Monocryl suture in a subcuticular fashion.  The incision was then cleaned with wet and dry sponges followed by ChloraPrep and then Dermabond skin glue was applied.    The left side and right side DBS system was turned back on to the therapeutic mode.    At the end of the case, all counts including needle, sponge and instrument counts were correct x 2.  There were no complications.  Patient was awakened and taken to the recovery room in a stable condition.     IMaite M.D., Ph.D., Neurosurgery Attending, was present and scrubbed for the entire case and performed the key and critical portions of the case.      MAITE LADD MD, PHD

## 2023-05-30 ENCOUNTER — TELEPHONE (OUTPATIENT)
Dept: NEUROLOGY | Facility: CLINIC | Age: 73
End: 2023-05-30
Payer: COMMERCIAL

## 2023-05-30 NOTE — TELEPHONE ENCOUNTER
Writer left brief voicemail, requesting a call back to discuss neuropysch visit with Dr. Goldstein. Call back number was provided.    In mid may, patient asked research coordinator why he has a neuropsych appointment in July when he recently completed one in January. Per Dr. Goldstein, 6 month is minimum to between evaluations. Patient can either reschedule neuropsych visit or keep the current scheduled date.    Cely Kolb RN ([place]) on May 30, 2023 at 10:36 AM

## 2023-07-13 ENCOUNTER — OFFICE VISIT (OUTPATIENT)
Dept: NEUROPSYCHOLOGY | Facility: CLINIC | Age: 73
End: 2023-07-13
Payer: COMMERCIAL

## 2023-07-13 DIAGNOSIS — Z00.6 EXAMINATION OF PARTICIPANT IN CLINICAL TRIAL: Primary | ICD-10-CM

## 2023-07-13 PROCEDURE — 90791 PSYCH DIAGNOSTIC EVALUATION: CPT

## 2023-07-13 PROCEDURE — 96138 PSYCL/NRPSYC TECH 1ST: CPT

## 2023-07-13 PROCEDURE — 96139 PSYCL/NRPSYC TST TECH EA: CPT

## 2023-07-14 ENCOUNTER — OFFICE VISIT (OUTPATIENT)
Dept: NEUROLOGY | Facility: CLINIC | Age: 73
End: 2023-07-14
Payer: COMMERCIAL

## 2023-07-14 VITALS
SYSTOLIC BLOOD PRESSURE: 142 MMHG | HEART RATE: 59 BPM | WEIGHT: 267.6 LBS | BODY MASS INDEX: 34.34 KG/M2 | OXYGEN SATURATION: 95 % | DIASTOLIC BLOOD PRESSURE: 88 MMHG | HEIGHT: 74 IN

## 2023-07-14 DIAGNOSIS — G20.A1 PARKINSON DISEASE (H): Primary | ICD-10-CM

## 2023-07-14 DIAGNOSIS — Z96.89 STATUS POST DEEP BRAIN STIMULATOR PLACEMENT: ICD-10-CM

## 2023-07-14 PROCEDURE — 95970 ALYS NPGT W/O PRGRMG: CPT | Performed by: PSYCHIATRY & NEUROLOGY

## 2023-07-14 PROCEDURE — 99215 OFFICE O/P EST HI 40 MIN: CPT | Performed by: PSYCHIATRY & NEUROLOGY

## 2023-07-14 ASSESSMENT — UNIFIED PARKINSONS DISEASE RATING SCALE (UPDRS)
PRONATION_SUPINATION_LEFT: (0) NORMAL: NO PROBLEMS.
PRONATION_SUPINATION_RIGHT: (0) NORMAL: NO PROBLEMS.
ARISING_CHAIR: (1) SLIGHT: ARISING IS SLOWER THAN NORMAL, OR MAY NEED MORE THAN ONE ATTEMPT, OR MAY NEED TO MOVE FORWARD IN THE CHAIR TO ARISE. NO NEED TO USE THE ARMS OF THE CHAIR.
LEG_AGILITY_LEFT: (0) NORMAL: NO PROBLEMS.
PRONATION_SUPINATION_LEFT: (0) NORMAL: NO PROBLEMS.
LEG_AGILITY_RIGHT: (0) NORMAL: NO PROBLEMS.
RIGIDITY_LUE: (1) SLIGHT: RIGIDITY ONLY DETECTED WITH ACTIVATION MANEUVER.
RIGIDITY_LUE: (1) SLIGHT: RIGIDITY ONLY DETECTED WITH ACTIVATION MANEUVER.
TOTAL_SCORE: 10
CONSTANCY_TREMOR_ATREST: (0) NORMAL: NO TREMOR.
SPEECH: (1) SLIGHT: LOSS OF MODULATION, DICTION OR VOLUME, BUT STILL ALL WORDS EASY TO UNDERSTAND.
FINGER_TAPPING_RIGHT: (1) SLIGHT: ANY OF THE FOLLOWING: A) THE REGULAR RHYTHM IS BROKEN WITH ONE WITH ONE OR TWO INTERRUPTIONS OR HESITATIONS OF THE MOVEMENT  B) SLIGHT SLOWING  C) THE AMPLITUDE DECREMENTS NEAR THE END OF THE 10 MOVEMENTS.
FREEZING_GAIT: (0) NORMAL: NO FREEZING.
PARKINSONS_MEDS: OFF
PARKINSONS_MEDS: OFF
AMPLITUDE_LUE: (0) NORMAL: NO TREMOR.
RIGIDITY_LUE: (2) MILD: RIGIDITY DETECTED WITHOUT THE ACTIVATION MANEUVER. FULL RANGE OF MOTION IS EASILY ACHIEVED.
TOTAL_SCORE_LEFT: 7
AMPLITUDE_RUE: (0) NORMAL: NO TREMOR.
AXIAL_SCORE: 7
HANDMOVEMENTS_RIGHT: (1) SLIGHT: ANY OF THE FOLLOWING: A) THE REGULAR RHYTHM IS BROKEN WITH ONE WITH ONE OR TWO INTERRUPTIONS OR HESITATIONS OF THE MOVEMENT  B) SLIGHT SLOWING  C) THE AMPLITUDE DECREMENTS NEAR THE END OF THE 10 MOVEMENTS.
RIGIDITY_RLE: (0) NORMAL: NO RIGIDITY.
PARKINSONS_MEDS: ON
FACIAL_EXPRESSION: (0) NORMAL: NORMAL FACIAL EXPRESSION.
FINGER_TAPPING_LEFT: (2) MILD: ANY OF THE FOLLOWING: A) 3 TO 5 INTERRUPTIONS DURING TAPPING  B) MILD SLOWING  C) THE AMPLITUDE DECREMENTS MIDWAY IN THE 10-MOVEMENT SEQUENCE.
ARISING_CHAIR: (1) SLIGHT: ARISING IS SLOWER THAN NORMAL, OR MAY NEED MORE THAN ONE ATTEMPT, OR MAY NEED TO MOVE FORWARD IN THE CHAIR TO ARISE. NO NEED TO USE THE ARMS OF THE CHAIR.
AMPLITUDE_LIP_JAW: (0) NORMAL: NO TREMOR.
RIGIDITY_RUE: (0) NORMAL: NO RIGIDITY.
DYSKINESIAS_PRESENT: YES
AMPLITUDE_RUE: (0) NORMAL: NO TREMOR.
HANDMOVEMENTS_LEFT: (1) SLIGHT: ANY OF THE FOLLOWING: A) THE REGULAR RHYTHM IS BROKEN WITH ONE WITH ONE OR TWO INTERRUPTIONS OR HESITATIONS OF THE MOVEMENT  B) SLIGHT SLOWING  C) THE AMPLITUDE DECREMENTS NEAR THE END OF THE 10 MOVEMENTS.
FINGER_TAPPING_RIGHT: (1) SLIGHT: ANY OF THE FOLLOWING: A) THE REGULAR RHYTHM IS BROKEN WITH ONE WITH ONE OR TWO INTERRUPTIONS OR HESITATIONS OF THE MOVEMENT  B) SLIGHT SLOWING  C) THE AMPLITUDE DECREMENTS NEAR THE END OF THE 10 MOVEMENTS.
LEG_AGILITY_RIGHT: (0) NORMAL: NO PROBLEMS.
HANDMOVEMENTS_RIGHT: (2) MILD: ANY OF THE FOLLOWING: A) 3 TO 5 INTERRUPTIONS DURING TAPPING  B) MILD SLOWING  C) THE AMPLITUDE DECREMENTS MIDWAY IN THE 10-MOVEMENT SEQUENCE.
AMPLITUDE_LUE: (0) NORMAL: NO TREMOR.
LEG_AGILITY_LEFT: (0) NORMAL: NO PROBLEMS.
AMPLITUDE_RUE: (0) NORMAL: NO TREMOR.
POSTURE: (0) NORMAL: NO PROBLEMS.
RIGIDITY_NECK: (3) MODERATE: RIGIDITY DETECTED WITHOUT THE ACTIVATION MANEUVER. FULL RANGE OF MOTION IS ACHIEVED WITH EFFORT.
FACIAL_EXPRESSION: (0) NORMAL: NORMAL FACIAL EXPRESSION.
TOTAL_SCORE: 11
AMPLITUDE_RLE: (0) NORMAL: NO TREMOR.
AMPLITUDE_LUE: (0) NORMAL: NO TREMOR.
MOVEMENTS_INTERFERE_WITH_RATINGS: NO
TOTAL_SCORE: 1
SPONTANEITY_OF_MOVEMENT: (2) MILD: MILD GLOBAL SLOWNESS AND POVERTY OF SPONTANEOUS MOVEMENTS.
LEG_AGILITY_LEFT: (0) NORMAL: NO PROBLEMS.
TOTAL_SCORE: 2
AXIAL_SCORE: 10
TOTAL_SCORE_LEFT: 4
RIGIDITY_NECK: (3) MODERATE: RIGIDITY DETECTED WITHOUT THE ACTIVATION MANEUVER. FULL RANGE OF MOTION IS ACHIEVED WITH EFFORT.
POSTURAL_STABILITY: (0) NORMAL: NO PROBLEMS. RECOVERS WITH ONE OR TWO STEPS.
FACIAL_EXPRESSION: (1) SLIGHT: MINIMAL MASKED FACIES MANIFESTED ONLY BY DECREASED FREQUENCY OF BLINKING.
PRONATION_SUPINATION_RIGHT: (0) NORMAL: NO PROBLEMS.
RIGIDITY_LLE: (0) NORMAL: NO RIGIDITY.
LEG_AGILITY_RIGHT: (0) NORMAL: NO PROBLEMS.
AMPLITUDE_LLE: (0) NORMAL: NO TREMOR.
HANDMOVEMENTS_RIGHT: (0) NORMAL: NO PROBLEMS.
TOETAPPING_RIGHT: (0) NORMAL: NO PROBLEMS.
SPEECH: (1) SLIGHT: LOSS OF MODULATION, DICTION OR VOLUME, BUT STILL ALL WORDS EASY TO UNDERSTAND.
SPONTANEITY_OF_MOVEMENT: (2) MILD: MILD GLOBAL SLOWNESS AND POVERTY OF SPONTANEOUS MOVEMENTS.
SPONTANEITY_OF_MOVEMENT: (1) SLIGHT: SLIGHT GLOBAL SLOWNESS AND POVERTY OF SPONTANEOUS MOVEMENTS.
AXIAL_SCORE: 6
POSTURAL_STABILITY: (0) NORMAL: NO PROBLEMS. RECOVERS WITH ONE OR TWO STEPS.
TOETAPPING_LEFT: (1) SLIGHT: ANY OF THE FOLLOWING: A) THE REGULAR RHYTHM IS BROKEN WITH ONE WITH ONE OR TWO INTERRUPTIONS OR HESITATIONS OF THE MOVEMENT  B) SLIGHT SLOWING  C) THE AMPLITUDE DECREMENTS NEAR THE END OF THE 10 MOVEMENTS.
FINGER_TAPPING_LEFT: (1) SLIGHT: ANY OF THE FOLLOWING: A) THE REGULAR RHYTHM IS BROKEN WITH ONE WITH ONE OR TWO INTERRUPTIONS OR HESITATIONS OF THE MOVEMENT  B) SLIGHT SLOWING  C) THE AMPLITUDE DECREMENTS NEAR THE END OF THE 10 MOVEMENTS.
CONSTANCY_TREMOR_ATREST: (0) NORMAL: NO TREMOR.
FINGER_TAPPING_RIGHT: (3) MODERATE: ANY OF THE FOLLOWING: A) MORE THAN 5 INTERRUPTIONS OR AT LEAST ONE LONGER ARREST (FREEZE) IN ONGOING MOVEMENT  B) MODERATE SLOWING  C) THE AMPLITUDE DECREMENTS STARTING AFTER THE FIRST MOVEMENT.
TOTAL_SCORE: 28
AMPLITUDE_RLE: (0) NORMAL: NO TREMOR.
GAIT: (0) NORMAL: NO PROBLEMS.
POSTURE: (0) NORMAL: NO PROBLEMS.
TOETAPPING_RIGHT: (0) NORMAL: NO PROBLEMS.
RIGIDITY_RUE: (2) MILD: RIGIDITY DETECTED WITHOUT THE ACTIVATION MANEUVER. FULL RANGE OF MOTION IS EASILY ACHIEVED.
MOVEMENTS_INTERFERE_WITH_RATINGS: NO
DYSKINESIAS_PRESENT: NO
AMPLITUDE_LLE: (0) NORMAL: NO TREMOR.
RIGIDITY_NECK: (4) SEVERE: RIGIDITY DETECTED WITHOUT THE ACTIVATION MANEUVER AND FULL RANGE OF MOTION NOT ACHIEVED.
FREEZING_GAIT: (0) NORMAL: NO FREEZING.
HANDMOVEMENTS_LEFT: (1) SLIGHT: ANY OF THE FOLLOWING: A) THE REGULAR RHYTHM IS BROKEN WITH ONE WITH ONE OR TWO INTERRUPTIONS OR HESITATIONS OF THE MOVEMENT  B) SLIGHT SLOWING  C) THE AMPLITUDE DECREMENTS NEAR THE END OF THE 10 MOVEMENTS.
ARISING_CHAIR: (1) SLIGHT: ARISING IS SLOWER THAN NORMAL, OR MAY NEED MORE THAN ONE ATTEMPT, OR MAY NEED TO MOVE FORWARD IN THE CHAIR TO ARISE. NO NEED TO USE THE ARMS OF THE CHAIR.
GAIT: (0) NORMAL: NO PROBLEMS.
TOTAL_SCORE: 13
TOTAL_SCORE_LEFT: 3
MOVEMENTS_INTERFERE_WITH_RATINGS: NO
RIGIDITY_RUE: (0) NORMAL: NO RIGIDITY.
PRONATION_SUPINATION_LEFT: (0) NORMAL: NO PROBLEMS.
AMPLITUDE_LIP_JAW: (0) NORMAL: NO TREMOR.
TOETAPPING_LEFT: (0) NORMAL: NO PROBLEMS.
POSTURAL_STABILITY: (1) SLIGHT: 3-5 STEPS, BUT RECOVERS UNAIDED.
DYSKINESIAS_PRESENT: NO
GAIT: (0) NORMAL: NO PROBLEMS.
TOETAPPING_LEFT: (0) NORMAL: NO PROBLEMS.
FREEZING_GAIT: (0) NORMAL: NO FREEZING.
HANDMOVEMENTS_LEFT: (0) NORMAL: NO PROBLEMS.
TOETAPPING_RIGHT: (0) NORMAL: NO PROBLEMS.
PRONATION_SUPINATION_RIGHT: (0) NORMAL: NO PROBLEMS.
RIGIDITY_RLE: (0) NORMAL: NO RIGIDITY.
POSTURE: (0) NORMAL: NO PROBLEMS.
AMPLITUDE_LIP_JAW: (0) NORMAL: NO TREMOR.
CONSTANCY_TREMOR_ATREST: (0) NORMAL: NO TREMOR.
AMPLITUDE_LLE: (0) NORMAL: NO TREMOR.
RIGIDITY_RLE: (2) MILD: RIGIDITY DETECTED WITHOUT THE ACTIVATION MANEUVER. FULL RANGE OF MOTION IS EASILY ACHIEVED.
RIGIDITY_LLE: (0) NORMAL: NO RIGIDITY.
RIGIDITY_LLE: (0) NORMAL: NO RIGIDITY.
FINGER_TAPPING_LEFT: (0) NORMAL: NO PROBLEMS.
SPEECH: (1) SLIGHT: LOSS OF MODULATION, DICTION OR VOLUME, BUT STILL ALL WORDS EASY TO UNDERSTAND.
AMPLITUDE_RLE: (0) NORMAL: NO TREMOR.

## 2023-07-14 ASSESSMENT — PAIN SCALES - GENERAL: PAINLEVEL: NO PAIN (0)

## 2023-07-14 NOTE — Clinical Note
"7/14/2023       RE: Stefan Odonnell  [address]  [facility] [address]     Dear Colleague,    Thank you for referring your patient, Stefan Odonnell, to the [facility] [place] at [facility]. Please see a copy of my visit note below.    74 yo RHM PD or ET-PD, onset with RUE tremor around 2012.Bi STN DBS with two Abbott Infinity 0.5s in Bi chest.  Gout, DM, nephrolithiasis.  Stage 1 adenocarcinoma of the gallbladder, s/p CCY & chemotherapy.  [employer], lives in [facility] ca. 3h away.  Has had remote-programming in the past.  L<R palp fiss, ?able L lower facial synkinesis    At last visit \"pretty good.\"  UPDRS_II:3  Able to work OK, but tires easily (attributed to chemotherapy).  Discovered that his device is configured \"Physician's Prescribed Amplitude\" rather than \"Patient's Last Used Amplitude\" and we corrected that.    Today:      7/13/2023     5:50 PM   UPDRS EDL Scale   2.1  Speech 0   2.2  Salivation 0   2.3  Chew & Swallow 0   2.4  Eating                  1   2.5  Dressing                0   2.6  Hygiene                 0   2.7  Handwriting             0   2.8  Hobbies etc.            1   2.9  Turn in bed             0   2.10 Tremor                  0   2.11 Stand from chair        0   2.12 Walking & Balance  1   2.13 Freezing                0   MDS-UPDRS II Total Score 3            *** 2-230 0700 1100 1500 7:309   CD/LD 25/100mg 2 2 2 2     CD/LD 50/200 mg CR       1     Trazodone 50 mg         1      Lead(s):  Recon [email or web address]    Side Left Right   Target STN STN   Lead  Abbott Abbott   Lead Model Infinity 0.5 Infinity 6172, 0.5 mm spacing   Lead Implant Date 5/29/2018 11/23/2021   IPG location Left chest Right chest      IPG(s):  IPG # 1 2   IPG " " Abbott Abbott   IPG Model Infinity 5 Infinity 5   IPG Implant Date 6/8/2018 12/3/2021   Location Left chest Right chest   Battery (V) (2.82) (2.88V) (2.89V) (2.97)   At last visit: continued low impedances at LSTN 2B,3B, and 4, new also at 3A, all in the 100-200 range;    Today, with the new IPG, all system impedences are normal  RSTN system impedences remain normal.      IPG location Left chest   Program Program 1   Side Left STN   Initial/Final Initial   Active/Inactive Active   Amplitude (mA) 3.25   Pt range (mA) 0-4.5 by 0.05   Pulse width ( s) 60   Freq (Hz) 130   Contacts:  Cpos, 3_omni neg            IPG location Right chest     Side Right STN     Program Program 1  Program 2   Initial/Final Initial Initial   Active/Inactive Inactive Inactive   Amplitude (mA)  2.2 1.6   Pt range (mA) 0-4.0 by 0.1 0-4.0 by 0.1   Pulse width ( s)  30 20   Freq (Hz)  130 130   Contacts  Cpos, 12neg Cpos, 12 neg        IPG # Right chest       Side Right STN        Program Program 3 Program 4 Program 5   Initial/Final Initial Initial Initial   Active/Inactive Active [sic, active] Inactive Inactive   Amplitude (mA) 3.2 1.5 2.0   Pt range (mA) 0-4.0 by 0.1 0-3.0 by 0.1 0-4.0 by 0.1   Pulse width ( s) 20 20 20   Freq (Hz) 130 130 130   Contacts Cpos, 11_C neg Cpos, 10_C neg 11_C pos  10_C neg      About 10d after IPG replacement, felt: L leg heavinbess & dizziness.  Was kneeling to repair mower, stood, lost balance, fell, scraped knee..  After yesterday's neuropsych, \"I got to thinking:\" his R chest program was recorded as pgm 1, nd he recalls (correctly) it should be   The L leg sensation was similar to today with stim off.      Impression:  Balance a little worse.  Maybe related to unexplained change of RSTN stim to program 1, but RSTN is now Program3, as it should be -- not perfectly clear it ever got switched back to Program 1.  It could just be that PD is worsening -- he understands that implies extra care needed to " avoid falls.      3  2      Again, thank you for allowing me to participate in the care of your patient.      Sincerely,    Stanford Sloan MD

## 2023-07-14 NOTE — NURSING NOTE
The patient was seen for neuropsychological evaluation at the request of Stanford Sloan MD for the purposes of diagnostic clarification and treatment planning. 270 minutes of test administration and scoring were provided by this writer. Please see Dr. Edwina Goldstein's report for a full interpretation of the findings.    Melani Pendleton  Psychometrist

## 2023-07-14 NOTE — CONFIDENTIAL NOTE
The patient completed the 60 month neuropsychological evaluation for the [place] Clinical Core. There were 270 minutes of psychometrist time (30910: 1 unit; 72202: 8 units) and one unit of neuropsychologist professional time (90755). OnCore ID: [id number]     Measures administered:    Dementia Rating Scale - 2 Standard Form: 131/144  WAIS-IV: Similarities, Comprehension, Matrix Reasoning, Letter Number Sequencing, Digit Span  WMS-R Information & Orientation, WMS-4 Logical Memory I and II  D-KEFS Verbal Fluency - Standard  Blachly Naming Test  Clock Drawing  Trail Making Test  Stroop  [place]consin Card Sorting Test - 64 items  Montiel Judgment of Line Orientation Form V  Gomez Verbal Learning Test - Revised Form 3  Brief Visual Memory Test - Revised Form 3  MoCA v 7.2 (Score = 26/30)    BDI-2: 3  QUIP  ESS  RBDSQ  PDQ-39  Apathy Scale: 10  HAM-D   HAM-A     Results suggest subtle executive dysfunction with performance otherwise falling within normal limits. Letter and category fluency have significantly improved since January, while switching fluency has declined. There have been improvements in memory. He is not reporting significant depressed mood, anxiety, or apathy.      Edwina Goldstein, Ph.D., ABPP  Licensed Psychologist, LP [id number]  Board Certified in Clinical Neuropsychology          
General

## 2023-07-14 NOTE — PATIENT INSTRUCTIONS
Thank you for coming in for your 59M research visit for the Clinical Core study.    We didn't make any changes to your DBS programs:    Left Chest Battery, you should be on Program 1 at 3.25 (left brain for right body)  Right Chest Battery, you should be on Program 3 at 3.20 (Right brain for left body).    -We will send you information on how to manage constipation.  -Please follow up with your primary care about the cough.   -Use caution when moving from lawnmower to ground.    We will schedule a follow up with Dr. Sloan in 6 month (1 hour DBS programming visit, take meds as usual for this appointment.)    We'll see you in 1 year (summer 2024 for 71M ON/OFF testing visit and neuropsych testing for the clinical core study).    Any questions, please contact us.  Gela Ny [phone]

## 2023-07-14 NOTE — PROGRESS NOTES
"72 yo RHM PD or ET-PD, onset with RUE tremor around 2012.Bi STN DBS with two Abbott Infinity 0.5s in Bi chest.  Gout, DM, nephrolithiasis.  Stage 1 adenocarcinoma of the gallbladder, s/p CCY & chemotherapy.  [employer] for [employer], lives in [facility] ca. 3h away.  Has had remote-programming in the past.  L<R palp fiss, ?able L lower facial synkinesis    At last visit \"pretty good.\"  UPDRS_II:3  Able to work OK, but tires easily (attributed to chemotherapy).  Discovered that his device is configured \"Physician's Prescribed Amplitude\" rather than \"Patient's Last Used Amplitude\" and we corrected that.    Today:      7/13/2023     5:50 PM   UPDRS EDL Scale   2.1  Speech 0   2.2  Salivation 0   2.3  Chew & Swallow 0   2.4  Eating                  1   2.5  Dressing                0   2.6  Hygiene                 0   2.7  Handwriting             0   2.8  Hobbies etc.            1   2.9  Turn in bed             0   2.10 Tremor                  0   2.11 Stand from chair        0   2.12 Walking & Balance  1   2.13 Freezing                0   MDS-UPDRS II Total Score 3             2-230 0700 1100 1500 7:309   CD/LD 25/100mg 2 2 2 2     CD/LD 50/200 mg CR       1     Trazodone 50 mg         1      Lead(s):  Recon [email or web address]    Side Left Right   Target STN STN   Lead  Abbott Abbott   Lead Model Infinity 0.5 Infinity 6172, 0.5 mm spacing   Lead Implant Date 5/29/2018 11/23/2021   IPG location Left chest Right chest      IPG(s):  IPG # 1 2   IPG  Abbott Abbott   IPG Model Infinity 5 Infinity 5   IPG Implant Date 6/8/2018 12/3/2021   Location Left chest Right chest   Battery (V) (2.82) (2.88V) (2.89V) (2.97)   At last visit: continued low impedances at LSTN 2B,3B, and 4, new also at 3A, all in the 100-200 range;    Today, with the new IPG, all system " "impedences are normal  RSTN system impedences remain normal.      IPG location Left chest   Program Program 1   Side Left STN   Initial/Final Initial & final   Active/Inactive Active   Amplitude (mA) 3.25   Pt range (mA) 0-4.5 by 0.05   Pulse width ( s) 60   Freq (Hz) 130   Contacts:  Cpos, 3_omni neg            IPG location Right chest     Side Right STN     Program Program 1  Program 2   Initial/Final Initial & final Initial & final   Active/Inactive Inactive Inactive   Amplitude (mA)  2.2 1.6   Pt range (mA) 0-4.0 by 0.1 0-4.0 by 0.1   Pulse width ( s)  30 20   Freq (Hz)  130 130   Contacts  Cpos, 12neg Cpos, 12 neg        IPG # Right chest       Side Right STN        Program Program 3 Program 4 Program 5   Initial/Final Initial Initial Initial   Active/Inactive Active [sic, active] Inactive Inactive   Amplitude (mA) 3.2 1.5 2.0   Pt range (mA) 0-4.0 by 0.1 0-3.0 by 0.1 0-4.0 by 0.1   Pulse width ( s) 20 20 20   Freq (Hz) 130 130 130   Contacts Cpos, 11_C neg Cpos, 10_C neg 11_C pos  10_C neg      About 10d after IPG replacement, felt: L leg heavinness & dizziness.  Was kneeling to repair mower, stood, lost balance, fell, scraped knee..  After yesterday's neuropsych, \"I got to thinking:\" his R chest program was recorded as pgm 1 The L leg sensation was similar to today with stim off.      Impression:  Balance a little worse.  Maybe related to unexplained change of RSTN stim to program 1, but RSTN is now Program3, as it should be -- not perfectly clear it ever got switched to Program 1.  It could just be that PD is worsening -- he understands that implies extra care needed to avoid falls.        "

## 2023-07-15 NOTE — PROGRESS NOTES
"72 yo RHM PD or ET-PD, onset with RUE tremor around .Bi STN DBS with two Abbott Infinity 0.5s in Bi chest.  Gout, DM, nephrolithiasis.  Stage 1 adenocarcinoma of the gallbladder, s/p CCY & chemotherapy.  [employer] for [employer], lives in [facility] ca. 3h away.  Has had remote-programming in the past.  L<R palp fiss, ?able L lower facial synkinesis    At last visit \"pretty good.\"  UPDRS_II:3  Able to work OK, but tires easily (attributed to chemotherapy).  Discovered that his device is configured \"Physician's Prescribed Amplitude\" rather than \"Patient's Last Used Amplitude\" and we corrected that.    Today:    UPDRS Part I     1.1 Cognitive impairment:  0: Normal: No cognitive impairment.    1.2 Hallucinations and psychosis:  0: Normal: No hallucinations or psychotic behaviour.     1.3 Depressed mood:  0: Normal: No depressed mood.    1.4 Anxious mood:  1: Slight: Anxious feelings present but not sustained for more than one day at a time. No interference with patient's ability to carry out normal activities and social interactions.     1.5 Apathy:  0: Normal: No apathy.     1.6 Features of DDS: 0: Normal: No problems present.     1.7 Sleep problems:  0: Normal: No problems.    1.8 Daytime sleepiness:  2: Mild: Sometimes I fall asleep when alone and relaxing. For example, while reading or watching TV.     1.9 Pain and other sensations:  1: Slight: I have these feelings. However, I can do things and be with other people without difficulty.     1.10 Urinary problems:  0: Normal: No urine control problems.     1.11 Constipation problems:  1: Slight: I have been constipated. I use extra effort to move my bowels. However, this problem does not disturb my activities or my being comfortable.     1.12 Light headedness on standin: Normal: No dizzy or foggy feelings.    1.13 Fatigue:  0: Normal: No fatigue.              2023     5:50 PM   UPDRS EDL Scale   2.1  Speech 0   2.2  Salivation 0   2.3  Chew & " Swallow 0   2.4  Eating                  1   2.5  Dressing                0   2.6  Hygiene                 0   2.7  Handwriting             0   2.8  Hobbies etc.            1   2.9  Turn in bed             0   2.10 Tremor                  0   2.11 Stand from chair        0   2.12 Walking & Balance  1   2.13 Freezing                0   MDS-UPDRS II Total Score 3            PD meds 2-230 0700 1100 1500 7:309   CD/LD 25/100mg 2 2 2 2     CD/LD 50/200 mg CR       1     Trazodone 50 mg         1      Lead(s):  Recon [email or web address]    Side Left Right   Target STN STN   Lead  Abbott Abbott   Lead Model Infinity 0.5 Infinity 6172, 0.5 mm spacing   Lead Implant Date 5/29/2018 11/23/2021   IPG location Left chest Right chest      IPG(s):  IPG # 1 2   IPG  Abbott Abbott   IPG Model Infinity 5 Infinity 5   IPG Implant Date 6/8/2018 12/3/2021   Location Left chest Right chest   Battery (V) (2.82) (2.88V) (2.89V) (2.97)   At last visit: continued low impedances at LSTN 2B,3B, and 4, new also at 3A, all in the 100-200 range;    Today, with the new IPG, all system impedences are normal  RSTN system impedences remain normal.      IPG location Left chest   Program Program 1   Side Left STN   Initial/Final Initial   Active/Inactive Active   Amplitude (mA) 3.25   Pt range (mA) 0-4.5 by 0.05   Pulse width ( s) 60   Freq (Hz) 130   Contacts:  Cpos, 3_omni neg            IPG location Right chest     Side Right STN     Program Program 1  Program 2   Initial/Final Initial Initial   Active/Inactive Inactive Inactive   Amplitude (mA)  2.2 1.6   Pt range (mA) 0-4.0 by 0.1 0-4.0 by 0.1   Pulse width ( s)  30 20   Freq (Hz)  130 130   Contacts  Cpos, 12neg Cpos, 12 neg        IPG # Right chest       Side Right STN        Program Program 3 Program 4 Program 5   Initial/Final Initial  "Initial Initial   Active/Inactive Active [sic, active] Inactive Inactive   Amplitude (mA) 3.2 1.5 2.0   Pt range (mA) 0-4.0 by 0.1 0-3.0 by 0.1 0-4.0 by 0.1   Pulse width ( s) 20 20 20   Freq (Hz) 130 130 130   Contacts Cpos, 11_C neg Cpos, 10_C neg 11_C pos  10_C neg      About 10d after IPG replacement, felt: L leg heavinbess & dizziness.  Was kneeling to repair mower, stood, lost balance, fell, scraped knee..  After yesterday's neuropsych, \"I got to thinking:\" his R chest program was recorded as pgm 1, nd he recalls (correctly) it should be The L leg sensation was similar to today with stim off.        7/14/2023     7:00 AM 7/14/2023     8:00 AM 7/14/2023     9:00 AM   UPDRS Motor Scale   Time: 07:15 08:30 09:19   Medication Off Off On   R Brain DBS: On Off On   L Brain DBS: On Off On   Dyskinesia (LID) No No Yes   Did LID interfere No No No   Speech 1 1 1   Facial Expression 0 1 0   Rigidity Neck 3 4 3   Rigidity RUE 0 2 0   Rigidity LUE 1 2 1   Rigidity RLE 0 2 0   Rigidity LLE 0 0 0   Finger Taps R 1 3 1   Finger Taps L 1 2 0   Hand Mvt R 1 2 0   Hand Mvt L 1 1 0   Pron-/Supinate R 0 0 0   Pron-/Supinate L 0 0 0   Toe Tap R 0 0 0   Toe Tap L 0 0 1   Leg Agility R 0 0 0   Leg Agility L 0 0 0   Arise From Chair 1 1 1   Gait 0 0 0   Gait Freezing 0 0 0   Postural Stability 0 1 0   Posture 0 0 0   Global Spont Mvt 2 2 1   Postural Tremor RUE 0 1 0   Postural Tremor LUE 0 1 0   Kinetic Tremor RUE 0 1 0   Kinetic Tremor LUE 1 1 1   Rest Tremor RUE 0 0 0   Rest Tremor LUE 0 0 0   Rest Tremor RLE 0 0 0   Rest Tremor LLE 0 0 0   Rest Tremor Lip/Jaw 0 0 0   Rest Tremor Constancy 0 0 0   Total Right 2 11 1   Total Left 4 7 3   Axial Total 7 10 6   Total 13 28 10         Impression:  Balance a little worse.  Maybe related to unexplained change of RSTN stim to program 1, but RSTN is now Program3, as it should be -- not perfectly clear it ever got switched back to Program 1.  It could just be that PD is worsening -- he " understands that implies extra care needed to avoid falls.    Time this date with patient, reviewing records, & documenting 2h.    Stanford Sloan PhD, MD

## 2023-07-29 ENCOUNTER — HEALTH MAINTENANCE LETTER (OUTPATIENT)
Age: 73
End: 2023-07-29

## 2023-10-07 ENCOUNTER — HEALTH MAINTENANCE LETTER (OUTPATIENT)
Age: 73
End: 2023-10-07

## 2024-01-18 NOTE — PROGRESS NOTES
"74 yo RHM PD or ET-PD, onset with RUE tremor around 2012.Bi STN DBS with two Abbott Infinity 0.5s in Bi chest.  Gout, DM, nephrolithiasis.  Stage 1 adenocarcinoma of the gallbladder, s/p CCY & chemotherapy.  [employer] for [employer], lives in [facility] ca. 3h away.  Has had remote-programming in the past.    At last visit, UPDRS_I was unremarkable, UPRS_II was 3.  One fall, when standing from a crouch.  He remembered DBS check at neuropsycology testing having shown an unexpected program for R STN but program 3 was active for R STN, as expected, when we interrogated his devices.    Since last visit, MACKENZIE Ny sent info on PD constipation management, and confirmed from neuropsych records that RSTN program 3 as active for them, as well.    Today        1/18/2024    10:23 AM   UPDRS EDL Scale   2.1  Speech 0   2.2  Salivation 1   2.3  Chew & Swallow 0   2.4  Eating                  1   2.5  Dressing                0   2.6  Hygiene                 0   2.7  Handwriting             1   2.8  Hobbies etc.            0   2.9  Turn in bed             0   2.10 Tremor                  1   2.11 Stand from chair        1   2.12 Walking & Balance  1   2.13 Freezing                0   MDS-UPDRS II Total Score 6   Was 3    Unaccompanied  cc: When I stand up I feel dizzy.  Like a floating\"  Been going on for months.  Was present, he thinks, at last bharat't.  He's feeling it right now.    His L palp fiss is markedly less than R.  I've previously noted.  He was unaware of it, until I had him look in a mirror.  Nobody (including his wife) has commented.  He 'phoned his wife, she confirmed she's not noticed it.  However, she said he's complained about his eyeSIGHT at times:  e.g. trying to read \"'it just looks lke a line'\"  He has a 's license photo from 5/9/2022 which doesn't show it.  (He confirms a new photo was taken for the renewal)  I did note at last visit L<R palpebral fissure, but I don't recall it as striking as " "this.  Moreover he had an ophthalmology appointment last week, and the exam documented doesn't mention this.    No serious bumps on head but one minor one e.g. \"The other day I got in the tractor and hit my head on the ceiling.\" Last Sunday.  At night when drive headlights the glare [bothers him]. And tablet blurry.  This for probably a janet.  Never diplopia.  Never oscillopsia.  Never \"true\" vertigo (never seeing, or feeling self or surround spinning.    Has DM2, on Ozempic.  October HgbA1c was 6.4.    Nothing else going on health-wise.    On exam:  L lid gets below top of L pupil.  Gaze is conjugate.  Left and right eye adduct well with convergence.  EOMs are full in L,R, up and down-gaze, with no diplopia.  Pupils are equal, equally and briskly reactive, and there is no APD.  Buries his lashes well bilaterally.  No synkinesia noted this visit.    Mild L pron drift, with L>R sustention tremor.  Tremor about the same with FNF, and no lack of check nor inaccuracy to sudden target shifts.  No lack of check in either UE to mechanical perturbation.    Unremarkable gait, apart from dec L armswing w/ tremor.  Demonstrated a pivot-turn (not so-instructed but he did it well) and only mild difficulty with tandem walking  Pull-test was 0 (normal)        7/14/2023     9:00 AM   UPDRS Motor Scale   Time: 09:19   Medication On   R Brain DBS: On   L Brain DBS: On   Dyskinesia (LID) Yes   Did LID interfere No   Speech 1   Facial Expression 0   Rigidity Neck 3   Rigidity RUE 0   Rigidity LUE 1   Rigidity RLE 0   Rigidity LLE 0   Finger Taps R 1   Finger Taps L 0   Hand Mvt R 0   Hand Mvt L 0   Pron-/Supinate R 0   Pron-/Supinate L 0   Toe Tap R 0   Toe Tap L 1   Leg Agility R 0   Leg Agility L 0   Arise From Chair 1   Gait 0   Gait Freezing 0   Postural Stability 0   Posture 0   Global Spont Mvt 1   Postural Tremor RUE 0   Postural Tremor LUE 0   Kinetic Tremor RUE 0   Kinetic Tremor LUE 1   Rest Tremor RUE 0   Rest Tremor LUE 0 "   Rest Tremor RLE 0   Rest Tremor LLE 0   Rest Tremor Lip/Jaw 0   Rest Tremor Constancy 0   Total Right 1   Total Left 3   Axial Total 6   Total 10     Orthostatic BPs:  Supine 150/77, 67  Standing, immediately (asymptomatic): 139/79, 89  Standing, after 4 minutes 127/79, 79    Called Isaac Muñiz OD  He's out, clinic supervisor spoke with me.  He thinks the absence of any mention of ptosis in the Dec12,2023 note indicates that no ptosis was present.  Confirmed there's an upcoming appointment Jan23,2024.  Said (if I recall correctly) that he'd discuss with Dr. Muñiz.    PD meds  2-230 0700 1100 1500 7:30-8p   CD/LD 25/100mg 2 2 2 2     CD/LD 50/200 mg CR       1     Trazodone 50 mg         1      Lead(s):  Recon [email or web address]    Side Left Right   Target STN STN   Lead  Abbott Abbott   Lead Model Infinity 0.5 mm spacing Infinity 6172, 0.5 mm spacing   Lead Implant Date 5/11/2023 11/23/2021   IPG location Left chest Right chest      IPG(s):  IPG  Abbott Abbott   IPG Model Infinity 5 Infinity 5   IPG Implant Date 6/8/2018 12/3/2021   Location Left chest Right chest   Battery (V) 2.96 2.97(2.96) (2.97)   System impedances R all OK  System impedances L all OK    IPG location Left chest   Program Program 1   Side Left STN   Initial/Final Initial & final   Active/Inactive Active   Amplitude (mA) 3.25   Pt range (mA) 0-4.5 by 0.05   Pulse width ( s) 60   Freq (Hz) 130   Contacts:  Cpos, 3_omni neg            IPG location Right chest     Side Right STN     Program Program 1  Program 2   Initial/Final Initial &final Initial &final   Active/Inactive Inactive Inactive   Amplitude (mA)  2.2 1.6   Pt range (mA) 0-4.0 by 0.1 0-4.0 by 0.1   Pulse width ( s)  30 20   Freq (Hz)  130 130   Contacts  [id number]         IPG # Right chest       Side Right STN         Program Program 3 Program 4 Program 5   Initial/Final Initial & final Initial & final Initial & final   Active/Inactive Active Inactive Inactive   Amplitude (mA) 3.2 1.5 2.0   Pt range (mA) 0-4.0 by 0.1 0-3.0 by 0.1 0-4.0 by 0.1   Pulse width ( s) 20 20 20   Freq (Hz) 130 130 130   Contacts Cpos, 11_C neg Cpos, 10_C neg 11_C pos  10_C neg       Meds as is.  DBS as is.  Since he has a documented exam last month without ptosis (although it may, on the other hand, have been present 6 months ago):  Stat image of brain & associated vasculature, rule out an imageable structural lesion e.g. aneurysm.  It is however pupil-sparing (in fact, it appears no other CNIII- innervated muscle is affected than levator), and he does have diabetes.  He'll have repeat ophthalmology exam later this month which should establish, at least, whether pupil remains uninvolved (and if eye movements are tested, whether extraocular muscles other than levator remain uninvolved).    Time this date with patient, reviewing records, & documentinh    Stanford Sloan PhD, MD    Later the same day:  CT head / CTA neck & head reviewed, images and radiologists reading.  They were unremarkable.  Mr. JUSTIN was waiting in the waiting rm. as instructed:  I found him there, and let him know that the scan was OK so he could return home.    Stanford Sloan PhD, MD

## 2024-01-19 ENCOUNTER — ANCILLARY PROCEDURE (OUTPATIENT)
Dept: CT IMAGING | Facility: CLINIC | Age: 74
End: 2024-01-19
Attending: PSYCHIATRY & NEUROLOGY
Payer: COMMERCIAL

## 2024-01-19 ENCOUNTER — OFFICE VISIT (OUTPATIENT)
Dept: NEUROLOGY | Facility: CLINIC | Age: 74
End: 2024-01-19
Payer: COMMERCIAL

## 2024-01-19 VITALS
BODY MASS INDEX: 34.02 KG/M2 | WEIGHT: 265 LBS | SYSTOLIC BLOOD PRESSURE: 131 MMHG | DIASTOLIC BLOOD PRESSURE: 78 MMHG | OXYGEN SATURATION: 95 % | HEART RATE: 74 BPM

## 2024-01-19 DIAGNOSIS — G20.A1 PARKINSON'S DISEASE, UNSPECIFIED WHETHER DYSKINESIA PRESENT, UNSPECIFIED WHETHER MANIFESTATIONS FLUCTUATE (H): Primary | ICD-10-CM

## 2024-01-19 DIAGNOSIS — Z96.89 STATUS POST DEEP BRAIN STIMULATOR PLACEMENT: ICD-10-CM

## 2024-01-19 DIAGNOSIS — H49.00 OCULOMOTOR NERVE DISORDER: ICD-10-CM

## 2024-01-19 DIAGNOSIS — G20.A1 PARKINSON'S DISEASE, UNSPECIFIED WHETHER DYSKINESIA PRESENT, UNSPECIFIED WHETHER MANIFESTATIONS FLUCTUATE (H): ICD-10-CM

## 2024-01-19 LAB
CREAT BLD-MCNC: 1.2 MG/DL (ref 0.7–1.3)
EGFRCR SERPLBLD CKD-EPI 2021: >60 ML/MIN/1.73M2

## 2024-01-19 PROCEDURE — 70498 CT ANGIOGRAPHY NECK: CPT | Mod: GC | Performed by: RADIOLOGY

## 2024-01-19 PROCEDURE — 99214 OFFICE O/P EST MOD 30 MIN: CPT | Mod: 25 | Performed by: PSYCHIATRY & NEUROLOGY

## 2024-01-19 PROCEDURE — 95970 ALYS NPGT W/O PRGRMG: CPT | Performed by: PSYCHIATRY & NEUROLOGY

## 2024-01-19 PROCEDURE — 82565 ASSAY OF CREATININE: CPT | Performed by: PATHOLOGY

## 2024-01-19 PROCEDURE — 70496 CT ANGIOGRAPHY HEAD: CPT | Mod: GC | Performed by: RADIOLOGY

## 2024-01-19 RX ORDER — IOPAMIDOL 755 MG/ML
70 INJECTION, SOLUTION INTRAVASCULAR ONCE
Status: COMPLETED | OUTPATIENT
Start: 2024-01-19 | End: 2024-01-19

## 2024-01-19 RX ADMIN — IOPAMIDOL 70 ML: 755 INJECTION, SOLUTION INTRAVASCULAR at 08:53

## 2024-01-19 ASSESSMENT — PAIN SCALES - GENERAL: PAINLEVEL: NO PAIN (0)

## 2024-01-19 NOTE — DISCHARGE INSTRUCTIONS

## 2024-01-26 ENCOUNTER — TRANSFERRED RECORDS (OUTPATIENT)
Dept: HEALTH INFORMATION MANAGEMENT | Facility: CLINIC | Age: 74
End: 2024-01-26
Payer: COMMERCIAL

## 2024-01-29 ENCOUNTER — DOCUMENTATION ONLY (OUTPATIENT)
Dept: NEUROLOGY | Facility: CLINIC | Age: 74
End: 2024-01-29
Payer: COMMERCIAL

## 2024-01-29 ENCOUNTER — MYC MEDICAL ADVICE (OUTPATIENT)
Dept: NEUROLOGY | Facility: CLINIC | Age: 74
End: 2024-01-29
Payer: COMMERCIAL

## 2024-01-29 NOTE — TELEPHONE ENCOUNTER
Requested clinic notes/imaging be sent to Neurology Clinic. Address, clinic number, and fax provided.

## 2024-01-29 NOTE — PROGRESS NOTES
Received office note from [facility]  Records Date of service: 1/26/24  Copy has been sent to scanning and emailed to provider   
none

## 2024-05-04 ENCOUNTER — HEALTH MAINTENANCE LETTER (OUTPATIENT)
Age: 74
End: 2024-05-04

## 2024-07-18 ENCOUNTER — OFFICE VISIT (OUTPATIENT)
Dept: NEUROPSYCHOLOGY | Facility: CLINIC | Age: 74
End: 2024-07-18
Payer: COMMERCIAL

## 2024-07-18 DIAGNOSIS — Z00.6 EXAMINATION OF PARTICIPANT IN CLINICAL TRIAL: Primary | ICD-10-CM

## 2024-07-18 PROCEDURE — 96139 PSYCL/NRPSYC TST TECH EA: CPT

## 2024-07-18 PROCEDURE — 90791 PSYCH DIAGNOSTIC EVALUATION: CPT

## 2024-07-18 PROCEDURE — 96138 PSYCL/NRPSYC TECH 1ST: CPT

## 2024-07-18 ASSESSMENT — SLEEP AND FATIGUE QUESTIONNAIRES
HOW LIKELY ARE YOU TO NOD OFF OR FALL ASLEEP WHEN YOU ARE A PASSENGER IN A CAR FOR AN HOUR WITHOUT A BREAK: MODERATE CHANCE OF DOZING
HOW LIKELY ARE YOU TO NOD OFF OR FALL ASLEEP WHILE LYING DOWN TO REST IN THE AFTERNOON WHEN CIRCUMSTANCES PERMIT: MODERATE CHANCE OF DOZING
HOW LIKELY ARE YOU TO NOD OFF OR FALL ASLEEP IN A CAR, WHILE STOPPED FOR A FEW MINUTES IN TRAFFIC: WOULD NEVER DOZE
HOW LIKELY ARE YOU TO NOD OFF OR FALL ASLEEP WHILE SITTING AND TALKING TO SOMEONE: WOULD NEVER DOZE
HOW LIKELY ARE YOU TO NOD OFF OR FALL ASLEEP WHILE SITTING QUIETLY AFTER LUNCH WITHOUT ALCOHOL: MODERATE CHANCE OF DOZING
HOW LIKELY ARE YOU TO NOD OFF OR FALL ASLEEP WHILE SITTING INACTIVE IN A PUBLIC PLACE: SLIGHT CHANCE OF DOZING
HOW LIKELY ARE YOU TO NOD OFF OR FALL ASLEEP WHILE WATCHING TV: HIGH CHANCE OF DOZING
HOW LIKELY ARE YOU TO NOD OFF OR FALL ASLEEP WHILE SITTING AND READING: MODERATE CHANCE OF DOZING

## 2024-07-18 NOTE — PROGRESS NOTES
The patient was seen for neuropsychological evaluation for the purpose of a research visit - [place] [facility] 72 month. 176 minutes of test administration and scoring were provided by this writer.  Please see Dr. Edwina Goldstein's report for a list of measures administered.    Kyara Velazco   Psychometrist

## 2024-07-18 NOTE — PROGRESS NOTES
Department of Neurology  Movement Disorders Division   DBS Follow-up Note    Patient: Stefan Odonnell  MRN: [id number]   : 1950   Date of Visit: 2024    Diagnosis: Parkinson's disease  DBS Target(s): Bilateral STN DBS  Date(s) of DBS Lead Placement: 21 (R STN) and 18 (L STN)     Date(s) of IPG Placement: 23 (left chest), 12/3/21 (right chest)  Device: Abbott Infinity 0.5s    Chief Complaint:  Stefan Odonnell is a 74 year old male with history of gout, DM, nephrolithiasis, stage 1 adenocarcinoma of the gallbladder s/p cholecystectomy and chemo who returns to clinic for follow up of Parkinson's disease status post bilateral STN DBS who returns for a programming visit. At last visit UPDRS_II was 6 and the patient had L eye ptosis and a stat CT/CTA were obtained and were without abnormality.    Interval History:  Since the last visit the patient reports that he has been under a lot of stress. Retiring has been part of this stress which has been a large change as he defined himself through his work quite a bit. This has caused a significant amount of anxiety which has been problematic. In addition, the patient has had a significant amount of back and hip pain which has been functionally limiting but he is working with an orthopod on this and will be getting a hip replacement. He's going to be busy, for a while, cleaning up in preparatin for movin to new home. The patient reports that he had a a concussion in [place]. Additionally, had cataract surgery recently which he found to be helpful.    Was recently found to have a ascending aortic aneurysm which appears stable from previous visit.      Review of Systems:  Other than that noted at the end of this note, the remainder of 12 systems reviewed were negative.    Medications:  Current Outpatient Medications   Medication Sig Dispense Refill    allopurinol (ZYLOPRIM) 300 MG tablet Take 1 tablet by mouth daily      amLODIPine (NORVASC) 5 MG tablet  Take 5 mg by mouth every morning       augmented betamethasone dipropionate (DIPROLENE-AF) 0.05 % external ointment Apply topically 2 times daily      blood glucose (NO BRAND SPECIFIED) test strip USE TO TEST BLOOD SUGARS 3 TIMES DAILY      blood glucose monitoring (VITA CONTOUR NEXT MONITOR) meter device kit       blood glucose monitoring (VITA MICROLET) lancets Test Blood Sugar 3 Times Daily.  Dx-E11.8      carbidopa-levodopa (SINEMET CR)  MG CR tablet Take half a tab at 3pm.  Increase to 1 tab after 1 week. 90 tablet 3    carbidopa-levodopa (SINEMET)  MG tablet 2 tabs qid at 5a,8a,11a,3p 720 tablet 3    cholecalciferol 50 MCG (2000 UT) CAPS Take 4,000 Units by mouth      COMFORT EZ PEN NEEDLES 32G X 6 MM miscellaneous       diphenoxylate-atropine (LOMOTIL) 2.5-0.025 MG per tablet Take 2 tablets by mouth 4 times daily as needed       escitalopram (LEXAPRO) 5 MG tablet One tab at bedtime for one week then two tabs at bedtime. 60 tablet 1    finasteride (PROSCAR) 5 MG tablet Take 5 mg by mouth every morning      insulin glargine (LANTUS) 100 UNIT/ML injection Inject Subcutaneous 2 times daily Inject 26 units qpm, 31 units qam      insulin pen needle 32G X 4 MM 1 each      insulin pen needle 32G X 5 MM USE WITH INSULIN THREE TIMES DAILY      Misc Natural Products (TART CHERRY ADVANCED) CAPS Take 1 capsule by mouth 2 times daily       predniSONE (DELTASONE) 20 MG tablet Take 20 mg by mouth      Semaglutide, 1 MG/DOSE, (OZEMPIC) 4 MG/3ML pen Inject 1 mg Subcutaneous every 7 days      simvastatin (ZOCOR) 40 MG tablet Take 40 mg by mouth At Bedtime       amoxicillin (AMOXIL) 500 MG capsule For dental appointments (Patient not taking: Reported on 7/14/2023)         PD meds  2-230 0700 1100 1500 7:30-8p   CD/LD 25/100mg 2 2 2 2     CD/LD 50/200 mg CR       1             0      Allergies: is allergic to atorvastatin, clindamycin, gabapentin, and lyrica [pregabalin].    Past Medical History:  Past Medical  History:   Diagnosis Date    Anosmia     Bleeding ulcer 1983    Carpal tunnel syndrome, bilateral     CKD (chronic kidney disease) stage 3, GFR 30-59 ml/min (H)     Diabetes mellitus (H)     Gout     Hx of skin cancer, basal cell 2013    removed from face    Hyperlipidemia     Hypertension     Insomnia     Macular degeneration     Neuropathy     Obese     Osteoarthritis of knees, bilateral     Periodic limb movements of sleep     Restless legs syndrome (RLS)        Past Surgical History:  Past Surgical History:   Procedure Laterality Date    ABDOMEN SURGERY  6/1983    ulcers    COLONOSCOPY      HEMORRHOID SURGERY      IMPLANT DEEP BRAIN STIMULATION GENERATOR / BATTERY Left 6/8/2018    Procedure: IMPLANT DEEP BRAIN STIMULATION GENERATOR / BATTERY;  Deep Brain Stimulator Placement, Phase II, Placement Of Deep Brain Stimulator Generator/Battery Over The Left Chest Wall;  Surgeon: Wayne Marshall MD;  Location: [facility] OR    IMPLANT DEEP BRAIN STIMULATION GENERATOR / BATTERY Right 12/3/2021    Procedure: Deep brain stimulator placement, phase II, placement of deep brain stimulator generator/battery over the right chest wall  Latex Free;  Surgeon: Wayne Marshall MD;  Location: [facility] OR    Open Stomach Ulcer Repair      OPTICAL TRACKING SYSTEM INSERTION DEEP BRAIN STIMULATION Left 5/29/2018    Procedure: OPTICAL TRACKING SYSTEM INSERTION DEEP BRAIN STIMULATION;  Stealth Assisted Left Deep Brain Stimulator Placement, Phase I, Placement Of Left Deep Brain Stimulator Electrode, Target Left Subthalamic Nucleus With Microelectrode Recording;  Surgeon: Wayne Marshall MD;  Location: [facility] OR    OPTICAL TRACKING SYSTEM INSERTION DEEP BRAIN STIMULATION Right 11/23/2021    Procedure: Right stealth assisted deep brain stimulator placement, phase I, placement of right side deep brain stimulator electrode, target right subthalamic nucleus, with microelectrode recording and placement of extension and externalization  wires for REZA research protocol;  Surgeon: Wayne Marshall MD;  Location: [facility] OR    REPLACE DEEP BRAIN STIMULATION GENERATOR / BATTERY Left 5/11/2023    Procedure: Replacement of deep brain stimulator generator/battery over left chest wall;  Surgeon: Wayne Marshall MD;  Location: [facility] OR    TONSILLECTOMY         Social History:  Social History     Socioeconomic History    Marital status:      Spouse name: Blanca Odonnell    Number of children: 1   Occupational History    Occupation: Special [employer]     Comment: [employer]   Tobacco Use    Smoking status: Never    Smokeless tobacco: Never   Substance and Sexual Activity    Alcohol use: No     Comment: 2 - 3 x a year    Drug use: No    Sexual activity: Yes     Partners: Female   Other Topics Concern    Parent/sibling w/ CABG, MI or angioplasty before 65F 55M? No   Social History Narrative    On second marriage.  Has a son from first marriage.   for 27 years.  Works at [employer] as special [employer].  Never smoked.  He rarely drinks alcohol about 2 - 3 x per year.  Doesn't use illicit drugs.      Social Determinants of Health     Financial Resource Strain: Patient Declined (1/19/2024)    Received from [facility]    Overall Financial Resource Strain (CARDIA)     Difficulty of Paying Living Expenses: Patient declined   Food Insecurity: Patient Declined (7/16/2024)    Received from [facility]    Hunger Vital Sign     Worried About Running Out of Food in the Last Year: Patient declined     Ran Out of Food in the Last Year: Patient declined   Transportation Needs: Patient Declined (7/16/2024)    Received from [facility]    PRAPARE - Transportation     Lack of Transportation (Medical): Patient declined     Lack of Transportation (Non-Medical): Patient declined   Interpersonal Safety: Not At Risk (3/6/2024)    Received  "from [facility] [employer] [facility] IP [employer] IPV     Do you feel UNSAFE in any of your personal relationships with your family members or any other acquaintances?: No   Housing Stability: Patient Declined (7/16/2024)    Received from [facility] [employer] [facility] [employer] [facility]    Housing Stability Vital Sign     Unable to Pay for Housing in the Last Year: Patient declined     Homeless in the Last Year: Patient declined       Family History:  Family History   Problem Relation Age of Onset    Heart Disease Mother     Diabetes Mother     Arthritis Mother     Depression Mother     Anxiety Disorder Mother     Dementia Mother     Hypertension Mother     LUNG DISEASE Mother     Prostate Cancer Father     Tremor Father     Cancer Father         Prostate    Obesity Sister     Heart Disease Brother     Bone Cancer Brother     Heart Disease Brother     Tremor Son     Heart Disease Maternal Grandmother     Heart Disease Maternal Grandfather     Heart Disease Paternal Grandmother     Heart Disease Paternal Grandfather     Unknown/Adopted Sister        Physical Exam:  The patient's  height is 1.88 m (6' 2\") and weight is 110.2 kg (243 lb). His oral temperature is 97.8  F (36.6  C). His blood pressure is 130/83 and his pulse is 78. His respiration is 20 and oxygen saturation is 97%.       Neurological Examination:   Last medication dose: C/L  2 tabs & CR  1 tab y'day 3:30pm   DBS OFF at 7:45a  DBS ON 8:35a (bilaterally)  Medication on: 2 tablets of  carb-lev (1 of these tabs was chewed) at 0837.      7/19/2024     7:00 AM 7/19/2024     8:00 AM 7/19/2024     9:00 AM   UPDRS Motor Scale   Time: 07:26 08:21 09:05   Medication Off Off On   R Brain DBS: On Off On   L Brain DBS: On Off On   Dyskinesia (LID) Yes No Yes   Did LID interfere Yes No No   Speech 1 1 1   Facial Expression 1 1 0   Rigidity Neck 2 4 2   Rigidity RUE 0 3 1   Rigidity LUE 0 2 2   Rigidity RLE 1 2 0   Rigidity LLE 0 1 0 "   Finger Taps R 1 2 0   Finger Taps L 1 2 1   Hand Mvt R 1 2 0   Hand Mvt L 2 3 1   Pron-/Supinate R 0 1 0   Pron-/Supinate L 1 1 0   Toe Tap R 0 0 0   Toe Tap L 1 0 0   Leg Agility R 0 0 0   Leg Agility L 0 1 0   Arise From Chair 1 1 1   Gait 0 1 0   Gait Freezing 0 0 0   Postural Stability 1 1 1   Posture 0 0 0   Global Spont Mvt 2 2 1   Postural Tremor RUE 0 2 0   Postural Tremor LUE 1 2 1   Kinetic Tremor RUE 0 2 0   Kinetic Tremor LUE 2 2 1   Rest Tremor RUE 0 0 0   Rest Tremor LUE 0 0 0   Rest Tremor RLE 0 0 0   Rest Tremor LLE 0 0 0   Rest Tremor Lip/Jaw 0 0 0   Rest Tremor Constancy 0 0 0   Total Right 3 14 1   Total Left 8 14 6   Axial Total 8 11 6   Total 19 39 13     Data Reviewed:       7/19/2024    10:00 AM   UPDRS EDL Scale   2.1  Speech 1   2.2  Salivation 1   2.3  Chew & Swallow 0   2.4  Eating                  0   2.5  Dressing                2   2.6  Hygiene                 0   2.7  Handwriting             0   2.8  Hobbies etc.            0   2.9  Turn in bed             0   2.10 Tremor                  1   2.11 Stand from chair        0   2.12 Walking & Balance  0   2.13 Freezing                0   MDS-UPDRS II Total Score 5   Prev: 6   Upon further questioning, pt did endorse some difficulty with the following:  Tremor - slight 1  Dressing - 1 some issues with buttons  Saliva - 1 slight  Speech - 1 slight      UPDRS  Part I     1.1 Cognitive impairment:  0: Normal: No cognitive impairment.    1.2 Hallucinations and psychosis:  0: Normal: No hallucinations or psychotic behaviour.     1.3 Depressed mood:  1: Slight: Episodes of depressed mood that are not sustained for more than one day at a time. No interference with patient's ability to carry out normal activities and social interactions.    1.4 Anxious mood:  3: Moderate: Anxious feelings interfere with, but do not preclude, the patient's ability to carry out normal activities and social interactions.     1.5 Apathy:  1: Slight: Apathy  appreciated by patient and/or caregiver, but no interference with daily activities and social interactions.     1.6 Features of DDS: 0: Normal: No problems present.     1.7 Sleep problems:  2: Mild: Sleep problems usually cause some difficulties getting a full night of sleep.    1.8 Daytime sleepiness:  2: Mild: Sometimes I fall asleep when alone and relaxing. For example, while reading or watching TV.     1.9 Pain and other sensations:  4: Severe: These feelings stop me from doing things or being with other people.    1.10 Urinary problems:  1: Slight: I need to urinate often or urgently. However, these problems do not cause difficulties with my daily activities.     1.11 Constipation problems:  0: Normal: No constipation.     1.12 Light headedness on standin: Normal: No dizzy or foggy feelings.    1.13 Fatigue:  1: Slight: Fatigue occurs. However it does not cause me troubles doing things or being with people.          Procedure: DBS Interrogation & Programming    Recon [email or web address]    Side Left Right   Target STN STN   Lead  Abbott Abbott   Lead Model Infinity 0.5 mm spacing Infinity 6172, 0.5 mm spacing   Lead Implant Date 2021   IPG location Left chest Right chest      IPG(s):  IPG  Abbott Abbott   IPG Model Infinity 5 Infinity 5   IPG Implant Date 5/11/23 12/3/2021   Location Left chest Right chest   Battery (V) 2.96 2.97(2.96) (2.97)   System impedances R all OK  System impedances L all OK     IPG location Left chest   Program Program 1   Side Left STN   Initial/Final Initial and final   Active/Inactive Active   Amplitude (mA) 3.25   Pt range (mA) 0-4.5 by 0.05   Pulse width ( s) 60   Freq (Hz) 130   Contacts:  Cpos, 3_omni neg            IPG location Right chest     Side Right STN     Program Program 1  Program 2    Initial/Final Initial and final Initial and final   Active/Inactive Inactive Inactive   Amplitude (mA)  2.2 1.6   Pt range (mA) 0-4.0 by 0.1 0-4.0 by 0.1   Pulse width ( s)  30 20   Freq (Hz)  130 130   Contacts  [id number]         IPG # Right chest       Side Right STN        Program Program 3 Program 4 Program 5   Initial/Final Initial and final Initial and final Initial and final   Active/Inactive Active Inactive Inactive   Amplitude (mA) 3.2 1.5 2.0   Pt range (mA) 0-4.0 by 0.1 0-3.0 by 0.1 0-4.0 by 0.1   Pulse width ( s) 20 20 20   Freq (Hz) 130 130 130   Contacts Cpos, 11_c neg Cpos, 10_C neg 11_C pos  10_C neg        Assessment/Plan:  Stefan Odonnell is a 74 year old male with PD s/p bilasteral STN DBS. No changes to his PD medications or his DBS today but given his significant anxiety since retiring will start him on Lexapro which should hopefully be helpful with this. Will plan to see the patient back in 6 months for subsequent programming visit.        - Start Lexapro 5mg at bedtime for 1 week and then increase to 10 mg  - No changes to PD medications or DBS  - Return to clinic in 6 months for 1 hour DBS visit    Patient seen and discussed with Movement Disorder attending, Dr. Luther Daley MD  Neuromodulation/Movement Disorders Fellow     Patient seen and examined with Dr. Daley and I agree with the assessment and plan as outlined.  Time this date  Time this date with patient, reviewing records, & documentinh.    Stanford Sloan PhD, MD

## 2024-07-19 ENCOUNTER — OFFICE VISIT (OUTPATIENT)
Dept: NEUROLOGY | Facility: CLINIC | Age: 74
End: 2024-07-19
Payer: COMMERCIAL

## 2024-07-19 VITALS
HEART RATE: 78 BPM | DIASTOLIC BLOOD PRESSURE: 83 MMHG | OXYGEN SATURATION: 97 % | HEIGHT: 74 IN | SYSTOLIC BLOOD PRESSURE: 130 MMHG | TEMPERATURE: 97.8 F | BODY MASS INDEX: 31.18 KG/M2 | WEIGHT: 243 LBS | RESPIRATION RATE: 20 BRPM

## 2024-07-19 DIAGNOSIS — G20.A1 PARKINSON'S DISEASE, UNSPECIFIED WHETHER DYSKINESIA PRESENT, UNSPECIFIED WHETHER MANIFESTATIONS FLUCTUATE (H): Primary | ICD-10-CM

## 2024-07-19 DIAGNOSIS — R29.818 PARKINSONIAN FEATURES: ICD-10-CM

## 2024-07-19 DIAGNOSIS — Z96.89 STATUS POST DEEP BRAIN STIMULATOR PLACEMENT: ICD-10-CM

## 2024-07-19 DIAGNOSIS — F41.9 ANXIETY: ICD-10-CM

## 2024-07-19 PROCEDURE — 99215 OFFICE O/P EST HI 40 MIN: CPT | Mod: 25 | Performed by: PSYCHIATRY & NEUROLOGY

## 2024-07-19 PROCEDURE — 99417 PROLNG OP E/M EACH 15 MIN: CPT | Performed by: PSYCHIATRY & NEUROLOGY

## 2024-07-19 PROCEDURE — 95970 ALYS NPGT W/O PRGRMG: CPT | Performed by: PSYCHIATRY & NEUROLOGY

## 2024-07-19 RX ORDER — ESCITALOPRAM OXALATE 5 MG/1
TABLET ORAL
Qty: 60 TABLET | Refills: 1 | Status: SHIPPED | OUTPATIENT
Start: 2024-07-19 | End: 2024-09-27

## 2024-07-19 RX ORDER — CARBIDOPA AND LEVODOPA 25; 100 MG/1; MG/1
TABLET ORAL
Qty: 720 TABLET | Refills: 3 | Status: SHIPPED | OUTPATIENT
Start: 2024-07-19

## 2024-07-19 RX ORDER — PREDNISONE 20 MG/1
20 TABLET ORAL
COMMUNITY
Start: 2024-07-15

## 2024-07-19 ASSESSMENT — UNIFIED PARKINSONS DISEASE RATING SCALE (UPDRS)
RIGIDITY_NECK: (4) SEVERE: RIGIDITY DETECTED WITHOUT THE ACTIVATION MANEUVER AND FULL RANGE OF MOTION NOT ACHIEVED.
ARISING_CHAIR: (1) SLIGHT: ARISING IS SLOWER THAN NORMAL, OR MAY NEED MORE THAN ONE ATTEMPT, OR MAY NEED TO MOVE FORWARD IN THE CHAIR TO ARISE. NO NEED TO USE THE ARMS OF THE CHAIR.
TOTAL_SCORE: 39
AMPLITUDE_LIP_JAW: (0) NORMAL: NO TREMOR.
AMPLITUDE_LLE: (0) NORMAL: NO TREMOR.
LEG_AGILITY_RIGHT: (0) NORMAL: NO PROBLEMS.
RIGIDITY_LLE: (0) NORMAL: NO RIGIDITY.
ARISING_CHAIR: (1) SLIGHT: ARISING IS SLOWER THAN NORMAL, OR MAY NEED MORE THAN ONE ATTEMPT, OR MAY NEED TO MOVE FORWARD IN THE CHAIR TO ARISE. NO NEED TO USE THE ARMS OF THE CHAIR.
GAIT: (1) SLIGHT: INDEPENDENT WALKING WITH MINOR GAIT IMPAIRMENT.
FACIAL_EXPRESSION: (1) SLIGHT: MINIMAL MASKED FACIES MANIFESTED ONLY BY DECREASED FREQUENCY OF BLINKING.
SPONTANEITY_OF_MOVEMENT: (1) SLIGHT: SLIGHT GLOBAL SLOWNESS AND POVERTY OF SPONTANEOUS MOVEMENTS.
SPONTANEITY_OF_MOVEMENT: (2) MILD: MILD GLOBAL SLOWNESS AND POVERTY OF SPONTANEOUS MOVEMENTS.
FREEZING_GAIT: (0) NORMAL: NO FREEZING.
POSTURAL_STABILITY: (1) SLIGHT: 3-5 STEPS, BUT RECOVERS UNAIDED.
FINGER_TAPPING_LEFT: (1) SLIGHT: ANY OF THE FOLLOWING: A) THE REGULAR RHYTHM IS BROKEN WITH ONE WITH ONE OR TWO INTERRUPTIONS OR HESITATIONS OF THE MOVEMENT  B) SLIGHT SLOWING  C) THE AMPLITUDE DECREMENTS NEAR THE END OF THE 10 MOVEMENTS.
CONSTANCY_TREMOR_ATREST: (0) NORMAL: NO TREMOR.
TOTAL_SCORE_LEFT: 6
GAIT: (0) NORMAL: NO PROBLEMS.
RIGIDITY_RUE: (3) MODERATE: RIGIDITY DETECTED WITHOUT THE ACTIVATION MANEUVER. FULL RANGE OF MOTION IS ACHIEVED WITH EFFORT.
AMPLITUDE_RUE: (0) NORMAL: NO TREMOR.
GAIT: (0) NORMAL: NO PROBLEMS.
AMPLITUDE_RUE: (0) NORMAL: NO TREMOR.
POSTURAL_STABILITY: (1) SLIGHT: 3-5 STEPS, BUT RECOVERS UNAIDED.
PRONATION_SUPINATION_LEFT: (1) SLIGHT: ANY OF THE FOLLOWING: A) THE REGULAR RHYTHM IS BROKEN WITH ONE WITH ONE OR TWO INTERRUPTIONS OR HESITATIONS OF THE MOVEMENT  B) SLIGHT SLOWING  C) THE AMPLITUDE DECREMENTS NEAR THE END OF THE 10 MOVEMENTS.
ARISING_CHAIR: (1) SLIGHT: ARISING IS SLOWER THAN NORMAL, OR MAY NEED MORE THAN ONE ATTEMPT, OR MAY NEED TO MOVE FORWARD IN THE CHAIR TO ARISE. NO NEED TO USE THE ARMS OF THE CHAIR.
FACIAL_EXPRESSION: (0) NORMAL: NORMAL FACIAL EXPRESSION.
RIGIDITY_RLE: (1) SLIGHT: RIGIDITY ONLY DETECTED WITH ACTIVATION MANEUVER.
RIGIDITY_LLE: (0) NORMAL: NO RIGIDITY.
POSTURE: (0) NORMAL: NO PROBLEMS.
POSTURAL_STABILITY: (1) SLIGHT: 3-5 STEPS, BUT RECOVERS UNAIDED.
SPEECH: (1) SLIGHT: LOSS OF MODULATION, DICTION OR VOLUME, BUT STILL ALL WORDS EASY TO UNDERSTAND.
AMPLITUDE_LIP_JAW: (0) NORMAL: NO TREMOR.
POSTURE: (0) NORMAL: NO PROBLEMS.
PRONATION_SUPINATION_RIGHT: (1) SLIGHT: ANY OF THE FOLLOWING: A) THE REGULAR RHYTHM IS BROKEN WITH ONE WITH ONE OR TWO INTERRUPTIONS OR HESITATIONS OF THE MOVEMENT  B) SLIGHT SLOWING  C) THE AMPLITUDE DECREMENTS NEAR THE END OF THE 10 MOVEMENTS.
LEG_AGILITY_LEFT: (1) SLIGHT: ANY OF THE FOLLOWING: A) THE REGULAR RHYTHM IS BROKEN WITH ONE WITH ONE OR TWO INTERRUPTIONS OR HESITATIONS OF THE MOVEMENT  B) SLIGHT SLOWING  C) THE AMPLITUDE DECREMENTS NEAR THE END OF THE 10 MOVEMENTS.
AMPLITUDE_LLE: (0) NORMAL: NO TREMOR.
HANDMOVEMENTS_RIGHT: (2) MILD: ANY OF THE FOLLOWING: A) 3 TO 5 INTERRUPTIONS DURING TAPPING  B) MILD SLOWING  C) THE AMPLITUDE DECREMENTS MIDWAY IN THE 10-MOVEMENT SEQUENCE.
MOVEMENTS_INTERFERE_WITH_RATINGS: NO
RIGIDITY_RLE: (2) MILD: RIGIDITY DETECTED WITHOUT THE ACTIVATION MANEUVER. FULL RANGE OF MOTION IS EASILY ACHIEVED.
LEG_AGILITY_LEFT: (0) NORMAL: NO PROBLEMS.
TOTAL_SCORE: 13
SPEECH: (1) SLIGHT: LOSS OF MODULATION, DICTION OR VOLUME, BUT STILL ALL WORDS EASY TO UNDERSTAND.
FREEZING_GAIT: (0) NORMAL: NO FREEZING.
RIGIDITY_LLE: (1) SLIGHT: RIGIDITY ONLY DETECTED WITH ACTIVATION MANEUVER.
FACIAL_EXPRESSION: (1) SLIGHT: MINIMAL MASKED FACIES MANIFESTED ONLY BY DECREASED FREQUENCY OF BLINKING.
TOETAPPING_LEFT: (1) SLIGHT: ANY OF THE FOLLOWING: A) THE REGULAR RHYTHM IS BROKEN WITH ONE WITH ONE OR TWO INTERRUPTIONS OR HESITATIONS OF THE MOVEMENT  B) SLIGHT SLOWING  C) THE AMPLITUDE DECREMENTS NEAR THE END OF THE 10 MOVEMENTS.
RIGIDITY_RUE: (0) NORMAL: NO RIGIDITY.
AXIAL_SCORE: 6
AMPLITUDE_LLE: (0) NORMAL: NO TREMOR.
DYSKINESIAS_PRESENT: YES
MOVEMENTS_INTERFERE_WITH_RATINGS: YES
AMPLITUDE_RUE: (0) NORMAL: NO TREMOR.
HANDMOVEMENTS_RIGHT: (1) SLIGHT: ANY OF THE FOLLOWING: A) THE REGULAR RHYTHM IS BROKEN WITH ONE WITH ONE OR TWO INTERRUPTIONS OR HESITATIONS OF THE MOVEMENT  B) SLIGHT SLOWING  C) THE AMPLITUDE DECREMENTS NEAR THE END OF THE 10 MOVEMENTS.
AMPLITUDE_RLE: (0) NORMAL: NO TREMOR.
TOETAPPING_RIGHT: (0) NORMAL: NO PROBLEMS.
HANDMOVEMENTS_RIGHT: (0) NORMAL: NO PROBLEMS.
FINGER_TAPPING_LEFT: (2) MILD: ANY OF THE FOLLOWING: A) 3 TO 5 INTERRUPTIONS DURING TAPPING  B) MILD SLOWING  C) THE AMPLITUDE DECREMENTS MIDWAY IN THE 10-MOVEMENT SEQUENCE.
TOTAL_SCORE: 19
AMPLITUDE_LUE: (0) NORMAL: NO TREMOR.
FINGER_TAPPING_RIGHT: (1) SLIGHT: ANY OF THE FOLLOWING: A) THE REGULAR RHYTHM IS BROKEN WITH ONE WITH ONE OR TWO INTERRUPTIONS OR HESITATIONS OF THE MOVEMENT  B) SLIGHT SLOWING  C) THE AMPLITUDE DECREMENTS NEAR THE END OF THE 10 MOVEMENTS.
AMPLITUDE_RLE: (0) NORMAL: NO TREMOR.
RIGIDITY_NECK: (2) MILD: RIGIDITY DETECTED WITHOUT THE ACTIVATION MANEUVER. FULL RANGE OF MOTION IS EASILY ACHIEVED.
FINGER_TAPPING_LEFT: (1) SLIGHT: ANY OF THE FOLLOWING: A) THE REGULAR RHYTHM IS BROKEN WITH ONE WITH ONE OR TWO INTERRUPTIONS OR HESITATIONS OF THE MOVEMENT  B) SLIGHT SLOWING  C) THE AMPLITUDE DECREMENTS NEAR THE END OF THE 10 MOVEMENTS.
FINGER_TAPPING_RIGHT: (2) MILD: ANY OF THE FOLLOWING: A) 3 TO 5 INTERRUPTIONS DURING TAPPING  B) MILD SLOWING  C) THE AMPLITUDE DECREMENTS MIDWAY IN THE 10-MOVEMENT SEQUENCE.
TOETAPPING_RIGHT: (0) NORMAL: NO PROBLEMS.
AMPLITUDE_LIP_JAW: (0) NORMAL: NO TREMOR.
TOETAPPING_LEFT: (0) NORMAL: NO PROBLEMS.
TOTAL_SCORE: 1
PRONATION_SUPINATION_RIGHT: (0) NORMAL: NO PROBLEMS.
LEG_AGILITY_RIGHT: (0) NORMAL: NO PROBLEMS.
TOTAL_SCORE: 14
DYSKINESIAS_PRESENT: YES
PARKINSONS_MEDS: OFF
PRONATION_SUPINATION_LEFT: (1) SLIGHT: ANY OF THE FOLLOWING: A) THE REGULAR RHYTHM IS BROKEN WITH ONE WITH ONE OR TWO INTERRUPTIONS OR HESITATIONS OF THE MOVEMENT  B) SLIGHT SLOWING  C) THE AMPLITUDE DECREMENTS NEAR THE END OF THE 10 MOVEMENTS.
PRONATION_SUPINATION_LEFT: (0) NORMAL: NO PROBLEMS.
TOETAPPING_RIGHT: (0) NORMAL: NO PROBLEMS.
AXIAL_SCORE: 11
LEG_AGILITY_RIGHT: (0) NORMAL: NO PROBLEMS.
TOTAL_SCORE_LEFT: 14
LEG_AGILITY_LEFT: (0) NORMAL: NO PROBLEMS.
RIGIDITY_RLE: (0) NORMAL: NO RIGIDITY.
SPONTANEITY_OF_MOVEMENT: (2) MILD: MILD GLOBAL SLOWNESS AND POVERTY OF SPONTANEOUS MOVEMENTS.
RIGIDITY_RUE: (1) SLIGHT: RIGIDITY ONLY DETECTED WITH ACTIVATION MANEUVER.
SPEECH: (1) SLIGHT: LOSS OF MODULATION, DICTION OR VOLUME, BUT STILL ALL WORDS EASY TO UNDERSTAND.
CONSTANCY_TREMOR_ATREST: (0) NORMAL: NO TREMOR.
HANDMOVEMENTS_LEFT: (2) MILD: ANY OF THE FOLLOWING: A) 3 TO 5 INTERRUPTIONS DURING TAPPING  B) MILD SLOWING  C) THE AMPLITUDE DECREMENTS MIDWAY IN THE 10-MOVEMENT SEQUENCE.
RIGIDITY_LUE: (2) MILD: RIGIDITY DETECTED WITHOUT THE ACTIVATION MANEUVER. FULL RANGE OF MOTION IS EASILY ACHIEVED.
CONSTANCY_TREMOR_ATREST: (0) NORMAL: NO TREMOR.
AMPLITUDE_LUE: (0) NORMAL: NO TREMOR.
TOTAL_SCORE: 3
DYSKINESIAS_PRESENT: NO
RIGIDITY_NECK: (2) MILD: RIGIDITY DETECTED WITHOUT THE ACTIVATION MANEUVER. FULL RANGE OF MOTION IS EASILY ACHIEVED.
PARKINSONS_MEDS: ON
AXIAL_SCORE: 8
AMPLITUDE_RLE: (0) NORMAL: NO TREMOR.
TOTAL_SCORE_LEFT: 8
RIGIDITY_LUE: (0) NORMAL: NO RIGIDITY.
AMPLITUDE_LUE: (0) NORMAL: NO TREMOR.
HANDMOVEMENTS_LEFT: (3) MODERATE: ANY OF THE FOLLOWING: A) MORE THAN 5 INTERRUPTIONS OR AT LEAST ONE LONGER ARREST (FREEZE) IN ONGOING MOVEMENT  B) MODERATE SLOWING  C) THE AMPLITUDE DECREMENTS STARTING AFTER THE FIRST MOVEMENT.
POSTURE: (0) NORMAL: NO PROBLEMS.
RIGIDITY_LUE: (2) MILD: RIGIDITY DETECTED WITHOUT THE ACTIVATION MANEUVER. FULL RANGE OF MOTION IS EASILY ACHIEVED.
PRONATION_SUPINATION_RIGHT: (0) NORMAL: NO PROBLEMS.
TOETAPPING_LEFT: (0) NORMAL: NO PROBLEMS.
FINGER_TAPPING_RIGHT: (0) NORMAL: NO PROBLEMS.
MOVEMENTS_INTERFERE_WITH_RATINGS: NO
HANDMOVEMENTS_LEFT: (1) SLIGHT: ANY OF THE FOLLOWING: A) THE REGULAR RHYTHM IS BROKEN WITH ONE WITH ONE OR TWO INTERRUPTIONS OR HESITATIONS OF THE MOVEMENT  B) SLIGHT SLOWING  C) THE AMPLITUDE DECREMENTS NEAR THE END OF THE 10 MOVEMENTS.
PARKINSONS_MEDS: OFF
FREEZING_GAIT: (0) NORMAL: NO FREEZING.

## 2024-07-19 ASSESSMENT — MOVEMENT DISORDERS SOCIETY - UNIFIED PARKINSONS DISEASE RATING SCALE (MDS-UPDRS)
WALKING_AND_BALANCE: (0) NORMAL: NOT AT ALL (NO PROBLEMS).
TURNING_IN_BED: (0) NORMAL: NOT AT ALL (NO PROBLEMS).
HOBBIES_AND_OTHER_ACTIVITIES: (0) NORMAL: NOT AT ALL (NO PROBLEMS).
HANDWRITING: (0) NORMAL: NOT AT ALL (NO PROBLEMS).
CHEWING_AND_SWALLOWING: (0) NORMAL: NO PROBLEMS.
HYGIENE: (0) NORMAL: NOT AT ALL (NO PROBLEMS).
FREEZING: (0) NORMAL: NOT AT ALL (NO PROBLEMS).
SPEECH: (1) SLIGHT: MY SPEECH IS SOFT, SLURRED OR UNEVEN, BUT IT DOES NOT CAUSE OTHERS TO ASK ME TO REPEAT MYSELF.
GETTING_OUT_OF_BED_CAR_DEEP_CHAIR: (0) NORMAL: NOT AT ALL (NO PROBLEMS).
TREMOR: (1) SLIGHT: SHAKING OR TREMOR OCCURS BUT DOES NOT CAUSE PROBLEMS WITH ANY ACTIVITIES.
TOTAL_SCORE: 5
SALIVA_AND_DROOLING: (1) SLIGHT: I HAVE TOO MUCH SALIVA, BUT DO NOT DROOL.
DRESSING: (2) MILD: I AM SLOW AND NEED HELP FOR A FEW DRESSING TASKS (BUTTONS, BRACELETS).
EATING_TASKS: (0) NORMAL: NOT AT ALL (NO PROBLEMS).

## 2024-07-19 NOTE — PATIENT INSTRUCTIONS
For the anxiety, we will try Lexapro (escitalopram).  I've prescribed 5 mg tabs.  Take one, daily, for one week then increase to 2, daily.  I suggest taking it at bedtime, in case it has a sedating effect.  If, however, it causes insomnia, then the first thing to try would be taking it in the morning.

## 2024-09-21 ENCOUNTER — HEALTH MAINTENANCE LETTER (OUTPATIENT)
Age: 74
End: 2024-09-21

## 2024-09-23 DIAGNOSIS — F41.9 ANXIETY: ICD-10-CM

## 2024-09-23 NOTE — TELEPHONE ENCOUNTER
RX Authorization    Medication: escitalopram (LEXAPRO) 5 MG tablet     Date last refill ordered: 7/19/24    Quantity ordered: 60    # refills: 1    Date of last clinic visit with ordering provider: 7/19/24    Date of next clinic visit with ordering provider: 1/17/25

## 2024-09-27 RX ORDER — ESCITALOPRAM OXALATE 10 MG/1
10 TABLET ORAL DAILY
Qty: 90 TABLET | Refills: 3 | Status: SHIPPED | OUTPATIENT
Start: 2024-09-27

## 2024-09-27 NOTE — TELEPHONE ENCOUNTER
Patient called to inquire about status of Lexapro refill. He started the medication in July by taking one tablet of 5 mg (total 5 mg), and then increased to his current dose of two tablets of 5 mg (total 10 mg). He would like to continue taking the medication, so I let him know we would fill the 10 mg strength tablets, and he'll just take one of these each time.  I let patient know I would send a message to Dr. Sloan to have this filled. He has enough medication to get him through the weekend.    I have changed the pended order to match patients current dose of 10 mg daily (strength changed from 5 mg to 10 mg) and added 90 day supply with 3 refills.     Gela Ny, RN, MPH  Research Nurse, Movement Disorders

## 2024-11-30 ENCOUNTER — HEALTH MAINTENANCE LETTER (OUTPATIENT)
Age: 74
End: 2024-11-30

## 2025-03-26 ENCOUNTER — TELEPHONE (OUTPATIENT)
Dept: NEUROLOGY | Facility: CLINIC | Age: 75
End: 2025-03-26
Payer: COMMERCIAL

## 2025-03-26 NOTE — TELEPHONE ENCOUNTER
The patient asked if his research on/off testing was on 7/18/25. The patient was informed his 7/18/25 apointment with Dr. Sloan is a regular programming visit and that it's not for research. The patient requested to have his programming addressed on the same day as his research visit on 8/18/25 and that he wanted to cancel the 7/18/25 appointment. The 7/18/25 appointment was canceled per patient's request. The patient stated he was not aware he had an appointment on 7/18/25 for a regular programming visit. Informed the patient it was made the by rooming staff who checked him in on 3/7/25. The patient verbalized their understanding and had no further questions.

## 2025-03-26 NOTE — Clinical Note
KELLIE he wants to do both research and programming on 8/18/25. I wasn't sure if that was okay to do.

## 2025-06-07 ENCOUNTER — HEALTH MAINTENANCE LETTER (OUTPATIENT)
Age: 75
End: 2025-06-07

## 2025-06-16 ENCOUNTER — TELEPHONE (OUTPATIENT)
Dept: NEUROSURGERY | Facility: CLINIC | Age: 75
End: 2025-06-16
Payer: COMMERCIAL

## 2025-06-16 NOTE — TELEPHONE ENCOUNTER
Patient calling stating he is needing Cataract and laser surgery asking about DBS system and if anything will need to be checked prior .  5/11/2023 by Dr. Marshall  replacement of deep brain stimulator generator/battery, model Infinity 5, over the left chest wall with connection to one electrode array and revision of the left chest wall generator/battery pocket on 5/11/2023 .  Will review and get back to patient.  Sending to Neuromodulation. Carleen OLIVEROS

## 2025-09-04 ENCOUNTER — TELEPHONE (OUTPATIENT)
Dept: NEUROLOGY | Facility: CLINIC | Age: 75
End: 2025-09-04
Payer: COMMERCIAL

## (undated) DEVICE — PAD CHUX UNDERPAD 23X24" 7136

## (undated) DEVICE — PATIENT MANUAL AND MAGNET

## (undated) DEVICE — DRAPE C-ARM W/STRAPS 42X72" 07-CA104

## (undated) DEVICE — SU VICRYL 3-0 SH 8X18" UND J864D

## (undated) DEVICE — CABLE 5 CHANNEL INPUT  ALPHA OMEGA SYSTEM STR-000642-55

## (undated) DEVICE — DRAPE SHEET REV FOLD 3/4 9349

## (undated) DEVICE — PREP CHLORAPREP CLEAR 3ML 260400

## (undated) DEVICE — PACK NEURO MINOR UMMC SNE32MNMU4

## (undated) DEVICE — STRAP UNIVERSAL POSITIONING 2-PIECE 4X47X76" 91-287

## (undated) DEVICE — PACK SET-UP STD 9102

## (undated) DEVICE — HEADRING POINTS STEREOTACTIC DHRSL

## (undated) DEVICE — GLOVE PROTEXIS MICRO 7.5  2D73PM75

## (undated) DEVICE — PREP CHLORAPREP ORANGE 3ML  260415

## (undated) DEVICE — TOOL INSERTION LEAD/EXTENSION 1803ANS

## (undated) DEVICE — COVER CAMERA VIDEO LASER 9X96" 04-CC227

## (undated) DEVICE — BUR STRK PRECISION ACORN 7.5MM 5220-030-575

## (undated) DEVICE — ESU PENCIL W/ROCKER SWITCH BLADE HOLSTER E2350HDB

## (undated) DEVICE — SU ETHIBOND 2-0 SHDA 30" X563H

## (undated) DEVICE — ESU GROUND PAD ADULT REM W/15' CORD E7507DB

## (undated) DEVICE — SU MONOCRYL 4-0 PS-2 27" UND Y426H

## (undated) DEVICE — PREP POVIDONE IODINE SOLUTION 10% 120ML

## (undated) DEVICE — SOL NACL 0.9% IRRIG 1000ML BOTTLE 2F7124

## (undated) DEVICE — GLOVE PROTEXIS BLUE W/NEU-THERA 8.0  2D73EB80

## (undated) DEVICE — SU VICRYL 3-0 SH CR 8X18" J774

## (undated) DEVICE — SU DERMABOND ADVANCED .7ML DNX12

## (undated) DEVICE — DRSG PRIMAPORE 02X3" 7133

## (undated) DEVICE — SPONGE SURGIFOAM 100 1974

## (undated) DEVICE — PREP CHLORAPREP 26ML TINTED HI-LITE ORANGE 930815

## (undated) DEVICE — LINEN TOWEL PACK X5 5464

## (undated) DEVICE — SYR 10ML FINGER CONTROL W/O NDL 309695

## (undated) DEVICE — DRAPE U SPLIT 74X120" 29440

## (undated) DEVICE — SUCTION MANIFOLD NEPTUNE 2 SYS 4 PORT 0702-020-000

## (undated) DEVICE — DRAPE IOBAN ISOLATION VERTICAL 320X21CM 6617

## (undated) DEVICE — ESU GROUND PAD ADULT W/CORD E7507

## (undated) DEVICE — TUBE GUIDE ALPHA OMEGA SYSTEM STR-007721-00

## (undated) DEVICE — LABEL MEDICATION SYSTEM 3303-P

## (undated) DEVICE — ELEC MICROPHONICS-FREE ALPHA OMEGA STR-009080-00

## (undated) DEVICE — SU VICRYL 3-0 SH 27" UND J416H

## (undated) DEVICE — WIPES FOLEY CARE SURESTEP PROVON DFC100

## (undated) DEVICE — SU VICRYL 0 CT-1 27" UND J260H

## (undated) DEVICE — ESU ELEC BLADE 2.75" COATED/INSULATED E1455

## (undated) DEVICE — GLOVE BIOGEL PI MICRO SZ 7.5 48575

## (undated) DEVICE — PREP POVIDONE IODINE SOLUTION 10% 4OZ

## (undated) DEVICE — NDL 25GA 2"  8881200441

## (undated) DEVICE — DRAPE LAP PEDS DISP 29492

## (undated) DEVICE — LINEN TOWEL PACK X6 WHITE 5487

## (undated) DEVICE — PATIENT CONTROLLER DBS

## (undated) DEVICE — COVER CAMERA IN-LIGHT DISP LT-C02

## (undated) DEVICE — SUTURE BOOTS 051003PBX

## (undated) DEVICE — PACK GOWN 3/PK DISP XL SBA32GPFCB

## (undated) DEVICE — PREP SKIN SCRUB TRAY 4461A

## (undated) DEVICE — Device

## (undated) DEVICE — PREP CHLORAPREP 26ML TINTED ORANGE  260815

## (undated) DEVICE — ESU PENCIL SMOKE EVAC W/ROCKER SWITCH 0703-047-000

## (undated) DEVICE — DECANTER VIAL 2006S

## (undated) DEVICE — NDL BLUNT 18GA 1" W/O FILTER 305181

## (undated) DEVICE — LINEN TOWEL PACK X30 5481

## (undated) DEVICE — BLADE CLIPPER SGL USE 9680

## (undated) DEVICE — DRSG GAUZE 4X4" TRAY 6939

## (undated) DEVICE — DRAPE IOBAN INCISE 23X17" 6650EZ

## (undated) DEVICE — SOL WATER IRRIG 1000ML BOTTLE 2F7114

## (undated) DEVICE — PREP CHLORAPREP CLEAR 3ML 930400

## (undated) DEVICE — SPONGE RAY-TEC 4X8" 7318

## (undated) DEVICE — SU SILK 2-0 TIE 12X30" A305H

## (undated) DEVICE — DRSG BIOPATCH GERMICIDAL SPLIT SPONGE 4MM MED 4150

## (undated) DEVICE — RX BACITRACIN OINTMENT 0.9G 1/32OZ CUR001109

## (undated) DEVICE — GLOVE BIOGEL PI MICRO INDICATOR UNDERGLOVE SZ 8.0 48980

## (undated) DEVICE — SPONGE COTTONOID 1/2X1/2" 20-04S

## (undated) DEVICE — DRAPE MAYO STAND 23X54 8337

## (undated) DEVICE — PREP POVIDONE IODINE SCRUB 7.5% 120ML

## (undated) DEVICE — DRSG STERI STRIP 1/2X4" R1547

## (undated) DEVICE — SUCTION MANIFOLD DORNOCH ULTRA CART UL-CL500

## (undated) DEVICE — PREP POVIDONE-IODINE 7.5% SCRUB 4OZ BOTTLE MDS093945

## (undated) DEVICE — PREP POVIDONE-IODINE 10% SOLUTION 4OZ BOTTLE MDS093944

## (undated) DEVICE — DRSG TELFA 3X8" 1238

## (undated) DEVICE — ADH SKIN CLOSURE PREMIERPRO EXOFIN 1.0ML 3470

## (undated) DEVICE — BLADE KNIFE SURG 15 371115

## (undated) DEVICE — DRSG PRIMAPORE 03 1/8X6" 66000318

## (undated) DEVICE — PREP POVIDONE IODINE SCRUB 7.5% 4OZ APL82212

## (undated) DEVICE — LIGHT HANDLE X1 31140133

## (undated) DEVICE — CABLE MULTI-LEAD TRIAL 3014ANS

## (undated) DEVICE — CATH TRAY FOLEY SURESTEP 16FR W/TMP PRB STLK LATEX A319416AM

## (undated) RX ORDER — FENTANYL CITRATE 50 UG/ML
INJECTION, SOLUTION INTRAMUSCULAR; INTRAVENOUS
Status: DISPENSED
Start: 2018-06-08

## (undated) RX ORDER — HYDROMORPHONE HYDROCHLORIDE 1 MG/ML
INJECTION, SOLUTION INTRAMUSCULAR; INTRAVENOUS; SUBCUTANEOUS
Status: DISPENSED
Start: 2018-05-29

## (undated) RX ORDER — ESMOLOL HYDROCHLORIDE 10 MG/ML
INJECTION INTRAVENOUS
Status: DISPENSED
Start: 2021-11-23

## (undated) RX ORDER — BUPIVACAINE HYDROCHLORIDE 2.5 MG/ML
INJECTION, SOLUTION EPIDURAL; INFILTRATION; INTRACAUDAL
Status: DISPENSED
Start: 2021-12-03

## (undated) RX ORDER — LIDOCAINE HYDROCHLORIDE AND EPINEPHRINE 10; 10 MG/ML; UG/ML
INJECTION, SOLUTION INFILTRATION; PERINEURAL
Status: DISPENSED
Start: 2021-11-23

## (undated) RX ORDER — CEFAZOLIN SODIUM/WATER 3 G/30 ML
SYRINGE (ML) INTRAVENOUS
Status: DISPENSED
Start: 2023-05-11

## (undated) RX ORDER — LIDOCAINE HYDROCHLORIDE AND EPINEPHRINE 10; 10 MG/ML; UG/ML
INJECTION, SOLUTION INFILTRATION; PERINEURAL
Status: DISPENSED
Start: 2018-06-08

## (undated) RX ORDER — ONDANSETRON 2 MG/ML
INJECTION INTRAMUSCULAR; INTRAVENOUS
Status: DISPENSED
Start: 2018-05-29

## (undated) RX ORDER — CEFAZOLIN SODIUM IN 0.9 % NACL 3 G/100 ML
INTRAVENOUS SOLUTION, PIGGYBACK (ML) INTRAVENOUS
Status: DISPENSED
Start: 2021-12-03

## (undated) RX ORDER — HYDROMORPHONE HYDROCHLORIDE 1 MG/ML
INJECTION, SOLUTION INTRAMUSCULAR; INTRAVENOUS; SUBCUTANEOUS
Status: DISPENSED
Start: 2018-06-08

## (undated) RX ORDER — LIDOCAINE HYDROCHLORIDE 20 MG/ML
INJECTION, SOLUTION EPIDURAL; INFILTRATION; INTRACAUDAL; PERINEURAL
Status: DISPENSED
Start: 2021-12-03

## (undated) RX ORDER — BUPIVACAINE HYDROCHLORIDE 2.5 MG/ML
INJECTION, SOLUTION EPIDURAL; INFILTRATION; INTRACAUDAL
Status: DISPENSED
Start: 2018-05-29

## (undated) RX ORDER — CEFAZOLIN SODIUM 1 G/3ML
INJECTION, POWDER, FOR SOLUTION INTRAMUSCULAR; INTRAVENOUS
Status: DISPENSED
Start: 2018-05-29

## (undated) RX ORDER — ONDANSETRON 2 MG/ML
INJECTION INTRAMUSCULAR; INTRAVENOUS
Status: DISPENSED
Start: 2021-12-03

## (undated) RX ORDER — ROCURONIUM BROMIDE 50 MG/5 ML
SYRINGE (ML) INTRAVENOUS
Status: DISPENSED
Start: 2021-11-23

## (undated) RX ORDER — PROPOFOL 10 MG/ML
INJECTION, EMULSION INTRAVENOUS
Status: DISPENSED
Start: 2018-06-08

## (undated) RX ORDER — BUPIVACAINE HYDROCHLORIDE 2.5 MG/ML
INJECTION, SOLUTION EPIDURAL; INFILTRATION; INTRACAUDAL
Status: DISPENSED
Start: 2021-11-23

## (undated) RX ORDER — ESMOLOL HYDROCHLORIDE 10 MG/ML
INJECTION INTRAVENOUS
Status: DISPENSED
Start: 2021-12-03

## (undated) RX ORDER — BUPIVACAINE HYDROCHLORIDE 2.5 MG/ML
INJECTION, SOLUTION EPIDURAL; INFILTRATION; INTRACAUDAL
Status: DISPENSED
Start: 2023-05-11

## (undated) RX ORDER — FENTANYL CITRATE 50 UG/ML
INJECTION, SOLUTION INTRAMUSCULAR; INTRAVENOUS
Status: DISPENSED
Start: 2021-12-03

## (undated) RX ORDER — CEFAZOLIN SODIUM 1 G/3ML
INJECTION, POWDER, FOR SOLUTION INTRAMUSCULAR; INTRAVENOUS
Status: DISPENSED
Start: 2021-11-23

## (undated) RX ORDER — EPHEDRINE SULFATE 50 MG/ML
INJECTION, SOLUTION INTRAMUSCULAR; INTRAVENOUS; SUBCUTANEOUS
Status: DISPENSED
Start: 2018-05-29

## (undated) RX ORDER — GLYCOPYRROLATE 0.2 MG/ML
INJECTION, SOLUTION INTRAMUSCULAR; INTRAVENOUS
Status: DISPENSED
Start: 2021-11-23

## (undated) RX ORDER — LIDOCAINE HYDROCHLORIDE 20 MG/ML
INJECTION, SOLUTION EPIDURAL; INFILTRATION; INTRACAUDAL; PERINEURAL
Status: DISPENSED
Start: 2021-11-23

## (undated) RX ORDER — BACITRACIN 50000 [IU]/1
INJECTION, POWDER, FOR SOLUTION INTRAMUSCULAR
Status: DISPENSED
Start: 2018-05-29

## (undated) RX ORDER — CEFAZOLIN SODIUM 2 G/100ML
INJECTION, SOLUTION INTRAVENOUS
Status: DISPENSED
Start: 2018-05-29

## (undated) RX ORDER — LIDOCAINE HYDROCHLORIDE AND EPINEPHRINE 10; 10 MG/ML; UG/ML
INJECTION, SOLUTION INFILTRATION; PERINEURAL
Status: DISPENSED
Start: 2018-05-29

## (undated) RX ORDER — LIDOCAINE HYDROCHLORIDE 20 MG/ML
JELLY TOPICAL
Status: DISPENSED
Start: 2021-11-23

## (undated) RX ORDER — ROCURONIUM BROMIDE 50 MG/5 ML
SYRINGE (ML) INTRAVENOUS
Status: DISPENSED
Start: 2021-12-03

## (undated) RX ORDER — LABETALOL HYDROCHLORIDE 5 MG/ML
INJECTION, SOLUTION INTRAVENOUS
Status: DISPENSED
Start: 2021-11-23

## (undated) RX ORDER — PROPOFOL 10 MG/ML
INJECTION, EMULSION INTRAVENOUS
Status: DISPENSED
Start: 2021-11-23

## (undated) RX ORDER — ACETAMINOPHEN 325 MG/1
TABLET ORAL
Status: DISPENSED
Start: 2021-12-03

## (undated) RX ORDER — BUPIVACAINE HYDROCHLORIDE 2.5 MG/ML
INJECTION, SOLUTION EPIDURAL; INFILTRATION; INTRACAUDAL
Status: DISPENSED
Start: 2018-06-08

## (undated) RX ORDER — LIDOCAINE HYDROCHLORIDE AND EPINEPHRINE 10; 10 MG/ML; UG/ML
INJECTION, SOLUTION INFILTRATION; PERINEURAL
Status: DISPENSED
Start: 2023-05-11

## (undated) RX ORDER — EPHEDRINE SULFATE 50 MG/ML
INJECTION, SOLUTION INTRAMUSCULAR; INTRAVENOUS; SUBCUTANEOUS
Status: DISPENSED
Start: 2021-12-03

## (undated) RX ORDER — GLYCOPYRROLATE 0.2 MG/ML
INJECTION, SOLUTION INTRAMUSCULAR; INTRAVENOUS
Status: DISPENSED
Start: 2021-12-03

## (undated) RX ORDER — PROPOFOL 10 MG/ML
INJECTION, EMULSION INTRAVENOUS
Status: DISPENSED
Start: 2018-05-29

## (undated) RX ORDER — HYDROMORPHONE HCL/0.9% NACL/PF 0.2MG/0.2
SYRINGE (ML) INTRAVENOUS
Status: DISPENSED
Start: 2018-05-29

## (undated) RX ORDER — ONDANSETRON 2 MG/ML
INJECTION INTRAMUSCULAR; INTRAVENOUS
Status: DISPENSED
Start: 2021-11-23

## (undated) RX ORDER — BACITRACIN 50000 [IU]/1
INJECTION, POWDER, FOR SOLUTION INTRAMUSCULAR
Status: DISPENSED
Start: 2018-06-08

## (undated) RX ORDER — FENTANYL CITRATE-0.9 % NACL/PF 10 MCG/ML
PLASTIC BAG, INJECTION (ML) INTRAVENOUS
Status: DISPENSED
Start: 2021-12-03

## (undated) RX ORDER — ACETAMINOPHEN 325 MG/1
TABLET ORAL
Status: DISPENSED
Start: 2021-11-23

## (undated) RX ORDER — FENTANYL CITRATE 50 UG/ML
INJECTION, SOLUTION INTRAMUSCULAR; INTRAVENOUS
Status: DISPENSED
Start: 2018-05-29

## (undated) RX ORDER — HYDRALAZINE HYDROCHLORIDE 20 MG/ML
INJECTION INTRAMUSCULAR; INTRAVENOUS
Status: DISPENSED
Start: 2018-05-29

## (undated) RX ORDER — PHENYLEPHRINE HCL IN 0.9% NACL 1 MG/10 ML
SYRINGE (ML) INTRAVENOUS
Status: DISPENSED
Start: 2018-06-08

## (undated) RX ORDER — LIDOCAINE HYDROCHLORIDE 20 MG/ML
INJECTION, SOLUTION EPIDURAL; INFILTRATION; INTRACAUDAL; PERINEURAL
Status: DISPENSED
Start: 2018-06-08

## (undated) RX ORDER — BACITRACIN 500 [USP'U]/G
OINTMENT OPHTHALMIC
Status: DISPENSED
Start: 2018-05-29

## (undated) RX ORDER — LIDOCAINE HYDROCHLORIDE AND EPINEPHRINE 10; 10 MG/ML; UG/ML
INJECTION, SOLUTION INFILTRATION; PERINEURAL
Status: DISPENSED
Start: 2021-12-03

## (undated) RX ORDER — FENTANYL CITRATE 50 UG/ML
INJECTION, SOLUTION INTRAMUSCULAR; INTRAVENOUS
Status: DISPENSED
Start: 2021-11-23

## (undated) RX ORDER — SODIUM CHLORIDE 9 MG/ML
INJECTION, SOLUTION INTRAVENOUS
Status: DISPENSED
Start: 2021-11-23

## (undated) RX ORDER — FENTANYL CITRATE-0.9 % NACL/PF 10 MCG/ML
PLASTIC BAG, INJECTION (ML) INTRAVENOUS
Status: DISPENSED
Start: 2021-11-23

## (undated) RX ORDER — ACETAMINOPHEN 325 MG/1
TABLET ORAL
Status: DISPENSED
Start: 2018-06-08

## (undated) RX ORDER — PHENYLEPHRINE HCL IN 0.9% NACL 1 MG/10 ML
SYRINGE (ML) INTRAVENOUS
Status: DISPENSED
Start: 2018-05-29

## (undated) RX ORDER — CEFAZOLIN SODIUM 1 G/50ML
SOLUTION INTRAVENOUS
Status: DISPENSED
Start: 2018-06-08

## (undated) RX ORDER — CEFAZOLIN SODIUM 2 G/100ML
INJECTION, SOLUTION INTRAVENOUS
Status: DISPENSED
Start: 2021-11-23

## (undated) RX ORDER — PROPOFOL 10 MG/ML
INJECTION, EMULSION INTRAVENOUS
Status: DISPENSED
Start: 2021-12-03